# Patient Record
Sex: MALE | Race: WHITE | NOT HISPANIC OR LATINO | Employment: OTHER | URBAN - METROPOLITAN AREA
[De-identification: names, ages, dates, MRNs, and addresses within clinical notes are randomized per-mention and may not be internally consistent; named-entity substitution may affect disease eponyms.]

---

## 2017-05-04 ENCOUNTER — PATIENT MESSAGE (OUTPATIENT)
Dept: DERMATOLOGY | Facility: CLINIC | Age: 72
End: 2017-05-04

## 2017-05-04 ENCOUNTER — PATIENT MESSAGE (OUTPATIENT)
Dept: CARDIOLOGY | Facility: CLINIC | Age: 72
End: 2017-05-04

## 2017-05-04 DIAGNOSIS — Z12.5 SCREENING FOR PROSTATE CANCER: Primary | ICD-10-CM

## 2017-06-21 ENCOUNTER — LAB VISIT (OUTPATIENT)
Dept: LAB | Facility: HOSPITAL | Age: 72
End: 2017-06-21
Attending: INTERNAL MEDICINE
Payer: MEDICARE

## 2017-06-21 DIAGNOSIS — Z12.5 SCREENING FOR PROSTATE CANCER: ICD-10-CM

## 2017-06-21 LAB — COMPLEXED PSA SERPL-MCNC: 1.9 NG/ML

## 2017-06-21 PROCEDURE — 36415 COLL VENOUS BLD VENIPUNCTURE: CPT

## 2017-06-21 PROCEDURE — 84270 ASSAY OF SEX HORMONE GLOBUL: CPT

## 2017-06-21 PROCEDURE — 84153 ASSAY OF PSA TOTAL: CPT

## 2017-06-24 LAB
ALBUMIN SERPL-MCNC: 4 G/DL (ref 3.6–5.1)
SHBG SERPL-SCNC: 22 NMOL/L (ref 22–77)
TESTOST FREE SERPL-MCNC: 65.7 PG/ML (ref 6–73)
TESTOST SERPL-MCNC: 356 NG/DL (ref 250–1100)
TESTOSTERONE.FREE+WB SERPL-MCNC: 120.8 NG/DL (ref 15–150)

## 2017-06-27 ENCOUNTER — OFFICE VISIT (OUTPATIENT)
Dept: CARDIOLOGY | Facility: CLINIC | Age: 72
End: 2017-06-27
Payer: MEDICARE

## 2017-06-27 ENCOUNTER — LAB VISIT (OUTPATIENT)
Dept: LAB | Facility: HOSPITAL | Age: 72
End: 2017-06-27
Payer: MEDICARE

## 2017-06-27 VITALS
DIASTOLIC BLOOD PRESSURE: 77 MMHG | HEIGHT: 73 IN | HEART RATE: 69 BPM | WEIGHT: 257.25 LBS | OXYGEN SATURATION: 96 % | BODY MASS INDEX: 34.09 KG/M2 | SYSTOLIC BLOOD PRESSURE: 151 MMHG

## 2017-06-27 DIAGNOSIS — E78.5 DYSLIPIDEMIA: ICD-10-CM

## 2017-06-27 DIAGNOSIS — R53.83 FATIGUE, UNSPECIFIED TYPE: ICD-10-CM

## 2017-06-27 DIAGNOSIS — R06.2 WHEEZING: Primary | ICD-10-CM

## 2017-06-27 DIAGNOSIS — I25.10 CORONARY ARTERY DISEASE INVOLVING NATIVE CORONARY ARTERY WITHOUT ANGINA PECTORIS, UNSPECIFIED WHETHER NATIVE OR TRANSPLANTED HEART: ICD-10-CM

## 2017-06-27 DIAGNOSIS — Z87.891 HISTORY OF CIGARETTE SMOKING: ICD-10-CM

## 2017-06-27 DIAGNOSIS — I10 ESSENTIAL HYPERTENSION: ICD-10-CM

## 2017-06-27 LAB
ALBUMIN SERPL BCP-MCNC: 3.6 G/DL
ALP SERPL-CCNC: 36 U/L
ALT SERPL W/O P-5'-P-CCNC: 30 U/L
ANION GAP SERPL CALC-SCNC: 8 MMOL/L
AST SERPL-CCNC: 26 U/L
BASOPHILS # BLD AUTO: 0.03 K/UL
BASOPHILS NFR BLD: 0.4 %
BILIRUB SERPL-MCNC: 0.4 MG/DL
BUN SERPL-MCNC: 33 MG/DL
CALCIUM SERPL-MCNC: 10 MG/DL
CHLORIDE SERPL-SCNC: 107 MMOL/L
CHOLEST/HDLC SERPL: 3.5 {RATIO}
CO2 SERPL-SCNC: 22 MMOL/L
CREAT SERPL-MCNC: 1.1 MG/DL
DIFFERENTIAL METHOD: NORMAL
EOSINOPHIL # BLD AUTO: 0.2 K/UL
EOSINOPHIL NFR BLD: 2.8 %
ERYTHROCYTE [DISTWIDTH] IN BLOOD BY AUTOMATED COUNT: 14.2 %
EST. GFR  (AFRICAN AMERICAN): >60 ML/MIN/1.73 M^2
EST. GFR  (NON AFRICAN AMERICAN): >60 ML/MIN/1.73 M^2
GLUCOSE SERPL-MCNC: 105 MG/DL
HCT VFR BLD AUTO: 43.1 %
HDL/CHOLESTEROL RATIO: 28.3 %
HDLC SERPL-MCNC: 184 MG/DL
HDLC SERPL-MCNC: 52 MG/DL
HGB BLD-MCNC: 14.5 G/DL
LDLC SERPL CALC-MCNC: 118.6 MG/DL
LYMPHOCYTES # BLD AUTO: 2 K/UL
LYMPHOCYTES NFR BLD: 26.6 %
MCH RBC QN AUTO: 31 PG
MCHC RBC AUTO-ENTMCNC: 33.6 %
MCV RBC AUTO: 92 FL
MONOCYTES # BLD AUTO: 0.8 K/UL
MONOCYTES NFR BLD: 11.3 %
NEUTROPHILS # BLD AUTO: 4.4 K/UL
NEUTROPHILS NFR BLD: 58.6 %
NONHDLC SERPL-MCNC: 132 MG/DL
PLATELET # BLD AUTO: 182 K/UL
PMV BLD AUTO: 10.8 FL
POTASSIUM SERPL-SCNC: 5 MMOL/L
PROT SERPL-MCNC: 7.5 G/DL
RBC # BLD AUTO: 4.68 M/UL
SODIUM SERPL-SCNC: 137 MMOL/L
TRIGL SERPL-MCNC: 67 MG/DL
TSH SERPL DL<=0.005 MIU/L-ACNC: 1.25 UIU/ML
WBC # BLD AUTO: 7.45 K/UL

## 2017-06-27 PROCEDURE — 85025 COMPLETE CBC W/AUTO DIFF WBC: CPT

## 2017-06-27 PROCEDURE — 99499 UNLISTED E&M SERVICE: CPT | Mod: S$GLB,,, | Performed by: INTERNAL MEDICINE

## 2017-06-27 PROCEDURE — 80053 COMPREHEN METABOLIC PANEL: CPT

## 2017-06-27 PROCEDURE — 1125F AMNT PAIN NOTED PAIN PRSNT: CPT | Mod: S$GLB,,, | Performed by: INTERNAL MEDICINE

## 2017-06-27 PROCEDURE — 84443 ASSAY THYROID STIM HORMONE: CPT

## 2017-06-27 PROCEDURE — 1159F MED LIST DOCD IN RCRD: CPT | Mod: S$GLB,,, | Performed by: INTERNAL MEDICINE

## 2017-06-27 PROCEDURE — 99999 PR PBB SHADOW E&M-EST. PATIENT-LVL III: CPT | Mod: PBBFAC,,, | Performed by: INTERNAL MEDICINE

## 2017-06-27 PROCEDURE — 99215 OFFICE O/P EST HI 40 MIN: CPT | Mod: S$GLB,,, | Performed by: INTERNAL MEDICINE

## 2017-06-27 PROCEDURE — 36415 COLL VENOUS BLD VENIPUNCTURE: CPT

## 2017-06-27 PROCEDURE — 80061 LIPID PANEL: CPT

## 2017-06-27 RX ORDER — LISINOPRIL 10 MG/1
20 TABLET ORAL DAILY
Qty: 90 TABLET | Refills: 0 | Status: SHIPPED | OUTPATIENT
Start: 2017-06-27 | End: 2017-07-24 | Stop reason: DRUGHIGH

## 2017-06-27 NOTE — PROGRESS NOTES
Subjective:    Patient ID:  Jose Fonseca Jr. is a 71 y.o. male who presents for follow-up of Coronary Artery Disease      HPI  Hx of  CAD s/p CABG x3 (1996 - LIMA-OM; LANCE-LAD; SVG-PDA), normal stress echo 6/26/15 . Last angiogram in Oct 2013 significant for patent pLAD stent and LIMA-OM and VG-PDA grafts and known LANCE-LAD occlusion dating back to at least 2004. He has had no symptoms of chest pain or shortness of breath. He reports significant stress in his life. Reports wheezing, generalized fatigue. Has not been exercising as much recently as he has in the past but has lost significant amount of weight due to lifestyle modification. Reports nocturnal leg cramping that improves with hydration and electrolyte replacement.      Review of Systems   Constitution: Positive for malaise/fatigue and weight loss. Negative for weight gain.   HENT: Negative for headaches.    Eyes: Negative for blurred vision, double vision, vision loss in left eye, vision loss in right eye and visual disturbance.   Cardiovascular: Negative for claudication, dyspnea on exertion, irregular heartbeat, leg swelling, near-syncope, orthopnea and palpitations.   Respiratory: Negative for shortness of breath and snoring.    Hematologic/Lymphatic: Does not bruise/bleed easily.   Skin: Negative for rash.   Musculoskeletal: Negative for myalgias.   Gastrointestinal: Negative for bloating, abdominal pain, constipation, diarrhea, hematemesis, hematochezia, melena, nausea and vomiting.   Neurological: Negative for excessive daytime sleepiness, dizziness, light-headedness, loss of balance, numbness and vertigo.   Psychiatric/Behavioral: Negative for altered mental status.        Objective:    Physical Exam   Constitutional: He is oriented to person, place, and time. Vital signs are normal. He appears well-developed and well-nourished.   HENT:   Head: Normocephalic.   Eyes: Conjunctivae and EOM are normal. Pupils are equal, round, and reactive to  light.   Neck: Normal range of motion. Neck supple. No hepatojugular reflux and no JVD present. Carotid bruit is not present. No tracheal deviation present. No thyromegaly present.   Cardiovascular: Normal rate, regular rhythm, normal heart sounds, intact distal pulses and normal pulses.    Pulses:       Carotid pulses are 2+ on the right side, and 2+ on the left side.       Radial pulses are 2+ on the right side, and 2+ on the left side.        Femoral pulses are 2+ on the right side, and 2+ on the left side.       Popliteal pulses are 2+ on the right side, and 2+ on the left side.        Dorsalis pedis pulses are 2+ on the right side, and 2+ on the left side.        Posterior tibial pulses are 2+ on the right side, and 2+ on the left side.   Pulmonary/Chest: Effort normal and breath sounds normal. No respiratory distress.   Abdominal: Soft. Bowel sounds are normal. There is no hepatosplenomegaly.   Musculoskeletal: Normal range of motion.   Neurological: He is alert and oriented to person, place, and time. He has normal strength and normal reflexes.   Skin: Skin is warm and dry.   Psychiatric: He has a normal mood and affect.   Nursing note and vitals reviewed.    Vitals:    06/27/17 0945   BP: (!) 151/77   Pulse: 69         Assessment:       1. Coronary artery disease involving native coronary artery without angina pectoris, unspecified whether native or transplanted heart    2. Essential hypertension    3. Dyslipidemia    4. Fatigue, unspecified type    5. Wheezing    6. History of cigarette smoking         Plan:     1. Coronary artery disease involving native coronary artery without angina pectoris, unspecified whether native or transplanted heart   - Continue ASA/ statin   2. Essential hypertension   - Increase lisinopril to 20 mg daily, D/C beta blocker due to fatigue / wheezing   3. Dyslipidemia   - COntinue lipitor   4. Fatigue, unspecified type   - Check BMP, CBC, TSH, stop beta blocker and monitor  response   5. Wheezing   - hx of smoking, check for COPD c PFTs, D/C beta blocker   6. History of cigarette smoking      RTC in 1 year or PRN    Patient seen and discussed with Dr. Fred Tran MD, MPH  PGY-IV  Cardiovascular Disease Fellow      I have personally taken the history and examined this patient. I have discussed and agree with the resident's findings and plan as documented in the resident's note.  Ken Ibarra

## 2017-06-28 ENCOUNTER — TELEPHONE (OUTPATIENT)
Dept: OTOLARYNGOLOGY | Facility: CLINIC | Age: 72
End: 2017-06-28

## 2017-06-28 ENCOUNTER — PATIENT MESSAGE (OUTPATIENT)
Dept: CARDIOLOGY | Facility: CLINIC | Age: 72
End: 2017-06-28

## 2017-06-28 DIAGNOSIS — I25.10 CORONARY ARTERY DISEASE, ANGINA PRESENCE UNSPECIFIED, UNSPECIFIED VESSEL OR LESION TYPE, UNSPECIFIED WHETHER NATIVE OR TRANSPLANTED HEART: Primary | ICD-10-CM

## 2017-06-28 DIAGNOSIS — E78.5 DYSLIPIDEMIA: ICD-10-CM

## 2017-06-28 NOTE — TELEPHONE ENCOUNTER
Voicemail left informing pt he  Was scheduled with wrong provider regarding a hearing test. Pt rescheduled with the first available audiogram and with NP to go over results. Pt asked to return call to clinic if there is a conflict with new date and time.

## 2017-06-29 RX ORDER — ASPIRIN 81 MG/1
81 TABLET ORAL DAILY
Qty: 30 TABLET | Refills: 11 | Status: SHIPPED | OUTPATIENT
Start: 2017-06-29

## 2017-06-29 RX ORDER — ATORVASTATIN CALCIUM 40 MG/1
40 TABLET, FILM COATED ORAL DAILY
Qty: 30 TABLET | Refills: 11 | Status: SHIPPED | OUTPATIENT
Start: 2017-06-29 | End: 2019-05-23 | Stop reason: SDUPTHER

## 2017-07-15 ENCOUNTER — PATIENT MESSAGE (OUTPATIENT)
Dept: CARDIOLOGY | Facility: CLINIC | Age: 72
End: 2017-07-15

## 2017-07-24 RX ORDER — LISINOPRIL 20 MG/1
40 TABLET ORAL DAILY
COMMUNITY
End: 2018-02-20 | Stop reason: ALTCHOICE

## 2017-07-25 ENCOUNTER — TELEPHONE (OUTPATIENT)
Dept: INTERNAL MEDICINE | Facility: CLINIC | Age: 72
End: 2017-07-25

## 2017-07-25 ENCOUNTER — OFFICE VISIT (OUTPATIENT)
Dept: INTERNAL MEDICINE | Facility: CLINIC | Age: 72
End: 2017-07-25
Payer: MEDICARE

## 2017-07-25 DIAGNOSIS — R53.83 FATIGUE, UNSPECIFIED TYPE: ICD-10-CM

## 2017-07-25 DIAGNOSIS — Z00.00 ROUTINE GENERAL MEDICAL EXAMINATION AT A HEALTH CARE FACILITY: Primary | ICD-10-CM

## 2017-07-25 DIAGNOSIS — E55.9 VITAMIN D DEFICIENCY: Primary | ICD-10-CM

## 2017-07-25 DIAGNOSIS — R79.9 ABNORMAL FINDING OF BLOOD CHEMISTRY: ICD-10-CM

## 2017-07-25 DIAGNOSIS — I25.10 CORONARY ARTERY DISEASE INVOLVING NATIVE CORONARY ARTERY WITHOUT ANGINA PECTORIS, UNSPECIFIED WHETHER NATIVE OR TRANSPLANTED HEART: ICD-10-CM

## 2017-07-25 DIAGNOSIS — E55.9 VITAMIN D DEFICIENCY: ICD-10-CM

## 2017-07-25 PROCEDURE — 99214 OFFICE O/P EST MOD 30 MIN: CPT | Mod: S$GLB,,, | Performed by: INTERNAL MEDICINE

## 2017-07-25 PROCEDURE — 1159F MED LIST DOCD IN RCRD: CPT | Mod: S$GLB,,, | Performed by: INTERNAL MEDICINE

## 2017-07-25 PROCEDURE — 99499 UNLISTED E&M SERVICE: CPT | Mod: S$GLB,,, | Performed by: INTERNAL MEDICINE

## 2017-07-25 PROCEDURE — 99999 PR PBB SHADOW E&M-EST. PATIENT-LVL III: CPT | Mod: PBBFAC,,, | Performed by: INTERNAL MEDICINE

## 2017-07-25 PROCEDURE — 1126F AMNT PAIN NOTED NONE PRSNT: CPT | Mod: S$GLB,,, | Performed by: INTERNAL MEDICINE

## 2017-07-26 ENCOUNTER — PATIENT MESSAGE (OUTPATIENT)
Dept: INTERNAL MEDICINE | Facility: CLINIC | Age: 72
End: 2017-07-26

## 2017-07-27 VITALS
HEART RATE: 80 BPM | WEIGHT: 258.19 LBS | SYSTOLIC BLOOD PRESSURE: 128 MMHG | TEMPERATURE: 98 F | BODY MASS INDEX: 34.06 KG/M2 | DIASTOLIC BLOOD PRESSURE: 64 MMHG

## 2017-07-27 RX ORDER — ASPIRIN 325 MG
50000 TABLET, DELAYED RELEASE (ENTERIC COATED) ORAL
Qty: 8 CAPSULE | Refills: 0 | Status: SHIPPED | OUTPATIENT
Start: 2017-07-27 | End: 2018-03-18 | Stop reason: SDUPTHER

## 2017-07-27 NOTE — PROGRESS NOTES
"Subjective:       Patient ID: Jose Fonseca Jr. is a 71 y.o. male.    Chief Complaint: Annual Exam    HPIVery pleasant gentleman from [place] here for his annual exam. Overall doing well, but he reports feeling fatigued over the last few weeks. He was recently seen by Dr Ibarra in Cardiology and his beta blocker was stopped to see if it made a difference. He still reports feeling "lethargic" and I obtained additional blood work to look into his complaints. I discussed the results of the initial studies that were unremarkable, but the added blood work showed some vitamin D deficiency. I will start him on replacement weekly and see how he does. I also discussed other possible causes of his fatigue including sleep apnea. He is not aware of whether he snores or not, but I suspect he might have some degree of LM and need to r/o. This issue was discussed in the distant past, but he did not tolerate the sleep study and did not complete it. I will schedule it at his convenience and treat accordingly. Meanwhile, I emphasized the importance of proper nutrition, exercise, weight management vis a vis future health issues.  Review of Systems   All other systems reviewed and are negative.      Objective:      Physical Exam   Constitutional: He is oriented to person, place, and time. He appears well-developed and well-nourished. No distress.   HENT:   Head: Normocephalic and atraumatic.   Right Ear: External ear normal.   Left Ear: External ear normal.   Nose: Nose normal.   Mouth/Throat: Oropharynx is clear and moist. No oropharyngeal exudate.   Eyes: Conjunctivae and EOM are normal. Pupils are equal, round, and reactive to light. Right eye exhibits no discharge. Left eye exhibits no discharge. No scleral icterus.   Cataract OS.   Neck: Normal range of motion. Neck supple. No JVD present. No thyromegaly present.   Cardiovascular: Normal rate, regular rhythm, normal heart sounds and intact distal pulses.    No murmur " heard.  Pulmonary/Chest: Effort normal and breath sounds normal. No respiratory distress. He has no wheezes. He exhibits no tenderness.   Abdominal: Soft. Bowel sounds are normal. He exhibits no distension and no mass. There is no tenderness.   Musculoskeletal: Normal range of motion. He exhibits no edema or tenderness.   Lymphadenopathy:     He has no cervical adenopathy.   Neurological: He is alert and oriented to person, place, and time. No cranial nerve deficit. Coordination normal.   Skin: Skin is warm and dry. No rash noted. He is not diaphoretic. No erythema.   Psychiatric: He has a normal mood and affect. His behavior is normal. Judgment and thought content normal.   Nursing note and vitals reviewed.      Assessment:       1. Fatigue, unspecified type    2. Vitamin D deficiency    3. Coronary artery disease involving native coronary artery without angina pectoris, unspecified whether native or transplanted heart    4. Routine general medical examination at a health care facility     5.    R/O LM  Plan:    1. Start vitamin D3 50 K units weekly x 8.         2. Obtain sleep study.         3. Continue with current medications.         4. RTC after above.

## 2017-10-02 ENCOUNTER — HOSPITAL ENCOUNTER (EMERGENCY)
Facility: OTHER | Age: 72
Discharge: HOME | End: 2017-10-02
Attending: EMERGENCY MEDICINE
Payer: MEDICARE

## 2017-10-02 VITALS
HEART RATE: 94 BPM | WEIGHT: 244 LBS | OXYGEN SATURATION: 95 % | BODY MASS INDEX: 32.34 KG/M2 | SYSTOLIC BLOOD PRESSURE: 178 MMHG | HEIGHT: 73 IN | DIASTOLIC BLOOD PRESSURE: 75 MMHG | RESPIRATION RATE: 24 BRPM | TEMPERATURE: 100 F

## 2017-10-02 DIAGNOSIS — M10.9 ACUTE GOUT OF LEFT FOOT, UNSPECIFIED CAUSE: Primary | ICD-10-CM

## 2017-10-02 DIAGNOSIS — R60.9 EDEMA: ICD-10-CM

## 2017-10-02 LAB — URATE SERPL-MCNC: 10.7 MG/DL

## 2017-10-02 PROCEDURE — 84550 ASSAY OF BLOOD/URIC ACID: CPT

## 2017-10-02 PROCEDURE — 99284 EMERGENCY DEPT VISIT MOD MDM: CPT | Mod: 25

## 2017-10-02 PROCEDURE — 96372 THER/PROPH/DIAG INJ SC/IM: CPT

## 2017-10-02 PROCEDURE — 63600175 PHARM REV CODE 636 W HCPCS: Performed by: EMERGENCY MEDICINE

## 2017-10-02 RX ORDER — OXYCODONE AND ACETAMINOPHEN 5; 325 MG/1; MG/1
1 TABLET ORAL EVERY 6 HOURS PRN
Qty: 12 TABLET | Refills: 0 | Status: SHIPPED | OUTPATIENT
Start: 2017-10-02 | End: 2018-06-27

## 2017-10-02 RX ORDER — DEXAMETHASONE SODIUM PHOSPHATE 4 MG/ML
8 INJECTION, SOLUTION INTRA-ARTICULAR; INTRALESIONAL; INTRAMUSCULAR; INTRAVENOUS; SOFT TISSUE
Status: COMPLETED | OUTPATIENT
Start: 2017-10-02 | End: 2017-10-02

## 2017-10-02 RX ORDER — KETOROLAC TROMETHAMINE 30 MG/ML
30 INJECTION, SOLUTION INTRAMUSCULAR; INTRAVENOUS
Status: COMPLETED | OUTPATIENT
Start: 2017-10-02 | End: 2017-10-02

## 2017-10-02 RX ORDER — IBUPROFEN 800 MG/1
800 TABLET ORAL EVERY 6 HOURS PRN
Qty: 12 TABLET | Refills: 0 | Status: ON HOLD | OUTPATIENT
Start: 2017-10-02 | End: 2019-04-22 | Stop reason: HOSPADM

## 2017-10-02 RX ADMIN — KETOROLAC TROMETHAMINE 30 MG: 30 INJECTION, SOLUTION INTRAMUSCULAR at 01:10

## 2017-10-02 RX ADMIN — DEXAMETHASONE SODIUM PHOSPHATE 8 MG: 4 INJECTION, SOLUTION INTRAMUSCULAR; INTRAVENOUS at 01:10

## 2017-10-02 NOTE — ED TRIAGE NOTES
Pt states began having swelling to left foot Friday morning.  Pt states the pain got so bad that he couldn't walk.

## 2017-10-02 NOTE — ED NOTES
Patient Identifiers for Jose Fonseca  checked and correct  LOC: The patient is awake, alert and aware of environment with an appropriate affect, the patient is oriented x 3 and speaking appropriate.  APPEARANCE: Patient resting comfortably and in no acute distress. The patient is clean and well groomed. The patient's clothing is properly fastened.  SKIN: The skin is warm and dry. The patient has normal skin turgor and moist mucus membranes. No rashes or lesions upon observation. Skin Intact , no breakdown noted.  Musculoskeletal :  Normal range of motion noted. Moves all extremities well, swelling noted to left foot  RESPIRATORY: Airway is open and patent, respirations are spontaneous, patient has a normal effort and rate. Breath sounds are clear & equal, bilaterally.  PULSES: 2+ radial & pedal pulses, symmetrical in all extremities.  Will continue to monitor

## 2017-10-02 NOTE — ED PROVIDER NOTES
"Encounter Date: 10/2/2017    SCRIBE #1 NOTE: I, Kasandrache Burton, am scribing for, and in the presence of, Dr. Boothe.       History     Chief Complaint   Patient presents with    Foot Swelling     C/o swelling/pain to left foot x 4 days. Denies any injury. Reports onset of pain when standing from seated position 3 days ago. Denies hx of DVT, gout. Strong pedal pulses noted. Area non-discolored.     Time seen by provider: 12:56 PM    This is a 71 y.o. male who presents with complaint of left foot pain and swelling that has persisted for the past 4 days. The patient reports that his pain began after "sitting incorrectly" at the [facility].  Although his pain is constant, he states that his discomfort fluctuates in severity.  He denies any recent trauma, including falls or heavy lifting.  He mentions no fever, chills, open wounds, color changes, weakness, numbness, or tingling.  The patient states that his discomfort is significantly exacerbated with weight bearing.  Although he attempted to alleviate his discomfort with ice and elevation, he has found no relief.  He denies attempting to alleviate his discomfort with OTC medication because he does not "believe in pain medication." He reports no other identifying, alleviating, or exacerbating factors.  The patient claims that he has had no prior episodes of this symptomology.  He reports history of HTN, HLD, MI, and CAD.  He reports surgical history of CABG.  He denies any recent medication changes.        The history is provided by the patient.     Review of patient's allergies indicates:  No Known Allergies  Past Medical History:   Diagnosis Date    Coronary artery disease     S/P 3 vessel CABG; stents    Glaucoma     HTN (hypertension) 4/19/2013    Hyperlipidemia     Myocardial infarction     Squamous cell carcinoma      Past Surgical History:   Procedure Laterality Date    ADENOIDECTOMY      CATARACT EXTRACTION      patient not sure which eye    CORONARY " ANGIOPLASTY      CORONARY ARTERY BYPASS GRAFT      ROTATOR CUFF REPAIR      SHOULDER SURGERY      TONSILLECTOMY Left 2015     Family History   Problem Relation Age of Onset    Heart disease Father     Hypertension Father     Heart failure Father     Heart attack Father     Diabetes Maternal Grandmother     Diabetes Maternal Grandfather      Social History   Substance Use Topics    Smoking status: Former Smoker     Quit date: 4/24/1991    Smokeless tobacco: Former User    Alcohol use 1.2 oz/week     2 Glasses of wine per week      Comment: Socially     Review of Systems   Constitutional: Negative for chills and fever.   HENT: Negative for congestion and sore throat.    Eyes: Negative for photophobia and redness.   Respiratory: Negative for cough and shortness of breath.    Cardiovascular: Negative for chest pain.   Gastrointestinal: Negative for abdominal pain, nausea and vomiting.   Genitourinary: Negative for dysuria.   Musculoskeletal: Negative for back pain.        Positive for left foot pain and swelling.   Skin: Negative for color change, rash and wound.   Neurological: Negative for weakness, light-headedness, numbness and headaches.        Negative for tingling.    Psychiatric/Behavioral: Negative for confusion.       Physical Exam     Initial Vitals [10/02/17 1234]   BP Pulse Resp Temp SpO2   (!) 178/75 94 (!) 24 100 °F (37.8 °C) 95 %      MAP       109.33         Physical Exam    Nursing note and vitals reviewed.  Constitutional: He appears well-developed and well-nourished. He is not diaphoretic. No distress.   HENT:   Head: Normocephalic and atraumatic.   Mouth/Throat: Oropharynx is clear and moist.   Oropharynx is clear and intact.  Moist mucous membranes.    Eyes: Conjunctivae and EOM are normal. Pupils are equal, round, and reactive to light.   Conjunctiva are pink, clear, and intact.    Neck: Normal range of motion. Neck supple.   Cardiovascular: Normal rate, regular rhythm, S1 normal, S2  normal and normal heart sounds. Exam reveals no gallop and no friction rub.    No murmur heard.  Pulses:       Dorsalis pedis pulses are 2+ on the left side.        Posterior tibial pulses are 2+ on the left side.   Pulmonary/Chest: Breath sounds normal. No respiratory distress. He has no wheezes. He has no rhonchi. He has no rales.   Clear to ascultation bilaterally.   Abdominal: Soft. Bowel sounds are normal. There is no tenderness. There is no rebound and no guarding.   No audible bruits.   Musculoskeletal: Normal range of motion. He exhibits no edema or tenderness.   1+ edema to the left foot. No lower extremity edema. No palpable cords.   Lymphadenopathy:     He has no cervical adenopathy.   Neurological: He is alert and oriented to person, place, and time.   Strength 5/5 to bilateral lower extremities.   Skin: Skin is warm and dry. Capillary refill takes less than 2 seconds. No rash noted. No pallor.   No clinical evidence of infection, cellulitis, or abscess formation. No clinical evidence of septic joint. Capillary refill <2 seconds. Warm and dry. No skin tenting, rashes, or lesions.     Psychiatric: He has a normal mood and affect. His behavior is normal. Judgment and thought content normal.         ED Course   Procedures  Labs Reviewed   URIC ACID - Abnormal; Notable for the following:        Result Value    Uric Acid 10.7 (*)     All other components within normal limits      Imaging Results          X-Ray Foot Complete Left (Final result)  Result time 10/02/17 14:06:32    Final result by Kira Pascual MD (10/02/17 14:06:32)                 Impression:        No acute displaced fracture or dislocation identified.      Electronically signed by: KIRA PASCUAL MD, MD  Date:     10/02/17  Time:    14:06              Narrative:    COMPARISON: None    FINDINGS: 3 views left foot.        Overall alignment is within normal limits.   No acute displaced fracture, dislocation, or destructive osseous process  identified.  Mild degenerative change at the midfoot and first MTP joint.  Small plantar calcaneal spur. Scattered advanced atherosclerotic vascular calcifications noted. A few small linear metallic densities suggesting surgical clips project adjacent to the anterior and medial aspect of the distal tibia. No subcutaneous emphysema.                                   X-Rays:   Independently Interpreted Readings:   Other Readings:  X-Ray Foot Complete Left: No acute fracture or dislocation.    Medical Decision Making:   History:   Old Medical Records: I decided to obtain old medical records.  Independently Interpreted Test(s):   I have ordered and independently interpreted X-rays - see prior notes.  Clinical Tests:   Lab Tests: Ordered and Reviewed  Radiological Study: Ordered and Reviewed            Scribe Attestation:   Scribe #1: I performed the above scribed service and the documentation accurately describes the services I performed. I attest to the accuracy of the note.    Attending Attestation:           Physician Attestation for Scribe:  Physician Attestation Statement for Scribe #1: I, Dr. Boothe, reviewed documentation, as scribed by Kasandra Burton in my presence, and it is both accurate and complete.         Attending ED Notes:   Emergent evaluation a 71-year-old male with nontraumatic right foot pain.  Patient is afebrile, nontoxic-appearing with stable vital signs except for elevation in blood pressure.  Patient is neurovascularly intact without focal neurologic deficits.  No clinical evidence of infection, cellulitis or abscess formation.  No septic joint.  Uric acid level is 10.7.  No acute findings on x-ray.  The patient is extensive the counseled on his diagnosis and treatment including all diagnostic, laboratory and physical exam findings.  The patient is discharged in good condition and directed to follow-up with his PCP in the next 24-48 hours.          ED Course      Clinical Impression:     1.  Acute gout of left foot, unspecified cause    2. Edema                                 Oh Walker MD  10/02/17 0032

## 2018-01-11 ENCOUNTER — PATIENT MESSAGE (OUTPATIENT)
Dept: ORTHOPEDICS | Facility: CLINIC | Age: 73
End: 2018-01-11

## 2018-02-14 ENCOUNTER — OFFICE VISIT (OUTPATIENT)
Dept: URGENT CARE | Facility: CLINIC | Age: 73
End: 2018-02-14
Payer: MEDICARE

## 2018-02-14 VITALS
TEMPERATURE: 100 F | HEART RATE: 83 BPM | SYSTOLIC BLOOD PRESSURE: 142 MMHG | DIASTOLIC BLOOD PRESSURE: 77 MMHG | RESPIRATION RATE: 19 BRPM | HEIGHT: 72 IN | OXYGEN SATURATION: 96 % | BODY MASS INDEX: 33.18 KG/M2 | WEIGHT: 245 LBS

## 2018-02-14 DIAGNOSIS — J18.9 PNEUMONIA DUE TO INFECTIOUS ORGANISM, UNSPECIFIED LATERALITY, UNSPECIFIED PART OF LUNG: ICD-10-CM

## 2018-02-14 DIAGNOSIS — J45.909 REACTIVE AIRWAY DISEASE WITHOUT COMPLICATION, UNSPECIFIED ASTHMA SEVERITY, UNSPECIFIED WHETHER PERSISTENT: ICD-10-CM

## 2018-02-14 DIAGNOSIS — R05.9 COUGH: Primary | ICD-10-CM

## 2018-02-14 LAB
CTP QC/QA: YES
FLUAV AG NPH QL: NEGATIVE
FLUBV AG NPH QL: NEGATIVE

## 2018-02-14 PROCEDURE — 87804 INFLUENZA ASSAY W/OPTIC: CPT | Mod: QW,S$GLB,, | Performed by: FAMILY MEDICINE

## 2018-02-14 PROCEDURE — 3008F BODY MASS INDEX DOCD: CPT | Mod: S$GLB,,, | Performed by: FAMILY MEDICINE

## 2018-02-14 PROCEDURE — 99499 UNLISTED E&M SERVICE: CPT | Mod: S$GLB,,, | Performed by: FAMILY MEDICINE

## 2018-02-14 PROCEDURE — 96372 THER/PROPH/DIAG INJ SC/IM: CPT | Mod: 59,S$GLB,, | Performed by: FAMILY MEDICINE

## 2018-02-14 PROCEDURE — 1159F MED LIST DOCD IN RCRD: CPT | Mod: S$GLB,,, | Performed by: FAMILY MEDICINE

## 2018-02-14 PROCEDURE — 94640 AIRWAY INHALATION TREATMENT: CPT | Mod: S$GLB,,, | Performed by: FAMILY MEDICINE

## 2018-02-14 PROCEDURE — 99215 OFFICE O/P EST HI 40 MIN: CPT | Mod: 25,S$GLB,, | Performed by: FAMILY MEDICINE

## 2018-02-14 RX ORDER — ALBUTEROL SULFATE 0.83 MG/ML
2.5 SOLUTION RESPIRATORY (INHALATION) EVERY 4 HOURS PRN
Qty: 1 BOX | Refills: 0 | Status: SHIPPED | OUTPATIENT
Start: 2018-02-14 | End: 2018-03-20

## 2018-02-14 RX ORDER — AZITHROMYCIN 250 MG/1
TABLET, FILM COATED ORAL
Qty: 6 TABLET | Refills: 0 | Status: SHIPPED | OUTPATIENT
Start: 2018-02-14 | End: 2018-02-20 | Stop reason: ALTCHOICE

## 2018-02-14 RX ORDER — ALBUTEROL SULFATE 0.83 MG/ML
2.5 SOLUTION RESPIRATORY (INHALATION)
Status: COMPLETED | OUTPATIENT
Start: 2018-02-14 | End: 2018-02-14

## 2018-02-14 RX ORDER — PREDNISONE 20 MG/1
40 TABLET ORAL DAILY
Qty: 10 TABLET | Refills: 0 | Status: SHIPPED | OUTPATIENT
Start: 2018-02-14 | End: 2018-02-19

## 2018-02-14 RX ORDER — CEFTRIAXONE 1 G/1
1 INJECTION, POWDER, FOR SOLUTION INTRAMUSCULAR; INTRAVENOUS
Status: COMPLETED | OUTPATIENT
Start: 2018-02-14 | End: 2018-02-14

## 2018-02-14 RX ORDER — BETAMETHASONE SODIUM PHOSPHATE AND BETAMETHASONE ACETATE 3; 3 MG/ML; MG/ML
9 INJECTION, SUSPENSION INTRA-ARTICULAR; INTRALESIONAL; INTRAMUSCULAR; SOFT TISSUE ONCE
Status: COMPLETED | OUTPATIENT
Start: 2018-02-14 | End: 2018-02-14

## 2018-02-14 RX ADMIN — BETAMETHASONE SODIUM PHOSPHATE AND BETAMETHASONE ACETATE 9 MG: 3; 3 INJECTION, SUSPENSION INTRA-ARTICULAR; INTRALESIONAL; INTRAMUSCULAR; SOFT TISSUE at 03:02

## 2018-02-14 RX ADMIN — CEFTRIAXONE 1 G: 1 INJECTION, POWDER, FOR SOLUTION INTRAMUSCULAR; INTRAVENOUS at 03:02

## 2018-02-14 RX ADMIN — ALBUTEROL SULFATE 2.5 MG: 0.83 SOLUTION RESPIRATORY (INHALATION) at 02:02

## 2018-02-14 NOTE — PATIENT INSTRUCTIONS
Pneumonia (Adult)  Pneumonia is an infection deep within the lungs. It is in the small air sacs (alveoli). Pneumonia may be caused by a virus or bacteria. Pneumonia caused by bacteria is usually treated with an antibiotic. Severe cases may need to be treated in the hospital. Milder cases can be treated at home. Symptoms usually start to get better during the first 2 days of treatment.    Home care  Follow these guidelines when caring for yourself at home:  · Rest at home for the first 2 to 3 days, or until you feel stronger. Dont let yourself get overly tired when you go back to your activities.  · Stay away from cigarette smoke - yours or other peoples.  · You may use acetaminophen or ibuprofen to control fever or pain, unless another medicine was prescribed. If you have chronic liver or kidney disease, talk with your healthcare provider before using these medicines. Also talk with your provider if youve had a stomach ulcer or gastrointestinal bleeding. Dont give aspirin to anyone younger than 18 years of age who is ill with a fever. It may cause severe liver damage.  · Your appetite may be poor, so a light diet is fine.  · Drink 6 to 8 glasses of fluids every day to make sure you are getting enough fluids. Beverages can include water, sport drinks, sodas without caffeine, juices, tea, or soup. Fluids will help loosen secretions in the lung. This will make it easier for you to cough up the phlegm (sputum). If you also have heart or kidney disease, check with your healthcare provider before you drink extra fluids.  · Take antibiotic medicine prescribed until it is all gone, even if you are feeling better after a few days.  Follow-up care  Follow up with your healthcare provider in the next 2 to 3 days, or as advised. This is to be sure the medicine is helping you get better.  If you are 65 or older, you should get a pneumococcal vaccine and a yearly flu (influenza) shot. You should also get these vaccines if  you have chronic lung disease like asthma, emphysema, or COPD. Recently, a second type of pneumonia vaccine has become available for everyone over 65 years old. This is in addition to the previous vaccine. Ask your provider about this.  When to seek medical advice  Call your healthcare provider right away if any of these occur:  · You dont get better within the first 48 hours of treatment  · Shortness of breath gets worse  · Rapid breathing (more than 25 breaths per minute)  · Coughing up blood  · Chest pain gets worse with breathing  · Fever of 100.4°F (38°C) or higher that doesnt get better with fever medicine  · Weakness, dizziness, or fainting that gets worse  · Thirst or dry mouth that gets worse  · Sinus pain, headache, or a stiff neck  · Chest pain not caused by coughing  Date Last Reviewed: 1/1/2017  © 8175-1043 [employer]. [address] [place] [address]. All rights reserved. This information is not intended as a substitute for professional medical care. Always follow your healthcare professional's instructions.        Bronchospasm (Adult)    Bronchospasm occurs when the airways (bronchial tubes) go into spasm and contract. This makes it hard to breathe and causes wheezing (a high-pitched whistling sound). Bronchospasm can also cause frequent coughing without wheezing.  Bronchospasm is due to irritation, inflammation, or allergic reaction of the airways. People with asthma get bronchospasm. However, not everyone with bronchospasm has asthma.  Being exposed to harmful fumes, a recent case of bronchitis, exercise, or a flare-up of chronic obstructive pulmonary disease (COPD) may cause the airways to spasm. An episode of bronchospasm may last 7 to 14 days. Medicine may be prescribed to relax the airways and prevent wheezing. Antibiotics will be prescribed only if your healthcare provider thinks there is a bacterial infection. Antibiotics do not help a viral infection.  Home  care  · Drink lots of water or other fluids (at least 10 glasses a day) during an attack. This will loosen lung secretions and make it easier to breathe. If you have heart or kidney disease, check with your doctor before you drink extra fluids.  · Take prescribed medicine exactly at the times advised. If you take an inhaled medicine to help with breathing, do not use it more than once every 4 hours, unless told to do so. If prescribed an antibiotic or prednisone, take all of the medicine, even if you are feeling better after a few days.  · Do not smoke. Also avoid being exposed to secondhand smoke.  · If you were given an inhaler, use it exactly as directed. If you need to use it more often than prescribed, your condition may be getting worse. Contact your healthcare provider.  Follow-up care  Follow up with your healthcare provider, or as advised.  Note: If you are age 65 or older, have a chronic lung disease or condition that affects your immune system, or you smoke, we recommend getting pneumococcal vaccinations, as well as an influenza vaccination (flu shot) every autumn. Ask your healthcare provider about this.  When to seek medical advice  Call your healthcare provider right away if any of these occur:  · You need to use your inhalers more often than usual.  · You develop a fever of 100.4°F (38°C) or higher.  · You are coughing up lots of dark-colored sputum (mucus).  · You do not start to improve within 24 hours.  Call 911, or get immediate medical care  Contact emergency services if any of these occur:  · Coughing up bloody sputum (mucus)  · Chest pain with each breath  · Increased wheezing or shortness of breath  Date Last Reviewed: 9/13/2015 © 2000-2017 [employer]. [address] [place] [address]. All rights reserved. This information is not intended as a substitute for professional medical care. Always follow your healthcare professional's instructions.      APPT WITH YOUR PCP  NEXT WEEK      Make sure that you follow up with your primary care doctor in the next 2-5 days if needed .  Return to the Urgent Care if signs or symptoms change and certainly if you have worsening symptoms go to the nearest emergency department for further evaluation.

## 2018-02-14 NOTE — PROGRESS NOTES
Subjective:       Patient ID: Jose Fonseca Jr. is a 72 y.o. male.    Vitals:  height is 6' (1.829 m) and weight is 111.1 kg (245 lb). His oral temperature is 100.1 °F (37.8 °C). His blood pressure is 142/77 (abnormal) and his pulse is 83. His respiration is 19 and oxygen saturation is 96%.     Chief Complaint: Cough    Patient with cough x 4 weeks. Patient stating he had a cough with fever, chills, and body aches. Patient also with wheezing. All of these symptoms began in January 2018. Patient has been using an Ventolin inhaler and mucinex and antibiotic as prescribed by his daughter. Patient also just finished a 7 day course of antibiotics that was prescribed to him by N.P. Patient stating his cough has been fairly consistent since January, although worse past 48 hours. Patient stating that fever and headache returned yesterday. Patient with fatigue      Cough   This is a chronic problem. The current episode started more than 1 month ago. The problem has been unchanged. Associated symptoms include shortness of breath and wheezing. Pertinent negatives include no chest pain, chills, ear pain, eye redness, fever, headaches, myalgias or sore throat.     Review of Systems   Constitution: Positive for malaise/fatigue. Negative for chills and fever.   HENT: Positive for congestion. Negative for ear pain, hoarse voice and sore throat.    Eyes: Negative for discharge and redness.   Cardiovascular: Negative for chest pain, dyspnea on exertion and leg swelling.   Respiratory: Positive for cough, shortness of breath and wheezing. Negative for sputum production.    Musculoskeletal: Negative for myalgias.   Gastrointestinal: Negative for abdominal pain and nausea.   Neurological: Negative for headaches.       Results for orders placed or performed in visit on 02/14/18   POCT Influenza A/B   Result Value Ref Range    Rapid Influenza A Ag Negative Negative    Rapid Influenza B Ag Negative Negative      Acceptable Yes      CXR: Findings suggesting mild pulmonary edema/CHF pattern without focal consolidation. Interstitial pneumonia not excluded. Followup as warranted.  Right costophrenic angle minimal blunting suggesting pleural thickening/scarring versus trace pleural effusion.    Objective:      Physical Exam   Constitutional: He is oriented to person, place, and time. He appears well-developed and well-nourished. He is cooperative.  Non-toxic appearance. He does not appear ill. No distress.   HENT:   Head: Normocephalic and atraumatic.   Right Ear: Hearing, tympanic membrane, external ear and ear canal normal.   Left Ear: Hearing, tympanic membrane, external ear and ear canal normal.   Nose: Nose normal. No mucosal edema, rhinorrhea or nasal deformity. No epistaxis. Right sinus exhibits no maxillary sinus tenderness and no frontal sinus tenderness. Left sinus exhibits no maxillary sinus tenderness and no frontal sinus tenderness.   Mouth/Throat: Uvula is midline, oropharynx is clear and moist and mucous membranes are normal. No trismus in the jaw. Normal dentition. No uvula swelling. No oropharyngeal exudate or posterior oropharyngeal erythema.   Eyes: Conjunctivae and lids are normal. No scleral icterus.   Sclera clear bilat   Neck: Trachea normal, full passive range of motion without pain and phonation normal. Neck supple.   Cardiovascular: Normal rate, regular rhythm, normal heart sounds, intact distal pulses and normal pulses.    Pulmonary/Chest: Effort normal. No respiratory distress. He has wheezes. He has no rales.   No distress.  Expiratory wheezing noted.  This improved after alb neb.  No rales.  Extrem with no edema.   Abdominal: Soft. Normal appearance and bowel sounds are normal. He exhibits no distension. There is no tenderness.   Musculoskeletal: Normal range of motion. He exhibits no edema or deformity.   Neurological: He is alert and oriented to person, place, and time. He exhibits normal muscle  tone. Coordination normal.   Skin: Skin is warm, dry and intact. He is not diaphoretic. No pallor.   Psychiatric: He has a normal mood and affect. His speech is normal and behavior is normal. Judgment and thought content normal. Cognition and memory are normal.   Nursing note and vitals reviewed.      Assessment:       1. Cough    2. Pneumonia due to infectious organism, unspecified laterality, unspecified part of lung    3. Reactive airway disease without complication, unspecified asthma severity, unspecified whether persistent        Plan:         Cough  -     POCT Influenza A/B  -     albuterol nebulizer solution 2.5 mg; Take 3 mLs (2.5 mg total) by nebulization one time.  -     X-Ray Chest PA And Lateral    Pneumonia due to infectious organism, unspecified laterality, unspecified part of lung  -     betamethasone acetate-betamethasone sodium phosphate injection 9 mg; Inject 1.5 mLs (9 mg total) into the muscle once.  -     cefTRIAXone injection 1 g; Inject 1 g into the muscle one time.  -     azithromycin (Z-KEELEY) 250 MG tablet; Take 2 tablets by mouth on day 1; Take 1 tablet by mouth on days 2-5  Dispense: 6 tablet; Refill: 0    Reactive airway disease without complication, unspecified asthma severity, unspecified whether persistent  -     predniSONE (DELTASONE) 20 MG tablet; Take 2 tablets (40 mg total) by mouth once daily.  Dispense: 10 tablet; Refill: 0  -     albuterol (PROVENTIL) 2.5 mg /3 mL (0.083 %) nebulizer solution; Take 3 mLs (2.5 mg total) by nebulization every 4 (four) hours as needed for Wheezing. Rescue  Dispense: 1 Box; Refill: 0    APPT WITH YOUR PCP NEXT WEEK      Make sure that you follow up with your primary care doctor in the next 2-5 days if needed .  Return to the Urgent Care if signs or symptoms change and certainly if you have worsening symptoms go to the nearest emergency department for further evaluation.

## 2018-02-15 ENCOUNTER — PATIENT MESSAGE (OUTPATIENT)
Dept: CARDIOLOGY | Facility: CLINIC | Age: 73
End: 2018-02-15

## 2018-02-18 ENCOUNTER — PATIENT MESSAGE (OUTPATIENT)
Dept: INTERNAL MEDICINE | Facility: CLINIC | Age: 73
End: 2018-02-18

## 2018-02-19 ENCOUNTER — TELEPHONE (OUTPATIENT)
Dept: INFECTIOUS DISEASES | Facility: CLINIC | Age: 73
End: 2018-02-19

## 2018-02-19 DIAGNOSIS — J40 BRONCHITIS: Primary | ICD-10-CM

## 2018-02-20 ENCOUNTER — HOSPITAL ENCOUNTER (OUTPATIENT)
Dept: PULMONOLOGY | Facility: CLINIC | Age: 73
Discharge: HOME | End: 2018-02-20
Payer: MEDICARE

## 2018-02-20 ENCOUNTER — OFFICE VISIT (OUTPATIENT)
Dept: PULMONOLOGY | Facility: CLINIC | Age: 73
End: 2018-02-20
Payer: MEDICARE

## 2018-02-20 ENCOUNTER — PATIENT MESSAGE (OUTPATIENT)
Dept: INTERNAL MEDICINE | Facility: CLINIC | Age: 73
End: 2018-02-20

## 2018-02-20 VITALS
SYSTOLIC BLOOD PRESSURE: 138 MMHG | OXYGEN SATURATION: 95 % | HEART RATE: 82 BPM | WEIGHT: 257 LBS | DIASTOLIC BLOOD PRESSURE: 78 MMHG | BODY MASS INDEX: 35.98 KG/M2 | HEIGHT: 71 IN

## 2018-02-20 DIAGNOSIS — R06.2 WHEEZING: ICD-10-CM

## 2018-02-20 DIAGNOSIS — R06.89 DYSPNEA AND RESPIRATORY ABNORMALITY: Primary | ICD-10-CM

## 2018-02-20 DIAGNOSIS — J40 BRONCHITIS: ICD-10-CM

## 2018-02-20 DIAGNOSIS — R06.09 DOE (DYSPNEA ON EXERTION): Primary | ICD-10-CM

## 2018-02-20 DIAGNOSIS — I50.22 CHRONIC SYSTOLIC CONGESTIVE HEART FAILURE: ICD-10-CM

## 2018-02-20 DIAGNOSIS — R06.00 DYSPNEA AND RESPIRATORY ABNORMALITY: Primary | ICD-10-CM

## 2018-02-20 LAB
PRE FEV1 FVC: 70
PRE FEV1: 1.97
PRE FVC: 2.82
PREDICTED FEV1 FVC: 78
PREDICTED FEV1: 3.39
PREDICTED FVC: 4.28

## 2018-02-20 PROCEDURE — 99204 OFFICE O/P NEW MOD 45 MIN: CPT | Mod: S$GLB,,, | Performed by: INTERNAL MEDICINE

## 2018-02-20 PROCEDURE — 3008F BODY MASS INDEX DOCD: CPT | Mod: S$GLB,,, | Performed by: INTERNAL MEDICINE

## 2018-02-20 PROCEDURE — 99999 PR PBB SHADOW E&M-EST. PATIENT-LVL III: CPT | Mod: PBBFAC,,, | Performed by: INTERNAL MEDICINE

## 2018-02-20 PROCEDURE — 1126F AMNT PAIN NOTED NONE PRSNT: CPT | Mod: S$GLB,,, | Performed by: INTERNAL MEDICINE

## 2018-02-20 PROCEDURE — 94729 DIFFUSING CAPACITY: CPT | Mod: S$GLB,,, | Performed by: INTERNAL MEDICINE

## 2018-02-20 PROCEDURE — 99499 UNLISTED E&M SERVICE: CPT | Mod: S$GLB,,, | Performed by: INTERNAL MEDICINE

## 2018-02-20 PROCEDURE — 1159F MED LIST DOCD IN RCRD: CPT | Mod: S$GLB,,, | Performed by: INTERNAL MEDICINE

## 2018-02-20 PROCEDURE — 94010 BREATHING CAPACITY TEST: CPT | Mod: S$GLB,,, | Performed by: INTERNAL MEDICINE

## 2018-02-20 RX ORDER — ALBUTEROL SULFATE 90 UG/1
2 AEROSOL, METERED RESPIRATORY (INHALATION) 3 TIMES DAILY PRN
COMMUNITY
Start: 2018-02-17 | End: 2018-03-20

## 2018-02-20 RX ORDER — FUROSEMIDE 40 MG/1
40 TABLET ORAL DAILY
Qty: 30 TABLET | Refills: 11 | Status: SHIPPED | OUTPATIENT
Start: 2018-02-20 | End: 2019-02-05 | Stop reason: SDUPTHER

## 2018-02-20 RX ORDER — ALLOPURINOL 300 MG/1
1 TABLET ORAL NIGHTLY
COMMUNITY
Start: 2018-01-20 | End: 2019-08-19 | Stop reason: SDUPTHER

## 2018-02-20 RX ORDER — LISINOPRIL 40 MG/1
1 TABLET ORAL DAILY
COMMUNITY
Start: 2018-02-12 | End: 2018-08-28 | Stop reason: SDUPTHER

## 2018-02-20 NOTE — LETTER
February 21, 2018      Vasyl Morris MD  [address] [facility] [address]           [facility] Pulmonary [facility]  [address]  [facility] [address]  Phone: [phone]          Patient: Jose Fonseca Jr.   MR Number: [id number]   YOB: 1945   Date of Visit: 2/20/2018       Dear Dr. Vasyl Morris:    Thank you for referring Jose Fonseca to me for evaluation. Attached you will find relevant portions of my assessment and plan of care.    If you have questions, please do not hesitate to call me. I look forward to following Jose Fonseca along with you.    Sincerely,    Darell Olivia MD    Enclosure  CC:  No Recipients    If you would like to receive this communication electronically, please contact [email or web address] or [phone] to request more information on [employer] access.    For providers and/or their staff who would like to refer a patient to [facility], please contact us through our one-stop-shop provider referral line, [facility], at [phone].    If you feel you have received this communication in error or would no longer like to receive these types of communications, please e-mail [email or web address]

## 2018-02-20 NOTE — PROGRESS NOTES
Subjective:       Patient ID: Jose Fonseca Jr. is a 72 y.o. male.    Chief Complaint: Cough and Shortness of Breath    HPI  73 yo male with hx of S/P CABG and later two stents comes for assessment of exertional dyspnea and awareness of wheezing after having a URI 10 days ago. Estefani Salazar Friday night but stayed in after that and ultimately went to an urgent care for cough and congestion. He had a chest x-ray. ?of slight increased interstitial markings and mild cardiomegaly, S/P sternotomy. No effusion. PFT's today show mild restriction with a decreased DLCO.  Review of Systems   Constitutional: Negative.    HENT: Negative.    Eyes: Negative.    Respiratory: Positive for cough and dyspnea on extertion.    Cardiovascular: Negative.         S/P CABG and later two stents   Genitourinary: Negative.    Musculoskeletal: Negative.         Rotator cuff surgery   Skin: Negative.    Gastrointestinal: Negative.    Neurological: Negative.    Psychiatric/Behavioral: Negative.        Objective:      Physical Exam   Constitutional: He is oriented to person, place, and time. He appears well-developed and well-nourished.   Obese  BMI 34   HENT:   Head: Normocephalic and atraumatic.   Right Ear: External ear normal.   Left Ear: External ear normal.   Eyes: Conjunctivae and EOM are normal. Pupils are equal, round, and reactive to light.   Neck: Normal range of motion. Neck supple.   Cardiovascular: Normal rate, regular rhythm and normal heart sounds.    2/6 apical systolic murmur  Radiating into the axilla.    No prior physician's exam reports a heart murmur.  This is  Either new  Or they can't  Hear.   Pulmonary/Chest: Effort normal and breath sounds normal.   PFT's mild restriction. No wheezes    Sa02: 97/pulse 77 and after walking the length of the milian and back 92% and 103 pulse.   Abdominal: Soft. Bowel sounds are normal.   Musculoskeletal: Normal range of motion. He exhibits no edema.   Neurological: He is alert and  oriented to person, place, and time. He has normal reflexes.   Skin: Skin is warm and dry.   Psychiatric: He has a normal mood and affect. His behavior is normal. Judgment and thought content normal.         Assessment:       No diagnosis found.    Outpatient Encounter Prescriptions as of 2018   Medication Sig Dispense Refill    albuterol (PROVENTIL) 2.5 mg /3 mL (0.083 %) nebulizer solution Take 3 mLs (2.5 mg total) by nebulization every 4 (four) hours as needed for Wheezing. Rescue 1 Box 0    allopurinol (ZYLOPRIM) 300 MG tablet Take 1 tablet by mouth once daily.      aspirin (ECOTRIN) 81 MG EC tablet Take 1 tablet (81 mg total) by mouth once daily. 30 tablet 11    atorvastatin (LIPITOR) 40 MG tablet Take 1 tablet (40 mg total) by mouth once daily. 30 tablet 11    cholecalciferol, vitamin D3, 50,000 unit capsule Take 1 capsule (50,000 Units total) by mouth every 7 days. 8 capsule 0    etodolac (LODINE) 400 MG tablet 400 mg 2 (two) times daily.       ibuprofen (ADVIL,MOTRIN) 800 MG tablet Take 1 tablet (800 mg total) by mouth every 6 (six) hours as needed for Pain. 12 tablet 0    lisinopril (PRINIVIL,ZESTRIL) 40 MG tablet Take 1 tablet by mouth once daily.      oxycodone-acetaminophen (PERCOCET) 5-325 mg per tablet Take 1 tablet by mouth every 6 (six) hours as needed for Pain. 12 tablet 0    [] predniSONE (DELTASONE) 20 MG tablet Take 2 tablets (40 mg total) by mouth once daily. 10 tablet 0    VENTOLIN HFA 90 mcg/actuation inhaler 2 puffs 3 (three) times daily as needed.      VIAGRA 100 mg tablet TK 1 T PO QD PRF ERECTILE DYSFUNCTION  11    [DISCONTINUED] azithromycin (Z-KEELEY) 250 MG tablet Take 2 tablets by mouth on day 1; Take 1 tablet by mouth on days 2-5 6 tablet 0    [DISCONTINUED] lisinopril (PRINIVIL,ZESTRIL) 20 MG tablet Take 40 mg by mouth once daily.        No facility-administered encounter medications on file as of 2018.      No orders of the defined types were placed in  this encounter.    Plan:         BNP: 367 , New apical murmur compatible with mitral valve regurgitation  Will  Give 40 mg of Lasix qod and put in touch with Dr. Ibarra; [facility]

## 2018-02-21 ENCOUNTER — PATIENT MESSAGE (OUTPATIENT)
Dept: PULMONOLOGY | Facility: CLINIC | Age: 73
End: 2018-02-21

## 2018-02-23 ENCOUNTER — PATIENT MESSAGE (OUTPATIENT)
Dept: CARDIOLOGY | Facility: CLINIC | Age: 73
End: 2018-02-23

## 2018-02-23 DIAGNOSIS — I50.9 CONGESTIVE HEART FAILURE, UNSPECIFIED CONGESTIVE HEART FAILURE CHRONICITY, UNSPECIFIED CONGESTIVE HEART FAILURE TYPE: Primary | ICD-10-CM

## 2018-02-27 NOTE — PROGRESS NOTES
Patient, Jose Fonseca Jr. (MRN #[id number]), presented with a recorded BMI of 35.84 kg/m^2 and a documented comorbidity(s):  - Atrial Fibrillation  to which the severe obesity is a contributing factor. This is consistent with the definition of severe obesity (BMI 35.0-35.9) with comorbidity (ICD-10 E66.01, Z68.35). The patient's severe obesity was monitored, evaluated, addressed and/or treated. This addendum to the medical record is made on 02/27/2018.

## 2018-03-07 ENCOUNTER — HOSPITAL ENCOUNTER (OUTPATIENT)
Dept: CARDIOLOGY | Facility: CLINIC | Age: 73
Discharge: HOME | End: 2018-03-07
Attending: INTERNAL MEDICINE
Payer: MEDICARE

## 2018-03-07 DIAGNOSIS — I50.9 CONGESTIVE HEART FAILURE, UNSPECIFIED CONGESTIVE HEART FAILURE CHRONICITY, UNSPECIFIED CONGESTIVE HEART FAILURE TYPE: ICD-10-CM

## 2018-03-07 LAB
AORTIC VALVE REGURGITATION: NORMAL
ESTIMATED PA SYSTOLIC PRESSURE: 29.21
MITRAL VALVE MOBILITY: NORMAL
MITRAL VALVE REGURGITATION: NORMAL
RETIRED EF AND QEF - SEE NOTES: 55 (ref 55–65)
TRICUSPID VALVE REGURGITATION: NORMAL

## 2018-03-07 PROCEDURE — 93306 TTE W/DOPPLER COMPLETE: CPT | Mod: S$GLB,,, | Performed by: INTERNAL MEDICINE

## 2018-03-09 ENCOUNTER — TELEPHONE (OUTPATIENT)
Dept: CARDIOLOGY | Facility: CLINIC | Age: 73
End: 2018-03-09

## 2018-03-09 DIAGNOSIS — I50.9 CONGESTIVE HEART FAILURE, UNSPECIFIED CONGESTIVE HEART FAILURE CHRONICITY, UNSPECIFIED CONGESTIVE HEART FAILURE TYPE: ICD-10-CM

## 2018-03-09 DIAGNOSIS — I25.10 CORONARY ARTERY DISEASE, ANGINA PRESENCE UNSPECIFIED, UNSPECIFIED VESSEL OR LESION TYPE, UNSPECIFIED WHETHER NATIVE OR TRANSPLANTED HEART: Primary | ICD-10-CM

## 2018-03-12 ENCOUNTER — DOCUMENTATION ONLY (OUTPATIENT)
Dept: CARDIOLOGY | Facility: CLINIC | Age: 73
End: 2018-03-12

## 2018-03-12 NOTE — PROGRESS NOTES
Cardiac PET stress test cancelled due to claustrophobia. Pt willing to attempt again with anti-anxiety meds. Will notify Dr. العلي's office. Pt instructed to contact physician.

## 2018-03-13 ENCOUNTER — PATIENT MESSAGE (OUTPATIENT)
Dept: CARDIOLOGY | Facility: CLINIC | Age: 73
End: 2018-03-13

## 2018-03-15 DIAGNOSIS — I25.10 CORONARY ARTERY DISEASE, ANGINA PRESENCE UNSPECIFIED, UNSPECIFIED VESSEL OR LESION TYPE, UNSPECIFIED WHETHER NATIVE OR TRANSPLANTED HEART: Primary | ICD-10-CM

## 2018-03-18 DIAGNOSIS — E55.9 VITAMIN D DEFICIENCY: ICD-10-CM

## 2018-03-19 RX ORDER — CHOLECALCIFEROL (VITAMIN D3) 1250 MCG
CAPSULE ORAL
Qty: 8 CAPSULE | Refills: 0 | Status: SHIPPED | OUTPATIENT
Start: 2018-03-19 | End: 2019-01-30

## 2018-03-20 ENCOUNTER — DOCUMENTATION ONLY (OUTPATIENT)
Dept: CARDIOLOGY | Facility: CLINIC | Age: 73
End: 2018-03-20

## 2018-03-20 ENCOUNTER — HOSPITAL ENCOUNTER (OUTPATIENT)
Dept: CARDIOLOGY | Facility: CLINIC | Age: 73
Discharge: HOME | End: 2018-03-20
Attending: INTERNAL MEDICINE
Payer: MEDICARE

## 2018-03-20 DIAGNOSIS — I25.10 CORONARY ARTERY DISEASE, ANGINA PRESENCE UNSPECIFIED, UNSPECIFIED VESSEL OR LESION TYPE, UNSPECIFIED WHETHER NATIVE OR TRANSPLANTED HEART: ICD-10-CM

## 2018-03-20 LAB
DIASTOLIC DYSFUNCTION: NO
RETIRED EF AND QEF - SEE NOTES: 50 (ref 55–65)

## 2018-03-20 PROCEDURE — 93321 DOPPLER ECHO F-UP/LMTD STD: CPT | Mod: S$GLB,,, | Performed by: INTERNAL MEDICINE

## 2018-03-20 PROCEDURE — 96372 THER/PROPH/DIAG INJ SC/IM: CPT | Mod: 59,S$GLB,, | Performed by: INTERNAL MEDICINE

## 2018-03-20 PROCEDURE — 93351 STRESS TTE COMPLETE: CPT | Mod: S$GLB,,, | Performed by: INTERNAL MEDICINE

## 2018-03-20 NOTE — PROGRESS NOTES
Patient verified by 2 identifiers and allergies reviewed.  22g IV placed to Rt FA.  DSE explained to patient, consent obtained & testing completed.  Pt tolerated testing well. IV removed post testing.  Post study discharge instructions reviewed with patient, patient verbalized understanding.  Patient discharged to home in stable condition.

## 2018-03-22 ENCOUNTER — PATIENT MESSAGE (OUTPATIENT)
Dept: CARDIOLOGY | Facility: CLINIC | Age: 73
End: 2018-03-22

## 2018-03-27 ENCOUNTER — PATIENT MESSAGE (OUTPATIENT)
Dept: CARDIOLOGY | Facility: CLINIC | Age: 73
End: 2018-03-27

## 2018-05-10 ENCOUNTER — PATIENT MESSAGE (OUTPATIENT)
Dept: PULMONOLOGY | Facility: CLINIC | Age: 73
End: 2018-05-10

## 2018-06-13 ENCOUNTER — PES CALL (OUTPATIENT)
Dept: ADMINISTRATIVE | Facility: CLINIC | Age: 73
End: 2018-06-13

## 2018-06-24 ENCOUNTER — PATIENT MESSAGE (OUTPATIENT)
Dept: CARDIOLOGY | Facility: CLINIC | Age: 73
End: 2018-06-24

## 2018-06-25 ENCOUNTER — PATIENT MESSAGE (OUTPATIENT)
Dept: INTERNAL MEDICINE | Facility: CLINIC | Age: 73
End: 2018-06-25

## 2018-06-25 ENCOUNTER — TELEPHONE (OUTPATIENT)
Dept: UROLOGY | Facility: CLINIC | Age: 73
End: 2018-06-25

## 2018-06-25 NOTE — TELEPHONE ENCOUNTER
Hi, At the anus there is a hard,swollen lump. My self diagnosis via [employer]  is a hemroid  or something else. So I thought it was a good idea to see a physician. I am 72 and it has been some time since I had  colonoscopy. Does this help?    ----- Message -----   From: Nurse Silvia SHAFFER   Sent: 6/25/2018  1:45 PM CDT   To: Jose Fonseca Jr.   Subject: RE: Appointment Request   Can you tell me more about the issue you are having? Dr. Jose does not see rectal swelling-that would be seen in our  colon and rectal department . He does see enlarged prostate and urinary issues.       ----- Message -----      From: Jose Fonseca Jr.      Sent: 6/24/2018  1:22 PM CDT        To: Patient Appointment Schedule Request Mailing List   Subject: Appointment Request      Appointment Request From: Jose Fonseca Jr.      With Provider: John Jose MD [[facility] - Urology 4th Floor]      Would Accept With:Only the person I've selected      Preferred Date Range: From 6/24/2018 To 7/13/2018      Preferred Times: Any      Reason for visit: Request an Appt      Comments:

## 2018-06-27 ENCOUNTER — OFFICE VISIT (OUTPATIENT)
Dept: SURGERY | Facility: CLINIC | Age: 73
End: 2018-06-27
Payer: MEDICARE

## 2018-06-27 VITALS
BODY MASS INDEX: 35.49 KG/M2 | HEART RATE: 85 BPM | HEIGHT: 72 IN | DIASTOLIC BLOOD PRESSURE: 65 MMHG | SYSTOLIC BLOOD PRESSURE: 161 MMHG | WEIGHT: 262 LBS

## 2018-06-27 DIAGNOSIS — K64.5 THROMBOSED EXTERNAL HEMORRHOID: ICD-10-CM

## 2018-06-27 DIAGNOSIS — R15.0 INCOMPLETE DEFECATION: ICD-10-CM

## 2018-06-27 DIAGNOSIS — R15.1 FECAL SMEARING: ICD-10-CM

## 2018-06-27 PROCEDURE — 99203 OFFICE O/P NEW LOW 30 MIN: CPT | Mod: 25,S$GLB,, | Performed by: COLON & RECTAL SURGERY

## 2018-06-27 PROCEDURE — 3077F SYST BP >= 140 MM HG: CPT | Mod: CPTII,S$GLB,, | Performed by: COLON & RECTAL SURGERY

## 2018-06-27 PROCEDURE — 3078F DIAST BP <80 MM HG: CPT | Mod: CPTII,S$GLB,, | Performed by: COLON & RECTAL SURGERY

## 2018-06-27 PROCEDURE — 46600 DIAGNOSTIC ANOSCOPY SPX: CPT | Mod: S$GLB,,, | Performed by: COLON & RECTAL SURGERY

## 2018-06-27 PROCEDURE — 99999 PR PBB SHADOW E&M-EST. PATIENT-LVL III: CPT | Mod: PBBFAC,,, | Performed by: COLON & RECTAL SURGERY

## 2018-06-27 NOTE — PROGRESS NOTES
CRS Office Visit History and Physical    Referring Md:   Aaareferral Self  No address on file    SUBJECTIVE:     Chief Complaint:  Hemorrhoids    History of Present Illness:  Patient is a 72 y.o. male presents with hemorrhoids  for 3-4 weeks duration.  No pain.  Reports protrusion or prolapse with bowel movements.  Bowel movements occur daily.  They are soft and leave a lot of residue behind.  He has had problems with spotting of stool on the toilet seat.  He has to wipe multiple times..  He denies abdominal pain, denies nausea or vomiting      Last Colonoscopy: 2014  Impression:        - One 5 mm polyp in the rectum. Resected and                                retrieved.                          - Two 3 mm polyps in the transverse colon. Resected                                and retrieved.                           -  One 3 mm polyp at the hepatic flexure. Resected                              and retrieved.                           - Diverticulosis in the sigmoid colon.                             - The examination was otherwise normal.      Past Medical History:   Diagnosis Date    Coronary artery disease     S/P 3 vessel CABG; stents    Glaucoma     HTN (hypertension) 4/19/2013    Hyperlipidemia     Myocardial infarction     Squamous cell carcinoma        Past Surgical History:   Procedure Laterality Date    ADENOIDECTOMY      CATARACT EXTRACTION      patient not sure which eye    CORONARY ANGIOPLASTY      CORONARY ARTERY BYPASS GRAFT      ROTATOR CUFF REPAIR      SHOULDER SURGERY      TONSILLECTOMY Left 2015       Review of patient's allergies indicates:  No Known Allergies    Current Outpatient Prescriptions on File Prior to Visit   Medication Sig Dispense Refill    allopurinol (ZYLOPRIM) 300 MG tablet Take 1 tablet by mouth once daily.      aspirin (ECOTRIN) 81 MG EC tablet Take 1 tablet (81 mg total) by mouth once daily. 30 tablet 11    atorvastatin (LIPITOR) 40 MG tablet Take 1 tablet (40  mg total) by mouth once daily. 30 tablet 11    DECARA 50,000 unit capsule TAKE 1 CAPSULE BY MOUTH EVERY 7 DAYS 8 capsule 0    etodolac (LODINE) 400 MG tablet 400 mg 2 (two) times daily.       furosemide (LASIX) 40 MG tablet Take 1 tablet (40 mg total) by mouth once daily. 30 tablet 11    ibuprofen (ADVIL,MOTRIN) 800 MG tablet Take 1 tablet (800 mg total) by mouth every 6 (six) hours as needed for Pain. 12 tablet 0    lisinopril (PRINIVIL,ZESTRIL) 40 MG tablet Take 1 tablet by mouth once daily.      oxycodone-acetaminophen (PERCOCET) 5-325 mg per tablet Take 1 tablet by mouth every 6 (six) hours as needed for Pain. 12 tablet 0    VIAGRA 100 mg tablet TK 1 T PO QD PRF ERECTILE DYSFUNCTION  11     No current facility-administered medications on file prior to visit.        Family History   Problem Relation Age of Onset    Heart disease Father     Hypertension Father     Heart failure Father     Heart attack Father     Diabetes Maternal Grandmother     Diabetes Maternal Grandfather        Social History   Substance Use Topics    Smoking status: Former Smoker     Packs/day: 1.00     Years: 31.00     Quit date: 4/24/1991    Smokeless tobacco: Never Used    Alcohol use 1.2 oz/week     2 Glasses of wine per week      Comment: Socially        Review of Systems:  ROS:  GENERAL: No fever, chills, fatigability or weight loss.  Integument:  No rashes, redness, icterus  CHEST: Denies OCONNELL, cyanosis, wheezing, cough and sputum production.  CARDIOVASCULAR: Denies chest pain, PND, orthopnea or reduced exercise tolerance.  GI:  Denies abd pain, dysphagia, nausea, vomiting, no hematemesis, no rectal pain  : Denies burning on urination, no hematuria, no bacteriuria  MSK:  No deformities, swelling, joint pain swelling  Neurologic:  No HAs, seizures, weakness, paresthesias, gait problems      OBJECTIVE:     Vital Signs (Most Recent)  BP (!) 161/65 (BP Location: Right arm, Patient Position: Sitting, BP Method: Large  (Automatic))   Pulse 85   Ht 6' (1.829 m)   Wt 118.8 kg (262 lb)   BMI 35.53 kg/m²     Physical Exam:  General: White male in no distress   Skin/ Sclera anicteric  HEENT: anicteric, normocephalic, extraocular movements intact   Neck trachea midline,   Chest symmetric, nl excursions, no retractions  Respiratory: respirations are even and unlabored     Anorectal:   Inspection       Small thrombosed hemorrhoid, right posterior position.  Nontender.  No fissures  Faint amount of fecal smearing 1 anus  OMEGA:  Normal tone, no masses, excellent squeeze, normal relaxation with Valsalva  Extremities: Warm dry and intact.  NO CCE  Neuro: alert and oriented x 4.  Moves all extremities.         ASSESSMENT/PLAN:   Asymptomatic thrombosed external hemorrhoid  Fecal smearing secondary to soft stools, in effective evacuations      Recommend  Anoscopy  High fiber diet - 25 grams per day.  Emphasis on whole grain breads such as double fiber wheat bread and high fiber cereals and bars.    Drink 8 glasses of water per day 64 - 96 oz per day  Fiber supplements such as KONSYL, METAMUCIL can be taken 1-2 x per day  Regular exercise - 30 min brisk walking 5 days per week  Office visit 6 weeks    Anoscopy Procedure Note:   Verbal consent obtained    Indications:  Hemorrhoids    Post procedure diagnosis:  Same    Procedure: anoscopy    Surgeon QASIM    Assistant: Rester    Specimen none    Findings:  Small thrombosed external hemorrhoid, mild internal hemorrhoids.  Fecal smearing    Right posterior hemorrhoid grade grade 1  Right anterior hemorrhoid grade grade 1  Left lateral hemorrhoid grade grade 2    Pt tolerated procedure well.      No complications.

## 2018-08-28 RX ORDER — LISINOPRIL 40 MG/1
TABLET ORAL
Qty: 30 TABLET | Refills: 0 | Status: SHIPPED | OUTPATIENT
Start: 2018-08-28 | End: 2018-11-30 | Stop reason: SDUPTHER

## 2018-11-24 ENCOUNTER — PATIENT MESSAGE (OUTPATIENT)
Dept: UROLOGY | Facility: CLINIC | Age: 73
End: 2018-11-24

## 2018-11-30 RX ORDER — LISINOPRIL 40 MG/1
TABLET ORAL
Qty: 30 TABLET | Refills: 0 | Status: SHIPPED | OUTPATIENT
Start: 2018-11-30 | End: 2018-12-30 | Stop reason: SDUPTHER

## 2018-12-07 ENCOUNTER — OFFICE VISIT (OUTPATIENT)
Dept: SPORTS MEDICINE | Facility: CLINIC | Age: 73
End: 2018-12-07
Payer: MEDICARE

## 2018-12-07 ENCOUNTER — HOSPITAL ENCOUNTER (OUTPATIENT)
Dept: RADIOLOGY | Facility: HOSPITAL | Age: 73
Discharge: HOME | End: 2018-12-07
Attending: PHYSICIAN ASSISTANT
Payer: MEDICARE

## 2018-12-07 VITALS
DIASTOLIC BLOOD PRESSURE: 71 MMHG | HEART RATE: 89 BPM | WEIGHT: 262 LBS | BODY MASS INDEX: 35.49 KG/M2 | HEIGHT: 72 IN | SYSTOLIC BLOOD PRESSURE: 140 MMHG

## 2018-12-07 DIAGNOSIS — M25.562 LEFT KNEE PAIN, UNSPECIFIED CHRONICITY: ICD-10-CM

## 2018-12-07 DIAGNOSIS — M25.462 EFFUSION OF LEFT KNEE: ICD-10-CM

## 2018-12-07 DIAGNOSIS — M22.42 PATELLA, CHONDROMALACIA, LEFT: ICD-10-CM

## 2018-12-07 DIAGNOSIS — M25.562 ACUTE PAIN OF LEFT KNEE: Primary | ICD-10-CM

## 2018-12-07 DIAGNOSIS — M17.12 OSTEOARTHRITIS OF LEFT KNEE, UNSPECIFIED OSTEOARTHRITIS TYPE: ICD-10-CM

## 2018-12-07 PROCEDURE — 3078F DIAST BP <80 MM HG: CPT | Mod: CPTII,HCNC,S$GLB, | Performed by: PHYSICIAN ASSISTANT

## 2018-12-07 PROCEDURE — 73564 X-RAY EXAM KNEE 4 OR MORE: CPT | Mod: 26,50,HCNC, | Performed by: RADIOLOGY

## 2018-12-07 PROCEDURE — 3077F SYST BP >= 140 MM HG: CPT | Mod: CPTII,HCNC,S$GLB, | Performed by: PHYSICIAN ASSISTANT

## 2018-12-07 PROCEDURE — 1101F PT FALLS ASSESS-DOCD LE1/YR: CPT | Mod: CPTII,HCNC,S$GLB, | Performed by: PHYSICIAN ASSISTANT

## 2018-12-07 PROCEDURE — 99999 PR PBB SHADOW E&M-EST. PATIENT-LVL IV: CPT | Mod: PBBFAC,HCNC,, | Performed by: PHYSICIAN ASSISTANT

## 2018-12-07 PROCEDURE — 20610 DRAIN/INJ JOINT/BURSA W/O US: CPT | Mod: HCNC,LT,S$GLB, | Performed by: PHYSICIAN ASSISTANT

## 2018-12-07 PROCEDURE — 99214 OFFICE O/P EST MOD 30 MIN: CPT | Mod: 25,HCNC,S$GLB, | Performed by: PHYSICIAN ASSISTANT

## 2018-12-07 PROCEDURE — 73564 X-RAY EXAM KNEE 4 OR MORE: CPT | Mod: TC,50,FY,HCNC,PO

## 2018-12-07 RX ORDER — TRIAMCINOLONE ACETONIDE 40 MG/ML
80 INJECTION, SUSPENSION INTRA-ARTICULAR; INTRAMUSCULAR
Status: COMPLETED | OUTPATIENT
Start: 2018-12-07 | End: 2018-12-07

## 2018-12-07 RX ORDER — LIDOCAINE HYDROCHLORIDE 10 MG/ML
2 INJECTION INFILTRATION; PERINEURAL
Status: COMPLETED | OUTPATIENT
Start: 2018-12-07 | End: 2018-12-07

## 2018-12-07 RX ORDER — BUPIVACAINE HYDROCHLORIDE 5 MG/ML
2 INJECTION, SOLUTION PERINEURAL
Status: COMPLETED | OUTPATIENT
Start: 2018-12-07 | End: 2018-12-07

## 2018-12-07 RX ADMIN — BUPIVACAINE HYDROCHLORIDE 10 MG: 5 INJECTION, SOLUTION PERINEURAL at 12:12

## 2018-12-07 RX ADMIN — TRIAMCINOLONE ACETONIDE 80 MG: 40 INJECTION, SUSPENSION INTRA-ARTICULAR; INTRAMUSCULAR at 12:12

## 2018-12-07 RX ADMIN — LIDOCAINE HYDROCHLORIDE 2 ML: 10 INJECTION INFILTRATION; PERINEURAL at 12:12

## 2018-12-10 NOTE — PROGRESS NOTES
CC: left knee pain    73 y.o. Male  who reports anterior knee pain refractory to conservative mgmt. He reports that his knee pain began 4-6 months ago but significantly worsened over the last week. He describes pain as aching. He has been climbing a lot of stairs which he thinks brought on his pain.     He reports that the pain is worse with steps.  It also bothers him at night.    Is affecting ADLs.     No mechanical symptoms, no instability    Review of Systems   Constitution: Negative. Negative for chills, fever and night sweats.   HENT: Negative for congestion and headaches.    Eyes: Negative for blurred vision, left vision loss and right vision loss.   Cardiovascular: Negative for chest pain and syncope.   Respiratory: Negative for cough and shortness of breath.    Endocrine: Negative for polydipsia, polyphagia and polyuria.   Hematologic/Lymphatic: Negative for bleeding problem. Does not bruise/bleed easily.   Skin: Negative for dry skin, itching and rash.   Musculoskeletal: Negative for falls and muscle weakness.   Gastrointestinal: Negative for abdominal pain and bowel incontinence.   Genitourinary: Negative for bladder incontinence and nocturia.   Neurological: Negative for disturbances in coordination, loss of balance and seizures.   Psychiatric/Behavioral: Negative for depression. The patient does not have insomnia.    Allergic/Immunologic: Negative for hives and persistent infections.   All other systems negative      PAST MEDICAL HISTORY:   Past Medical History:   Diagnosis Date    Coronary artery disease     S/P 3 vessel CABG; stents    Glaucoma     HTN (hypertension) 4/19/2013    Hyperlipidemia     Myocardial infarction     Squamous cell carcinoma      PAST SURGICAL HISTORY:   Past Surgical History:   Procedure Laterality Date    ADENOIDECTOMY      ARTHROSCOPY-SHOULDER EXCISION DISTAL CLAVICLE Left 3/18/2015    [facility]    ARTHROSCOPY-SHOULDER WITH  SUBACROMIAL DECOMPRESSION Left 3/18/2015    [facility]    CATARACT EXTRACTION      patient not sure which eye    CATHETERIZATION, HEART, LEFT Left 10/1/2013    [facility]    COLONOSCOPY N/A 2014    [facility] ENDO (4TH FLR)    CORONARY ANGIOPLASTY      CORONARY ARTERY BYPASS GRAFT      REPAIR-ROTATOR CUFF Left 3/18/2015    [facility]    REPAIR-TENDON-BICEP Left 3/18/2015    [facility]    ROTATOR CUFF REPAIR      SHOULDER SURGERY      TONSILLECTOMY Left 2015     FAMILY HISTORY:   Family History   Problem Relation Age of Onset    Heart disease Father     Hypertension Father     Heart failure Father     Heart attack Father     Diabetes Maternal Grandmother     Diabetes Maternal Grandfather      SOCIAL HISTORY:   Social History     Socioeconomic History    Marital status:      Spouse name: Not on file    Number of children: Not on file    Years of education: Not on file    Highest education level: Not on file   Social Needs    Financial resource strain: Not on file    Food insecurity - worry: Not on file    Food insecurity - inability: Not on file    Transportation needs - medical: Not on file    Transportation needs - non-medical: Not on file   Occupational History    Not on file   Tobacco Use    Smoking status: Former Smoker     Packs/day: 1.00     Years: 31.00     Pack years: 31.00     Last attempt to quit: 1991     Years since quittin.6    Smokeless tobacco: Never Used   Substance and Sexual Activity    Alcohol use: Yes     Alcohol/week: 1.2 oz     Types: 2 Glasses of wine per week     Comment: Socially    Drug use: No    Sexual activity: Not on file   Other Topics Concern    Not on file   Social History Narrative    Not on file       MEDICATIONS:   Current Outpatient Medications:      allopurinol (ZYLOPRIM) 300 MG tablet, Take 1 tablet by mouth once daily., Disp: , Rfl:     aspirin (ECOTRIN) 81 MG EC tablet, Take 1 tablet (81 mg total) by mouth once daily., Disp: 30 tablet, Rfl: 11    atorvastatin (LIPITOR) 40 MG tablet, Take 1 tablet (40 mg total) by mouth once daily., Disp: 30 tablet, Rfl: 11    DECARA 50,000 unit capsule, TAKE 1 CAPSULE BY MOUTH EVERY 7 DAYS, Disp: 8 capsule, Rfl: 0    etodolac (LODINE) 400 MG tablet, 400 mg 2 (two) times daily. , Disp: , Rfl:     furosemide (LASIX) 40 MG tablet, Take 1 tablet (40 mg total) by mouth once daily., Disp: 30 tablet, Rfl: 11    ibuprofen (ADVIL,MOTRIN) 800 MG tablet, Take 1 tablet (800 mg total) by mouth every 6 (six) hours as needed for Pain., Disp: 12 tablet, Rfl: 0    lisinopril (PRINIVIL,ZESTRIL) 40 MG tablet, TAKE 1 TABLET BY MOUTH EVERY DAY, Disp: 30 tablet, Rfl: 0    VIAGRA 100 mg tablet, TK 1 T PO QD PRF ERECTILE DYSFUNCTION, Disp: , Rfl: 11  ALLERGIES: Review of patient's allergies indicates:  No Known Allergies    VITAL SIGNS: BP (!) 140/71   Pulse 89   Ht 6' (1.829 m)   Wt 118.8 kg (262 lb)   BMI 35.53 kg/m²      PHYSICAL EXAMINATION    General:  The patient is alert and oriented x 3.  Mood is pleasant.  Observation of ears, eyes and nose reveal no gross abnormalities.  HEENT: NCAT, sclera nonicteric  Lungs: Respirations are equal and unlabored.    left  KNEE EXAMINATION     OBSERVATION / INSPECTION   Gait:   Nonantalgic   Alignment:  Neutral   Scars:   None   Muscle atrophy: Mild  Effusion:  moderate  Warmth:  None   Discoloration:   none     TENDERNESS / CREPITUS (T / C):          T / C      T / C   Patella   - / -   Lateral joint line   - / -      Peripatellar medial  +  Medial joint line    - / -   Peripatellar lateral +  Medial plica   - / -   Patellar tendon -   Popliteal fossa  - / -   Quad tendon   +   Gastrocnemius   -   Prepatellar Bursa - / -   Quadricep   -   Tibial tubercle  -  Thigh/hamstring  -   Pes  anserine/HS -  Fibula    -   ITB   - / -  Tibia     -   Tib/fib joint  - / -  LCL    -     MFC   - / -   MCL: Proximal  -    LFC   - / -    Distal   -          ROM: (* = pain)  PASSIVE   ACTIVE    Left :   0/ 0 / 130   0 / 0 / 130     Right :    5 / 0 / 145   5 / 0 / 145    Patellofemoral examination:  See above noted areas of tenderness.   Patella position    Subluxation / dislocation: Centered           Sup. / Inf;   Normal   Crepitus (PF):    Absent   Patellar Mobility:       Medial-lateral:   Normal    Superior-inferior:  Normal    Inferior tilt   Normal    Patellar tendon:  Normal   Lateral tilt:    Normal   J-sign:     None   Patellofemoral grind:   +       MENISCAL SIGNS:     Pain on terminal extension:  -  Pain on terminal flexion:  +  Jemals maneuver:  -  Squat     NT    LIGAMENT EXAMINATION:  ACL / Lachman:  normal (-1 to 2mm)    PCL-Post.  drawer: normal 0 to 2mm  MCL- Valgus:  normal 0 to 2mm  LCL- Varus:  normal 0 to 2mm  Pivot shift: normal (Equal)   Dial Test: difference c/w other side   At 30° flexion: normal (< 5°)    At 90° flexion: normal (< 5°)   Reverse Pivot Shift:   normal (Equal)     STRENGTH: (* = with pain) PAINFUL SIDE   Quadricep   5/5   Hamstrin/5    EXTREMITY NEURO-VASCULAR EXAMINATION:   Sensation:  Grossly intact to light touch all dermatomal regions.   Motor Function:  Fully intact motor function at hip, knee, foot and ankle    DTRs;  quadriceps and  achilles 2+.  No clonus and downgoing Babinski.    Vascular status:  DP and PT pulses 2+, brisk capillary refill, symmetric.     Other Findings:  + step down bilat  + bridge test    + Pain with pushing patella down into trochlea groove     Xrays:  Xrays of the bilateral knees with flexion were ordered and reviewed by me today. No fracture, subluxation. Mild to moderate DJD noted with medial compartment joint space narrowing bilaterally and mild patella spurring noted. Joint effusion noted on left.      ASSESSMENT:    1.  left Knee pain  2. Left knee osteoarthritis   3. Left knee effusion  4. Patella chondromalacia  Bilateral hip abd/core weakness    PLAN:    1. PT for left knee pain due to anterior compartment inflammation, osteoarthritis, and quad tendinitis. Tight IT band on left. Pt for stretching and strengthening of the hip, core, abd. Give HEP.  2. Ice compresses prn pain    3.  PROCEDURE NOTE:   left KNEE ASPIRATION and INJECTION  After consent was obtained, time out was performed, including verification of patient ID, procedure, site and side, availability of information and equipment, review of safety issues, and agreement with consent, the procedure site was marked and the patient was prepped aseptically.    Using a sterile technique the left knee was prepped and the superior lateral knee joint was entered with a 18 gauge needle and 36 ml's of serous colored fluid was withdrawn.   The procedure was well tolerated.       A diagnostic and therapeutic injection of 2cc 40 mg kenalog and 1% lidocaine/0.5% sensorcaine was given under sterile technique using the same 18 gauge needle into the superior lateral knee site with patient in supine position.     The patient had no adverse reactions to the medication. Pain decreased. The patient was instructed to apply ice to the joint for 20 minutes and avoid strenuous activities for 24-36 hours following the injection. Patient was warned of possible blood sugar and/or blood pressure changes during that time. Following that time, patient can resume regular activities.  Watch for fever, or increased swelling or persistent pain in knee. Call or return to clinic prn if such symptoms occur or the knee fails to improve as anticipated.    4. Knee compression sleeve given today.     5. RTC in 2-3 weeks for recheck     All questions were answered, pt will contact us for questions or concerns in the interim.

## 2018-12-19 ENCOUNTER — OFFICE VISIT (OUTPATIENT)
Dept: INTERNAL MEDICINE | Facility: CLINIC | Age: 73
End: 2018-12-19
Payer: MEDICARE

## 2018-12-19 VITALS
SYSTOLIC BLOOD PRESSURE: 136 MMHG | DIASTOLIC BLOOD PRESSURE: 70 MMHG | HEART RATE: 72 BPM | BODY MASS INDEX: 34 KG/M2 | WEIGHT: 250.69 LBS

## 2018-12-19 DIAGNOSIS — Z00.00 ROUTINE GENERAL MEDICAL EXAMINATION AT A HEALTH CARE FACILITY: Primary | ICD-10-CM

## 2018-12-19 DIAGNOSIS — E55.9 VITAMIN D DEFICIENCY: ICD-10-CM

## 2018-12-19 DIAGNOSIS — I25.10 CORONARY ARTERY DISEASE, ANGINA PRESENCE UNSPECIFIED, UNSPECIFIED VESSEL OR LESION TYPE, UNSPECIFIED WHETHER NATIVE OR TRANSPLANTED HEART: ICD-10-CM

## 2018-12-19 DIAGNOSIS — Z12.5 PROSTATE CANCER SCREENING: ICD-10-CM

## 2018-12-19 DIAGNOSIS — I10 HYPERTENSION, UNSPECIFIED TYPE: ICD-10-CM

## 2018-12-19 DIAGNOSIS — R53.83 FATIGUE, UNSPECIFIED TYPE: ICD-10-CM

## 2018-12-19 PROCEDURE — 99999 PR PBB SHADOW E&M-EST. PATIENT-LVL III: CPT | Mod: PBBFAC,HCNC,, | Performed by: INTERNAL MEDICINE

## 2018-12-19 PROCEDURE — 3078F DIAST BP <80 MM HG: CPT | Mod: CPTII,HCNC,S$GLB, | Performed by: INTERNAL MEDICINE

## 2018-12-19 PROCEDURE — 3075F SYST BP GE 130 - 139MM HG: CPT | Mod: CPTII,HCNC,S$GLB, | Performed by: INTERNAL MEDICINE

## 2018-12-19 PROCEDURE — 99214 OFFICE O/P EST MOD 30 MIN: CPT | Mod: HCNC,S$GLB,, | Performed by: INTERNAL MEDICINE

## 2018-12-19 PROCEDURE — 1101F PT FALLS ASSESS-DOCD LE1/YR: CPT | Mod: CPTII,HCNC,S$GLB, | Performed by: INTERNAL MEDICINE

## 2018-12-19 NOTE — PROGRESS NOTES
Subjective:       Patient ID: Jose Fonseca Jr. is a 73 y.o. male.    Chief Complaint: Annual Exam    HPIPlIneasant gentleman from [place] here for his annual exam. Overall doing well, but he reports feeling fatigued, tired, somnolent over the last year. We discussed the possibility of LM as he snores and has occassional bouts of apnea. He has been scheduled for 3 sleep studies, but has been unable to complete them due to the surroundings of [facility] and his inability to relax at that time. I discussed the possibility of a home study and will make arrangements for him to be seen in Sleep Clinic for a formal consult and proceed from there. He also has hx of CAD and was recently seen by Dr Ibarra in Cardiology. Studies were obtained that were fairly normal and was encouraged to continue with his current meds. He also has noted decreased body hair and fatigue, lack of energy and will obtain blood work including testosterone levels for evaluation.  Review of Systems   All other systems reviewed and are negative.      Objective:      Physical Exam   Constitutional: He is oriented to person, place, and time. He appears well-developed and well-nourished. No distress.   HENT:   Head: Normocephalic and atraumatic.   Right Ear: External ear normal.   Left Ear: External ear normal.   Mouth/Throat: Oropharynx is clear and moist. No oropharyngeal exudate.   Eyes: Conjunctivae and EOM are normal. Pupils are equal, round, and reactive to light. Right eye exhibits no discharge. Left eye exhibits no discharge. No scleral icterus.   Neck: Normal range of motion. Neck supple. No JVD present. No thyromegaly present.   Cardiovascular: Normal rate, regular rhythm, normal heart sounds and intact distal pulses.   No murmur heard.  Pulmonary/Chest: Effort normal and breath sounds normal. No respiratory distress. He has no wheezes. He exhibits no tenderness.   Abdominal: Soft. Bowel sounds are normal. He exhibits no  distension and no mass. There is no tenderness.   Musculoskeletal: Normal range of motion. He exhibits no edema or tenderness.   Lymphadenopathy:     He has no cervical adenopathy.   Neurological: He is alert and oriented to person, place, and time. No cranial nerve deficit. Coordination normal.   Skin: Skin is warm and dry. No rash noted. He is not diaphoretic. No erythema.   Psychiatric: He has a normal mood and affect. His behavior is normal. Judgment and thought content normal.   Nursing note and vitals reviewed.      Assessment:       1. Routine general medical examination at a health care facility    2. Fatigue, unspecified type    3. Coronary artery disease, angina presence unspecified, unspecified vessel or lesion type, unspecified whether native or transplanted heart    4. Hypertension, unspecified type    5. Prostate cancer screening    6. Vitamin D deficiency        Plan:    1. Obtain blood work.         2. Consult Sleep Clinic.         3. Continue with current medications.         4. RTC for review.

## 2019-01-01 RX ORDER — LISINOPRIL 40 MG/1
TABLET ORAL
Qty: 30 TABLET | Refills: 0 | Status: SHIPPED | OUTPATIENT
Start: 2019-01-01 | End: 2019-02-01 | Stop reason: SDUPTHER

## 2019-01-04 ENCOUNTER — OFFICE VISIT (OUTPATIENT)
Dept: UROLOGY | Facility: CLINIC | Age: 74
End: 2019-01-04
Payer: MEDICARE

## 2019-01-04 ENCOUNTER — PATIENT MESSAGE (OUTPATIENT)
Dept: INTERNAL MEDICINE | Facility: CLINIC | Age: 74
End: 2019-01-04

## 2019-01-04 VITALS
HEART RATE: 72 BPM | BODY MASS INDEX: 33.23 KG/M2 | WEIGHT: 245.38 LBS | SYSTOLIC BLOOD PRESSURE: 133 MMHG | DIASTOLIC BLOOD PRESSURE: 81 MMHG | HEIGHT: 72 IN

## 2019-01-04 DIAGNOSIS — N13.8 BPH WITH URINARY OBSTRUCTION: ICD-10-CM

## 2019-01-04 DIAGNOSIS — R39.198 URINE STREAM SPRAYING: ICD-10-CM

## 2019-01-04 DIAGNOSIS — N40.1 BPH WITH URINARY OBSTRUCTION: ICD-10-CM

## 2019-01-04 DIAGNOSIS — R39.15 URGENCY OF URINATION: Primary | ICD-10-CM

## 2019-01-04 PROCEDURE — 99214 OFFICE O/P EST MOD 30 MIN: CPT | Mod: HCNC,S$GLB,, | Performed by: UROLOGY

## 2019-01-04 PROCEDURE — 3079F DIAST BP 80-89 MM HG: CPT | Mod: CPTII,HCNC,S$GLB, | Performed by: UROLOGY

## 2019-01-04 PROCEDURE — 1101F PT FALLS ASSESS-DOCD LE1/YR: CPT | Mod: CPTII,HCNC,S$GLB, | Performed by: UROLOGY

## 2019-01-04 PROCEDURE — 99214 PR OFFICE/OUTPT VISIT, EST, LEVL IV, 30-39 MIN: ICD-10-PCS | Mod: HCNC,S$GLB,, | Performed by: UROLOGY

## 2019-01-04 PROCEDURE — 3079F PR MOST RECENT DIASTOLIC BLOOD PRESSURE 80-89 MM HG: ICD-10-PCS | Mod: CPTII,HCNC,S$GLB, | Performed by: UROLOGY

## 2019-01-04 PROCEDURE — 99999 PR PBB SHADOW E&M-EST. PATIENT-LVL III: ICD-10-PCS | Mod: PBBFAC,HCNC,, | Performed by: UROLOGY

## 2019-01-04 PROCEDURE — 1101F PR PT FALLS ASSESS DOC 0-1 FALLS W/OUT INJ PAST YR: ICD-10-PCS | Mod: CPTII,HCNC,S$GLB, | Performed by: UROLOGY

## 2019-01-04 PROCEDURE — 99999 PR PBB SHADOW E&M-EST. PATIENT-LVL III: CPT | Mod: PBBFAC,HCNC,, | Performed by: UROLOGY

## 2019-01-04 PROCEDURE — 3075F PR MOST RECENT SYSTOLIC BLOOD PRESS GE 130-139MM HG: ICD-10-PCS | Mod: CPTII,HCNC,S$GLB, | Performed by: UROLOGY

## 2019-01-04 PROCEDURE — 3075F SYST BP GE 130 - 139MM HG: CPT | Mod: CPTII,HCNC,S$GLB, | Performed by: UROLOGY

## 2019-01-04 RX ORDER — TAMSULOSIN HYDROCHLORIDE 0.4 MG/1
0.4 CAPSULE ORAL NIGHTLY
Qty: 30 CAPSULE | Refills: 11 | Status: SHIPPED | OUTPATIENT
Start: 2019-01-04 | End: 2020-01-02

## 2019-01-04 NOTE — PROGRESS NOTES
CC: spraying of urine    Jsoe Fonseca Jr. is a 73 y.o. man who is here for the evaluation of weak urine flow, spraying of urine, incomplete bladder emptying.    last seen by me on 1/2016.  At that time, I told him that if he has a progressive urinary symptoms such as weak urine flow or spraying of urine, he should come back and see me.  He has experienced these symptoms and thus he decided to follow up with me.  Denies flank pain, dysuira, hematuria.      He is an  and is a single.  S/p CABG and cardiac stent x2.    He is also interested in prostate check and colonoscopy.  Tried viagra in the past and it worked marginally well.  Tried Stendra but did not work as well as expected.  Experienced more side effects of HA and nasal congestion.  Denies any chest pain nor taking nitroglycerine products.     Past Medical History:   Diagnosis Date    Coronary artery disease     S/P 3 vessel CABG; stents    Glaucoma     HTN (hypertension) 4/19/2013    Hyperlipidemia     Myocardial infarction     Squamous cell carcinoma      Past Surgical History:   Procedure Laterality Date    ADENOIDECTOMY      ARTHROSCOPY-SHOULDER EXCISION DISTAL CLAVICLE Left 3/18/2015    [facility]    ARTHROSCOPY-SHOULDER WITH SUBACROMIAL DECOMPRESSION Left 3/18/2015    [facility]    CATARACT EXTRACTION      patient not sure which eye    CATHETERIZATION, HEART, LEFT Left 10/1/2013    [facility] CATH LAB    COLONOSCOPY N/A 11/20/2014    [facility] ENDO (4TH FLR)    CORONARY ANGIOPLASTY      CORONARY ARTERY BYPASS GRAFT      REPAIR-ROTATOR CUFF Left 3/18/2015    [facility]    REPAIR-TENDON-BICEP Left 3/18/2015    [facility] OR    ROTATOR CUFF REPAIR      SHOULDER SURGERY      TONSILLECTOMY Left 2015     Social History      Tobacco Use    Smoking status: Former Smoker     Packs/day: 1.00     Years: 31.00     Pack years: 31.00     Last attempt to quit: 1991     Years since quittin.7    Smokeless tobacco: Never Used   Substance Use Topics    Alcohol use: Yes     Alcohol/week: 1.2 oz     Types: 2 Glasses of wine per week     Comment: Socially    Drug use: No     Family History   Problem Relation Age of Onset    Heart disease Father     Hypertension Father     Heart failure Father     Heart attack Father     Diabetes Maternal Grandmother     Diabetes Maternal Grandfather      Allergy:  Review of patient's allergies indicates:  No Known Allergies  Outpatient Encounter Medications as of 2019   Medication Sig Dispense Refill    allopurinol (ZYLOPRIM) 300 MG tablet Take 1 tablet by mouth once daily.      aspirin (ECOTRIN) 81 MG EC tablet Take 1 tablet (81 mg total) by mouth once daily. 30 tablet 11    atorvastatin (LIPITOR) 40 MG tablet Take 1 tablet (40 mg total) by mouth once daily. 30 tablet 11    DECARA 50,000 unit capsule TAKE 1 CAPSULE BY MOUTH EVERY 7 DAYS 8 capsule 0    etodolac (LODINE) 400 MG tablet 400 mg 2 (two) times daily.       furosemide (LASIX) 40 MG tablet Take 1 tablet (40 mg total) by mouth once daily. 30 tablet 11    ibuprofen (ADVIL,MOTRIN) 800 MG tablet Take 1 tablet (800 mg total) by mouth every 6 (six) hours as needed for Pain. 12 tablet 0    lisinopril (PRINIVIL,ZESTRIL) 40 MG tablet TAKE 1 TABLET BY MOUTH EVERY DAY 30 tablet 0    VIAGRA 100 mg tablet TK 1 T PO QD PRF ERECTILE DYSFUNCTION  11    tamsulosin (FLOMAX) 0.4 mg Cap Take 1 capsule (0.4 mg total) by mouth every evening. 30 capsule 11     No facility-administered encounter medications on file as of 2019.      Review of Systems   ROS  Physical Exam     Vitals:    19 0905   BP: 133/81   Pulse: 72     Physical Exam   Constitutional: He is oriented to person, place, and time. He appears well-developed and  well-nourished. No distress.   HENT:   Head: Normocephalic and atraumatic.   Right Ear: External ear normal.   Left Ear: External ear normal.   Nose: Nose normal.   Mouth/Throat: Oropharynx is clear and moist.   Eyes: Conjunctivae are normal. Pupils are equal, round, and reactive to light.   Neck: Normal range of motion. Neck supple. No JVD present. No tracheal deviation present. No thyromegaly present.   Cardiovascular: Normal rate, regular rhythm, normal heart sounds and intact distal pulses.  Exam reveals no gallop and no friction rub.    No murmur heard.  Pulmonary/Chest: Effort normal and breath sounds normal. No respiratory distress. He has no wheezes. He exhibits no tenderness.   Abdominal: Soft. Bowel sounds are normal. He exhibits no distension and no mass. There is no tenderness. There is no rebound and no guarding.   Genitourinary: Rectum normal and penis normal. No penile tenderness.   Genitourinary Comments: Prostate 30 grams with negative nodule or negative tenderness     Musculoskeletal: Normal range of motion. He exhibits no edema, tenderness or deformity.   Lymphadenopathy:     He has no cervical adenopathy.   Neurological: He is alert and oriented to person, place, and time.   Skin: Skin is warm and dry. He is not diaphoretic.     Psychiatric: He has a normal mood and affect. His behavior is normal. Thought content normal.     Genitalia:  Scrotum: no rash or lesion  Normal symmetric epididymis without masses  Normal vas palpated  Normal size, symmetric testicles with no masses   Normal urethral meatus with no discharge  Normal circumcised penis with no lesion   Rectal:  Normal perineum and anus upon inspection.  Normal tone, no masses or tenderness;     LABS:  Lab Results   Component Value Date    PSA 1.5 12/19/2018    PSA 1.9 06/21/2017    PSA 1.7 12/21/2015    PSA 1.0 05/13/2014    PSA 1.57 04/18/2013    PSADIAG 1.7 12/21/2015    PSADIAG 2.0 11/03/2015    PSATOTAL 1.0 09/27/2004    PSAFREE 0.31  09/27/2004    PSAFREEPCT 31.00 09/27/2004     Results for orders placed or performed in visit on 12/21/15   Prostate Specific Antigen, Diagnostic   Result Value Ref Range    PSA DIAGNOSTIC 1.7 0.00 - 4.00 ng/mL   Results for orders placed or performed in visit on 11/03/15   Prostate Specific Antigen, Diagnostic   Result Value Ref Range    PSA DIAGNOSTIC 2.0 0.00 - 4.00 ng/mL     Lab Results   Component Value Date    CREATININE 1.1 12/19/2018    CREATININE 1.1 06/27/2017    CREATININE 1.2 12/21/2015     Results for orders placed or performed in visit on 12/19/18   Testosterone   Result Value Ref Range    Testosterone, Total 376 304 - 1227 ng/dL   Results for orders placed or performed in visit on 12/21/15   Testosterone   Result Value Ref Range    Testosterone, Total 414 195.0 - 1138.0 ng/dL     No results found for: LABURIN  UA clear    Assessment and Plan:  Jose was seen today for spraying with urinaion.    Diagnoses and all orders for this visit:    Urgency of urination    Urine stream spraying  -     tamsulosin (FLOMAX) 0.4 mg Cap; Take 1 capsule (0.4 mg total) by mouth every evening.    BPH with urinary obstruction  -     tamsulosin (FLOMAX) 0.4 mg Cap; Take 1 capsule (0.4 mg total) by mouth every evening.    nature of BPH with obstruction discussed in depth.  discussed extensively regarding options of treatments including medications ( alpha blockers, 5 alpha reductase inhibitors, and daily cialis), minimally invasive therapy (Rezum Therapy), and surgery (laser vs. Traditional TURP).  discussed benefits and risks of these treatments.    Will start him on flomax first.  All questions answered.  He watched Rezum Therapy video today.    Follow-up:  Follow-up in about 3 months (around 4/4/2019).

## 2019-01-09 ENCOUNTER — TELEPHONE (OUTPATIENT)
Dept: INTERNAL MEDICINE | Facility: CLINIC | Age: 74
End: 2019-01-09

## 2019-01-09 NOTE — TELEPHONE ENCOUNTER
----- Message from Ken Ibarra MD sent at 1/8/2019  4:27 PM CST -----  You obviously are calling the wrong number. My nurse is Marisol and her phone lines are always answered. Her # is [phone]. I saw one of Vasyl's patients today and the appt was made by someone in your office several days ago.   I'll be happy to see  any time you like, I know him well.   Allan      ----- Message -----  From: Giulia Thrasher  Sent: 1/8/2019   9:06 AM  To: Vasyl Morris MD, Ken Ibarra MD    Good Morning Dr Ibarra:  This is Giulia BERNABE. From Dr Morris's office, I just want to let you know that the pt Jose Fonseca with Mr No. [id number] have anormal BNP and Dr Morris wants the pt to go to you, but is been impossible to communicate with your office, I don't know if the pt get in contact with you...Pt address and telephone no. Are correct on Epic..Thanks and have a good day.      Giulia Thrasher  Patient Coordinador  [phone]

## 2019-01-30 ENCOUNTER — OFFICE VISIT (OUTPATIENT)
Dept: SLEEP MEDICINE | Facility: CLINIC | Age: 74
End: 2019-01-30
Payer: MEDICARE

## 2019-01-30 VITALS
HEIGHT: 72 IN | WEIGHT: 251.13 LBS | DIASTOLIC BLOOD PRESSURE: 78 MMHG | HEART RATE: 74 BPM | SYSTOLIC BLOOD PRESSURE: 122 MMHG | BODY MASS INDEX: 34.01 KG/M2

## 2019-01-30 DIAGNOSIS — G47.33 OBSTRUCTIVE SLEEP APNEA: Primary | ICD-10-CM

## 2019-01-30 DIAGNOSIS — E66.9 OBESITY (BMI 30-39.9): Chronic | ICD-10-CM

## 2019-01-30 DIAGNOSIS — I10 ESSENTIAL HYPERTENSION: ICD-10-CM

## 2019-01-30 DIAGNOSIS — Z95.1 S/P CABG (CORONARY ARTERY BYPASS GRAFT): Chronic | ICD-10-CM

## 2019-01-30 PROCEDURE — 3078F DIAST BP <80 MM HG: CPT | Mod: HCNC,CPTII,S$GLB, | Performed by: NURSE PRACTITIONER

## 2019-01-30 PROCEDURE — 3074F SYST BP LT 130 MM HG: CPT | Mod: HCNC,CPTII,S$GLB, | Performed by: NURSE PRACTITIONER

## 2019-01-30 PROCEDURE — 99201 PR OFFICE/OUTPT VISIT,NEW,LEVL I: ICD-10-PCS | Mod: HCNC,S$GLB,, | Performed by: NURSE PRACTITIONER

## 2019-01-30 PROCEDURE — 1101F PT FALLS ASSESS-DOCD LE1/YR: CPT | Mod: HCNC,CPTII,S$GLB, | Performed by: NURSE PRACTITIONER

## 2019-01-30 PROCEDURE — 99201 PR OFFICE/OUTPT VISIT,NEW,LEVL I: CPT | Mod: HCNC,S$GLB,, | Performed by: NURSE PRACTITIONER

## 2019-01-30 PROCEDURE — 99999 PR PBB SHADOW E&M-EST. PATIENT-LVL III: ICD-10-PCS | Mod: PBBFAC,HCNC,, | Performed by: NURSE PRACTITIONER

## 2019-01-30 PROCEDURE — 1101F PR PT FALLS ASSESS DOC 0-1 FALLS W/OUT INJ PAST YR: ICD-10-PCS | Mod: HCNC,CPTII,S$GLB, | Performed by: NURSE PRACTITIONER

## 2019-01-30 PROCEDURE — 3074F PR MOST RECENT SYSTOLIC BLOOD PRESSURE < 130 MM HG: ICD-10-PCS | Mod: HCNC,CPTII,S$GLB, | Performed by: NURSE PRACTITIONER

## 2019-01-30 PROCEDURE — 3078F PR MOST RECENT DIASTOLIC BLOOD PRESSURE < 80 MM HG: ICD-10-PCS | Mod: HCNC,CPTII,S$GLB, | Performed by: NURSE PRACTITIONER

## 2019-01-30 PROCEDURE — 99999 PR PBB SHADOW E&M-EST. PATIENT-LVL III: CPT | Mod: PBBFAC,HCNC,, | Performed by: NURSE PRACTITIONER

## 2019-01-30 NOTE — PROGRESS NOTES
"This 73 y.o. male patient presents for the mgt of obstructive sleep apnea.. Last seen by MD 2013, this is my initial visit with him    Since seen he never had requal study. Gained wgt since his study 2003. No record of study available. Having more pulmonary issues/respiratory past year. Will wake up short of breath, air gasping. GF reports + snoring. SIDE sleeper. +disrupted sleep. May check emails if up at 3a. +lethargic. Wears mouth guard already for grinding.Busy .       HISTORY  2013  History of LM testing pre-vane - reports "difficulty completing the test".  Tried CPAP during testing only, but difficulty sleeping.    Never set up on CPAP. He concerned about untreated LM and how it affects his cardiovascular health. He would be interested in pursuing CPAP again  Since 2003 he reports cessation of snoring, but weight gain of 40 + lbs    ESS = 8    BASELINE PSG 6/24/03: +LM, RDI 14.9, low sat 89%; wt 235 lbs    EPWORTH SLEEPINESS SCALE 1/30/2019   Sitting and reading 2   Watching TV 0   Sitting, inactive in a public place (e.g. a theatre or a meeting) 0   As a passenger in a car for an hour without a break 0   Lying down to rest in the afternoon when circumstances permit 3   Sitting and talking to someone 0   Sitting quietly after a lunch without alcohol 0   In a car, while stopped for a few minutes in traffic 0   Total score 5     Sleep Clinic New Patient 1/30/2019   What time do you go to bed on a week day? (Give a range) 10:30 pm to 12:30 am   What time do you go to bed on a day off? (Give a range) 10:30 pm to 12:30 am   How long does it take you to fall asleep? (Give a range) Time varys depending on whether or not he has been working late on computer. Sometimes falls asleep quickly other times much longer.   On average, how many times per night do you wake up? 1   How long does it take you to fall back into sleep? (Give a range) Pretty quickly   What time do you wake up to start your day on a " week day? (Give a range) 7:30 am   What time do you wake up to start your day on a day off? (Give a range) 7:30 am   What time do you get out of bed? (Give a range) Gets up, gets coffee, and goes back to bed to watch tv.   On average, how many hours do you sleep? 8 to 9   On average, how many naps do you take per day? 0-1   Rate your sleep quality from 0 to 5 (0-poor, 5-great). 4   Have you experienced:  Weight loss?   How much weight have you lost or gained (in lbs.) in the last year? 10   On average, how many times per night do you go to the bathroom?  1   Have you ever had a sleep study/CPAP machine/surgery for sleep apnea? Yes   Have you ever had a CPAP machine for sleep apnea? No   Have you ever had surgery for sleep apnea? No     REVIEW OF SYSTEMS:  Sleep related symptoms as per [facility]   Sleep Clinic ROS  1/30/2019   Difficulty breathing through the nose?  Sometimes   Sore throat or dry mouth in the morning? Yes   Irregular or very fast heart beat?  No   Shortness of breath?  Yes   Acid reflux? Sometimes   Body aches and pains?  Yes   Morning headaches? No   Dizziness? No   Mood changes?  Sometimes   Do you exercise?  No   Do you feel like moving your legs a lot?  No    Otherwise, a balance of systems reviewed is negative.    PHYSICAL EXAM:  /78   Pulse 74   Ht 6' (1.829 m)   Wt 113.9 kg (251 lb 1.7 oz)   BMI 34.06 kg/m²   GENERAL: Well groomed; Normally developed; Obese    ASSESSMENT:    1. LM, mild. No record PSG available. Symptoms ongoing  He has medical co-morbidities of hypertension (optimal today), CAD (MI/CABG), and obesity       2. Deviated nasal septum - may limit PAP adherence.    PLAN:    Defers in lab. OBTAIN home sleep study, myochnser results. Plan PAP setup if study + with close monitoring.    Education: During our discussion today, we talked about the etiology of obstructive sleep apnea as well as the potential ramifications of untreated sleep apnea, which could include daytime  sleepiness, hypertension, heart disease and/or stroke.  We discussed potential treatment options, which could include weight loss, body positioning, use of an oral appliance, continuous positive airway pressure (CPAP),[facility], or  referral for surgical consideration.

## 2019-02-01 ENCOUNTER — TELEPHONE (OUTPATIENT)
Dept: SLEEP MEDICINE | Facility: OTHER | Age: 74
End: 2019-02-01

## 2019-02-01 RX ORDER — LISINOPRIL 40 MG/1
TABLET ORAL
Qty: 30 TABLET | Refills: 0 | Status: SHIPPED | OUTPATIENT
Start: 2019-02-01 | End: 2019-03-07 | Stop reason: SDUPTHER

## 2019-02-05 ENCOUNTER — OFFICE VISIT (OUTPATIENT)
Dept: CARDIOLOGY | Facility: CLINIC | Age: 74
End: 2019-02-05
Payer: MEDICARE

## 2019-02-05 ENCOUNTER — PATIENT MESSAGE (OUTPATIENT)
Dept: UROLOGY | Facility: CLINIC | Age: 74
End: 2019-02-05

## 2019-02-05 VITALS
BODY MASS INDEX: 34.31 KG/M2 | OXYGEN SATURATION: 94 % | HEIGHT: 72 IN | DIASTOLIC BLOOD PRESSURE: 66 MMHG | HEART RATE: 74 BPM | WEIGHT: 253.31 LBS | SYSTOLIC BLOOD PRESSURE: 142 MMHG

## 2019-02-05 DIAGNOSIS — I50.22 CHRONIC SYSTOLIC CONGESTIVE HEART FAILURE: ICD-10-CM

## 2019-02-05 DIAGNOSIS — I50.30 (HFPEF) HEART FAILURE WITH PRESERVED EJECTION FRACTION: Primary | ICD-10-CM

## 2019-02-05 PROCEDURE — 3077F SYST BP >= 140 MM HG: CPT | Mod: HCNC,CPTII,S$GLB, | Performed by: INTERNAL MEDICINE

## 2019-02-05 PROCEDURE — 3077F PR MOST RECENT SYSTOLIC BLOOD PRESSURE >= 140 MM HG: ICD-10-PCS | Mod: HCNC,CPTII,S$GLB, | Performed by: INTERNAL MEDICINE

## 2019-02-05 PROCEDURE — 99215 OFFICE O/P EST HI 40 MIN: CPT | Mod: HCNC,S$GLB,, | Performed by: INTERNAL MEDICINE

## 2019-02-05 PROCEDURE — 99215 PR OFFICE/OUTPT VISIT, EST, LEVL V, 40-54 MIN: ICD-10-PCS | Mod: HCNC,S$GLB,, | Performed by: INTERNAL MEDICINE

## 2019-02-05 PROCEDURE — 99999 PR PBB SHADOW E&M-EST. PATIENT-LVL IV: CPT | Mod: PBBFAC,HCNC,, | Performed by: INTERNAL MEDICINE

## 2019-02-05 PROCEDURE — 3078F PR MOST RECENT DIASTOLIC BLOOD PRESSURE < 80 MM HG: ICD-10-PCS | Mod: HCNC,CPTII,S$GLB, | Performed by: INTERNAL MEDICINE

## 2019-02-05 PROCEDURE — 3078F DIAST BP <80 MM HG: CPT | Mod: HCNC,CPTII,S$GLB, | Performed by: INTERNAL MEDICINE

## 2019-02-05 PROCEDURE — 1101F PR PT FALLS ASSESS DOC 0-1 FALLS W/OUT INJ PAST YR: ICD-10-PCS | Mod: HCNC,CPTII,S$GLB, | Performed by: INTERNAL MEDICINE

## 2019-02-05 PROCEDURE — 1101F PT FALLS ASSESS-DOCD LE1/YR: CPT | Mod: HCNC,CPTII,S$GLB, | Performed by: INTERNAL MEDICINE

## 2019-02-05 PROCEDURE — 99999 PR PBB SHADOW E&M-EST. PATIENT-LVL IV: ICD-10-PCS | Mod: PBBFAC,HCNC,, | Performed by: INTERNAL MEDICINE

## 2019-02-05 RX ORDER — FUROSEMIDE 40 MG/1
40 TABLET ORAL 2 TIMES DAILY
Qty: 60 TABLET | Refills: 11 | Status: ON HOLD | OUTPATIENT
Start: 2019-02-05 | End: 2019-04-22 | Stop reason: HOSPADM

## 2019-02-05 RX ORDER — AMOXICILLIN 500 MG/1
TABLET, FILM COATED ORAL
Refills: 0 | COMMUNITY
Start: 2019-01-24 | End: 2019-02-14

## 2019-02-05 NOTE — PROGRESS NOTES
Subjective:    Patient ID:  Jose Fonseca Jr. is a 73 y.o. male who presents for follow-up of and evaluation of fatigue, SOB and elevated BNP.    HPI  73 year old male here for evaluation of elevated BNP. Patient has PMH significant for CAD s/p CABG x3 (1996 - LIMA-OM; LANCE-LAD; SVG-PDA), normal stress echo 6/26/15 . Last angiogram in Oct 2013 significant for patent pLAD stent and LIMA-OM and VG-PDA grafts and known LANCE-LAD occlusion .    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 When he skip doses he felt worsening SOB. Over the past 2 months, his SOB has progressed and BNP was checked and was elevated at 447, his weight has been ranging between 110-112, he gained 5 Lbs over the past 2 months. Patient was able to walk from the parking lot, however he seems SOB while talking in the clinic.   He denied Orthopnea, denied LE edema, denied PND. Denied any chest pain.    He eats outside most of the time, he eats crackers and canned food at home. He drinks 1-2 glasses of wine daily and doesn't smoke. He has sleep apnea that us untreated because of is waiting for the CPAP approval.     TTE 3/2018     1 - Low normal to mildly depressed left ventricular systolic function (EF 50-55%).     2 - Normal right ventricular systolic function .     3 - Normal left ventricular diastolic function.     4 - Mild left atrial enlargement.     Inferoseptal and inferior wall myocardial scar.    Review of Systems   Constitution: Positive for fever and weight gain. Negative for decreased appetite.   HENT: Negative for congestion, sore throat, stridor and tinnitus.    Cardiovascular: Positive for dyspnea on exertion. Negative for chest pain, claudication, irregular heartbeat, orthopnea, palpitations, paroxysmal nocturnal dyspnea and syncope.   Respiratory: Positive for shortness of  breath and snoring. Negative for cough and wheezing.    Endocrine: Negative.    Skin: Negative.    Gastrointestinal: Negative for bloating, melena, nausea and vomiting.   Neurological: Negative.         Objective:    Physical Exam   Constitutional: He is oriented to person, place, and time. He appears well-developed and well-nourished. No distress.   HENT:   Head: Normocephalic and atraumatic.   Eyes: Pupils are equal, round, and reactive to light.   Neck: Normal range of motion. Neck supple. JVD present.   Hepatojugular reflux   Cardiovascular: Normal rate, regular rhythm and intact distal pulses.   Murmur heard.  2/6 BECKY on the right parasternum   Pulmonary/Chest: Effort normal and breath sounds normal. He has no wheezes. He has no rales. He exhibits no tenderness.   Abdominal: Bowel sounds are normal. He exhibits no distension. There is no tenderness. There is no rebound.   Musculoskeletal: Normal range of motion. He exhibits no edema.   Neurological: He is alert and oriented to person, place, and time.   Skin: Skin is warm. He is not diaphoretic.         Vitals:    02/05/19 1329   BP: (!) 142/66   Pulse:        Assessment:       1. (HFpEF) heart failure with preserved ejection fraction    2. Chronic systolic congestive heart failure         Plan:   --Acute on chronic systolic heart failure. Preserved EF on previous echo.  - Etiology is high salt intake.    Recommendations  -Low salt intake, patient educated about canned foods and eating outside.   -Daily weight every morning and document readings.   -Increase Furosemide to 40 mgs BID for 5 days then drop to 40 mgs daily.  -Stress the importance of treating LM and ensuring using the CPAP.  -Check TTE   -Check CXR  -Check BMP in 1 week   Will schedule patient in the HTS clinic.     Chris Dykes MD  Cardiology Fellow      I have personally taken the history and examined this patient. I have discussed and agree with the resident's findings and plan as documented  in the resident's note.  Referred with for evaluation of elevated BNP.   Ken Ibarra

## 2019-02-06 ENCOUNTER — TELEPHONE (OUTPATIENT)
Dept: TRANSPLANT | Facility: CLINIC | Age: 74
End: 2019-02-06

## 2019-02-06 ENCOUNTER — HOSPITAL ENCOUNTER (OUTPATIENT)
Dept: RADIOLOGY | Facility: HOSPITAL | Age: 74
Discharge: HOME | End: 2019-02-06
Attending: HOSPITALIST
Payer: MEDICARE

## 2019-02-06 ENCOUNTER — HOSPITAL ENCOUNTER (OUTPATIENT)
Dept: CARDIOLOGY | Facility: CLINIC | Age: 74
Discharge: HOME | End: 2019-02-06
Attending: HOSPITALIST
Payer: MEDICARE

## 2019-02-06 VITALS
DIASTOLIC BLOOD PRESSURE: 66 MMHG | HEIGHT: 72 IN | WEIGHT: 253 LBS | HEART RATE: 84 BPM | SYSTOLIC BLOOD PRESSURE: 142 MMHG | BODY MASS INDEX: 34.27 KG/M2

## 2019-02-06 DIAGNOSIS — N52.9 ED (ERECTILE DYSFUNCTION) OF ORGANIC ORIGIN: Primary | ICD-10-CM

## 2019-02-06 DIAGNOSIS — I50.22 CHRONIC SYSTOLIC CONGESTIVE HEART FAILURE: ICD-10-CM

## 2019-02-06 DIAGNOSIS — I50.22 CHRONIC SYSTOLIC CONGESTIVE HEART FAILURE: Primary | ICD-10-CM

## 2019-02-06 LAB
ASCENDING AORTA: 3.38 CM
AV INDEX (PROSTH): 0.75
AV MEAN GRADIENT: 3.31 MMHG
AV PEAK GRADIENT: 6.66 MMHG
AV VALVE AREA: 2.96 CM2
AV VELOCITY RATIO: 0.7
BSA FOR ECHO PROCEDURE: 2.41 M2
CV ECHO LV RWT: 0.34 CM
DOP CALC AO PEAK VEL: 1.29 M/S
DOP CALC AO VTI: 22.8 CM
DOP CALC LVOT AREA: 3.94 CM2
DOP CALC LVOT DIAMETER: 2.24 CM
DOP CALC LVOT PEAK VEL: 0.91 M/S
DOP CALC LVOT STROKE VOLUME: 67.59 CM3
DOP CALCLVOT PEAK VEL VTI: 17.16 CM
E WAVE DECELERATION TIME: 178.73 MSEC
E/A RATIO: 1.79
E/E' RATIO: 9.63
ECHO LV POSTERIOR WALL: 1.11 CM (ref 0.6–1.1)
FRACTIONAL SHORTENING: 18 % (ref 28–44)
INTERVENTRICULAR SEPTUM: 0.99 CM (ref 0.6–1.1)
IVRT: 0.07 MSEC
LA MAJOR: 6.51 CM
LA MINOR: 6.44 CM
LA WIDTH: 5.32 CM
LEFT ATRIUM SIZE: 5.59 CM
LEFT ATRIUM VOLUME INDEX: 69.5 ML/M2
LEFT ATRIUM VOLUME: 163.67 CM3
LEFT INTERNAL DIMENSION IN SYSTOLE: 5.32 CM (ref 2.1–4)
LEFT VENTRICLE DIASTOLIC VOLUME INDEX: 90.7 ML/M2
LEFT VENTRICLE DIASTOLIC VOLUME: 213.52 ML
LEFT VENTRICLE MASS INDEX: 127 G/M2
LEFT VENTRICLE SYSTOLIC VOLUME INDEX: 58 ML/M2
LEFT VENTRICLE SYSTOLIC VOLUME: 136.44 ML
LEFT VENTRICULAR INTERNAL DIMENSION IN DIASTOLE: 6.47 CM (ref 3.5–6)
LEFT VENTRICULAR MASS: 298.94 G
LV LATERAL E/E' RATIO: 7.7
LV SEPTAL E/E' RATIO: 12.83
MV PEAK A VEL: 0.43 M/S
MV PEAK E VEL: 0.77 M/S
PISA TR MAX VEL: 3.81 M/S
PULM VEIN S/D RATIO: 0.27
PV PEAK D VEL: 1.07 M/S
PV PEAK S VEL: 0.29 M/S
PV PEAK VELOCITY: 1.02 CM/S
RA MAJOR: 6.43 CM
RA PRESSURE: 3 MMHG
RA WIDTH: 4.86 CM
RETIRED EF AND QEF - SEE NOTES: 48 %
RIGHT VENTRICULAR END-DIASTOLIC DIMENSION: 5.5 CM
RV TISSUE DOPPLER FREE WALL SYSTOLIC VELOCITY 1 (APICAL 4 CHAMBER VIEW): 7.76 M/S
SINUS: 2.96 CM
STJ: 2.79 CM
TDI LATERAL: 0.1
TDI SEPTAL: 0.06
TDI: 0.08
TR MAX PG: 58.06 MMHG
TRICUSPID ANNULAR PLANE SYSTOLIC EXCURSION: 1.43 CM
TV REST PULMONARY ARTERY PRESSURE: 61 MMHG

## 2019-02-06 PROCEDURE — 71046 X-RAY EXAM CHEST 2 VIEWS: CPT | Mod: 26,HCNC,, | Performed by: RADIOLOGY

## 2019-02-06 PROCEDURE — 93306 TRANSTHORACIC ECHO (TTE) COMPLETE (CUPID ONLY): ICD-10-PCS | Mod: HCNC,S$GLB,, | Performed by: INTERNAL MEDICINE

## 2019-02-06 PROCEDURE — 93306 TTE W/DOPPLER COMPLETE: CPT | Mod: HCNC,S$GLB,, | Performed by: INTERNAL MEDICINE

## 2019-02-06 PROCEDURE — 71046 XR CHEST PA AND LATERAL: ICD-10-PCS | Mod: 26,HCNC,, | Performed by: RADIOLOGY

## 2019-02-06 PROCEDURE — 96374 THER/PROPH/DIAG INJ IV PUSH: CPT | Mod: 59,HCNC,S$GLB, | Performed by: INTERNAL MEDICINE

## 2019-02-06 PROCEDURE — 71046 X-RAY EXAM CHEST 2 VIEWS: CPT | Mod: TC,HCNC,FY

## 2019-02-06 PROCEDURE — 96374 TRANSTHORACIC ECHO (TTE) COMPLETE (CUPID ONLY): ICD-10-PCS | Mod: 59,HCNC,S$GLB, | Performed by: INTERNAL MEDICINE

## 2019-02-06 RX ORDER — SILDENAFIL 100 MG/1
100 TABLET, FILM COATED ORAL DAILY PRN
Qty: 6 TABLET | Refills: 12 | Status: ON HOLD | OUTPATIENT
Start: 2019-02-06 | End: 2023-01-01 | Stop reason: HOSPADM

## 2019-02-06 NOTE — PROGRESS NOTES
ED (erectile dysfunction) of organic origin  -     sildenafil (VIAGRA) 100 MG tablet; Take 1 tablet (100 mg total) by mouth daily as needed for Erectile Dysfunction.  Dispense: 6 tablet; Refill: 12

## 2019-02-06 NOTE — NURSING
Patient identified by 2 identifiers. Denies previous reactions to blood transfusions, allergies reviewed & procedure explained.  22 g IV placed to Lt FA, flushed w/ 10cc NS pre & post contrast administration.  3cc Optison administered, echo images obtained.  Pt tolerated procedure well.  IV D/C'ed, preasure dsg applied.  Pt D/C'ed to home.

## 2019-02-07 DIAGNOSIS — I25.10 CORONARY ARTERY DISEASE, ANGINA PRESENCE UNSPECIFIED, UNSPECIFIED VESSEL OR LESION TYPE, UNSPECIFIED WHETHER NATIVE OR TRANSPLANTED HEART: Primary | ICD-10-CM

## 2019-02-10 ENCOUNTER — PATIENT MESSAGE (OUTPATIENT)
Dept: TRANSPLANT | Facility: CLINIC | Age: 74
End: 2019-02-10

## 2019-02-10 ENCOUNTER — PATIENT MESSAGE (OUTPATIENT)
Dept: CARDIOLOGY | Facility: CLINIC | Age: 74
End: 2019-02-10

## 2019-02-11 ENCOUNTER — TELEPHONE (OUTPATIENT)
Dept: SLEEP MEDICINE | Facility: OTHER | Age: 74
End: 2019-02-11

## 2019-02-11 ENCOUNTER — LAB VISIT (OUTPATIENT)
Dept: LAB | Facility: OTHER | Age: 74
End: 2019-02-11
Payer: MEDICARE

## 2019-02-11 DIAGNOSIS — I50.22 CHRONIC SYSTOLIC CONGESTIVE HEART FAILURE: ICD-10-CM

## 2019-02-13 ENCOUNTER — HOSPITAL ENCOUNTER (OUTPATIENT)
Dept: CARDIOLOGY | Facility: CLINIC | Age: 74
Discharge: HOME | End: 2019-02-13
Attending: INTERNAL MEDICINE
Payer: MEDICARE

## 2019-02-13 VITALS
WEIGHT: 236.5 LBS | HEART RATE: 73 BPM | BODY MASS INDEX: 32.03 KG/M2 | DIASTOLIC BLOOD PRESSURE: 58 MMHG | HEIGHT: 72 IN | RESPIRATION RATE: 18 BRPM | SYSTOLIC BLOOD PRESSURE: 122 MMHG

## 2019-02-13 DIAGNOSIS — I25.10 CORONARY ARTERY DISEASE, ANGINA PRESENCE UNSPECIFIED, UNSPECIFIED VESSEL OR LESION TYPE, UNSPECIFIED WHETHER NATIVE OR TRANSPLANTED HEART: ICD-10-CM

## 2019-02-13 LAB
ASCENDING AORTA: 3.28 CM
BSA FOR ECHO PROCEDURE: 2.33 M2
CV ECHO LV RWT: 0.3 CM
CV STRESS BASE HR: 77 BPM
DIASTOLIC BLOOD PRESSURE: 70 MMHG
DOP CALC LVOT AREA: 3.3 CM2
DOP CALC LVOT DIAMETER: 2.05 CM
DOP CALC LVOT PEAK VEL: 0.79 M/S
DOP CALC LVOT STROKE VOLUME: 41.07 CM3
DOP CALCLVOT PEAK VEL VTI: 12.45 CM
E WAVE DECELERATION TIME: 237.63 MSEC
E/A RATIO: 1.71
ECHO LV POSTERIOR WALL: 0.93 CM (ref 0.6–1.1)
FRACTIONAL SHORTENING: 17 % (ref 28–44)
INTERVENTRICULAR SEPTUM: 0.86 CM (ref 0.6–1.1)
IVRT: 0.09 MSEC
LA MAJOR: 5.01 CM
LA MINOR: 5.35 CM
LA WIDTH: 2.87 CM
LEFT ATRIUM SIZE: 4.98 CM
LEFT ATRIUM VOLUME INDEX: 27.5 ML/M2
LEFT ATRIUM VOLUME: 62.86 CM3
LEFT INTERNAL DIMENSION IN SYSTOLE: 5.08 CM (ref 2.1–4)
LEFT VENTRICLE DIASTOLIC VOLUME INDEX: 82.34 ML/M2
LEFT VENTRICLE DIASTOLIC VOLUME: 188.38 ML
LEFT VENTRICLE MASS INDEX: 96.9 G/M2
LEFT VENTRICLE SYSTOLIC VOLUME INDEX: 53.7 ML/M2
LEFT VENTRICLE SYSTOLIC VOLUME: 122.8 ML
LEFT VENTRICULAR INTERNAL DIMENSION IN DIASTOLE: 6.12 CM (ref 3.5–6)
LEFT VENTRICULAR MASS: 221.66 G
MV PEAK A VEL: 0.51 M/S
MV PEAK E VEL: 0.87 M/S
OHS CV CPX 1 MINUTE RECOVERY HEART RATE: 141 BPM
OHS CV CPX 85 PERCENT MAX PREDICTED HEART RATE MALE: 125
OHS CV CPX MAX PREDICTED HEART RATE: 147
OHS CV CPX PATIENT IS FEMALE: 0
OHS CV CPX PATIENT IS MALE: 1
OHS CV CPX PEAK DIASTOLIC BLOOD PRESSURE: 43 MMHG
OHS CV CPX PEAK HEAR RATE: 141 BPM
OHS CV CPX PEAK RATE PRESSURE PRODUCT: ABNORMAL
OHS CV CPX PEAK SYSTOLIC BLOOD PRESSURE: 147 MMHG
OHS CV CPX PERCENT MAX PREDICTED HEART RATE ACHIEVED: 96
OHS CV CPX PERCENT TARGET HEART RATE ACHIEVED: 113.71
OHS CV CPX RATE PRESSURE PRODUCT PRESENTING: ABNORMAL
OHS CV CPX TARGET HEART RATE: 124
PULM VEIN S/D RATIO: 0.75
PV PEAK D VEL: 0.69 M/S
PV PEAK S VEL: 0.52 M/S
RA MAJOR: 4.48 CM
RA WIDTH: 3.11 CM
RIGHT VENTRICULAR END-DIASTOLIC DIMENSION: 4.31 CM
SYSTOLIC BLOOD PRESSURE: 142 MMHG
TRICUSPID ANNULAR PLANE SYSTOLIC EXCURSION: 1.67 CM

## 2019-02-13 PROCEDURE — 93351 ECHOCARDIOGRAM STRESS TEST (CUPID ONLY): ICD-10-PCS | Mod: HCNC,S$GLB,, | Performed by: INTERNAL MEDICINE

## 2019-02-13 PROCEDURE — 93351 STRESS TTE COMPLETE: CPT | Mod: HCNC,S$GLB,, | Performed by: INTERNAL MEDICINE

## 2019-02-13 RX ORDER — DOBUTAMINE HYDROCHLORIDE 100 MG/100ML
30 INJECTION INTRAVENOUS
Status: COMPLETED | OUTPATIENT
Start: 2019-02-13 | End: 2019-02-13

## 2019-02-13 RX ORDER — ATROPINE SULFATE 0.1 MG/ML
0.25 INJECTION INTRAVENOUS
Status: COMPLETED | OUTPATIENT
Start: 2019-02-13 | End: 2019-02-13

## 2019-02-13 RX ADMIN — ATROPINE SULFATE 0.25 MG: 0.1 INJECTION INTRAVENOUS at 09:02

## 2019-02-13 RX ADMIN — DOBUTAMINE HYDROCHLORIDE 30 MCG/KG/MIN: 100 INJECTION INTRAVENOUS at 09:02

## 2019-02-13 NOTE — NURSING NOTE
Patient verified by 2 identifiers and allergies reviewed.  22g IV placed to Rt FA.  DSE explained to patient, consent obtained & testing completed.  Pt's HR sensitive to Atropine, would use less than 0.25mg dose in the future.  Pt C/O 7/10 non-radiating CP associated w/ SOB during peak testing.  Symptoms resolved during the recovery period.   IV removed post testing.  Post study discharge instructions reviewed with patient, patient verbalized understanding.  Patient discharged to home in stable condition.

## 2019-02-14 ENCOUNTER — TELEPHONE (OUTPATIENT)
Dept: CARDIOLOGY | Facility: CLINIC | Age: 74
End: 2019-02-14

## 2019-02-14 ENCOUNTER — INITIAL CONSULT (OUTPATIENT)
Dept: TRANSPLANT | Facility: CLINIC | Age: 74
End: 2019-02-14
Payer: MEDICARE

## 2019-02-14 VITALS
HEART RATE: 85 BPM | WEIGHT: 244.06 LBS | DIASTOLIC BLOOD PRESSURE: 63 MMHG | SYSTOLIC BLOOD PRESSURE: 110 MMHG | HEIGHT: 72 IN | BODY MASS INDEX: 33.06 KG/M2

## 2019-02-14 DIAGNOSIS — I34.0 MODERATE MITRAL REGURGITATION BY PRIOR ECHOCARDIOGRAM: ICD-10-CM

## 2019-02-14 DIAGNOSIS — Z95.1 S/P CABG (CORONARY ARTERY BYPASS GRAFT): Primary | Chronic | ICD-10-CM

## 2019-02-14 DIAGNOSIS — I51.89 MILD LEFT VENTRICULAR SYSTOLIC DYSFUNCTION: ICD-10-CM

## 2019-02-14 DIAGNOSIS — I25.10 CORONARY ARTERY DISEASE INVOLVING NATIVE CORONARY ARTERY WITHOUT ANGINA PECTORIS, UNSPECIFIED WHETHER NATIVE OR TRANSPLANTED HEART: ICD-10-CM

## 2019-02-14 PROCEDURE — 3074F PR MOST RECENT SYSTOLIC BLOOD PRESSURE < 130 MM HG: ICD-10-PCS | Mod: HCNC,CPTII,S$GLB, | Performed by: INTERNAL MEDICINE

## 2019-02-14 PROCEDURE — 99205 OFFICE O/P NEW HI 60 MIN: CPT | Mod: HCNC,S$GLB,, | Performed by: INTERNAL MEDICINE

## 2019-02-14 PROCEDURE — 99999 PR PBB SHADOW E&M-EST. PATIENT-LVL III: ICD-10-PCS | Mod: PBBFAC,HCNC,, | Performed by: INTERNAL MEDICINE

## 2019-02-14 PROCEDURE — 99205 PR OFFICE/OUTPT VISIT, NEW, LEVL V, 60-74 MIN: ICD-10-PCS | Mod: HCNC,S$GLB,, | Performed by: INTERNAL MEDICINE

## 2019-02-14 PROCEDURE — 3078F PR MOST RECENT DIASTOLIC BLOOD PRESSURE < 80 MM HG: ICD-10-PCS | Mod: HCNC,CPTII,S$GLB, | Performed by: INTERNAL MEDICINE

## 2019-02-14 PROCEDURE — 3074F SYST BP LT 130 MM HG: CPT | Mod: HCNC,CPTII,S$GLB, | Performed by: INTERNAL MEDICINE

## 2019-02-14 PROCEDURE — 1101F PR PT FALLS ASSESS DOC 0-1 FALLS W/OUT INJ PAST YR: ICD-10-PCS | Mod: HCNC,CPTII,S$GLB, | Performed by: INTERNAL MEDICINE

## 2019-02-14 PROCEDURE — 1101F PT FALLS ASSESS-DOCD LE1/YR: CPT | Mod: HCNC,CPTII,S$GLB, | Performed by: INTERNAL MEDICINE

## 2019-02-14 PROCEDURE — 99999 PR PBB SHADOW E&M-EST. PATIENT-LVL III: CPT | Mod: PBBFAC,HCNC,, | Performed by: INTERNAL MEDICINE

## 2019-02-14 PROCEDURE — 3078F DIAST BP <80 MM HG: CPT | Mod: HCNC,CPTII,S$GLB, | Performed by: INTERNAL MEDICINE

## 2019-02-14 NOTE — PROGRESS NOTES
Subjective:    Patient ID:  Jose Fonseca Jr. is a 73 y.o. male who presents for evaluation of Congestive Heart Failure and Mitral Regurgitation      HPI  CAD s/p 1996 PCI of LAD in 2013 CABG 2/3 patent graft in 2013 with a history of OCONNELL / weight gain since OCt 2018 while on trip aboard. He notes that BID lasix makes him urinate multiple times a day so he occasionally skips doses When he was seen in interventional clinic in early Feb 2019, he was encouraged to increase his lasix to 40 BID til he saw me.  Since interventional clinic visit, his clinic weight has decreased 9 lbs (home weight six) with normalization of his BNP.       Today reports slight OCONNELL which he can not quantify  No other CHF symptoms (see ROS) or exertional chest pain   He has not been exercising.  Has a sleep study planned for March 2019     Review of Systems   Constitution: Negative for decreased appetite, weight gain and weight loss.   Cardiovascular: Positive for dyspnea on exertion. Negative for chest pain, leg swelling, near-syncope, orthopnea and palpitations.   Respiratory: Negative for cough and shortness of breath.    Musculoskeletal: Negative for myalgias.   Gastrointestinal: Negative for jaundice.        Objective:    Physical Exam   Constitutional: He is oriented to person, place, and time. He appears well-developed and well-nourished. He is active. He is not intubated.   /63 (BP Location: Right arm, Patient Position: Sitting, BP Method: Large (Automatic))   Pulse 85   Ht 6' (1.829 m)   Wt 110.7 kg (244 lb 0.8 oz)   BMI 33.10 kg/m²      HENT:   Head: Normocephalic and atraumatic. Hair is normal.   Right Ear: External ear normal.   Left Ear: External ear normal.   Nose: Nose normal. No nasal deformity. No epistaxis.  No foreign bodies.   Mouth/Throat: Mucous membranes are normal. Mucous membranes are not cyanotic. No oropharyngeal exudate.   Eyes: Conjunctivae and EOM are normal. Pupils are equal, round, and reactive to  light.   Neck: Neck supple. No hepatojugular reflux and no JVD present.   Cardiovascular: Normal rate, regular rhythm and normal pulses. Exam reveals no gallop.   Murmur heard.  High-pitched blowing early systolic murmur is present with a grade of 2/6 at the apex. Consistent with mild to moderate MR  Pulmonary/Chest: Effort normal and breath sounds normal. No apnea and no tachypnea. He is not intubated. No respiratory distress. He exhibits no tenderness.   Abdominal: Soft. Normal appearance and bowel sounds are normal. There is no tenderness. No hernia.   Musculoskeletal: Normal range of motion.   Neurological: He is alert and oriented to person, place, and time. He displays no seizure activity.   Skin: Skin is warm, dry and intact. No rash noted. No pallor.   Psychiatric: He has a normal mood and affect. His speech is normal and behavior is normal. Thought content normal. Cognition and memory are normal.         Assessment:       1. S/P CABG (coronary artery bypass graft)    2. Coronary artery disease involving native coronary artery without angina pectoris, unspecified whether native or transplanted heart    3. Moderate ischemic mitral regurgitation by prior echocardiogram    4. Mild left ventricular systolic dysfunction (LVEF 45% 2019)          Plan:       Problem List Items Addressed This Visit     S/P CABG (coronary artery bypass graft) - Primary (Chronic)    Relevant Orders    Basic metabolic panel    Comprehensive metabolic panel    Brain natriuretic peptide    CAD (coronary artery disease)    Overview      Last angiogram in Oct 2013 significant for patent pLAD stent and LIMA-OM and VG-PDA grafts and known LANCE-LAD occlusion            Moderate ischemic mitral regurgitation by prior echocardiogram    Current Assessment & Plan     2019 stress echo shows - Basal inferoseptum and basal inferior are akinetic at rest that is unchanged with stress (old MI)   · Would like to review angio with Dr Ibarra Wonder if we  need PET to see if LAD is more ischemic versus the inferior wall has become infarcted to explain ischemic MR          Mild left ventricular systolic dysfunction (LVEF 45% 2019)     Overview     Due to CAD (infarct or ischemia) versus mitral regurg or both         Current Assessment & Plan     Will change lasix 40 mg daily with second dose for weight gain > 2 lbs in 24 hr or 5 lbs in a week   Will check labs / nurse review in two weeks to see how his doing   Maybe worth getting MRI to look at volumes if we have an option for mitral valve intervention (percutanous or surgical repair?)            Follow-up in about 5 weeks (around 3/22/2019).         Requested patient to weigh themselves daily, and to notify us if their weight increases by more than 2 lbs in 1 day or 5 lbs in 1 week.

## 2019-02-14 NOTE — Clinical Note
Any options for his ischemic mitral valve (carillon versus surgical repair?) What do u think about getting a PET ?

## 2019-02-14 NOTE — PATIENT INSTRUCTIONS
Take 40 mg of lasix daily  TAke second dose of 40 mg of lasix if weight increases by more than 2 lbs in 1 day or 5 lbs in 1 week.

## 2019-02-16 PROBLEM — I51.89 MILD LEFT VENTRICULAR SYSTOLIC DYSFUNCTION: Status: ACTIVE | Noted: 2019-02-16

## 2019-02-16 PROBLEM — I34.0 MODERATE MITRAL REGURGITATION BY PRIOR ECHOCARDIOGRAM: Status: ACTIVE | Noted: 2019-02-16

## 2019-02-16 NOTE — ASSESSMENT & PLAN NOTE
Will change lasix 40 mg daily with second dose for weight gain > 2 lbs in 24 hr or 5 lbs in a week   Will check labs / nurse review in two weeks to see how his doing   Maybe worth getting MRI to look at volumes if we have an option for mitral valve intervention (percutanous or surgical repair?)

## 2019-02-16 NOTE — ASSESSMENT & PLAN NOTE
2019 stress echo shows - Basal inferoseptum and basal inferior are akinetic at rest that is unchanged with stress (old MI)   · Would like to review angio with Dr Ibarra Wonder if we need PET to see if LAD is more ischemic versus the inferior wall has become infarcted to explain ischemic MR

## 2019-02-20 DIAGNOSIS — I50.22 CHRONIC SYSTOLIC CONGESTIVE HEART FAILURE: ICD-10-CM

## 2019-02-20 DIAGNOSIS — I34.0 MITRAL VALVE INSUFFICIENCY, UNSPECIFIED ETIOLOGY: Primary | ICD-10-CM

## 2019-02-20 RX ORDER — FUROSEMIDE 40 MG/1
TABLET ORAL
Qty: 30 TABLET | Refills: 0 | Status: ON HOLD | OUTPATIENT
Start: 2019-02-20 | End: 2019-04-22 | Stop reason: HOSPADM

## 2019-02-20 RX ORDER — FUROSEMIDE 40 MG/1
TABLET ORAL
Qty: 30 TABLET | Refills: 0 | Status: SHIPPED | OUTPATIENT
Start: 2019-02-20 | End: 2019-11-03 | Stop reason: SDUPTHER

## 2019-02-28 ENCOUNTER — TELEPHONE (OUTPATIENT)
Dept: TRANSPLANT | Facility: CLINIC | Age: 74
End: 2019-02-28

## 2019-02-28 ENCOUNTER — LAB VISIT (OUTPATIENT)
Dept: LAB | Facility: OTHER | Age: 74
End: 2019-02-28
Attending: INTERNAL MEDICINE
Payer: MEDICARE

## 2019-02-28 DIAGNOSIS — Z95.1 S/P CABG (CORONARY ARTERY BYPASS GRAFT): Chronic | ICD-10-CM

## 2019-02-28 LAB
ANION GAP SERPL CALC-SCNC: 13 MMOL/L
BUN SERPL-MCNC: 45 MG/DL
CALCIUM SERPL-MCNC: 10.1 MG/DL
CHLORIDE SERPL-SCNC: 104 MMOL/L
CO2 SERPL-SCNC: 21 MMOL/L
CREAT SERPL-MCNC: 1.3 MG/DL
EST. GFR  (AFRICAN AMERICAN): >60 ML/MIN/1.73 M^2
EST. GFR  (NON AFRICAN AMERICAN): 54 ML/MIN/1.73 M^2
GLUCOSE SERPL-MCNC: 123 MG/DL
POTASSIUM SERPL-SCNC: 4.5 MMOL/L
SODIUM SERPL-SCNC: 138 MMOL/L

## 2019-02-28 PROCEDURE — 80048 BASIC METABOLIC PNL TOTAL CA: CPT | Mod: HCNC

## 2019-02-28 PROCEDURE — 36415 COLL VENOUS BLD VENIPUNCTURE: CPT | Mod: HCNC

## 2019-03-07 RX ORDER — LISINOPRIL 40 MG/1
TABLET ORAL
Qty: 30 TABLET | Refills: 0 | Status: ON HOLD | OUTPATIENT
Start: 2019-03-07 | End: 2019-04-22 | Stop reason: SDUPTHER

## 2019-03-15 ENCOUNTER — TELEPHONE (OUTPATIENT)
Dept: SLEEP MEDICINE | Facility: OTHER | Age: 74
End: 2019-03-15

## 2019-03-18 ENCOUNTER — HOSPITAL ENCOUNTER (OUTPATIENT)
Dept: SLEEP MEDICINE | Facility: OTHER | Age: 74
Discharge: HOME | End: 2019-03-18
Attending: NURSE PRACTITIONER
Payer: MEDICARE

## 2019-03-18 DIAGNOSIS — G47.33 OBSTRUCTIVE SLEEP APNEA: ICD-10-CM

## 2019-03-18 PROCEDURE — 95800 SLP STDY UNATTENDED: CPT | Mod: HCNC

## 2019-03-18 PROCEDURE — 95806 SLEEP STUDY UNATT&RESP EFFT: CPT | Mod: 26,HCNC,, | Performed by: PSYCHIATRY & NEUROLOGY

## 2019-03-18 PROCEDURE — 95806 PR SLEEP STUDY, UNATTENDED, SIMUL RECORD HR/O2 SAT/RESP FLOW/RESP EFFT: ICD-10-PCS | Mod: 26,HCNC,, | Performed by: PSYCHIATRY & NEUROLOGY

## 2019-03-26 ENCOUNTER — OFFICE VISIT (OUTPATIENT)
Dept: CARDIOLOGY | Facility: CLINIC | Age: 74
End: 2019-03-26
Payer: MEDICARE

## 2019-03-26 VITALS
DIASTOLIC BLOOD PRESSURE: 58 MMHG | WEIGHT: 248.69 LBS | BODY MASS INDEX: 32.96 KG/M2 | SYSTOLIC BLOOD PRESSURE: 122 MMHG | HEART RATE: 68 BPM | OXYGEN SATURATION: 94 % | HEIGHT: 73 IN

## 2019-03-26 DIAGNOSIS — I25.10 CORONARY ARTERY DISEASE INVOLVING NATIVE CORONARY ARTERY WITHOUT ANGINA PECTORIS, UNSPECIFIED WHETHER NATIVE OR TRANSPLANTED HEART: ICD-10-CM

## 2019-03-26 DIAGNOSIS — I34.0 MODERATE MITRAL REGURGITATION BY PRIOR ECHOCARDIOGRAM: ICD-10-CM

## 2019-03-26 DIAGNOSIS — I51.89 MILD LEFT VENTRICULAR SYSTOLIC DYSFUNCTION: ICD-10-CM

## 2019-03-26 DIAGNOSIS — Z95.1 S/P CABG (CORONARY ARTERY BYPASS GRAFT): Chronic | ICD-10-CM

## 2019-03-26 DIAGNOSIS — I20.89 STABLE ANGINA: Primary | ICD-10-CM

## 2019-03-26 PROCEDURE — 99999 PR PBB SHADOW E&M-EST. PATIENT-LVL III: CPT | Mod: PBBFAC,HCNC,, | Performed by: INTERNAL MEDICINE

## 2019-03-26 PROCEDURE — 3074F SYST BP LT 130 MM HG: CPT | Mod: HCNC,CPTII,S$GLB, | Performed by: INTERNAL MEDICINE

## 2019-03-26 PROCEDURE — 3078F DIAST BP <80 MM HG: CPT | Mod: HCNC,CPTII,S$GLB, | Performed by: INTERNAL MEDICINE

## 2019-03-26 PROCEDURE — 3078F PR MOST RECENT DIASTOLIC BLOOD PRESSURE < 80 MM HG: ICD-10-PCS | Mod: HCNC,CPTII,S$GLB, | Performed by: INTERNAL MEDICINE

## 2019-03-26 PROCEDURE — 99999 PR PBB SHADOW E&M-EST. PATIENT-LVL III: ICD-10-PCS | Mod: PBBFAC,HCNC,, | Performed by: INTERNAL MEDICINE

## 2019-03-26 PROCEDURE — 99215 PR OFFICE/OUTPT VISIT, EST, LEVL V, 40-54 MIN: ICD-10-PCS | Mod: HCNC,S$GLB,, | Performed by: INTERNAL MEDICINE

## 2019-03-26 PROCEDURE — 1101F PR PT FALLS ASSESS DOC 0-1 FALLS W/OUT INJ PAST YR: ICD-10-PCS | Mod: HCNC,CPTII,S$GLB, | Performed by: INTERNAL MEDICINE

## 2019-03-26 PROCEDURE — 99215 OFFICE O/P EST HI 40 MIN: CPT | Mod: HCNC,S$GLB,, | Performed by: INTERNAL MEDICINE

## 2019-03-26 PROCEDURE — 99499 UNLISTED E&M SERVICE: CPT | Mod: S$GLB,,, | Performed by: INTERNAL MEDICINE

## 2019-03-26 PROCEDURE — 3074F PR MOST RECENT SYSTOLIC BLOOD PRESSURE < 130 MM HG: ICD-10-PCS | Mod: HCNC,CPTII,S$GLB, | Performed by: INTERNAL MEDICINE

## 2019-03-26 PROCEDURE — 99499 RISK ADDL DX/OHS AUDIT: ICD-10-PCS | Mod: S$GLB,,, | Performed by: INTERNAL MEDICINE

## 2019-03-26 PROCEDURE — 1101F PT FALLS ASSESS-DOCD LE1/YR: CPT | Mod: HCNC,CPTII,S$GLB, | Performed by: INTERNAL MEDICINE

## 2019-03-26 RX ORDER — SODIUM CHLORIDE 9 MG/ML
3 INJECTION, SOLUTION INTRAVENOUS CONTINUOUS
Status: CANCELLED | OUTPATIENT
Start: 2019-03-26 | End: 2019-03-26

## 2019-03-26 RX ORDER — DIPHENHYDRAMINE HCL 25 MG
50 CAPSULE ORAL EVERY 6 HOURS
Status: CANCELLED | OUTPATIENT
Start: 2019-03-26 | End: 2019-03-26

## 2019-03-26 NOTE — PROGRESS NOTES
Interventional Cardiology Clinic Note  Reason for Visit: angina     HPI:   74 y/o M here for follow up of dyspnea on exertion and angina. Since 2/2018 had dyspnea and orthopnea. His orthopnea has improved with diuresis. [place] airport [place] distances he experiences dyspnea on exertion and chest tightness that is relieved with rest. Stress echo negative in 2/2019. Compared with 2018 his EF has decreased to 45% along with moderate MR.     CAD s/p 1996 PCI of LAD in 2013. LIMA-OM; LANCE-LAD (occluded); SVG-PDA.        ROS:    Review of Systems   All other systems reviewed and are negative.    PMH:     Past Medical History:   Diagnosis Date    Coronary artery disease     S/P 3 vessel CABG; stents    Glaucoma     HTN (hypertension) 4/19/2013    Hyperlipidemia     Myocardial infarction     Obstructive sleep apnea     Squamous cell carcinoma      Past Surgical History:   Procedure Laterality Date    ADENOIDECTOMY      ARTHROSCOPY-SHOULDER EXCISION DISTAL CLAVICLE Left 3/18/2015    [facility]    ARTHROSCOPY-SHOULDER WITH SUBACROMIAL DECOMPRESSION Left 3/18/2015    [facility]    CATARACT EXTRACTION      patient not sure which eye    CATHETERIZATION, HEART, LEFT Left 10/1/2013    [facility] CATH LAB    COLONOSCOPY N/A 11/20/2014    Performed by Ken Gay [facility] at [facility] ENDO (4TH FLR)    CORONARY ANGIOPLASTY      CORONARY ARTERY BYPASS GRAFT      REPAIR-ROTATOR CUFF Left 3/18/2015    Performed [facility]    REPAIR-TENDON-BICEP Left 3/18/2015    [facility]    ROTATOR CUFF REPAIR      SHOULDER SURGERY      TONSILLECTOMY Left 2015     Allergies:   Review of patient's allergies indicates:  No Known Allergies  Medications:     Current Outpatient Medications on File Prior to Visit   Medication Sig Dispense Refill     allopurinol (ZYLOPRIM) 300 MG tablet Take 1 tablet by mouth once daily.      aspirin (ECOTRIN) 81 MG EC tablet Take 1 tablet (81 mg total) by mouth once daily. 30 tablet 11    atorvastatin (LIPITOR) 40 MG tablet Take 1 tablet (40 mg total) by mouth once daily. 30 tablet 11    etodolac (LODINE) 400 MG tablet 400 mg as needed.       furosemide (LASIX) 40 MG tablet Take 1 tablet (40 mg total) by mouth 2 (two) times daily. (Patient taking differently: Take 40 mg by mouth once daily. ) 60 tablet 11    furosemide (LASIX) 40 MG tablet TAKE 1 TABLET(40 MG) BY MOUTH EVERY DAY 30 tablet 0    furosemide (LASIX) 40 MG tablet TAKE 1 TABLET(40 MG) BY MOUTH EVERY DAY 30 tablet 0    ibuprofen (ADVIL,MOTRIN) 800 MG tablet Take 1 tablet (800 mg total) by mouth every 6 (six) hours as needed for Pain. 12 tablet 0    lisinopril (PRINIVIL,ZESTRIL) 40 MG tablet TAKE 1 TABLET BY MOUTH EVERY DAY 30 tablet 0    sildenafil (VIAGRA) 100 MG tablet Take 1 tablet (100 mg total) by mouth daily as needed for Erectile Dysfunction. 6 tablet 12    tamsulosin (FLOMAX) 0.4 mg Cap Take 1 capsule (0.4 mg total) by mouth every evening. 30 capsule 11     No current facility-administered medications on file prior to visit.      Social History:     Social History     Tobacco Use    Smoking status: Former Smoker     Packs/day: 1.00     Years: 31.00     Pack years: 31.00     Last attempt to quit: 1991     Years since quittin.9    Smokeless tobacco: Never Used   Substance Use Topics    Alcohol use: Yes     Alcohol/week: 1.2 oz     Types: 2 Glasses of wine per week     Comment: Socially     Family History:     Family History   Problem Relation Age of Onset    Heart disease Father     Hypertension Father     Heart failure Father     Heart attack Father     Diabetes Maternal Grandmother     Diabetes Maternal Grandfather      Physical Exam:   BP (!) 122/58 (BP Location: Right arm, Patient Position: Sitting, BP Method: Large  "(Automatic))   Pulse 68   Ht 6' 1" (1.854 m)   Wt 112.8 kg (248 lb 10.9 oz)   SpO2 (!) 94%   BMI 32.81 kg/m²      Physical Exam   Constitutional: He is oriented to person, place, and time. He appears well-developed and well-nourished.   HENT:   Head: Normocephalic and atraumatic.   Eyes: No scleral icterus.   Neck: No JVD present.   Cardiovascular: Normal rate and regular rhythm.   Murmur heard.  Pulses:       Radial pulses are 2+ on the right side, and 2+ on the left side.        Femoral pulses are 2+ on the right side.  Allens test normal   Systolic murmur at left sternal border    Pulmonary/Chest: Breath sounds normal. No respiratory distress. He has no wheezes. He has no rales.   Musculoskeletal: He exhibits no edema.   Neurological: He is alert and oriented to person, place, and time.   Skin: Skin is warm.   Psychiatric: He has a normal mood and affect.       Labs:     Lab Results   Component Value Date     02/28/2019    K 4.5 02/28/2019     02/28/2019    CO2 21 (L) 02/28/2019    BUN 45 (H) 02/28/2019    CREATININE 1.3 02/28/2019    ANIONGAP 13 02/28/2019     Lab Results   Component Value Date    HGBA1C 5.6 07/25/2017     Lab Results   Component Value Date    BNP 54 02/11/2019     (H) 12/19/2018     (H) 02/20/2018    Lab Results   Component Value Date    WBC 9.30 12/19/2018    HGB 14.1 12/19/2018    HCT 42.6 12/19/2018     12/19/2018    GRAN 6.6 12/19/2018    GRAN 70.7 12/19/2018     Lab Results   Component Value Date    CHOL 158 12/19/2018    HDL 54 12/19/2018    LDLCALC 90.4 12/19/2018    TRIG 68 12/19/2018          Imaging:     Assessment:      1. Stable angina    2. S/P CABG (coronary artery bypass graft)    3. Coronary artery disease involving native coronary artery without angina pectoris, unspecified whether native or transplanted heart    4. Moderate ischemic mitral regurgitation by prior echocardiogram    5. Mild left ventricular systolic dysfunction (LVEF 45% " 2019)         72 y/o M with previous CABG, known CAD, here with exertional dyspnea and chest tightness. He has stable angina and given his age and presence of bypass grafts along with a new reduced EF would recommend repeat angiogram.    Will need to be loaded with Plavix 600mg on the day of procedure    Plan:   Diagnoses and all orders for this visit:    Stable angina  1. - Cardiac catheterization with probable PCI.   2. Antiplatelets: ASA ( will load with plavix 600mg on the day of procedure)  3. Access: RCFA   4. Catheters: Rafal   5. The risks, benefits, and alternatives of coronary vascular angiography and possible intervention were discussed with the patient. All questions were answered and informed consent was obtained. I had a detailed discussion with the patient regarding risk of stroke, MI, bleeding access site complications including limb loss, allergy, kidney failure including dialysis and death.  6. The patient understands the risks and benefits and wishes to go ahead with the procedure.  7. All patient's questions were answered    Charles Zarate DO  Cardiology Fellow       Moderate ischemic mitral regurgitation by prior echocardiogram  Ischemia evaluation     Mild left ventricular systolic dysfunction (LVEF 45% 2019)   -Will pursue ischemic eval with angiogram   Repeat TTE           Signed:  Charles Zarate DO  Cardiology Fellow      I have personally taken the history and examined this patient. I have discussed and agree with the resident's findings and plan as documented in the resident's note.  Repeat ECHO. Would recommend repeat LHC regardless of results due to persistent smx now that patient is diuresed and euvolumenic.  Ken Ibarra

## 2019-03-26 NOTE — H&P (VIEW-ONLY)
Interventional Cardiology Clinic Note  Reason for Visit: angina     HPI:   72 y/o M here for follow up of dyspnea on exertion and angina. Since 2/2018 had dyspnea and orthopnea. His orthopnea has improved with diuresis. [place] he experiences dyspnea on exertion and chest tightness that is relieved with rest. Stress echo negative in 2/2019. Compared with 2018 his EF has decreased to 45% along with moderate MR.     CAD s/p 1996 PCI of LAD in 2013. LIMA-OM; LANCE-LAD (occluded); SVG-PDA.        ROS:    Review of Systems   All other systems reviewed and are negative.    PMH:     Past Medical History:   Diagnosis Date    Coronary artery disease     S/P 3 vessel CABG; stents    Glaucoma     HTN (hypertension) 4/19/2013    Hyperlipidemia     Myocardial infarction     Obstructive sleep apnea     Squamous cell carcinoma      Past Surgical History:   Procedure Laterality Date    ADENOIDECTOMY      ARTHROSCOPY-SHOULDER EXCISION DISTAL CLAVICLE Left 3/18/2015    [facility] at [facility] OR    ARTHROSCOPY-SHOULDER WITH SUBACROMIAL DECOMPRESSION Left 3/18/2015    [facility] at [facility] OR    CATARACT EXTRACTION      patient not sure which eye    CATHETERIZATION, HEART, LEFT Left 10/1/2013    Performed by Ken Ibarra [facility] at [facility] CATH LAB    COLONOSCOPY N/A 11/20/2014    Performed by Ken Gay MD at [facility] ENDO (4TH FLR)    CORONARY ANGIOPLASTY      CORONARY ARTERY BYPASS GRAFT      REPAIR-ROTATOR CUFF Left 3/18/2015    [facility] by [facility] at [facility] OR    REPAIR-TENDON-BICEP Left 3/18/2015    [facility] at [facility] OR    ROTATOR CUFF REPAIR      SHOULDER SURGERY      TONSILLECTOMY Left 2015     Allergies:   Review of patient's allergies indicates:  No Known Allergies  Medications:     Current Outpatient Medications on File Prior to Visit   Medication Sig Dispense Refill     allopurinol (ZYLOPRIM) 300 MG tablet Take 1 tablet by mouth once daily.      aspirin (ECOTRIN) 81 MG EC tablet Take 1 tablet (81 mg total) by mouth once daily. 30 tablet 11    atorvastatin (LIPITOR) 40 MG tablet Take 1 tablet (40 mg total) by mouth once daily. 30 tablet 11    etodolac (LODINE) 400 MG tablet 400 mg as needed.       furosemide (LASIX) 40 MG tablet Take 1 tablet (40 mg total) by mouth 2 (two) times daily. (Patient taking differently: Take 40 mg by mouth once daily. ) 60 tablet 11    furosemide (LASIX) 40 MG tablet TAKE 1 TABLET(40 MG) BY MOUTH EVERY DAY 30 tablet 0    furosemide (LASIX) 40 MG tablet TAKE 1 TABLET(40 MG) BY MOUTH EVERY DAY 30 tablet 0    ibuprofen (ADVIL,MOTRIN) 800 MG tablet Take 1 tablet (800 mg total) by mouth every 6 (six) hours as needed for Pain. 12 tablet 0    lisinopril (PRINIVIL,ZESTRIL) 40 MG tablet TAKE 1 TABLET BY MOUTH EVERY DAY 30 tablet 0    sildenafil (VIAGRA) 100 MG tablet Take 1 tablet (100 mg total) by mouth daily as needed for Erectile Dysfunction. 6 tablet 12    tamsulosin (FLOMAX) 0.4 mg Cap Take 1 capsule (0.4 mg total) by mouth every evening. 30 capsule 11     No current facility-administered medications on file prior to visit.      Social History:     Social History     Tobacco Use    Smoking status: Former Smoker     Packs/day: 1.00     Years: 31.00     Pack years: 31.00     Last attempt to quit: 1991     Years since quittin.9    Smokeless tobacco: Never Used   Substance Use Topics    Alcohol use: Yes     Alcohol/week: 1.2 oz     Types: 2 Glasses of wine per week     Comment: Socially     Family History:     Family History   Problem Relation Age of Onset    Heart disease Father     Hypertension Father     Heart failure Father     Heart attack Father     Diabetes Maternal Grandmother     Diabetes Maternal Grandfather      Physical Exam:   BP (!) 122/58 (BP Location: Right arm, Patient Position: Sitting, BP Method: Large  "(Automatic))   Pulse 68   Ht 6' 1" (1.854 m)   Wt 112.8 kg (248 lb 10.9 oz)   SpO2 (!) 94%   BMI 32.81 kg/m²      Physical Exam   Constitutional: He is oriented to person, place, and time. He appears well-developed and well-nourished.   HENT:   Head: Normocephalic and atraumatic.   Eyes: No scleral icterus.   Neck: No JVD present.   Cardiovascular: Normal rate and regular rhythm.   Murmur heard.  Pulses:       Radial pulses are 2+ on the right side, and 2+ on the left side.        Femoral pulses are 2+ on the right side.  Allens test normal   Systolic murmur at left sternal border    Pulmonary/Chest: Breath sounds normal. No respiratory distress. He has no wheezes. He has no rales.   Musculoskeletal: He exhibits no edema.   Neurological: He is alert and oriented to person, place, and time.   Skin: Skin is warm.   Psychiatric: He has a normal mood and affect.       Labs:     Lab Results   Component Value Date     02/28/2019    K 4.5 02/28/2019     02/28/2019    CO2 21 (L) 02/28/2019    BUN 45 (H) 02/28/2019    CREATININE 1.3 02/28/2019    ANIONGAP 13 02/28/2019     Lab Results   Component Value Date    HGBA1C 5.6 07/25/2017     Lab Results   Component Value Date    BNP 54 02/11/2019     (H) 12/19/2018     (H) 02/20/2018    Lab Results   Component Value Date    WBC 9.30 12/19/2018    HGB 14.1 12/19/2018    HCT 42.6 12/19/2018     12/19/2018    GRAN 6.6 12/19/2018    GRAN 70.7 12/19/2018     Lab Results   Component Value Date    CHOL 158 12/19/2018    HDL 54 12/19/2018    LDLCALC 90.4 12/19/2018    TRIG 68 12/19/2018          Imaging:     Assessment:      1. Stable angina    2. S/P CABG (coronary artery bypass graft)    3. Coronary artery disease involving native coronary artery without angina pectoris, unspecified whether native or transplanted heart    4. Moderate ischemic mitral regurgitation by prior echocardiogram    5. Mild left ventricular systolic dysfunction (LVEF 45% " 2019)         72 y/o M with previous CABG, known CAD, here with exertional dyspnea and chest tightness. He has stable angina and given his age and presence of bypass grafts along with a new reduced EF would recommend repeat angiogram.    Will need to be loaded with Plavix 600mg on the day of procedure    Plan:   Diagnoses and all orders for this visit:    Stable angina  1. - Cardiac catheterization with probable PCI.   2. Antiplatelets: ASA ( will load with plavix 600mg on the day of procedure)  3. Access: RCFA   4. Catheters: Rafal   5. The risks, benefits, and alternatives of coronary vascular angiography and possible intervention were discussed with the patient. All questions were answered and informed consent was obtained. I had a detailed discussion with the patient regarding risk of stroke, MI, bleeding access site complications including limb loss, allergy, kidney failure including dialysis and death.  6. The patient understands the risks and benefits and wishes to go ahead with the procedure.  7. All patient's questions were answered    Charles Zarate DO  Cardiology Fellow       Moderate ischemic mitral regurgitation by prior echocardiogram  Ischemia evaluation     Mild left ventricular systolic dysfunction (LVEF 45% 2019)   -Will pursue ischemic eval with angiogram   Repeat TTE           Signed:  Charles Zarate DO  Cardiology Fellow      I have personally taken the history and examined this patient. I have discussed and agree with the resident's findings and plan as documented in the resident's note.  Repeat ECHO. Would recommend repeat LHC regardless of results due to persistent smx now that patient is diuresed and euvolumenic.  Ken Ibarra

## 2019-03-28 ENCOUNTER — DOCUMENTATION ONLY (OUTPATIENT)
Dept: CARDIOLOGY | Facility: CLINIC | Age: 74
End: 2019-03-28

## 2019-03-28 NOTE — PROGRESS NOTES
OUTPATIENT CATHETERIZATION INSTRUCTIONS    You have been scheduled for a procedure in the catheterization lab on Wednesday May 22, 2019.     Please report to the Cardiology Waiting Area on the Third floor of the hospital and check in at 7 AM.   You will then be taken to the SSCU (Short Stay Cardiac Unit) and prepared for your procedure. Please be aware that this is not the time of your procedure but the time you are to arrive. The procedures are scheduled on an hourly basis; however, emergency cases take precedence over all other cases.       You may not have anything to eat or drink after midnight the night before your test. You may take your regular morning medications with water. If there are any medications that you should not take you will be instructed to hold them that morning. If you are diabetic and on Metformin (Glucophage) do not take it the day before, the day of, and for 2 days after your procedure.      The procedure will take 1-2 hours to perform. After the procedure, you will return to SSCU on the third floor of the hospital. You will need to lie still (or keep your arm still) for the next 4 to 6 hours to minimize bleeding from the puncture site. Your family may remain in the room with you during this time.       You may be able to be discharged home that same afternoon if there is someone to drive you home and there were no complications. If you have one of the balloon, stent, or device procedures you may spend the night in the hospital. Your doctor will determine, based on your progress, the date and time of your discharge. The results of your procedure will be discussed with you before you are discharged. Any further testing or procedures will be scheduled for you either before you leave or you will be called with these appointments.       If you should have any questions, concerns, or need to change the date of your procedure, please call [facility] @ [phone]    Special  Instructions:    None        THE ABOVE INSTRUCTIONS WERE GIVEN TO THE PATIENT VERBALLY AND THEY VERBALIZED UNDERSTANDING.  THEY DO NOT REQUIRE ANY SPECIAL NEEDS AND DO NOT HAVE ANY LEARNING BARRIERS.          Directions for Reporting to Cardiology Waiting Area in the Hospital  If you park in the Parking Garage:  Take elevators to the1st floor of the parking garage.  Continue past the gift shop, coffee shop, and piano.  Take a right and go to the gold elevators. (Elevator B)  Take the elevator to the 3rd floor.  Follow the arrow on the sign on the wall that says Cath Lab Registration/EP Lab Registration.  Follow the long hallway all the way around until you come to a big open area.  This is the registration area.  Check in at Reception Desk.    OR    If family is dropping you off:  Have them drop you off at the front of the Hospital under the green overhang.  Enter through the doors and take a right.  Take the E elevators to the 3rd floor Cardiology Waiting Area.  Check in at the Reception Desk in the waiting room.

## 2019-03-29 ENCOUNTER — HOSPITAL ENCOUNTER (OUTPATIENT)
Dept: CARDIOLOGY | Facility: CLINIC | Age: 74
Discharge: HOME | End: 2019-03-29
Attending: INTERNAL MEDICINE
Payer: MEDICARE

## 2019-03-29 VITALS
HEIGHT: 73 IN | BODY MASS INDEX: 33 KG/M2 | HEART RATE: 65 BPM | SYSTOLIC BLOOD PRESSURE: 122 MMHG | DIASTOLIC BLOOD PRESSURE: 58 MMHG | WEIGHT: 249 LBS

## 2019-03-29 DIAGNOSIS — I34.0 MITRAL VALVE INSUFFICIENCY, UNSPECIFIED ETIOLOGY: ICD-10-CM

## 2019-03-29 LAB
ASCENDING AORTA: 3.44 CM
AV INDEX (PROSTH): 0.86
AV MEAN GRADIENT: 2.61 MMHG
AV PEAK GRADIENT: 5.11 MMHG
AV VALVE AREA: 3.81 CM2
AV VELOCITY RATIO: 0.91
BSA FOR ECHO PROCEDURE: 2.41 M2
CV ECHO LV RWT: 0.36 CM
DOP CALC AO PEAK VEL: 1.13 M/S
DOP CALC AO VTI: 21.35 CM
DOP CALC LVOT AREA: 4.41 CM2
DOP CALC LVOT DIAMETER: 2.37 CM
DOP CALC LVOT PEAK VEL: 1.03 M/S
DOP CALC LVOT STROKE VOLUME: 81.31 CM3
DOP CALCLVOT PEAK VEL VTI: 18.44 CM
E WAVE DECELERATION TIME: 117.27 MSEC
E/A RATIO: 1.38
ECHO LV POSTERIOR WALL: 1.15 CM (ref 0.6–1.1)
FRACTIONAL SHORTENING: 21 % (ref 28–44)
INTERVENTRICULAR SEPTUM: 1.26 CM (ref 0.6–1.1)
LA MAJOR: 6.87 CM
LA MINOR: 6.35 CM
LA WIDTH: 4.5 CM
LEFT ATRIUM SIZE: 5.39 CM
LEFT ATRIUM VOLUME INDEX: 57.6 ML/M2
LEFT ATRIUM VOLUME: 136.07 CM3
LEFT INTERNAL DIMENSION IN SYSTOLE: 5.02 CM (ref 2.1–4)
LEFT VENTRICLE DIASTOLIC VOLUME INDEX: 87.97 ML/M2
LEFT VENTRICLE DIASTOLIC VOLUME: 207.76 ML
LEFT VENTRICLE MASS INDEX: 148.4 G/M2
LEFT VENTRICLE SYSTOLIC VOLUME INDEX: 50.5 ML/M2
LEFT VENTRICLE SYSTOLIC VOLUME: 119.22 ML
LEFT VENTRICULAR INTERNAL DIMENSION IN DIASTOLE: 6.39 CM (ref 3.5–6)
LEFT VENTRICULAR MASS: 350.5 G
LV LATERAL E/E' RATIO: 7
MV PEAK A VEL: 0.56 M/S
MV PEAK E VEL: 0.77 M/S
PISA TR MAX VEL: 2.26 M/S
PULM VEIN S/D RATIO: 0.48
PV PEAK D VEL: 0.81 M/S
PV PEAK S VEL: 0.39 M/S
RA MAJOR: 5.77 CM
RA PRESSURE: 3 MMHG
RA WIDTH: 5.16 CM
RIGHT VENTRICULAR END-DIASTOLIC DIMENSION: 4.74 CM
RV TISSUE DOPPLER FREE WALL SYSTOLIC VELOCITY 1 (APICAL 4 CHAMBER VIEW): 8.95 M/S
SINUS: 3.42 CM
STJ: 2.62 CM
TDI LATERAL: 0.11
TR MAX PG: 20.43 MMHG
TRICUSPID ANNULAR PLANE SYSTOLIC EXCURSION: 1.7 CM
TV REST PULMONARY ARTERY PRESSURE: 23 MMHG

## 2019-03-29 PROCEDURE — 93306 TRANSTHORACIC ECHO (TTE) COMPLETE (CUPID ONLY): ICD-10-PCS | Mod: HCNC,S$GLB,, | Performed by: INTERNAL MEDICINE

## 2019-03-29 PROCEDURE — 93306 TTE W/DOPPLER COMPLETE: CPT | Mod: HCNC,S$GLB,, | Performed by: INTERNAL MEDICINE

## 2019-04-03 ENCOUNTER — PATIENT MESSAGE (OUTPATIENT)
Dept: SLEEP MEDICINE | Facility: CLINIC | Age: 74
End: 2019-04-03

## 2019-04-03 DIAGNOSIS — G47.33 OBSTRUCTIVE SLEEP APNEA: Primary | ICD-10-CM

## 2019-04-09 ENCOUNTER — TELEPHONE (OUTPATIENT)
Dept: DERMATOLOGY | Facility: CLINIC | Age: 74
End: 2019-04-09

## 2019-04-09 ENCOUNTER — OFFICE VISIT (OUTPATIENT)
Dept: DERMATOLOGY | Facility: CLINIC | Age: 74
End: 2019-04-09
Payer: MEDICARE

## 2019-04-09 DIAGNOSIS — L02.91 ABSCESS: Primary | ICD-10-CM

## 2019-04-09 PROCEDURE — 99999 PR PBB SHADOW E&M-EST. PATIENT-LVL II: CPT | Mod: PBBFAC,HCNC,, | Performed by: PATHOLOGY

## 2019-04-09 PROCEDURE — 1101F PT FALLS ASSESS-DOCD LE1/YR: CPT | Mod: HCNC,CPTII,S$GLB, | Performed by: PATHOLOGY

## 2019-04-09 PROCEDURE — 87186 SC STD MICRODIL/AGAR DIL: CPT | Mod: HCNC

## 2019-04-09 PROCEDURE — 99999 PR PBB SHADOW E&M-EST. PATIENT-LVL II: ICD-10-PCS | Mod: PBBFAC,HCNC,, | Performed by: PATHOLOGY

## 2019-04-09 PROCEDURE — 1101F PR PT FALLS ASSESS DOC 0-1 FALLS W/OUT INJ PAST YR: ICD-10-PCS | Mod: HCNC,CPTII,S$GLB, | Performed by: PATHOLOGY

## 2019-04-09 PROCEDURE — 87070 CULTURE OTHR SPECIMN AEROBIC: CPT | Mod: HCNC

## 2019-04-09 PROCEDURE — 99212 PR OFFICE/OUTPT VISIT, EST, LEVL II, 10-19 MIN: ICD-10-PCS | Mod: HCNC,S$GLB,, | Performed by: PATHOLOGY

## 2019-04-09 PROCEDURE — 99212 OFFICE O/P EST SF 10 MIN: CPT | Mod: HCNC,S$GLB,, | Performed by: PATHOLOGY

## 2019-04-09 PROCEDURE — 87077 CULTURE AEROBIC IDENTIFY: CPT | Mod: HCNC

## 2019-04-09 NOTE — PROGRESS NOTES
Subjective:       Patient ID:  Jose Fonseca Jr. is a 73 y.o. male who presents for   Chief Complaint   Patient presents with    Cyst     Pt here today for abscess on left calf, 4+ days, fluid filled,redness, pain, Tx. Sulfa pill     Cyst  - Initial  Affected locations: left lower leg  Duration: 4 days  Signs / symptoms: redness, tender, oozing and draining  Severity: mild  Timing: constant  Aggravated by: nothing  Relieving factors/Treatments tried: nothing  Improvement on treatment: no relief    Has had bouts of recurrent staph furunculosis over the years.  Noticed increasing redness and tenderness to right calf 4 days ago.  Saw Dr. Campbell yesterday - she obtained a culture and started him on doxycycline (has had 3 doses).  She apparently wanted to start him on Bactrim, but there was some contraindication in combination with one or more of his other medications.  Pain improved but surrounding erythema and induration worse today.  +weeping.  Subjective fever/chills yesterday but not today.      Review of Systems   Constitutional: Positive for chills. Negative for fever, weight loss, weight gain, fatigue, night sweats and malaise.   Skin: Negative for daily sunscreen use and activity-related sunscreen use.        Objective:    Physical Exam   Constitutional: He appears well-developed and well-nourished.   Neurological: He is alert and oriented to person, place, and time.   Psychiatric: He has a normal mood and affect.   Skin:   Areas Examined (abnormalities noted in diagram):   LLE Inspection Performed                      Diagram Legend     Erythematous scaling macule/papule c/w actinic keratosis       Vascular papule c/w angioma      Pigmented verrucoid papule/plaque c/w seborrheic keratosis      Yellow umbilicated papule c/w sebaceous hyperplasia      Irregularly shaped tan macule c/w lentigo     1-2 mm smooth white papules consistent with Milia      Movable subcutaneous cyst with punctum c/w epidermal  inclusion cyst      Subcutaneous movable cyst c/w pilar cyst      Firm pink to brown papule c/w dermatofibroma      Pedunculated fleshy papule(s) c/w skin tag(s)      Evenly pigmented macule c/w junctional nevus     Mildly variegated pigmented, slightly irregular-bordered macule c/w mildly atypical nevus      Flesh colored to evenly pigmented papule c/w intradermal nevus       Pink pearly papule/plaque c/w basal cell carcinoma      Erythematous hyperkeratotic cursted plaque c/w SCC      Surgical scar with no sign of skin cancer recurrence      Open and closed comedones      Inflammatory papules and pustules      Verrucoid papule consistent consistent with wart     Erythematous eczematous patches and plaques     Dystrophic onycholytic nail with subungual debris c/w onychomycosis     Umbilicated papule    Erythematous-base heme-crusted tan verrucoid plaque consistent with inflamed seborrheic keratosis     Erythematous Silvery Scaling Plaque c/w Psoriasis     See annotation      Assessment / Plan:        Abscess  -     Aerobic culture  - continue doxycycline as prescribed  - await culture and sensitivities  - notify us if area of redness or induration expands, or if systemic symptoms develop           Follow up if symptoms worsen or fail to improve.

## 2019-04-09 NOTE — TELEPHONE ENCOUNTER
----- Message from Sophie Adamson MA sent at 4/9/2019  7:27 AM CDT -----  Contact: pt   Why you sent this to me?   ----- Message -----  From: Marisol Zheng MA  Sent: 4/8/2019   5:04 PM  To: Sophie Adamson MA        ----- Message -----  From: Garima Negro  Sent: 4/8/2019   3:43 PM  To: Jayla Scherer [facility] Clinical Staff    Same Day Appointment Request    Was an appointment with another provider offered? An appt was offered on April 10 pt needs to be seen tomorrow   Reason for FST appt.: pt has a boil on his left calf   Communication Preference: can you please call pt at [phone]  Additional Information: none    MARY

## 2019-04-09 NOTE — TELEPHONE ENCOUNTER
Scheduled patient appointment per message received in the Dermatology department. Patient acknowledges appointment made and confirms.

## 2019-04-09 NOTE — TELEPHONE ENCOUNTER
Called patient to schedule an appointment with Dermatology, per message. No answer. Message was left asking patient to call the office back for an appointment.

## 2019-04-11 ENCOUNTER — PATIENT MESSAGE (OUTPATIENT)
Dept: MEDSURG UNIT | Facility: HOSPITAL | Age: 74
End: 2019-04-11

## 2019-04-11 ENCOUNTER — PATIENT MESSAGE (OUTPATIENT)
Dept: DERMATOLOGY | Facility: CLINIC | Age: 74
End: 2019-04-11

## 2019-04-11 DIAGNOSIS — L02.91 ABSCESS: Primary | ICD-10-CM

## 2019-04-11 LAB — BACTERIA SPEC AEROBE CULT: NORMAL

## 2019-04-12 ENCOUNTER — TELEPHONE (OUTPATIENT)
Dept: DERMATOLOGY | Facility: CLINIC | Age: 74
End: 2019-04-12

## 2019-04-12 DIAGNOSIS — L02.91 ABSCESS: Primary | ICD-10-CM

## 2019-04-12 RX ORDER — SULFAMETHOXAZOLE AND TRIMETHOPRIM 800; 160 MG/1; MG/1
2 TABLET ORAL 2 TIMES DAILY
Qty: 40 TABLET | Refills: 0 | Status: ON HOLD | OUTPATIENT
Start: 2019-04-12 | End: 2019-04-22 | Stop reason: HOSPADM

## 2019-04-12 NOTE — TELEPHONE ENCOUNTER
----- Message from Liv Wright MD sent at 4/12/2019 10:10 AM CDT -----  Please let patient know that I heard back from Dr. Morris.  He does not feel that there is any contraindication to switching his antibiotic from doxycycline to Bactrim, even in combination with his lasix and other medications.  Since the abscess is not improving very quickly on the doxycycline, I am switching him to Bactrim DS - 2 tablets by mouth twice daily for 10 days.  I sent the prescription to his [facility] on file.  If you cannot reach him by phone, you can message him through [facility].  He should stop the doxycycline when he starts the Bactrim.  Thanks,  bv

## 2019-04-12 NOTE — TELEPHONE ENCOUNTER
Spoke with pt and let him know that Dr. Wright heard back from Dr. Morris and Dr. Morris  does not feel that there is any contraindication to switching his antibiotic from doxycycline to Bactrim, even in combination with his lasix and other medications. I let the pt know that since the abscess is not improving very quickly on the doxycycline, Dr. Wright is switching him to Bactrim DS - 2 tablets by mouth twice daily for 10 days. I let the pt know that the medication was sent to his [facility] that is on file and that he should stop the doxycycline when he starts the Bactrim. Pt verbalized understanding.

## 2019-04-22 ENCOUNTER — HOSPITAL ENCOUNTER (OUTPATIENT)
Facility: HOSPITAL | Age: 74
Discharge: HOME | End: 2019-04-22
Attending: INTERNAL MEDICINE | Admitting: INTERNAL MEDICINE
Payer: MEDICARE

## 2019-04-22 VITALS
RESPIRATION RATE: 16 BRPM | TEMPERATURE: 97 F | BODY MASS INDEX: 32.25 KG/M2 | HEIGHT: 72 IN | OXYGEN SATURATION: 97 % | WEIGHT: 238.13 LBS | HEART RATE: 74 BPM | SYSTOLIC BLOOD PRESSURE: 126 MMHG | DIASTOLIC BLOOD PRESSURE: 64 MMHG

## 2019-04-22 DIAGNOSIS — I20.89 STABLE ANGINA: ICD-10-CM

## 2019-04-22 LAB
ABO + RH BLD: NORMAL
ANION GAP SERPL CALC-SCNC: 11 MMOL/L (ref 8–16)
BASOPHILS # BLD AUTO: 0.05 K/UL (ref 0–0.2)
BASOPHILS NFR BLD: 0.6 % (ref 0–1.9)
BLD GP AB SCN CELLS X3 SERPL QL: NORMAL
BUN SERPL-MCNC: 51 MG/DL (ref 8–23)
CALCIUM SERPL-MCNC: 9.9 MG/DL (ref 8.7–10.5)
CHLORIDE SERPL-SCNC: 104 MMOL/L (ref 95–110)
CO2 SERPL-SCNC: 18 MMOL/L (ref 23–29)
CREAT SERPL-MCNC: 2.8 MG/DL (ref 0.5–1.4)
DIFFERENTIAL METHOD: ABNORMAL
EOSINOPHIL # BLD AUTO: 0.2 K/UL (ref 0–0.5)
EOSINOPHIL NFR BLD: 2 % (ref 0–8)
ERYTHROCYTE [DISTWIDTH] IN BLOOD BY AUTOMATED COUNT: 14.9 % (ref 11.5–14.5)
EST. GFR  (AFRICAN AMERICAN): 24.7 ML/MIN/1.73 M^2
EST. GFR  (NON AFRICAN AMERICAN): 21.4 ML/MIN/1.73 M^2
GLUCOSE SERPL-MCNC: 111 MG/DL (ref 70–110)
HCT VFR BLD AUTO: 42.1 % (ref 40–54)
HGB BLD-MCNC: 13.6 G/DL (ref 14–18)
IMM GRANULOCYTES # BLD AUTO: 0.05 K/UL (ref 0–0.04)
IMM GRANULOCYTES NFR BLD AUTO: 0.6 % (ref 0–0.5)
INR PPP: 1 (ref 0.8–1.2)
LYMPHOCYTES # BLD AUTO: 1.9 K/UL (ref 1–4.8)
LYMPHOCYTES NFR BLD: 21.7 % (ref 18–48)
MCH RBC QN AUTO: 31.2 PG (ref 27–31)
MCHC RBC AUTO-ENTMCNC: 32.3 G/DL (ref 32–36)
MCV RBC AUTO: 97 FL (ref 82–98)
MONOCYTES # BLD AUTO: 0.7 K/UL (ref 0.3–1)
MONOCYTES NFR BLD: 7.4 % (ref 4–15)
NEUTROPHILS # BLD AUTO: 6 K/UL (ref 1.8–7.7)
NEUTROPHILS NFR BLD: 67.7 % (ref 38–73)
NRBC BLD-RTO: 0 /100 WBC
PLATELET # BLD AUTO: 254 K/UL (ref 150–350)
PMV BLD AUTO: 10.9 FL (ref 9.2–12.9)
POTASSIUM SERPL-SCNC: 5 MMOL/L (ref 3.5–5.1)
PROTHROMBIN TIME: 10.6 SEC (ref 9–12.5)
RBC # BLD AUTO: 4.36 M/UL (ref 4.6–6.2)
SODIUM SERPL-SCNC: 133 MMOL/L (ref 136–145)
WBC # BLD AUTO: 8.83 K/UL (ref 3.9–12.7)

## 2019-04-22 PROCEDURE — 86901 BLOOD TYPING SEROLOGIC RH(D): CPT | Mod: HCNC

## 2019-04-22 PROCEDURE — 25000003 PHARM REV CODE 250: Mod: HCNC | Performed by: STUDENT IN AN ORGANIZED HEALTH CARE EDUCATION/TRAINING PROGRAM

## 2019-04-22 PROCEDURE — 99499 UNLISTED E&M SERVICE: CPT | Mod: HCNC,,, | Performed by: INTERNAL MEDICINE

## 2019-04-22 PROCEDURE — 85025 COMPLETE CBC W/AUTO DIFF WBC: CPT | Mod: HCNC

## 2019-04-22 PROCEDURE — 80048 BASIC METABOLIC PNL TOTAL CA: CPT | Mod: HCNC

## 2019-04-22 PROCEDURE — 99499 NO LOS: ICD-10-PCS | Mod: HCNC,,, | Performed by: INTERNAL MEDICINE

## 2019-04-22 PROCEDURE — 85610 PROTHROMBIN TIME: CPT | Mod: HCNC

## 2019-04-22 RX ORDER — LISINOPRIL 20 MG/1
20 TABLET ORAL DAILY
Qty: 90 TABLET | Refills: 3 | Status: SHIPPED | OUTPATIENT
Start: 2019-04-22 | End: 2020-02-19 | Stop reason: SDUPTHER

## 2019-04-22 RX ORDER — SODIUM CHLORIDE 9 MG/ML
3 INJECTION, SOLUTION INTRAVENOUS CONTINUOUS
Status: ACTIVE | OUTPATIENT
Start: 2019-04-22 | End: 2019-04-22

## 2019-04-22 RX ORDER — CLOPIDOGREL 300 MG/1
300 TABLET, FILM COATED ORAL ONCE
Status: COMPLETED | OUTPATIENT
Start: 2019-04-22 | End: 2019-04-22

## 2019-04-22 RX ORDER — DIPHENHYDRAMINE HCL 25 MG
50 CAPSULE ORAL EVERY 6 HOURS
Status: COMPLETED | OUTPATIENT
Start: 2019-04-22 | End: 2019-04-22

## 2019-04-22 RX ORDER — ASPIRIN 325 MG
325 TABLET ORAL DAILY
Status: DISCONTINUED | OUTPATIENT
Start: 2019-04-22 | End: 2019-04-22 | Stop reason: HOSPADM

## 2019-04-22 RX ADMIN — CLOPIDOGREL BISULFATE 300 MG: 300 TABLET, FILM COATED ORAL at 10:04

## 2019-04-22 RX ADMIN — SODIUM CHLORIDE 3 ML/KG/HR: 0.9 INJECTION, SOLUTION INTRAVENOUS at 10:04

## 2019-04-22 RX ADMIN — DIPHENHYDRAMINE HYDROCHLORIDE 50 MG: 25 CAPSULE ORAL at 10:04

## 2019-04-22 RX ADMIN — ASPIRIN 325 MG ORAL TABLET 325 MG: 325 PILL ORAL at 10:04

## 2019-04-22 NOTE — PLAN OF CARE
Problem: Adult Inpatient Plan of Care  Goal: Plan of Care Review  Outcome: Ongoing (interventions implemented as appropriate)  Patient arrived to room. PIV placed, labs sent. Admit assessment completed. Plan of care discussed with patient. Will monitor. Report called to Jinny

## 2019-04-22 NOTE — PROGRESS NOTES
Cr elevated. Procedure cancelled. Discharge instructions given to and reviewed with patient and daughter. PIVs dc'd, catheter tips intact. Patient refused transport and ambulated off unit with daughter.

## 2019-04-23 ENCOUNTER — PATIENT MESSAGE (OUTPATIENT)
Dept: CARDIOLOGY | Facility: CLINIC | Age: 74
End: 2019-04-23

## 2019-04-23 NOTE — HPI
72 y/o M here for follow up of dyspnea on exertion and angina. Since 2/2018 had dyspnea and orthopnea. His orthopnea has improved with diuresis. [place] he experiences dyspnea on exertion and chest tightness that is relieved with rest. Stress echo negative in 2/2019. Compared with 2018 his EF has decreased to 45% along with moderate MR.      CAD s/p 1996 PCI of LAD in 2013. LIMA-OM; LANCE-LAD (occluded); SVG-PDA.

## 2019-04-23 NOTE — HOSPITAL COURSE
Procedure aborted due to elevated Cr. Will decrease lisinopril and will have follow up with nephrology prior to angiogram.

## 2019-04-23 NOTE — DISCHARGE SUMMARY
[facility]  Interventional Cardiology  Discharge Summary      Patient Name: Jose Fonseca Jr.  MRN: [id number]  Admission Date: 4/22/2019  Hospital Length of Stay: 0 days  Discharge Date and Time:  04/23/2019 10:56 AM  Attending Physician: No att. providers found  Discharging Provider: Nydia Pérez MD  Primary Care Physician: Vasyl Morris MD    HPI:  74 y/o M here for follow up of dyspnea on exertion and angina. Since 2/2018 had dyspnea and orthopnea. His orthopnea has improved with diuresis. [place] the airport [place] distances he experiences dyspnea on exertion and chest tightness that is relieved with rest. Stress echo negative in 2/2019. Compared with 2018 his EF has decreased to 45% along with moderate MR.      CAD s/p 1996 PCI of LAD in 2013. LIMA-OM; LANCE-LAD (occluded); SVG-PDA.         Procedure(s) (LRB):  ANGIOGRAM, CORONARY, INCLUDING BYPASS GRAFT, WITH LEFT HEART CATHETERIZATION (Right)     Indwelling Lines/Drains at time of discharge:  Lines/Drains/Airways          None          Hospital Course:  Procedure aborted due to elevated Cr. Will decrease lisinopril and will have follow up with nephrology prior to angiogram.         Pending Diagnostic Studies:     None            Discharged Condition: good    Follow Up:    Patient Instructions:   No discharge procedures on file.  Medications:  Reconciled Home Medications:      Medication List      CHANGE how you take these medications    atorvastatin 40 MG tablet  Commonly known as:  LIPITOR  Take 1 tablet (40 mg total) by mouth once daily.  What changed:  when to take this     furosemide 40 MG tablet  Commonly known as:  LASIX  TAKE 1 TABLET(40 MG) BY MOUTH EVERY DAY  What changed:  Another medication with the same name was removed. Continue taking this medication, and follow the directions you see here.     lisinopril 20 MG tablet  Commonly known as:  PRINIVIL,ZESTRIL  Take 1 tablet (20 mg total) by mouth  once daily.  What changed:    · medication strength  · how much to take        CONTINUE taking these medications    allopurinol 300 MG tablet  Commonly known as:  ZYLOPRIM  Take 1 tablet by mouth every evening.     aspirin 81 MG EC tablet  Commonly known as:  ECOTRIN  Take 1 tablet (81 mg total) by mouth once daily.     sildenafil 100 MG tablet  Commonly known as:  VIAGRA  Take 1 tablet (100 mg total) by mouth daily as needed for Erectile Dysfunction.     tamsulosin 0.4 mg Cap  Commonly known as:  FLOMAX  Take 1 capsule (0.4 mg total) by mouth every evening.        STOP taking these medications    etodolac 400 MG tablet  Commonly known as:  LODINE     ibuprofen 800 MG tablet  Commonly known as:  ADVIL,MOTRIN     sulfamethoxazole-trimethoprim 800-160mg 800-160 mg Tab  Commonly known as:  BACTRIM DS            Time spent on the discharge of patient: 30 minutes    Nydia Pérez MD  Interventional Cardiology  [facility]

## 2019-04-25 ENCOUNTER — OFFICE VISIT (OUTPATIENT)
Dept: NEPHROLOGY | Facility: CLINIC | Age: 74
End: 2019-04-25
Payer: MEDICARE

## 2019-04-25 VITALS
OXYGEN SATURATION: 96 % | SYSTOLIC BLOOD PRESSURE: 120 MMHG | DIASTOLIC BLOOD PRESSURE: 60 MMHG | HEART RATE: 85 BPM | BODY MASS INDEX: 33.45 KG/M2 | WEIGHT: 246.94 LBS | HEIGHT: 72 IN

## 2019-04-25 DIAGNOSIS — I10 ESSENTIAL HYPERTENSION: ICD-10-CM

## 2019-04-25 DIAGNOSIS — N17.9 ACUTE KIDNEY INJURY: Primary | ICD-10-CM

## 2019-04-25 DIAGNOSIS — I25.10 CORONARY ARTERY DISEASE INVOLVING NATIVE CORONARY ARTERY WITHOUT ANGINA PECTORIS, UNSPECIFIED WHETHER NATIVE OR TRANSPLANTED HEART: ICD-10-CM

## 2019-04-25 PROCEDURE — 99999 PR PBB SHADOW E&M-EST. PATIENT-LVL IV: CPT | Mod: PBBFAC,HCNC,GC, | Performed by: GENERAL PRACTICE

## 2019-04-25 PROCEDURE — 99999 PR PBB SHADOW E&M-EST. PATIENT-LVL IV: ICD-10-PCS | Mod: PBBFAC,HCNC,GC, | Performed by: GENERAL PRACTICE

## 2019-04-25 NOTE — PROGRESS NOTES
Nephrology Clinic Progress Note    Subjective:       Patient ID: Jose Fonseca Jr. is a 73 y.o. White male who presents for new evaluation of Initial Visit and Chronic Kidney Disease    72 y/o White man with CKD 2 (unknown proteinuria), CAD s/p CABG x 3 1996, arterial hypertension, dyslipidemia, LM, glaucoma, comes for initial evaluation at [facility] after most recent labs 4/22/2019 showed increased sCr to 2.8 from baseline sCr ~1.1-1.3.  Patient recently started (4/12/2019) on Bactrim 800/160 mg 2 tabs BiD x 10 days for a thigh abscess which grew MRSA, completed treatment 4/23/2019.  Patient noticed that urine volume seemed less.     Patient has had chest pain and SOB, for which patient had originally been seen by his cardiology, was to get a heart cath 4/22/2019 but cancelled due to sudden rise in sCr to 2.8.    The patient denies taking NSAIDs or new antibiotics, recreational drugs, recent episode of dehydration, diarrhea, nausea or vomiting, acute illness, hospitalization or exposure to IV radiocontrast.    Past Medical History:  No date: Coronary artery disease      Comment:  S/P 3 vessel CABG; stents  No date: Glaucoma  4/19/2013: HTN (hypertension)  No date: Hyperlipidemia  No date: Myocardial infarction  No date: Obstructive sleep apnea  No date: Squamous cell carcinoma    Review of patient's family history indicates:  Problem: Heart disease      Relation: Father          Age of Onset: (Not Specified)  Problem: Hypertension      Relation: Father          Age of Onset: (Not Specified)  Problem: Heart failure      Relation: Father          Age of Onset: (Not Specified)  Problem: Heart attack      Relation: Father          Age of Onset: (Not Specified)  Problem: Diabetes      Relation: Maternal Grandmother          Age of Onset: (Not Specified)  Problem: Diabetes      Relation: Maternal Grandfather          Age of Onset: (Not Specified)  Problem: Melanoma      Relation: Neg Hx          Age of  Onset: (Not Specified)      Past Surgical History:  No date: ADENOIDECTOMY  4/22/2019: ANGIOGRAM, CORONARY, INCLUDING BYPASS GRAFT, WITH LEFT   HEART CATHETERIZATION; Right      Comment:  Performed by Ken Ibarra MD at [facility] CATH LAB  3/18/2015: ARTHROSCOPY-SHOULDER EXCISION DISTAL CLAVICLE; Left      Comment:  Performed by John Lindsey MD at [facility] OR  3/18/2015: ARTHROSCOPY-SHOULDER WITH SUBACROMIAL DECOMPRESSION; Left      Comment:  Performed by John Lindsey MD at [facility] OR  No date: CATARACT EXTRACTION      Comment:  patient not sure which eye  10/1/2013: CATHETERIZATION, HEART, LEFT; Left      Comment:  Performed by Ken Ibarra MD at [facility] CATH LAB  11/20/2014: COLONOSCOPY; N/A      Comment:  Performed by Ken Gay MD at [facility] ENDO (4TH FLR)  No date: CORONARY ANGIOPLASTY  No date: CORONARY ARTERY BYPASS GRAFT  3/18/2015: REPAIR-ROTATOR CUFF; Left      Comment:  Performed by John Lindsey MD at [facility] OR  3/18/2015: REPAIR-TENDON-BICEP; Left      Comment:  Performed by John Lindsey MD at [facility] OR  No date: ROTATOR CUFF REPAIR  No date: SHOULDER SURGERY  2015: TONSILLECTOMY; Left    Current Outpatient Medications:   allopurinol (ZYLOPRIM) 300 MG tablet, Take 1 tablet by mouth every evening. , Disp: , Rfl:   aspirin (ECOTRIN) 81 MG EC tablet, Take 1 tablet (81 mg total) by mouth once daily., Disp: 30 tablet, Rfl: 11  atorvastatin (LIPITOR) 40 MG tablet, Take 1 tablet (40 mg total) by mouth once daily. (Patient taking differently: Take 40 mg by mouth every evening. ), Disp: 30 tablet, Rfl: 11  furosemide (LASIX) 40 MG tablet, TAKE 1 TABLET(40 MG) BY MOUTH EVERY DAY, Disp: 30 tablet, Rfl: 0  lisinopril (PRINIVIL,ZESTRIL) 20 MG tablet, Take 1 tablet (20 mg total) by mouth once daily., Disp: 90 tablet, Rfl: 3  sildenafil (VIAGRA) 100 MG tablet, Take 1 tablet (100 mg total) by mouth daily as needed for Erectile Dysfunction., Disp: 6 tablet, Rfl: 12  tamsulosin (FLOMAX) 0.4  mg Cap, Take 1 capsule (0.4 mg total) by mouth every evening., Disp: 30 capsule, Rfl: 11    Patient's medical, family, surgical, and medication hx reviewed.          Review of Systems   Constitutional: Negative for chills and fever.   HENT: Negative for facial swelling.    Eyes: Negative for visual disturbance.   Respiratory: Negative for cough, shortness of breath and wheezing.    Cardiovascular: Negative for chest pain and palpitations.   Gastrointestinal: Negative for abdominal distention, abdominal pain, diarrhea, nausea and vomiting.   Genitourinary: Negative for difficulty urinating, dysuria, flank pain and scrotal swelling.   Musculoskeletal: Negative for arthralgias, back pain, gait problem and joint swelling.   Skin: Negative for pallor.   Neurological: Negative for dizziness, syncope and light-headedness.   Psychiatric/Behavioral: Negative for behavioral problems and confusion.       Objective:       Wt Readings from Last 3 Encounters:   04/25/19 112 kg (246 lb 14.6 oz)   04/22/19 108 kg (238 lb 1.6 oz)   03/29/19 112.9 kg (249 lb)     Temp Readings from Last 3 Encounters:   04/22/19 97.3 °F (36.3 °C) (Oral)   02/14/18 100.1 °F (37.8 °C) (Oral)   10/02/17 100 °F (37.8 °C) (Oral)     BP Readings from Last 3 Encounters:   04/25/19 120/60   04/22/19 126/64   03/29/19 (!) 122/58     Pulse Readings from Last 3 Encounters:   04/25/19 85   04/22/19 74   03/29/19 65       Physical Exam   Constitutional: He is oriented to person, place, and time. He appears well-developed and well-nourished.   HENT:   Head: Normocephalic and atraumatic.   Eyes: Pupils are equal, round, and reactive to light.   Neck: No JVD present.   Cardiovascular: Normal rate and regular rhythm.   No murmur heard.  Pulmonary/Chest: Breath sounds normal. No respiratory distress. He has no wheezes. He has no rales.   Abdominal: Soft. Bowel sounds are normal. He exhibits no distension. There is no guarding. No hernia.   Musculoskeletal: He exhibits  no edema.   Neurological: He is alert and oriented to person, place, and time.   Skin: No erythema.       Assessment:       1. Essential hypertension    2. Acute kidney injury on CKD 2    3. Coronary artery disease involving native coronary artery without angina pectoris, unspecified whether native or transplanted heart        Plan:    *    1. Acute Kidney Injury on CKD 2 (baseline sCr 1.1-1.3) :   Patient with KASSIE likely secondary to prerenal factors of diuresing for heart failure and bactrim administration recently on (finished 4/23/2019).    Lab Results   Component Value Date    CREATININE 2.8 (H) 04/22/2019     Urine Protein:   No results found for: UTPCR    Acid-Base:   Lab Results   Component Value Date     (L) 04/22/2019    K 5.0 04/22/2019    CO2 18 (L) 04/22/2019     Will repeat labs and urine samples.    2. HTN: Blood pressures   Stable blood pressures.      Follow up in with labs and urine studies.    Faheem Jiang MD  Nephrology  [facility]

## 2019-04-29 ENCOUNTER — HOSPITAL ENCOUNTER (OUTPATIENT)
Dept: RADIOLOGY | Facility: OTHER | Age: 74
Discharge: HOME | End: 2019-04-29
Attending: GENERAL PRACTICE
Payer: MEDICARE

## 2019-04-29 DIAGNOSIS — N17.9 ACUTE KIDNEY INJURY: ICD-10-CM

## 2019-04-29 PROCEDURE — 76770 US RETROPERITONEAL COMPLETE: ICD-10-PCS | Mod: 26,HCNC,, | Performed by: RADIOLOGY

## 2019-04-29 PROCEDURE — 76770 US EXAM ABDO BACK WALL COMP: CPT | Mod: TC,HCNC

## 2019-04-29 PROCEDURE — 76770 US EXAM ABDO BACK WALL COMP: CPT | Mod: 26,HCNC,, | Performed by: RADIOLOGY

## 2019-04-30 ENCOUNTER — PATIENT MESSAGE (OUTPATIENT)
Dept: TRANSPLANT | Facility: CLINIC | Age: 74
End: 2019-04-30

## 2019-05-16 ENCOUNTER — LAB VISIT (OUTPATIENT)
Dept: LAB | Facility: OTHER | Age: 74
End: 2019-05-16
Attending: INTERNAL MEDICINE
Payer: MEDICARE

## 2019-05-16 DIAGNOSIS — Z95.1 S/P CABG (CORONARY ARTERY BYPASS GRAFT): Chronic | ICD-10-CM

## 2019-05-16 LAB
ALBUMIN SERPL BCP-MCNC: 3.8 G/DL (ref 3.5–5.2)
ALP SERPL-CCNC: 41 U/L (ref 55–135)
ALT SERPL W/O P-5'-P-CCNC: 29 U/L (ref 10–44)
ANION GAP SERPL CALC-SCNC: 7 MMOL/L (ref 8–16)
AST SERPL-CCNC: 21 U/L (ref 10–40)
BILIRUB SERPL-MCNC: 0.4 MG/DL (ref 0.1–1)
BNP SERPL-MCNC: 240 PG/ML (ref 0–99)
BUN SERPL-MCNC: 27 MG/DL (ref 8–23)
CALCIUM SERPL-MCNC: 9.7 MG/DL (ref 8.7–10.5)
CHLORIDE SERPL-SCNC: 107 MMOL/L (ref 95–110)
CO2 SERPL-SCNC: 24 MMOL/L (ref 23–29)
CREAT SERPL-MCNC: 1.1 MG/DL (ref 0.5–1.4)
EST. GFR  (AFRICAN AMERICAN): >60 ML/MIN/1.73 M^2
EST. GFR  (NON AFRICAN AMERICAN): >60 ML/MIN/1.73 M^2
GLUCOSE SERPL-MCNC: 106 MG/DL (ref 70–110)
POTASSIUM SERPL-SCNC: 4.8 MMOL/L (ref 3.5–5.1)
PROT SERPL-MCNC: 7.1 G/DL (ref 6–8.4)
SODIUM SERPL-SCNC: 138 MMOL/L (ref 136–145)

## 2019-05-16 PROCEDURE — 36415 COLL VENOUS BLD VENIPUNCTURE: CPT | Mod: HCNC

## 2019-05-16 PROCEDURE — 83880 ASSAY OF NATRIURETIC PEPTIDE: CPT | Mod: HCNC

## 2019-05-16 PROCEDURE — 80053 COMPREHEN METABOLIC PANEL: CPT | Mod: HCNC

## 2019-05-17 ENCOUNTER — OFFICE VISIT (OUTPATIENT)
Dept: TRANSPLANT | Facility: CLINIC | Age: 74
End: 2019-05-17
Payer: MEDICARE

## 2019-05-17 VITALS
DIASTOLIC BLOOD PRESSURE: 76 MMHG | HEART RATE: 79 BPM | BODY MASS INDEX: 33.58 KG/M2 | WEIGHT: 247.94 LBS | SYSTOLIC BLOOD PRESSURE: 136 MMHG | HEIGHT: 72 IN

## 2019-05-17 DIAGNOSIS — I51.89 MILD LEFT VENTRICULAR SYSTOLIC DYSFUNCTION: Primary | ICD-10-CM

## 2019-05-17 DIAGNOSIS — I34.0 MODERATE MITRAL REGURGITATION BY PRIOR ECHOCARDIOGRAM: ICD-10-CM

## 2019-05-17 PROCEDURE — 3078F DIAST BP <80 MM HG: CPT | Mod: HCNC,CPTII,S$GLB, | Performed by: INTERNAL MEDICINE

## 2019-05-17 PROCEDURE — 99999 PR PBB SHADOW E&M-EST. PATIENT-LVL III: CPT | Mod: PBBFAC,HCNC,, | Performed by: INTERNAL MEDICINE

## 2019-05-17 PROCEDURE — 99214 OFFICE O/P EST MOD 30 MIN: CPT | Mod: HCNC,S$GLB,, | Performed by: INTERNAL MEDICINE

## 2019-05-17 PROCEDURE — 99214 PR OFFICE/OUTPT VISIT, EST, LEVL IV, 30-39 MIN: ICD-10-PCS | Mod: HCNC,S$GLB,, | Performed by: INTERNAL MEDICINE

## 2019-05-17 PROCEDURE — 99999 PR PBB SHADOW E&M-EST. PATIENT-LVL III: ICD-10-PCS | Mod: PBBFAC,HCNC,, | Performed by: INTERNAL MEDICINE

## 2019-05-17 PROCEDURE — 3078F PR MOST RECENT DIASTOLIC BLOOD PRESSURE < 80 MM HG: ICD-10-PCS | Mod: HCNC,CPTII,S$GLB, | Performed by: INTERNAL MEDICINE

## 2019-05-17 PROCEDURE — 3075F PR MOST RECENT SYSTOLIC BLOOD PRESS GE 130-139MM HG: ICD-10-PCS | Mod: HCNC,CPTII,S$GLB, | Performed by: INTERNAL MEDICINE

## 2019-05-17 PROCEDURE — 1101F PR PT FALLS ASSESS DOC 0-1 FALLS W/OUT INJ PAST YR: ICD-10-PCS | Mod: HCNC,CPTII,S$GLB, | Performed by: INTERNAL MEDICINE

## 2019-05-17 PROCEDURE — 99499 RISK ADDL DX/OHS AUDIT: ICD-10-PCS | Mod: HCNC,S$GLB,, | Performed by: INTERNAL MEDICINE

## 2019-05-17 PROCEDURE — 99499 UNLISTED E&M SERVICE: CPT | Mod: HCNC,S$GLB,, | Performed by: INTERNAL MEDICINE

## 2019-05-17 PROCEDURE — 3075F SYST BP GE 130 - 139MM HG: CPT | Mod: HCNC,CPTII,S$GLB, | Performed by: INTERNAL MEDICINE

## 2019-05-17 PROCEDURE — 1101F PT FALLS ASSESS-DOCD LE1/YR: CPT | Mod: HCNC,CPTII,S$GLB, | Performed by: INTERNAL MEDICINE

## 2019-05-17 NOTE — ASSESSMENT & PLAN NOTE
lasix 40 mg daily with second dose for weight gain > 2 lbs in 24 hr or 5 lbs in a week  Maybe worth getting MRI to look at volumes if we have an option for mitral valve intervention (percutanous or surgical repair?)

## 2019-05-17 NOTE — PROGRESS NOTES
Subjective: class 2     Patient ID:  Jose Fonseca Jr. is a 73 y.o. male who presents for follow-up of Congestive Heart Failure      HPI  s/p 1996 PCI of LAD in 2013 CABG 2/3 patent graft in 2013 with a history of OCONNELL / weight gain since OCt 2018 who comes for medical optimization.  He is currently on lasix 40 daily which maintains his weight at 108 to 110 kg No CHF symptoms. Sleeps on two pillows No edema   Fatigued late in the day CPAP equipment has not been working     Review of Systems   Constitution: Negative for decreased appetite, weight gain and weight loss.   Cardiovascular: Negative for chest pain, dyspnea on exertion, leg swelling, near-syncope, orthopnea and palpitations.   Respiratory: Negative for cough and shortness of breath.    Musculoskeletal: Negative for myalgias.   Gastrointestinal: Negative for jaundice.        Objective:    Physical Exam   Constitutional: He is oriented to person, place, and time. He appears well-developed and well-nourished. He is active. He is not intubated.   /76 (BP Location: Left arm, Patient Position: Sitting, BP Method: Large (Automatic))   Pulse 79   Ht 6' (1.829 m)   Wt 112.5 kg (247 lb 14.5 oz)   BMI 33.62 kg/m²      HENT:   Head: Normocephalic and atraumatic. Hair is normal.   Right Ear: External ear normal.   Left Ear: External ear normal.   Nose: Nose normal. No nasal deformity. No epistaxis.  No foreign bodies.   Mouth/Throat: Mucous membranes are normal. Mucous membranes are not cyanotic. No oropharyngeal exudate.   Eyes: Pupils are equal, round, and reactive to light. Conjunctivae and EOM are normal.   Neck: Neck supple. No hepatojugular reflux and no JVD present.   Cardiovascular: Normal rate, regular rhythm and normal pulses. Exam reveals no gallop.   Murmur heard.   Medium-pitched blowing decrescendo early systolic murmur is present with a grade of 2/6 at the apex.  Pulmonary/Chest: Effort normal and breath sounds normal. No apnea and no  tachypnea. He is not intubated. No respiratory distress. He exhibits no tenderness.   Abdominal: Soft. Normal appearance and bowel sounds are normal. There is no tenderness. No hernia.   Musculoskeletal: Normal range of motion.   Neurological: He is alert and oriented to person, place, and time. He displays no seizure activity.   Skin: Skin is warm, dry and intact. No rash noted. No pallor.   Psychiatric: He has a normal mood and affect. His speech is normal and behavior is normal. Thought content normal. Cognition and memory are normal.     Lab Results   Component Value Date     (H) 05/16/2019     05/16/2019    K 4.8 05/16/2019     05/16/2019    CO2 24 05/16/2019    BUN 27 (H) 05/16/2019    CREATININE 1.1 05/16/2019     05/16/2019    HGBA1C 5.6 07/25/2017    AST 21 05/16/2019    ALT 29 05/16/2019    ALBUMIN 3.8 05/16/2019    ALBUMIN 4.0 06/21/2017    PROT 7.1 05/16/2019    BILITOT 0.4 05/16/2019    CHOL 158 12/19/2018    HDL 54 12/19/2018    LDLCALC 90.4 12/19/2018    TRIG 68 12/19/2018     BNP   Date Value Ref Range Status   05/16/2019 240 (H) 0 - 99 pg/mL Final     Comment:     Values of less than 100 pg/ml are consistent with non-CHF populations.   02/11/2019 54 0 - 99 pg/mL Final     Comment:     Values of less than 100 pg/ml are consistent with non-CHF populations.   12/19/2018 447 (H) 0 - 99 pg/mL Final     Comment:     Values of less than 100 pg/ml are consistent with non-CHF populations.               Assessment:       1. Mild left ventricular systolic dysfunction (LVEF 45% 2019)     2. Moderate ischemic mitral regurgitation by prior echocardiogram         Plan:           Problem List Items Addressed This Visit     Moderate ischemic mitral regurgitation by prior echocardiogram    Current Assessment & Plan     May review angio with Dr Ibarra later this month   Will repeat echo in t hree month          Mild left ventricular systolic dysfunction (LVEF 45% 2019)  - Primary    Overview      Due to CAD (infarct or ischemia) versus mitral regurg or both         Current Assessment & Plan      lasix 40 mg daily with second dose for weight gain > 2 lbs in 24 hr or 5 lbs in a week  Maybe worth getting MRI to look at volumes if we have an option for mitral valve intervention (percutanous or surgical repair?)          Relevant Orders    Transthoracic echo (TTE) 2D with Color Flow    Brain natriuretic peptide    Comprehensive metabolic panel        Follow up in about 3 months (around 8/17/2019).

## 2019-05-22 ENCOUNTER — HOSPITAL ENCOUNTER (OUTPATIENT)
Facility: HOSPITAL | Age: 74
Discharge: HOME | End: 2019-05-22
Attending: INTERNAL MEDICINE | Admitting: INTERNAL MEDICINE
Payer: MEDICARE

## 2019-05-22 VITALS
DIASTOLIC BLOOD PRESSURE: 65 MMHG | HEART RATE: 82 BPM | WEIGHT: 240.31 LBS | BODY MASS INDEX: 32.55 KG/M2 | RESPIRATION RATE: 20 BRPM | OXYGEN SATURATION: 98 % | TEMPERATURE: 98 F | HEIGHT: 72 IN | SYSTOLIC BLOOD PRESSURE: 141 MMHG

## 2019-05-22 DIAGNOSIS — I25.10 CORONARY ARTERY DISEASE, ANGINA PRESENCE UNSPECIFIED, UNSPECIFIED VESSEL OR LESION TYPE, UNSPECIFIED WHETHER NATIVE OR TRANSPLANTED HEART: ICD-10-CM

## 2019-05-22 LAB
ABO + RH BLD: NORMAL
ANION GAP SERPL CALC-SCNC: 6 MMOL/L (ref 8–16)
BASOPHILS # BLD AUTO: 0.04 K/UL (ref 0–0.2)
BASOPHILS NFR BLD: 0.4 % (ref 0–1.9)
BLD GP AB SCN CELLS X3 SERPL QL: NORMAL
BUN SERPL-MCNC: 28 MG/DL (ref 8–23)
CALCIUM SERPL-MCNC: 9.8 MG/DL (ref 8.7–10.5)
CHLORIDE SERPL-SCNC: 112 MMOL/L (ref 95–110)
CO2 SERPL-SCNC: 24 MMOL/L (ref 23–29)
CREAT SERPL-MCNC: 1.1 MG/DL (ref 0.5–1.4)
DIFFERENTIAL METHOD: ABNORMAL
EOSINOPHIL # BLD AUTO: 0.4 K/UL (ref 0–0.5)
EOSINOPHIL NFR BLD: 3.8 % (ref 0–8)
ERYTHROCYTE [DISTWIDTH] IN BLOOD BY AUTOMATED COUNT: 15 % (ref 11.5–14.5)
EST. GFR  (AFRICAN AMERICAN): >60 ML/MIN/1.73 M^2
EST. GFR  (NON AFRICAN AMERICAN): >60 ML/MIN/1.73 M^2
GLUCOSE SERPL-MCNC: 119 MG/DL (ref 70–110)
HCT VFR BLD AUTO: 35.9 % (ref 40–54)
HGB BLD-MCNC: 11.8 G/DL (ref 14–18)
IMM GRANULOCYTES # BLD AUTO: 0.05 K/UL (ref 0–0.04)
IMM GRANULOCYTES NFR BLD AUTO: 0.5 % (ref 0–0.5)
LYMPHOCYTES # BLD AUTO: 1.3 K/UL (ref 1–4.8)
LYMPHOCYTES NFR BLD: 13.8 % (ref 18–48)
MCH RBC QN AUTO: 32.3 PG (ref 27–31)
MCHC RBC AUTO-ENTMCNC: 32.9 G/DL (ref 32–36)
MCV RBC AUTO: 98 FL (ref 82–98)
MONOCYTES # BLD AUTO: 0.9 K/UL (ref 0.3–1)
MONOCYTES NFR BLD: 10 % (ref 4–15)
NEUTROPHILS # BLD AUTO: 6.6 K/UL (ref 1.8–7.7)
NEUTROPHILS NFR BLD: 71.5 % (ref 38–73)
NRBC BLD-RTO: 0 /100 WBC
PLATELET # BLD AUTO: 194 K/UL (ref 150–350)
PMV BLD AUTO: 10.9 FL (ref 9.2–12.9)
POTASSIUM SERPL-SCNC: 4.4 MMOL/L (ref 3.5–5.1)
RBC # BLD AUTO: 3.65 M/UL (ref 4.6–6.2)
SODIUM SERPL-SCNC: 142 MMOL/L (ref 136–145)
WBC # BLD AUTO: 9.18 K/UL (ref 3.9–12.7)

## 2019-05-22 PROCEDURE — C1894 INTRO/SHEATH, NON-LASER: HCPCS | Mod: HCNC | Performed by: INTERNAL MEDICINE

## 2019-05-22 PROCEDURE — 99152 MOD SED SAME PHYS/QHP 5/>YRS: CPT | Mod: HCNC | Performed by: INTERNAL MEDICINE

## 2019-05-22 PROCEDURE — 93459 L HRT ART/GRFT ANGIO: CPT | Mod: HCNC | Performed by: INTERNAL MEDICINE

## 2019-05-22 PROCEDURE — 25000003 PHARM REV CODE 250: Mod: HCNC | Performed by: INTERNAL MEDICINE

## 2019-05-22 PROCEDURE — 25000003 PHARM REV CODE 250: Mod: HCNC | Performed by: STUDENT IN AN ORGANIZED HEALTH CARE EDUCATION/TRAINING PROGRAM

## 2019-05-22 PROCEDURE — 99152 PR MOD CONSCIOUS SEDATION, SAME PHYS, 5+ YRS, FIRST 15 MIN: ICD-10-PCS | Mod: HCNC,,, | Performed by: INTERNAL MEDICINE

## 2019-05-22 PROCEDURE — 93459: ICD-10-PCS | Mod: 26,HCNC,, | Performed by: INTERNAL MEDICINE

## 2019-05-22 PROCEDURE — 63600175 PHARM REV CODE 636 W HCPCS: Mod: HCNC | Performed by: INTERNAL MEDICINE

## 2019-05-22 PROCEDURE — 99152 MOD SED SAME PHYS/QHP 5/>YRS: CPT | Mod: HCNC,,, | Performed by: INTERNAL MEDICINE

## 2019-05-22 PROCEDURE — 93459 L HRT ART/GRFT ANGIO: CPT | Mod: 26,HCNC,, | Performed by: INTERNAL MEDICINE

## 2019-05-22 PROCEDURE — 85025 COMPLETE CBC W/AUTO DIFF WBC: CPT | Mod: HCNC

## 2019-05-22 PROCEDURE — C1769 GUIDE WIRE: HCPCS | Mod: HCNC | Performed by: INTERNAL MEDICINE

## 2019-05-22 PROCEDURE — 25500020 PHARM REV CODE 255: Mod: HCNC | Performed by: INTERNAL MEDICINE

## 2019-05-22 PROCEDURE — 86850 RBC ANTIBODY SCREEN: CPT | Mod: HCNC

## 2019-05-22 PROCEDURE — 80048 BASIC METABOLIC PNL TOTAL CA: CPT | Mod: HCNC

## 2019-05-22 RX ORDER — CLOPIDOGREL 300 MG/1
600 TABLET, FILM COATED ORAL ONCE
Status: COMPLETED | OUTPATIENT
Start: 2019-05-22 | End: 2019-05-22

## 2019-05-22 RX ORDER — DIPHENHYDRAMINE HCL 25 MG
50 CAPSULE ORAL ONCE
Status: COMPLETED | OUTPATIENT
Start: 2019-05-22 | End: 2019-05-22

## 2019-05-22 RX ORDER — MIDAZOLAM HYDROCHLORIDE 1 MG/ML
INJECTION, SOLUTION INTRAMUSCULAR; INTRAVENOUS
Status: DISCONTINUED | OUTPATIENT
Start: 2019-05-22 | End: 2019-05-22 | Stop reason: HOSPADM

## 2019-05-22 RX ORDER — ASPIRIN 81 MG/1
81 TABLET ORAL DAILY
Status: DISCONTINUED | OUTPATIENT
Start: 2019-05-22 | End: 2019-05-22 | Stop reason: HOSPADM

## 2019-05-22 RX ORDER — SODIUM CHLORIDE 9 MG/ML
INJECTION, SOLUTION INTRAVENOUS CONTINUOUS
Status: DISCONTINUED | OUTPATIENT
Start: 2019-05-22 | End: 2019-05-22 | Stop reason: HOSPADM

## 2019-05-22 RX ORDER — NITROGLYCERIN 5 MG/ML
INJECTION, SOLUTION INTRAVENOUS
Status: DISCONTINUED | OUTPATIENT
Start: 2019-05-22 | End: 2019-05-22 | Stop reason: HOSPADM

## 2019-05-22 RX ORDER — ACETAMINOPHEN 325 MG/1
650 TABLET ORAL EVERY 4 HOURS PRN
Status: DISCONTINUED | OUTPATIENT
Start: 2019-05-22 | End: 2019-05-22 | Stop reason: HOSPADM

## 2019-05-22 RX ORDER — HEPARIN SODIUM 200 [USP'U]/100ML
INJECTION, SOLUTION INTRAVENOUS
Status: DISCONTINUED | OUTPATIENT
Start: 2019-05-22 | End: 2019-05-22 | Stop reason: HOSPADM

## 2019-05-22 RX ORDER — SODIUM CHLORIDE 9 MG/ML
INJECTION, SOLUTION INTRAVENOUS CONTINUOUS
Status: ACTIVE | OUTPATIENT
Start: 2019-05-22 | End: 2019-05-22

## 2019-05-22 RX ORDER — LIDOCAINE HYDROCHLORIDE 20 MG/ML
INJECTION, SOLUTION INFILTRATION; PERINEURAL
Status: DISCONTINUED | OUTPATIENT
Start: 2019-05-22 | End: 2019-05-22 | Stop reason: HOSPADM

## 2019-05-22 RX ORDER — FENTANYL CITRATE 50 UG/ML
INJECTION, SOLUTION INTRAMUSCULAR; INTRAVENOUS
Status: DISCONTINUED | OUTPATIENT
Start: 2019-05-22 | End: 2019-05-22 | Stop reason: HOSPADM

## 2019-05-22 RX ADMIN — SODIUM CHLORIDE: 0.9 INJECTION, SOLUTION INTRAVENOUS at 08:05

## 2019-05-22 RX ADMIN — ASPIRIN 81 MG: 81 TABLET, COATED ORAL at 08:05

## 2019-05-22 RX ADMIN — CLOPIDOGREL BISULFATE 600 MG: 300 TABLET, FILM COATED ORAL at 08:05

## 2019-05-22 RX ADMIN — DIPHENHYDRAMINE HYDROCHLORIDE 50 MG: 25 CAPSULE ORAL at 08:05

## 2019-05-22 NOTE — Clinical Note
Catheter is inserted into the LIMA graft . Angiography performed of the graft. Angiography performed via hand injection with .

## 2019-05-22 NOTE — Clinical Note
100 ml injected throughout the case. 100 mL total wasted during the case. 200 mL total used in the case.

## 2019-05-22 NOTE — ASSESSMENT & PLAN NOTE
- Here for C +/- PCI, patient is a MIGUEL candidate  - Anti-platelet Therapy: ASA 81 mg Daily and Clopidogrel 600 mg load x 1  - Access: R CFA  - Creatinine/CrCl: 1.1  - Allergies: No shellfish/Iodine allergy  - Pre-Hydration: 3 cc/kg/hr IV, continuous, for 1 hour, Pre-Procedure  - Pre-Op Med: Diphenhydramine (Benadryl) 50 mg, Oral, Once, Pre-Procedure   - Consents signed in clinic, scanned in media  - All patient's questions were answered  - The risks, benefits & alternatives of the procedure were explained to the patient  - The risks of coronary angiography include but are not limited to: Bleeding, infection, heart rhythm abnormalities, allergic reactions, kidney injury requiring dilaysis, limb loss, stroke and death   - Should stenting be indicated, the patient has agreed to dual anti-platelet therapy for 1-consecutive year with a drug-eluting stent and a minimum of 1-month with the use of a bare metal stent  - Additionally, pt is aware that non-compliance is likely to result in stent clotting with heart attack, heart failure, and/or death  - The risks of moderate sedation include hypotension, respiratory depression, arrhythmias, bronchospasm, & death  - Informed consent was obtained & the patient is agreeable to proceed with the procedure

## 2019-05-22 NOTE — PROGRESS NOTES
Patient ambulated around unit with standby assist. R groin remained CDI, soft, non tender. Will monitor.

## 2019-05-22 NOTE — HPI
Mr. Fonseca is a 72 y/o gentleman with a PMHx CAD s/p CABG 1996 (LIMA-OM, LANCE-LAD, SVG-PDA) s/p LAD PCI in 2013. His LANCE is known to be occluded. He has stable angina with OCONNELL since 2/2018. His HF has been managed and orthopnea has improved with diuresis. However he still has angina. TTE with EF deecreased to 45% along with moderate MR.    Procedure was cancelled in the past due to KASSIE, now resolved with Cr 1.1 this AM.    Now here for LHC +/- PCI, patient is a MIGUEL candidate.  NPO since yesterday.  Has not been on any DAPT, loaded with ASA and Plavix this AM.

## 2019-05-22 NOTE — BRIEF OP NOTE
Post Cath Note  Referring Physician: Ken Ibarra MD  Procedure: CATHETERIZATION, HEART, LEFT (N/A), ANGIOGRAM, CORONARY ARTERY (N/A)       Access: Right CFA    Patent LIMA-OM graft  Patent SVG-RCA graft  Patent LAD stent with distal 95% stenosis  Distal % stenosis    LVEDP 24 mmHg    See full report for further details    Intervention:     Closure device: Manual pressure    Post Cath Exam:   BP (!) 116/55   Pulse 71   Temp 98.5 °F (36.9 °C) (Oral)   Resp (!) 42   Ht 6' (1.829 m)   Wt 109 kg (240 lb 4.8 oz)   SpO2 98%   BMI 32.59 kg/m²   No unusual pain, hematoma, thrill or bruit at vascular access site.  Distal pulse present without signs of ischemia.    Recommendations:   - Routine post-cath care  - IVF at 100 cc/hr for 4 hrs  - Continue medical management  - Risk factor reduction  - Follow-up with outpatient cardiologist    Signed:  Ramsey Salomon MD  Cardiology Fellow, PGY-6  5/22/2019 2:15 PM

## 2019-05-22 NOTE — Clinical Note
Catheter is inserted into the SVG . Angiography performed of the graft. Angiography performed via hand injection with .

## 2019-05-22 NOTE — PLAN OF CARE
Problem: Adult Inpatient Plan of Care  Goal: Plan of Care Review  Outcome: Ongoing (interventions implemented as appropriate)  Received report from [facility]. Patient s/p [facility], AAOx3. VSS, no c/o pain or discomfort at this time, resp even and unlabored. Gauze/tegaderm dressing to R groin is CDI. No active bleeding. No hematoma noted. Post procedure protocol reviewed with patient. Understanding verbalized. Family members at bedside. Nurse call bell within reach. Will continue to monitor per post procedure protocol.

## 2019-05-22 NOTE — SUBJECTIVE & OBJECTIVE
Past Medical History:   Diagnosis Date    Coronary artery disease     S/P 3 vessel CABG; stents    Glaucoma     HTN (hypertension) 4/19/2013    Hyperlipidemia     Myocardial infarction     Obstructive sleep apnea     Squamous cell carcinoma      Past Surgical History:   Procedure Laterality Date    ADENOIDECTOMY      ANGIOGRAM, CORONARY, INCLUDING BYPASS GRAFT, WITH LEFT HEART CATHETERIZATION Right 4/22/2019    Performed by [facility] MD at [facility] CATH LAB    ARTHROSCOPY-SHOULDER EXCISION DISTAL CLAVICLE Left 3/18/2015    [facility] by [facility] OR    ARTHROSCOPY-SHOULDER WITH SUBACROMIAL DECOMPRESSION Left 3/18/2015    Performed by John [facility] at [facility] OR    CATARACT EXTRACTION      patient not sure which eye    CATHETERIZATION, HEART, LEFT Left 10/1/2013    Performed by Ken [facility]ra MD at [facility] CATH LAB    COLONOSCOPY N/A 11/20/2014    Performed by Ken Gay MD at [facility] ENDO (4TH FLR)    CORONARY ANGIOPLASTY      CORONARY ARTERY BYPASS GRAFT      REPAIR-ROTATOR CUFF Left 3/18/2015    Performed by [facility] at [facility] OR    REPAIR-TENDON-BICEP Left 3/18/2015    Performed by John [facility] at [facility] OR    ROTATOR CUFF REPAIR      SHOULDER SURGERY      TONSILLECTOMY Left 2015     Review of patient's allergies indicates:  No Known Allergies    PTA Medications   Medication Sig    allopurinol (ZYLOPRIM) 300 MG tablet Take 1 tablet by mouth every evening.     atorvastatin (LIPITOR) 40 MG tablet Take 1 tablet (40 mg total) by mouth once daily. (Patient taking differently: Take 40 mg by mouth every evening. )    furosemide (LASIX) 40 MG tablet TAKE 1 TABLET(40 MG) BY MOUTH EVERY DAY    lisinopril (PRINIVIL,ZESTRIL) 20 MG tablet Take 1 tablet (20 mg total) by mouth once daily.    tamsulosin (FLOMAX) 0.4 mg Cap Take 1 capsule (0.4 mg total) by mouth every evening.    aspirin (ECOTRIN) 81 MG EC tablet Take 1 tablet (81 mg total) by mouth  once daily.    sildenafil (VIAGRA) 100 MG tablet Take 1 tablet (100 mg total) by mouth daily as needed for Erectile Dysfunction.     Family History     Problem Relation (Age of Onset)    Diabetes Maternal Grandmother, Maternal Grandfather    Heart attack Father    Heart disease Father    Heart failure Father    Hypertension Father        Tobacco Use    Smoking status: Former Smoker     Packs/day: 1.00     Years: 31.00     Pack years: 31.00     Last attempt to quit: 1991     Years since quittin.0    Smokeless tobacco: Never Used   Substance and Sexual Activity    Alcohol use: Yes     Alcohol/week: 1.2 oz     Types: 2 Glasses of wine per week     Comment: Socially    Drug use: No    Sexual activity: Not on file     Review of Systems   Constitution: Negative for chills, fever and weight loss.   HENT: Negative for sore throat and stridor.    Eyes: Negative for blurred vision, double vision and pain.   Cardiovascular: Positive for chest pain and dyspnea on exertion. Negative for claudication, near-syncope, orthopnea, palpitations, paroxysmal nocturnal dyspnea and syncope.   Respiratory: Positive for shortness of breath. Negative for cough, sputum production and wheezing.    Endocrine: Negative for polydipsia, polyphagia and polyuria.   Skin: Negative for rash.   Musculoskeletal: Negative for joint pain, joint swelling and myalgias.   Gastrointestinal: Negative for abdominal pain, constipation, diarrhea, heartburn, melena, nausea and vomiting.   Genitourinary: Negative for dysuria and hematuria.   Neurological: Negative for focal weakness and loss of balance.   Psychiatric/Behavioral: Negative for depression and memory loss.     Objective:     Vital Signs (Most Recent):  Temp: 98.5 °F (36.9 °C) (19 0800)  Pulse: 80 (19 0800)  Resp: 20 (19 08)  BP: (!) 121/56 (19 0801)  SpO2: (!) 94 % (19 08) Vital Signs (24h Range):  Temp:  [98.5 °F (36.9 °C)] 98.5 °F (36.9 °C)  Pulse:   [80] 80  Resp:  [20] 20  SpO2:  [94 %] 94 %  BP: (121-131)/(56-60) 121/56     Weight: 109 kg (240 lb 4.8 oz)  Body mass index is 32.59 kg/m².  SpO2: (!) 94 %  O2 Device (Oxygen Therapy): room air  No intake or output data in the 24 hours ending 05/22/19 1013    Lines/Drains/Airways          None        Physical Exam   Constitutional: He is oriented to person, place, and time. He appears well-developed and well-nourished.   HENT:   Head: Normocephalic.   Eyes: Pupils are equal, round, and reactive to light.   Neck: Normal range of motion. No JVD present.   Cardiovascular: Normal rate and regular rhythm.   Murmur heard.   Blowing holosystolic murmur is present with a grade of 2/6 at the apex.  Pulses:       Radial pulses are 2+ on the right side, and 2+ on the left side.        Femoral pulses are 2+ on the right side, and 2+ on the left side.       Dorsalis pedis pulses are 2+ on the right side, and 2+ on the left side.        Posterior tibial pulses are 2+ on the right side, and 2+ on the left side.   Pulmonary/Chest: Effort normal and breath sounds normal. No respiratory distress. He has no wheezes. He has no rales.   Abdominal: Soft. Bowel sounds are normal. He exhibits no distension. There is no tenderness.   Musculoskeletal: Normal range of motion. He exhibits no edema.   Neurological: He is alert and oriented to person, place, and time.   Skin: Skin is warm and dry.     Significant Labs:   CMP   Recent Labs   Lab 05/22/19  0739      K 4.4   *   CO2 24   *   BUN 28*   CREATININE 1.1   CALCIUM 9.8   ANIONGAP 6*   ESTGFRAFRICA >60.0   EGFRNONAA >60.0   , CBC   Recent Labs   Lab 05/22/19  0739   WBC 9.18   HGB 11.8*   HCT 35.9*

## 2019-05-22 NOTE — Clinical Note
Catheter is inserted into the ostium   right coronary artery. Angiography performed of the right coronary arteries in multiple views. Angiography performed via hand injection with .

## 2019-05-23 DIAGNOSIS — E78.5 DYSLIPIDEMIA: ICD-10-CM

## 2019-05-23 DIAGNOSIS — I25.10 CORONARY ARTERY DISEASE, ANGINA PRESENCE UNSPECIFIED, UNSPECIFIED VESSEL OR LESION TYPE, UNSPECIFIED WHETHER NATIVE OR TRANSPLANTED HEART: ICD-10-CM

## 2019-05-23 RX ORDER — ATORVASTATIN CALCIUM 40 MG/1
TABLET, FILM COATED ORAL
Qty: 30 TABLET | Refills: 0 | Status: SHIPPED | OUTPATIENT
Start: 2019-05-23 | End: 2019-06-21 | Stop reason: SDUPTHER

## 2019-05-23 NOTE — DISCHARGE SUMMARY
Discharge Summary  Interventional Cardiology      Admit Date: 5/22/2019    Discharge Date:  5/22/2019    Attending Physician: Ken Ibarra MD    Discharge Physician: Ramsey Salomon MD    Principal Diagnoses: CAD (coronary artery disease)  Indication for Admission: CATHETERIZATION, HEART, LEFT (N/A), ANGIOGRAM, CORONARY ARTERY (N/A)    Discharged Condition: Good    Hospital Course:   Patient presented for outpatient LHC which went without complication. LHC revealed patent LIMA-OM graft, patent SVG-RCA graft, patent LAD stent with distal LAD 95% stenosis, and distal % stenosis. This was essentially unchanged from previous angiogram in 2013. See full cath report in Epic for details. Hemostasis of patient's R CFA access site was achieved with manual pressure. Patient was monitored per post-cath protocol, and his groin access site was c/d/i with no evidence of hematoma this afternoon. He was able to ambulate without difficulty. Patient was feeling well and anticipating discharge home today.    Outpatient Plan:  - Continue medical management  - Risk factor reduction  - Follow-up with outpatient cardiologist (Dr. Radha Olivas)    Diet: Cardiac diet    Activity: Ad jackeline, wound care instructions provided    Disposition: Home or Self Care    Discharge Medications:      Medication List      CHANGE how you take these medications    atorvastatin 40 MG tablet  Commonly known as:  LIPITOR  Take 1 tablet (40 mg total) by mouth once daily.  What changed:  when to take this        CONTINUE taking these medications    allopurinol 300 MG tablet  Commonly known as:  ZYLOPRIM     aspirin 81 MG EC tablet  Commonly known as:  ECOTRIN  Take 1 tablet (81 mg total) by mouth once daily.     furosemide 40 MG tablet  Commonly known as:  LASIX  TAKE 1 TABLET(40 MG) BY MOUTH EVERY DAY     lisinopril 20 MG tablet  Commonly known as:  PRINIVIL,ZESTRIL  Take 1 tablet (20 mg total) by mouth once daily.     sildenafil 100 MG  tablet  Commonly known as:  VIAGRA  Take 1 tablet (100 mg total) by mouth daily as needed for Erectile Dysfunction.     tamsulosin 0.4 mg Cap  Commonly known as:  FLOMAX  Take 1 capsule (0.4 mg total) by mouth every evening.          Follow Up:  Follow-up Information     Radha Olivas MD.    Specialties:  Transplant, Cardiology  Contact information:  [address]  [facility] [id number] [phone]             Vasyl Morris MD.    Specialty:  Internal Medicine  Contact information:  [address]  [facility] [id number] [phone]

## 2019-05-27 ENCOUNTER — PATIENT MESSAGE (OUTPATIENT)
Dept: CARDIOLOGY | Facility: CLINIC | Age: 74
End: 2019-05-27

## 2019-06-21 DIAGNOSIS — E78.5 DYSLIPIDEMIA: ICD-10-CM

## 2019-06-21 DIAGNOSIS — I25.10 CORONARY ARTERY DISEASE, ANGINA PRESENCE UNSPECIFIED, UNSPECIFIED VESSEL OR LESION TYPE, UNSPECIFIED WHETHER NATIVE OR TRANSPLANTED HEART: ICD-10-CM

## 2019-06-24 RX ORDER — ATORVASTATIN CALCIUM 40 MG/1
TABLET, FILM COATED ORAL
Qty: 30 TABLET | Refills: 0 | Status: SHIPPED | OUTPATIENT
Start: 2019-06-24 | End: 2019-08-05 | Stop reason: SDUPTHER

## 2019-08-05 DIAGNOSIS — E78.5 DYSLIPIDEMIA: ICD-10-CM

## 2019-08-05 DIAGNOSIS — I25.10 CORONARY ARTERY DISEASE, ANGINA PRESENCE UNSPECIFIED, UNSPECIFIED VESSEL OR LESION TYPE, UNSPECIFIED WHETHER NATIVE OR TRANSPLANTED HEART: ICD-10-CM

## 2019-08-05 RX ORDER — ATORVASTATIN CALCIUM 40 MG/1
TABLET, FILM COATED ORAL
Qty: 30 TABLET | Refills: 0 | Status: SHIPPED | OUTPATIENT
Start: 2019-08-05 | End: 2019-08-19 | Stop reason: SDUPTHER

## 2019-08-14 ENCOUNTER — PES CALL (OUTPATIENT)
Dept: ADMINISTRATIVE | Facility: CLINIC | Age: 74
End: 2019-08-14

## 2019-08-19 ENCOUNTER — OFFICE VISIT (OUTPATIENT)
Dept: TRANSPLANT | Facility: CLINIC | Age: 74
End: 2019-08-19
Payer: MEDICARE

## 2019-08-19 ENCOUNTER — HOSPITAL ENCOUNTER (OUTPATIENT)
Dept: CARDIOLOGY | Facility: CLINIC | Age: 74
Discharge: HOME | End: 2019-08-19
Attending: INTERNAL MEDICINE
Payer: MEDICARE

## 2019-08-19 VITALS
WEIGHT: 244.25 LBS | HEIGHT: 73 IN | BODY MASS INDEX: 32.37 KG/M2 | DIASTOLIC BLOOD PRESSURE: 66 MMHG | SYSTOLIC BLOOD PRESSURE: 151 MMHG | HEART RATE: 70 BPM

## 2019-08-19 VITALS
WEIGHT: 240 LBS | DIASTOLIC BLOOD PRESSURE: 70 MMHG | HEIGHT: 72 IN | HEART RATE: 70 BPM | BODY MASS INDEX: 32.51 KG/M2 | SYSTOLIC BLOOD PRESSURE: 140 MMHG

## 2019-08-19 DIAGNOSIS — I34.0 MODERATE MITRAL REGURGITATION BY PRIOR ECHOCARDIOGRAM: ICD-10-CM

## 2019-08-19 DIAGNOSIS — I51.89 MILD LEFT VENTRICULAR SYSTOLIC DYSFUNCTION: ICD-10-CM

## 2019-08-19 DIAGNOSIS — E78.5 DYSLIPIDEMIA: ICD-10-CM

## 2019-08-19 DIAGNOSIS — I51.89 MILD LEFT VENTRICULAR SYSTOLIC DYSFUNCTION: Primary | ICD-10-CM

## 2019-08-19 DIAGNOSIS — I25.10 CORONARY ARTERY DISEASE, ANGINA PRESENCE UNSPECIFIED, UNSPECIFIED VESSEL OR LESION TYPE, UNSPECIFIED WHETHER NATIVE OR TRANSPLANTED HEART: ICD-10-CM

## 2019-08-19 LAB
ASCENDING AORTA: 3.47 CM
AV INDEX (PROSTH): 0.65
AV MEAN GRADIENT: 4 MMHG
AV PEAK GRADIENT: 8 MMHG
AV VALVE AREA: 2.68 CM2
AV VELOCITY RATIO: 0.64
BSA FOR ECHO PROCEDURE: 2.35 M2
CV ECHO LV RWT: 0.35 CM
DOP CALC AO PEAK VEL: 1.44 M/S
DOP CALC AO VTI: 28.58 CM
DOP CALC LVOT AREA: 4.1 CM2
DOP CALC LVOT DIAMETER: 2.29 CM
DOP CALC LVOT PEAK VEL: 0.92 M/S
DOP CALC LVOT STROKE VOLUME: 76.73 CM3
DOP CALCLVOT PEAK VEL VTI: 18.64 CM
E WAVE DECELERATION TIME: 433.32 MSEC
E/A RATIO: 2.3
E/E' RATIO: 8.44 M/S
ECHO LV POSTERIOR WALL: 1.1 CM (ref 0.6–1.1)
FRACTIONAL SHORTENING: 19 % (ref 28–44)
INTERVENTRICULAR SEPTUM: 0.9 CM (ref 0.6–1.1)
LA MAJOR: 7.02 CM
LA MINOR: 7.02 CM
LA WIDTH: 5.87 CM
LEFT ATRIUM SIZE: 5.64 CM
LEFT ATRIUM VOLUME INDEX: 85.8 ML/M2
LEFT ATRIUM VOLUME: 197.55 CM3
LEFT INTERNAL DIMENSION IN SYSTOLE: 5.13 CM (ref 2.1–4)
LEFT VENTRICLE DIASTOLIC VOLUME INDEX: 89.31 ML/M2
LEFT VENTRICLE DIASTOLIC VOLUME: 205.59 ML
LEFT VENTRICLE MASS INDEX: 118 G/M2
LEFT VENTRICLE SYSTOLIC VOLUME INDEX: 54.6 ML/M2
LEFT VENTRICLE SYSTOLIC VOLUME: 125.75 ML
LEFT VENTRICULAR INTERNAL DIMENSION IN DIASTOLE: 6.36 CM (ref 3.5–6)
LEFT VENTRICULAR MASS: 272.68 G
LV LATERAL E/E' RATIO: 6.91 M/S
LV SEPTAL E/E' RATIO: 10.86 M/S
MV PEAK A VEL: 0.33 M/S
MV PEAK E VEL: 0.76 M/S
PISA TR MAX VEL: 3.68 M/S
PULM VEIN S/D RATIO: 0.42
PV PEAK D VEL: 0.92 M/S
PV PEAK S VEL: 0.39 M/S
RA MAJOR: 5.79 CM
RA PRESSURE: 8 MMHG
RA WIDTH: 5.24 CM
RIGHT VENTRICULAR END-DIASTOLIC DIMENSION: 4.42 CM
RV TISSUE DOPPLER FREE WALL SYSTOLIC VELOCITY 1 (APICAL 4 CHAMBER VIEW): 9.21 CM/S
SINUS: 3.98 CM
STJ: 3.54 CM
TDI LATERAL: 0.11 M/S
TDI SEPTAL: 0.07 M/S
TDI: 0.09 M/S
TR MAX PG: 54 MMHG
TRICUSPID ANNULAR PLANE SYSTOLIC EXCURSION: 1.73 CM
TV REST PULMONARY ARTERY PRESSURE: 62 MMHG

## 2019-08-19 PROCEDURE — 99499 UNLISTED E&M SERVICE: CPT | Mod: S$GLB,,, | Performed by: INTERNAL MEDICINE

## 2019-08-19 PROCEDURE — 3077F PR MOST RECENT SYSTOLIC BLOOD PRESSURE >= 140 MM HG: ICD-10-PCS | Mod: HCNC,CPTII,S$GLB, | Performed by: INTERNAL MEDICINE

## 2019-08-19 PROCEDURE — 99999 PR PBB SHADOW E&M-EST. PATIENT-LVL III: ICD-10-PCS | Mod: PBBFAC,HCNC,, | Performed by: INTERNAL MEDICINE

## 2019-08-19 PROCEDURE — 99215 PR OFFICE/OUTPT VISIT, EST, LEVL V, 40-54 MIN: ICD-10-PCS | Mod: HCNC,S$GLB,, | Performed by: INTERNAL MEDICINE

## 2019-08-19 PROCEDURE — 1101F PT FALLS ASSESS-DOCD LE1/YR: CPT | Mod: HCNC,CPTII,S$GLB, | Performed by: INTERNAL MEDICINE

## 2019-08-19 PROCEDURE — 99999 PR PBB SHADOW E&M-EST. PATIENT-LVL III: CPT | Mod: PBBFAC,HCNC,, | Performed by: INTERNAL MEDICINE

## 2019-08-19 PROCEDURE — 99499 RISK ADDL DX/OHS AUDIT: ICD-10-PCS | Mod: S$GLB,,, | Performed by: INTERNAL MEDICINE

## 2019-08-19 PROCEDURE — 93306 TRANSTHORACIC ECHO (TTE) COMPLETE (CUPID ONLY): ICD-10-PCS | Mod: HCNC,S$GLB,, | Performed by: INTERNAL MEDICINE

## 2019-08-19 PROCEDURE — 99215 OFFICE O/P EST HI 40 MIN: CPT | Mod: HCNC,S$GLB,, | Performed by: INTERNAL MEDICINE

## 2019-08-19 PROCEDURE — 1101F PR PT FALLS ASSESS DOC 0-1 FALLS W/OUT INJ PAST YR: ICD-10-PCS | Mod: HCNC,CPTII,S$GLB, | Performed by: INTERNAL MEDICINE

## 2019-08-19 PROCEDURE — 3078F PR MOST RECENT DIASTOLIC BLOOD PRESSURE < 80 MM HG: ICD-10-PCS | Mod: HCNC,CPTII,S$GLB, | Performed by: INTERNAL MEDICINE

## 2019-08-19 PROCEDURE — 93306 TTE W/DOPPLER COMPLETE: CPT | Mod: HCNC,S$GLB,, | Performed by: INTERNAL MEDICINE

## 2019-08-19 PROCEDURE — 3077F SYST BP >= 140 MM HG: CPT | Mod: HCNC,CPTII,S$GLB, | Performed by: INTERNAL MEDICINE

## 2019-08-19 PROCEDURE — 3078F DIAST BP <80 MM HG: CPT | Mod: HCNC,CPTII,S$GLB, | Performed by: INTERNAL MEDICINE

## 2019-08-19 RX ORDER — ATORVASTATIN CALCIUM 40 MG/1
TABLET, FILM COATED ORAL
Qty: 30 TABLET | Refills: 6 | Status: SHIPPED | OUTPATIENT
Start: 2019-08-19 | End: 2020-02-19 | Stop reason: SDUPTHER

## 2019-08-19 RX ORDER — ALLOPURINOL 300 MG/1
300 TABLET ORAL NIGHTLY
Qty: 30 TABLET | Refills: 6 | Status: SHIPPED | OUTPATIENT
Start: 2019-08-19

## 2019-08-19 NOTE — ASSESSMENT & PLAN NOTE
· Continue lipitor 40 and ASA 81  · Consider b-blocker ?  Erectile dysfunction not an issue for pt

## 2019-08-19 NOTE — PATIENT INSTRUCTIONS
GOAL weight at home of 230 lbs  Metoprolol extended-release tablets  What is this medicine?  METOPROLOL (me TOE proe lole) is a beta-blocker. Beta-blockers reduce the workload on the heart and help it to beat more regularly. This medicine is used to treat high blood pressure and to prevent chest pain. It is also used to after a heart attack and to prevent an additional heart attack from occurring.  How should I use this medicine?  Take this medicine by mouth with a glass of water. Follow the directions on the prescription label. Do not crush or chew. Take this medicine with or immediately after meals. Take your doses at regular intervals. Do not take more medicine than directed. Do not stop taking this medicine suddenly. This could lead to serious heart-related effects.  Talk to your pediatrician regarding the use of this medicine in children. While this drug may be prescribed for children as young as 6 years for selected conditions, precautions do apply.  What side effects may I notice from receiving this medicine?  Side effects that you should report to your doctor or health care professional as soon as possible:  · allergic reactions like skin rash, itching or hives  · cold or numb hands or feet  · depression  · difficulty breathing  · faint  · fever with sore throat  · irregular heartbeat, chest pain  · rapid weight gain  · swollen legs or ankles  Side effects that usually do not require medical attention (report to your doctor or health care professional if they continue or are bothersome):  · anxiety or nervousness  · change in sex drive or performance  · dry skin  · headache  · nightmares or trouble sleeping  · short term memory loss  · stomach upset or diarrhea  · unusually tired  What may interact with this medicine?  This medicine may interact with the following medications:  · certain medicines for blood pressure, heart disease, irregular heart beat  · certain medicines for depression, like monoamine  oxidase (MAO) inhibitors, fluoxetine, or paroxetine  · clonidine  · dobutamine  · epinephrine  · isoproterenol  · reserpine  What if I miss a dose?  If you miss a dose, take it as soon as you can. If it is almost time for your next dose, take only that dose. Do not take double or extra doses.  Where should I keep my medicine?  Keep out of the reach of children.  Store at room temperature between 15 and 30 degrees C (59 and 86 degrees F). Throw away any unused medicine after the expiration date.  What should I tell my health care provider before I take this medicine?  They need to know if you have any of these conditions:  · diabetes  · heart or vessel disease like slow heart rate, worsening heart failure, heart block, sick sinus syndrome or Raynaud's disease  · kidney disease  · liver disease  · lung or breathing disease, like asthma or emphysema  · pheochromocytoma  · thyroid disease  · an unusual or allergic reaction to metoprolol, other beta-blockers, medicines, foods, dyes, or preservatives  · pregnant or trying to get pregnant  · breast-feeding  What should I watch for while using this medicine?  Visit your doctor or health care professional for regular check ups. Contact your doctor right away if your symptoms worsen. Check your blood pressure and pulse rate regularly. Ask your health care professional what your blood pressure and pulse rate should be, and when you should contact them.  You may get drowsy or dizzy. Do not drive, use machinery, or do anything that needs mental alertness until you know how this medicine affects you. Do not sit or stand up quickly, especially if you are an older patient. This reduces the risk of dizzy or fainting spells. Contact your doctor if these symptoms continue. Alcohol may interfere with the effect of this medicine. Avoid alcoholic drinks.  NOTE:This sheet is a summary. It may not cover all possible information. If you have questions about this medicine, talk to your  doctor, pharmacist, or health care provider. Copyright© 2017 [employer]

## 2019-08-19 NOTE — PROGRESS NOTES
Subjective: class 2     Patient ID:  Jose Fonseca Jr. is a 73 y.o. male who presents for follow-up of Mitral Regurgitation and Congestive Heart Failure      HPI   s/p 1996 PCI of LAD in 2013 CABG 2/3 patent graft in 2013 with a history of OCONNELL / weight gain since OCt 2018 who comes for medical optimization.  He is currently on lasix 40 daily which maintains his home weight at 235 lbs (clinic weight is actually two kg lower today)   . Sleeps on two pillows  Able to walk more than two blocks [place] [facility] [place].  No edema  Admits that it is challenging to maintain low salt diet as he frequently eats out as he is a single man who travels often.   Did not take lisinopril this am with may explain high BP today (also has some barbeque yesterday)    Echo 8/19/19   · Mild left ventricular enlargement.  · Mildly decreased left ventricular systolic function. The estimated ejection fraction is 45%  · Mild right ventricular enlargement.  · Low normal right ventricular systolic function.  · Grade III (severe) left ventricular diastolic dysfunction consistent with restrictive physiology.  · Severe biatrial enlargement.  · Moderate mitral regurgitation.  · The estimated PA systolic pressure is 62 mm Hg  · Intermediate central venous pressure (8 mm Hg).    Review of Systems   Constitution: Negative for decreased appetite, weight gain and weight loss.   Cardiovascular: Negative for chest pain, dyspnea on exertion, leg swelling, near-syncope, orthopnea and palpitations.   Respiratory: Negative for cough and shortness of breath.    Musculoskeletal: Negative for myalgias.   Gastrointestinal: Negative for jaundice.        Objective:    Physical Exam   Constitutional: He is oriented to person, place, and time. He appears well-developed and well-nourished. He is active. He is not intubated.   BP (!) 151/66 (BP Location: Right arm, Patient  "Position: Sitting, BP Method: Large (Automatic))   Pulse 70   Ht 6' 1" (1.854 m)   Wt 110.8 kg (244 lb 4.3 oz)   BMI 32.23 kg/m²      HENT:   Head: Normocephalic and atraumatic. Hair is normal.   Right Ear: External ear normal.   Left Ear: External ear normal.   Nose: Nose normal. No nasal deformity. No epistaxis.  No foreign bodies.   Mouth/Throat: Mucous membranes are normal. Mucous membranes are not cyanotic. No oropharyngeal exudate.   Eyes: Pupils are equal, round, and reactive to light. Conjunctivae and EOM are normal.   Neck: Neck supple. No hepatojugular reflux and no JVD (JVP 6 ) present.   Cardiovascular: Normal rate, regular rhythm and normal pulses. Exam reveals no gallop.   Murmur heard.  High-pitched blowing decrescendo early systolic murmur is present with a grade of 3/6 at the apex radiating to the axilla. Consistent with MR   Pulmonary/Chest: Effort normal and breath sounds normal. No apnea and no tachypnea. He is not intubated. No respiratory distress. He exhibits no tenderness.   Abdominal: Soft. Normal appearance and bowel sounds are normal. There is no tenderness. No hernia.   Musculoskeletal: Normal range of motion.   Neurological: He is alert and oriented to person, place, and time. He displays no seizure activity.   Skin: Skin is warm, dry and intact. No rash noted. No pallor.   Psychiatric: He has a normal mood and affect. His speech is normal and behavior is normal. Thought content normal. Cognition and memory are normal.     BMP  Lab Results   Component Value Date     08/19/2019    K 4.6 08/19/2019     (H) 08/19/2019    CO2 22 (L) 08/19/2019    BUN 34 (H) 08/19/2019    CREATININE 1.5 (H) 08/19/2019    CALCIUM 9.5 08/19/2019    ANIONGAP 9 08/19/2019    ESTGFRAFRICA 52.6 (A) 08/19/2019    EGFRNONAA 45.5 (A) 08/19/2019     Lab Results   Component Value Date    CHOL 158 12/19/2018    CHOL 184 06/27/2017    CHOL 191 12/21/2015     Lab Results   Component Value Date    HDL 54 " 12/19/2018    HDL 52 06/27/2017    HDL 56 12/21/2015     Lab Results   Component Value Date    LDLCALC 90.4 12/19/2018    LDLCALC 118.6 06/27/2017    LDLCALC 106.2 12/21/2015     Lab Results   Component Value Date    TRIG 68 12/19/2018    TRIG 67 06/27/2017    TRIG 144 12/21/2015     Lab Results   Component Value Date    CHOLHDL 34.2 12/19/2018    CHOLHDL 28.3 06/27/2017    CHOLHDL 29.3 12/21/2015         BNP   Date Value Ref Range Status   08/19/2019 486 (H) 0 - 99 pg/mL Final     Comment:     Values of less than 100 pg/ml are consistent with non-CHF populations.   05/16/2019 240 (H) 0 - 99 pg/mL Final     Comment:     Values of less than 100 pg/ml are consistent with non-CHF populations.   02/11/2019 54 0 - 99 pg/mL Final     Comment:     Values of less than 100 pg/ml are consistent with non-CHF populations.           Assessment:       1. Mild left ventricular systolic dysfunction (LVEF 45% 2019)     2. Dyslipidemia    3. Coronary artery disease, angina presence unspecified, unspecified vessel or lesion type, unspecified whether native or transplanted heart    4. Moderate ischemic mitral regurgitation by prior echocardiogram         Plan:       Problem List Items Addressed This Visit     CAD (coronary artery disease)    Overview      Last angiogram in Oct 2013 significant for patent pLAD stent and LIMA-OM and VG-PDA grafts and known LANCE-LAD occlusion  2019 angiogram    · Dist LAD lesion , 95% stenosed.  · Dist RCA lesion , 100% stenosed.  · Estimated blood loss: none  · Patent LIMA to to OMB and patent SVG to RCA.   2019 significant change in coronary anatomy compared to angiogram performed October 2013           Current Assessment & Plan       · Continue lipitor 40 and ASA 81  · Consider b-blocker ?  Erectile dysfunction not an issue for pt            Relevant Medications    atorvastatin (LIPITOR) 40 MG tablet    Other Relevant Orders    CPX Study    Basic metabolic panel    Brain natriuretic peptide     Dyslipidemia    Relevant Medications    atorvastatin (LIPITOR) 40 MG tablet    Other Relevant Orders    CPX Study    Moderate ischemic mitral regurgitation by prior echocardiogram    Current Assessment & Plan     Unchanged on today's echo         Mild left ventricular systolic dysfunction (LVEF 45% 2019)  - Primary    Overview     Due to CAD (infarct or ischemia) versus mitral regurg or both         Current Assessment & Plan     lasix 40 mg daily with second dose for weight gain > 2 lbs in 24 hr or 5 lbs in a week  Maybe worth getting MRI to look at volumes if we have an option for mitral valve intervention (percutanous or surgical repair?)   Would like to keep on lisinopril 20 daily for now (see above for explanation of elevated BP today)   Will get CPX for functional assessment

## 2019-08-19 NOTE — ASSESSMENT & PLAN NOTE
lasix 40 mg daily with second dose for weight gain > 2 lbs in 24 hr or 5 lbs in a week  Maybe worth getting MRI to look at volumes if we have an option for mitral valve intervention (percutanous or surgical repair?)   Would like to keep on lisinopril 20 daily for now (see above for explanation of elevated BP today)   Will get CPX for functional assessment

## 2019-08-21 DIAGNOSIS — I25.10 CORONARY ARTERY DISEASE INVOLVING NATIVE CORONARY ARTERY WITHOUT ANGINA PECTORIS, UNSPECIFIED WHETHER NATIVE OR TRANSPLANTED HEART: Primary | ICD-10-CM

## 2019-08-28 ENCOUNTER — PATIENT MESSAGE (OUTPATIENT)
Dept: TRANSPLANT | Facility: CLINIC | Age: 74
End: 2019-08-28

## 2019-09-06 DIAGNOSIS — I25.10 CORONARY ARTERY DISEASE, ANGINA PRESENCE UNSPECIFIED, UNSPECIFIED VESSEL OR LESION TYPE, UNSPECIFIED WHETHER NATIVE OR TRANSPLANTED HEART: ICD-10-CM

## 2019-09-06 DIAGNOSIS — E78.5 DYSLIPIDEMIA: ICD-10-CM

## 2019-09-06 RX ORDER — ATORVASTATIN CALCIUM 40 MG/1
TABLET, FILM COATED ORAL
Qty: 30 TABLET | Refills: 0 | Status: SHIPPED | OUTPATIENT
Start: 2019-09-06 | End: 2020-03-30 | Stop reason: SDUPTHER

## 2019-09-19 ENCOUNTER — RESEARCH ENCOUNTER (OUTPATIENT)
Dept: RESEARCH | Facility: HOSPITAL | Age: 74
End: 2019-09-19

## 2019-09-19 DIAGNOSIS — I50.9 HEART FAILURE, UNSPECIFIED HF CHRONICITY, UNSPECIFIED HEART FAILURE TYPE: Primary | ICD-10-CM

## 2019-09-19 NOTE — PROGRESS NOTES
Met with patient to discuss the Guide- HF trial. Dr. Boothe met with patient and discussed the trial at length. Answered any questions that the patient had regarding the trial. Patient would like to wait to sign consent and meet with Dr. Ibarra and Dr. Olivas before making any decisions. Thanked patient for meeting to discuss the trial and provided contact information if he has any further questions.

## 2019-09-30 ENCOUNTER — HOSPITAL ENCOUNTER (OUTPATIENT)
Dept: CARDIOLOGY | Facility: CLINIC | Age: 74
Discharge: HOME | End: 2019-09-30
Attending: INTERNAL MEDICINE
Payer: MEDICARE

## 2019-09-30 VITALS — WEIGHT: 243 LBS | HEIGHT: 73 IN | BODY MASS INDEX: 32.2 KG/M2

## 2019-09-30 DIAGNOSIS — E78.5 DYSLIPIDEMIA: ICD-10-CM

## 2019-09-30 DIAGNOSIS — I25.10 CORONARY ARTERY DISEASE, ANGINA PRESENCE UNSPECIFIED, UNSPECIFIED VESSEL OR LESION TYPE, UNSPECIFIED WHETHER NATIVE OR TRANSPLANTED HEART: ICD-10-CM

## 2019-09-30 LAB
CV STRESS BASE HR: 80 BPM
DIASTOLIC BLOOD PRESSURE: 70 MMHG
OHS CV CPX 1 MINUTE RECOVERY HEART RATE: 129 BPM
OHS CV CPX 85 PERCENT MAX PREDICTED HEART RATE MALE: 125
OHS CV CPX ANAEROBIC THRESHOLD DIASTOLIC BLOOD PRESSURE: 89 MMHG
OHS CV CPX ANAEROBIC THRESHOLD HEART RATE: 115
OHS CV CPX ANAEROBIC THRESHOLD RATE PRESSURE PRODUCT: NORMAL
OHS CV CPX ANAEROBIC THRESHOLD SYSTOLIC BLOOD PRESSURE: 137
OHS CV CPX DATA GRADE - AT: 4
OHS CV CPX DATA GRADE - PEAK: 4.9
OHS CV CPX DATA O2 SAT - PEAK: 97
OHS CV CPX DATA O2 SAT - REST: 97
OHS CV CPX DATA SPEED - AT: 2.7
OHS CV CPX DATA SPEED - PEAK: 3
OHS CV CPX DATA TIME - AT: 6.25
OHS CV CPX DATA TIME - PEAK: 7.42
OHS CV CPX DATA VE/VCO2 - AT: 45
OHS CV CPX DATA VE/VCO2 - PEAK: 47
OHS CV CPX DATA VE/VO2 - AT: 43
OHS CV CPX DATA VE/VO2 - PEAK: 47
OHS CV CPX DATA VO2 - AT: 11.7
OHS CV CPX DATA VO2 - PEAK: 13.6
OHS CV CPX DATA VO2 - REST: 4.1
OHS CV CPX FEV1/FVC: 0.7
OHS CV CPX FORCED EXPIRATORY VOLUME: 1.97
OHS CV CPX FORCED VITAL CAPACITY (FVC): 2.83
OHS CV CPX HIGHEST VO: 21.6
OHS CV CPX MAX PREDICTED HEART RATE: 147
OHS CV CPX MAXIMAL VOLUNTARY VENTILATION (MVV) PREDICTED: 78.8
OHS CV CPX MAXIMAL VOLUNTARY VENTILATION (MVV): 79
OHS CV CPX MAXIUMUM EXERCISE VENTILATION (VE MAX): 70
OHS CV CPX PATIENT AGE: 73
OHS CV CPX PATIENT HEIGHT IN: 73
OHS CV CPX PATIENT IS FEMALE AGE 11-19: 0
OHS CV CPX PATIENT IS FEMALE AGE GREATER THAN 19: 0
OHS CV CPX PATIENT IS FEMALE AGE LESS THAN 11: 0
OHS CV CPX PATIENT IS FEMALE: 0
OHS CV CPX PATIENT IS MALE AGE 11-25: 0
OHS CV CPX PATIENT IS MALE AGE GREATER THAN 25: 1
OHS CV CPX PATIENT IS MALE AGE LESS THAN 11: 0
OHS CV CPX PATIENT IS MALE GREATER THAN 18: 1
OHS CV CPX PATIENT IS MALE LESS THAN OR EQUAL TO 18: 0
OHS CV CPX PATIENT IS MALE: 1
OHS CV CPX PATIENT WEIGHT RETURNED IN OZ: 3888
OHS CV CPX PEAK DIASTOLIC BLOOD PRESSURE: 80 MMHG
OHS CV CPX PEAK HEAR RATE: 131 BPM
OHS CV CPX PEAK RATE PRESSURE PRODUCT: NORMAL
OHS CV CPX PEAK SYSTOLIC BLOOD PRESSURE: 140 MMHG
OHS CV CPX PERCENT BODY FAT: 21
OHS CV CPX PERCENT MAX PREDICTED HEART RATE ACHIEVED: 89
OHS CV CPX PREDICTED VO2: 21.6 ML/KG/MIN
OHS CV CPX RATE PRESSURE PRODUCT PRESENTING: NORMAL
OHS CV CPX REST PET CO2: 25
OHS CV CPX VE/VCO2 SLOPE: 39
STRESS ECHO POST EXERCISE DUR MIN: 7 MINUTES
STRESS ECHO POST EXERCISE DUR SEC: 25 SECONDS
SYSTOLIC BLOOD PRESSURE: 134 MMHG

## 2019-09-30 PROCEDURE — 94621 CARDIOPULM EXERCISE TESTING: CPT | Mod: HCNC

## 2019-09-30 PROCEDURE — 94621 CARDIOPULM EXERCISE TESTING: CPT | Mod: 26,HCNC,, | Performed by: INTERNAL MEDICINE

## 2019-09-30 PROCEDURE — 94621 CARDIOPULMONARY EXERCISE TESTING (CUPID ONLY): ICD-10-PCS | Mod: 26,HCNC,, | Performed by: INTERNAL MEDICINE

## 2019-10-07 ENCOUNTER — LAB VISIT (OUTPATIENT)
Dept: LAB | Facility: HOSPITAL | Age: 74
End: 2019-10-07
Attending: INTERNAL MEDICINE
Payer: MEDICARE

## 2019-10-07 ENCOUNTER — OFFICE VISIT (OUTPATIENT)
Dept: TRANSPLANT | Facility: CLINIC | Age: 74
End: 2019-10-07
Payer: MEDICARE

## 2019-10-07 ENCOUNTER — PATIENT MESSAGE (OUTPATIENT)
Dept: ADMINISTRATIVE | Facility: OTHER | Age: 74
End: 2019-10-07

## 2019-10-07 VITALS
BODY MASS INDEX: 33.13 KG/M2 | WEIGHT: 250 LBS | DIASTOLIC BLOOD PRESSURE: 66 MMHG | OXYGEN SATURATION: 93 % | SYSTOLIC BLOOD PRESSURE: 140 MMHG | HEART RATE: 83 BPM | HEIGHT: 73 IN

## 2019-10-07 DIAGNOSIS — I10 ESSENTIAL HYPERTENSION: ICD-10-CM

## 2019-10-07 DIAGNOSIS — I25.10 CORONARY ARTERY DISEASE, ANGINA PRESENCE UNSPECIFIED, UNSPECIFIED VESSEL OR LESION TYPE, UNSPECIFIED WHETHER NATIVE OR TRANSPLANTED HEART: ICD-10-CM

## 2019-10-07 DIAGNOSIS — I25.10 CORONARY ARTERY DISEASE INVOLVING NATIVE CORONARY ARTERY WITHOUT ANGINA PECTORIS, UNSPECIFIED WHETHER NATIVE OR TRANSPLANTED HEART: Primary | ICD-10-CM

## 2019-10-07 DIAGNOSIS — I34.0 MODERATE MITRAL REGURGITATION BY PRIOR ECHOCARDIOGRAM: ICD-10-CM

## 2019-10-07 DIAGNOSIS — I51.89 MILD LEFT VENTRICULAR SYSTOLIC DYSFUNCTION: ICD-10-CM

## 2019-10-07 LAB
ANION GAP SERPL CALC-SCNC: 8 MMOL/L (ref 8–16)
BNP SERPL-MCNC: 342 PG/ML (ref 0–99)
BUN SERPL-MCNC: 24 MG/DL (ref 8–23)
CALCIUM SERPL-MCNC: 9.3 MG/DL (ref 8.7–10.5)
CHLORIDE SERPL-SCNC: 111 MMOL/L (ref 95–110)
CO2 SERPL-SCNC: 22 MMOL/L (ref 23–29)
CREAT SERPL-MCNC: 1.1 MG/DL (ref 0.5–1.4)
EST. GFR  (AFRICAN AMERICAN): >60 ML/MIN/1.73 M^2
EST. GFR  (NON AFRICAN AMERICAN): >60 ML/MIN/1.73 M^2
GLUCOSE SERPL-MCNC: 122 MG/DL (ref 70–110)
POTASSIUM SERPL-SCNC: 4.5 MMOL/L (ref 3.5–5.1)
SODIUM SERPL-SCNC: 141 MMOL/L (ref 136–145)

## 2019-10-07 PROCEDURE — 1101F PT FALLS ASSESS-DOCD LE1/YR: CPT | Mod: HCNC,CPTII,S$GLB, | Performed by: INTERNAL MEDICINE

## 2019-10-07 PROCEDURE — 99215 PR OFFICE/OUTPT VISIT, EST, LEVL V, 40-54 MIN: ICD-10-PCS | Mod: HCNC,S$GLB,, | Performed by: INTERNAL MEDICINE

## 2019-10-07 PROCEDURE — 3078F DIAST BP <80 MM HG: CPT | Mod: HCNC,CPTII,S$GLB, | Performed by: INTERNAL MEDICINE

## 2019-10-07 PROCEDURE — 80048 BASIC METABOLIC PNL TOTAL CA: CPT | Mod: HCNC

## 2019-10-07 PROCEDURE — 83880 ASSAY OF NATRIURETIC PEPTIDE: CPT | Mod: HCNC

## 2019-10-07 PROCEDURE — 1101F PR PT FALLS ASSESS DOC 0-1 FALLS W/OUT INJ PAST YR: ICD-10-PCS | Mod: HCNC,CPTII,S$GLB, | Performed by: INTERNAL MEDICINE

## 2019-10-07 PROCEDURE — 3077F PR MOST RECENT SYSTOLIC BLOOD PRESSURE >= 140 MM HG: ICD-10-PCS | Mod: HCNC,CPTII,S$GLB, | Performed by: INTERNAL MEDICINE

## 2019-10-07 PROCEDURE — 3077F SYST BP >= 140 MM HG: CPT | Mod: HCNC,CPTII,S$GLB, | Performed by: INTERNAL MEDICINE

## 2019-10-07 PROCEDURE — 99999 PR PBB SHADOW E&M-EST. PATIENT-LVL III: ICD-10-PCS | Mod: PBBFAC,HCNC,, | Performed by: INTERNAL MEDICINE

## 2019-10-07 PROCEDURE — 99999 PR PBB SHADOW E&M-EST. PATIENT-LVL III: CPT | Mod: PBBFAC,HCNC,, | Performed by: INTERNAL MEDICINE

## 2019-10-07 PROCEDURE — 36415 COLL VENOUS BLD VENIPUNCTURE: CPT | Mod: HCNC

## 2019-10-07 PROCEDURE — 3078F PR MOST RECENT DIASTOLIC BLOOD PRESSURE < 80 MM HG: ICD-10-PCS | Mod: HCNC,CPTII,S$GLB, | Performed by: INTERNAL MEDICINE

## 2019-10-07 PROCEDURE — 99499 UNLISTED E&M SERVICE: CPT | Mod: HCNC,S$GLB,, | Performed by: INTERNAL MEDICINE

## 2019-10-07 PROCEDURE — 99499 RISK ADDL DX/OHS AUDIT: ICD-10-PCS | Mod: HCNC,S$GLB,, | Performed by: INTERNAL MEDICINE

## 2019-10-07 PROCEDURE — 99215 OFFICE O/P EST HI 40 MIN: CPT | Mod: HCNC,S$GLB,, | Performed by: INTERNAL MEDICINE

## 2019-10-07 RX ORDER — METOPROLOL SUCCINATE 25 MG/1
25 TABLET, EXTENDED RELEASE ORAL NIGHTLY
Qty: 30 TABLET | Refills: 11 | Status: SHIPPED | OUTPATIENT
Start: 2019-10-07 | End: 2020-02-19

## 2019-10-07 NOTE — PATIENT INSTRUCTIONS
Take lasix 40 mg twice a day on Friday and Monday ONCE a day lasix all other days  Consider cardiomems if above does not work  START toprol 25 at night with increase to 50 in a month if labs ok

## 2019-10-07 NOTE — PROGRESS NOTES
Subjective: class 2     Patient ID:  Jose Fonseca Jr. is a 73 y.o. male who presents for follow-up of No chief complaint on file.      HPI   s/p 1996 PCI of LAD in 2013 CABG 2/3 patent graft in 2013 with a history of OCONNELL / weight gain since OCt 2018 who comes for medical optimization.  He is currently on lasix 40 daily which maintains his home weight at  107 kg (clinic weight is actually two kg higher today).  Reports that he home weight fluctuates three to five lbs per weight.  Report one week of weight gain related to trip to [place].  What more SOB in [place] compared to [place] earlier this Summer.    Sleeps on two pillows  No edema  Admits that it is challenging to maintain low salt diet as he frequently eats out as he is a single man who travels often.     Echo 8/19/19   · Mild left ventricular enlargement.  · Mildly decreased left ventricular systolic function. The estimated ejection fraction is 45%  · Mild right ventricular enlargement.  · Low normal right ventricular systolic function.  · Grade III (severe) left ventricular diastolic dysfunction consistent with restrictive physiology.  · Severe biatrial enlargement.  · Moderate mitral regurgitation.  · The estimated PA systolic pressure is 62 mm Hg  Intermediate central venous pressure (8 mm Hg).    Sept 2019 CPX  peak VO2 was 13.6 ml/kg/min which is 62.96% of predicted equating to a functional capacity of 3.89 METS indicating moderate functional impairment.         Review of Systems   Constitution: Negative for decreased appetite, weight gain and weight loss.   Cardiovascular: Negative for chest pain, dyspnea on exertion, leg swelling, near-syncope, orthopnea and palpitations.   Respiratory: Negative for cough and shortness of breath.    Musculoskeletal: Negative for myalgias.   Gastrointestinal: Negative for jaundice.        Objective:    Physical Exam   Constitutional: He is oriented to person, place, and time. He appears well-developed and  "well-nourished. He is active. He is not intubated.   BP (!) 140/66 (BP Location: Right arm, Patient Position: Sitting, BP Method: Large (Automatic))   Pulse 83   Ht 6' 1" (1.854 m)   Wt 113.4 kg (250 lb)   SpO2 (!) 93%   BMI 32.98 kg/m²        HENT:   Head: Normocephalic and atraumatic. Hair is normal.   Right Ear: External ear normal.   Left Ear: External ear normal.   Nose: Nose normal. No nasal deformity. No epistaxis.  No foreign bodies.   Mouth/Throat: Mucous membranes are normal. Mucous membranes are not cyanotic. No oropharyngeal exudate.   Eyes: Pupils are equal, round, and reactive to light. Conjunctivae and EOM are normal.   Neck: Neck supple. No hepatojugular reflux and no JVD (JVP 6 ) present.   Cardiovascular: Normal rate, regular rhythm and normal pulses. Exam reveals no gallop.   Murmur heard.  High-pitched blowing decrescendo early systolic murmur is present with a grade of 3/6 at the apex radiating to the axilla. Consistent with MR   Pulmonary/Chest: Effort normal and breath sounds normal. No apnea and no tachypnea. He is not intubated. No respiratory distress. He exhibits no tenderness.   Abdominal: Soft. Normal appearance and bowel sounds are normal. There is no tenderness. No hernia.   Musculoskeletal: Normal range of motion.   Neurological: He is alert and oriented to person, place, and time. He displays no seizure activity.   Skin: Skin is warm, dry and intact. No rash noted. No pallor.   Psychiatric: He has a normal mood and affect. His speech is normal and behavior is normal. Thought content normal. Cognition and memory are normal.     BMP  Lab Results   Component Value Date     10/07/2019    K 4.5 10/07/2019     (H) 10/07/2019    CO2 22 (L) 10/07/2019    BUN 24 (H) 10/07/2019    CREATININE 1.1 10/07/2019    CALCIUM 9.3 10/07/2019    ANIONGAP 8 10/07/2019    ESTGFRAFRICA >60.0 10/07/2019    EGFRNONAA >60.0 10/07/2019     Lab Results   Component Value Date    CHOL 158 " 12/19/2018    CHOL 184 06/27/2017    CHOL 191 12/21/2015     Lab Results   Component Value Date    HDL 54 12/19/2018    HDL 52 06/27/2017    HDL 56 12/21/2015     Lab Results   Component Value Date    LDLCALC 90.4 12/19/2018    LDLCALC 118.6 06/27/2017    LDLCALC 106.2 12/21/2015     Lab Results   Component Value Date    TRIG 68 12/19/2018    TRIG 67 06/27/2017    TRIG 144 12/21/2015     Lab Results   Component Value Date    CHOLHDL 34.2 12/19/2018    CHOLHDL 28.3 06/27/2017    CHOLHDL 29.3 12/21/2015         BNP   Date Value Ref Range Status   10/07/2019 342 (H) 0 - 99 pg/mL Final     Comment:     Values of less than 100 pg/ml are consistent with non-CHF populations.   08/19/2019 486 (H) 0 - 99 pg/mL Final     Comment:     Values of less than 100 pg/ml are consistent with non-CHF populations.   05/16/2019 240 (H) 0 - 99 pg/mL Final     Comment:     Values of less than 100 pg/ml are consistent with non-CHF populations.           Assessment:       1. Coronary artery disease involving native coronary artery without angina pectoris, unspecified whether native or transplanted heart    2. Mild left ventricular systolic dysfunction (LVEF 45% 2019)     3. Moderate ischemic mitral regurgitation by prior echocardiogram    4. Essential hypertension         Plan:       Problem List Items Addressed This Visit     CAD (coronary artery disease) - Primary    Overview      Last angiogram in Oct 2013 significant for patent pLAD stent and LIMA-OM and VG-PDA grafts and known LANCE-LAD occlusion  2019 angiogram    · Dist LAD lesion , 95% stenosed.  · Dist RCA lesion , 100% stenosed.  · Estimated blood loss: none  · Patent LIMA to to OMB and patent SVG to RCA.   2019 significant change in coronary anatomy compared to angiogram performed October 2013           Current Assessment & Plan     · Continue lipitor 40 and ASA 81  Take lasix 40 mg twice a day on Friday and Monday   Consider cardiomems if above does not work  START toprol 25 at  night with increase to 50 in a month if labs ok          Relevant Orders    Cardiac Rehab Phase II    Basic metabolic panel    Brain natriuretic peptide    Basic metabolic panel    Brain natriuretic peptide    HTN (hypertension)    Current Assessment & Plan     Consider digital hypertension   Start toprol as noted above          Moderate ischemic mitral regurgitation by prior echocardiogram    Current Assessment & Plan     Will start on b-blocker see above followed by MRI (want to get MRI if cost / claustrophobia not prohibitive to look at RV / LV volumes)  Could be a candidate for percutaneous intervention for mitral regurg if significant MR persists          Relevant Orders    Cardiac Rehab Phase II    Mild left ventricular systolic dysfunction (LVEF 45% 2019)     Overview     Due to CAD (infarct or ischemia) versus mitral regurg or both         Current Assessment & Plan     Recommendation by me based on CPX Sept 2019 -  Can repeat as this could  improve with exercise and weight lose          Relevant Orders    Cardiac Rehab Phase II

## 2019-10-07 NOTE — ASSESSMENT & PLAN NOTE
Recommendation by me based on CPX Sept 2019 -  Can repeat as this could  improve with exercise and weight lose

## 2019-10-07 NOTE — ASSESSMENT & PLAN NOTE
Will start on b-blocker see above followed by MRI (want to get MRI if cost / claustrophobia not prohibitive to look at RV / LV volumes)  Could be a candidate for percutaneous intervention for mitral regurg if significant MR persists

## 2019-10-07 NOTE — ASSESSMENT & PLAN NOTE
· Continue lipitor 40 and ASA 81  Take lasix 40 mg twice a day on Friday and Monday   Consider cardiomems if above does not work  START toprol 25 at night with increase to 50 in a month if labs ok

## 2019-10-21 ENCOUNTER — LAB VISIT (OUTPATIENT)
Dept: LAB | Facility: HOSPITAL | Age: 74
End: 2019-10-21
Attending: INTERNAL MEDICINE
Payer: MEDICARE

## 2019-10-21 DIAGNOSIS — I25.10 CORONARY ARTERY DISEASE INVOLVING NATIVE CORONARY ARTERY WITHOUT ANGINA PECTORIS, UNSPECIFIED WHETHER NATIVE OR TRANSPLANTED HEART: ICD-10-CM

## 2019-10-21 LAB
ANION GAP SERPL CALC-SCNC: 10 MMOL/L (ref 8–16)
BNP SERPL-MCNC: 250 PG/ML (ref 0–99)
BUN SERPL-MCNC: 31 MG/DL (ref 8–23)
CALCIUM SERPL-MCNC: 9.6 MG/DL (ref 8.7–10.5)
CHLORIDE SERPL-SCNC: 107 MMOL/L (ref 95–110)
CO2 SERPL-SCNC: 23 MMOL/L (ref 23–29)
CREAT SERPL-MCNC: 1.4 MG/DL (ref 0.5–1.4)
EST. GFR  (AFRICAN AMERICAN): 57.2 ML/MIN/1.73 M^2
EST. GFR  (NON AFRICAN AMERICAN): 49.5 ML/MIN/1.73 M^2
GLUCOSE SERPL-MCNC: 141 MG/DL (ref 70–110)
POTASSIUM SERPL-SCNC: 4.6 MMOL/L (ref 3.5–5.1)
SODIUM SERPL-SCNC: 140 MMOL/L (ref 136–145)

## 2019-10-21 PROCEDURE — 36415 COLL VENOUS BLD VENIPUNCTURE: CPT | Mod: HCNC,PN

## 2019-10-21 PROCEDURE — 83880 ASSAY OF NATRIURETIC PEPTIDE: CPT | Mod: HCNC

## 2019-10-21 PROCEDURE — 80048 BASIC METABOLIC PNL TOTAL CA: CPT | Mod: HCNC

## 2019-10-30 RX ORDER — LISINOPRIL 40 MG/1
40 TABLET ORAL NIGHTLY
Qty: 90 TABLET | Refills: 3 | Status: SHIPPED | OUTPATIENT
Start: 2019-10-30 | End: 2020-02-19 | Stop reason: SDUPTHER

## 2019-11-03 DIAGNOSIS — I50.22 CHRONIC SYSTOLIC CONGESTIVE HEART FAILURE: ICD-10-CM

## 2019-11-03 RX ORDER — FUROSEMIDE 40 MG/1
TABLET ORAL
Qty: 30 TABLET | Refills: 0 | Status: SHIPPED | OUTPATIENT
Start: 2019-11-03 | End: 2019-12-27

## 2019-11-07 ENCOUNTER — LAB VISIT (OUTPATIENT)
Dept: LAB | Facility: HOSPITAL | Age: 74
End: 2019-11-07
Attending: INTERNAL MEDICINE
Payer: MEDICARE

## 2019-11-07 DIAGNOSIS — I25.10 CORONARY ARTERY DISEASE INVOLVING NATIVE CORONARY ARTERY WITHOUT ANGINA PECTORIS, UNSPECIFIED WHETHER NATIVE OR TRANSPLANTED HEART: ICD-10-CM

## 2019-11-07 LAB
ANION GAP SERPL CALC-SCNC: 12 MMOL/L (ref 8–16)
BNP SERPL-MCNC: 102 PG/ML (ref 0–99)
BUN SERPL-MCNC: 52 MG/DL (ref 8–23)
CALCIUM SERPL-MCNC: 9.5 MG/DL (ref 8.7–10.5)
CHLORIDE SERPL-SCNC: 107 MMOL/L (ref 95–110)
CO2 SERPL-SCNC: 20 MMOL/L (ref 23–29)
CREAT SERPL-MCNC: 1.5 MG/DL (ref 0.5–1.4)
EST. GFR  (AFRICAN AMERICAN): 52.3 ML/MIN/1.73 M^2
EST. GFR  (NON AFRICAN AMERICAN): 45.2 ML/MIN/1.73 M^2
GLUCOSE SERPL-MCNC: 125 MG/DL (ref 70–110)
POTASSIUM SERPL-SCNC: 4.8 MMOL/L (ref 3.5–5.1)
SODIUM SERPL-SCNC: 139 MMOL/L (ref 136–145)

## 2019-11-07 PROCEDURE — 83880 ASSAY OF NATRIURETIC PEPTIDE: CPT | Mod: HCNC

## 2019-11-07 PROCEDURE — 80048 BASIC METABOLIC PNL TOTAL CA: CPT | Mod: HCNC

## 2019-11-07 PROCEDURE — 36415 COLL VENOUS BLD VENIPUNCTURE: CPT | Mod: HCNC,PN

## 2019-11-08 ENCOUNTER — TELEPHONE (OUTPATIENT)
Dept: TRANSPLANT | Facility: CLINIC | Age: 74
End: 2019-11-08

## 2019-11-08 NOTE — TELEPHONE ENCOUNTER
11/8/19 - Labs received and reviewed by ANT Olivas M.D.  Written orders received for patient to increase Toprol to 50 mg at bedtime if tolerating.  Called and spoke with patient who stated he has been feeling fine.  He reports no issues with the Toprol.  Instructed patient of above orders.  Patient verbalized understanding and all instructions given.

## 2019-11-22 ENCOUNTER — TELEPHONE (OUTPATIENT)
Dept: TRANSPLANT | Facility: CLINIC | Age: 74
End: 2019-11-22

## 2019-12-27 DIAGNOSIS — I50.22 CHRONIC SYSTOLIC CONGESTIVE HEART FAILURE: ICD-10-CM

## 2019-12-27 RX ORDER — FUROSEMIDE 40 MG/1
TABLET ORAL
Qty: 30 TABLET | Refills: 0 | Status: SHIPPED | OUTPATIENT
Start: 2019-12-27 | End: 2019-12-27

## 2019-12-27 RX ORDER — FUROSEMIDE 40 MG/1
TABLET ORAL
Qty: 90 TABLET | Refills: 3 | Status: SHIPPED | OUTPATIENT
Start: 2019-12-27 | End: 2020-02-19

## 2020-01-01 DIAGNOSIS — N13.8 BPH WITH URINARY OBSTRUCTION: ICD-10-CM

## 2020-01-01 DIAGNOSIS — R39.198 URINE STREAM SPRAYING: ICD-10-CM

## 2020-01-01 DIAGNOSIS — N40.1 BPH WITH URINARY OBSTRUCTION: ICD-10-CM

## 2020-01-02 RX ORDER — TAMSULOSIN HYDROCHLORIDE 0.4 MG/1
CAPSULE ORAL
Qty: 30 CAPSULE | Refills: 11 | Status: SHIPPED | OUTPATIENT
Start: 2020-01-02 | End: 2021-03-26

## 2020-02-09 ENCOUNTER — PATIENT MESSAGE (OUTPATIENT)
Dept: SPORTS MEDICINE | Facility: CLINIC | Age: 75
End: 2020-02-09

## 2020-02-19 ENCOUNTER — OFFICE VISIT (OUTPATIENT)
Dept: TRANSPLANT | Facility: CLINIC | Age: 75
End: 2020-02-19
Payer: MEDICARE

## 2020-02-19 ENCOUNTER — LAB VISIT (OUTPATIENT)
Dept: LAB | Facility: HOSPITAL | Age: 75
End: 2020-02-19
Attending: INTERNAL MEDICINE
Payer: MEDICARE

## 2020-02-19 VITALS
BODY MASS INDEX: 32.67 KG/M2 | DIASTOLIC BLOOD PRESSURE: 52 MMHG | HEART RATE: 62 BPM | WEIGHT: 246.5 LBS | SYSTOLIC BLOOD PRESSURE: 102 MMHG | HEIGHT: 73 IN

## 2020-02-19 DIAGNOSIS — I50.22 CHRONIC SYSTOLIC CONGESTIVE HEART FAILURE: ICD-10-CM

## 2020-02-19 DIAGNOSIS — I25.10 CORONARY ARTERY DISEASE INVOLVING NATIVE CORONARY ARTERY WITHOUT ANGINA PECTORIS, UNSPECIFIED WHETHER NATIVE OR TRANSPLANTED HEART: ICD-10-CM

## 2020-02-19 DIAGNOSIS — I51.89 MILD LEFT VENTRICULAR SYSTOLIC DYSFUNCTION: ICD-10-CM

## 2020-02-19 LAB
ANION GAP SERPL CALC-SCNC: 9 MMOL/L (ref 8–16)
BNP SERPL-MCNC: 220 PG/ML (ref 0–99)
BUN SERPL-MCNC: 62 MG/DL (ref 8–23)
CALCIUM SERPL-MCNC: 10.1 MG/DL (ref 8.7–10.5)
CHLORIDE SERPL-SCNC: 105 MMOL/L (ref 95–110)
CO2 SERPL-SCNC: 26 MMOL/L (ref 23–29)
CREAT SERPL-MCNC: 1.4 MG/DL (ref 0.5–1.4)
EST. GFR  (AFRICAN AMERICAN): 56.8 ML/MIN/1.73 M^2
EST. GFR  (NON AFRICAN AMERICAN): 49.1 ML/MIN/1.73 M^2
GLUCOSE SERPL-MCNC: 136 MG/DL (ref 70–110)
POTASSIUM SERPL-SCNC: 4.3 MMOL/L (ref 3.5–5.1)
SODIUM SERPL-SCNC: 140 MMOL/L (ref 136–145)

## 2020-02-19 PROCEDURE — 3074F SYST BP LT 130 MM HG: CPT | Mod: HCNC,CPTII,S$GLB, | Performed by: INTERNAL MEDICINE

## 2020-02-19 PROCEDURE — 80048 BASIC METABOLIC PNL TOTAL CA: CPT | Mod: HCNC

## 2020-02-19 PROCEDURE — 3288F PR FALLS RISK ASSESSMENT DOCUMENTED: ICD-10-PCS | Mod: HCNC,CPTII,S$GLB, | Performed by: INTERNAL MEDICINE

## 2020-02-19 PROCEDURE — 3288F FALL RISK ASSESSMENT DOCD: CPT | Mod: HCNC,CPTII,S$GLB, | Performed by: INTERNAL MEDICINE

## 2020-02-19 PROCEDURE — 83880 ASSAY OF NATRIURETIC PEPTIDE: CPT | Mod: HCNC

## 2020-02-19 PROCEDURE — 36415 COLL VENOUS BLD VENIPUNCTURE: CPT | Mod: HCNC

## 2020-02-19 PROCEDURE — 99214 OFFICE O/P EST MOD 30 MIN: CPT | Mod: HCNC,S$GLB,, | Performed by: INTERNAL MEDICINE

## 2020-02-19 PROCEDURE — 1159F PR MEDICATION LIST DOCUMENTED IN MEDICAL RECORD: ICD-10-PCS | Mod: HCNC,S$GLB,, | Performed by: INTERNAL MEDICINE

## 2020-02-19 PROCEDURE — 99999 PR PBB SHADOW E&M-EST. PATIENT-LVL III: CPT | Mod: PBBFAC,HCNC,, | Performed by: INTERNAL MEDICINE

## 2020-02-19 PROCEDURE — 99999 PR PBB SHADOW E&M-EST. PATIENT-LVL III: ICD-10-PCS | Mod: PBBFAC,HCNC,, | Performed by: INTERNAL MEDICINE

## 2020-02-19 PROCEDURE — 3078F PR MOST RECENT DIASTOLIC BLOOD PRESSURE < 80 MM HG: ICD-10-PCS | Mod: HCNC,CPTII,S$GLB, | Performed by: INTERNAL MEDICINE

## 2020-02-19 PROCEDURE — 1100F PR PT FALLS ASSESS DOC 2+ FALLS/FALL W/INJURY/YR: ICD-10-PCS | Mod: HCNC,CPTII,S$GLB, | Performed by: INTERNAL MEDICINE

## 2020-02-19 PROCEDURE — 99214 PR OFFICE/OUTPT VISIT, EST, LEVL IV, 30-39 MIN: ICD-10-PCS | Mod: HCNC,S$GLB,, | Performed by: INTERNAL MEDICINE

## 2020-02-19 PROCEDURE — 1125F AMNT PAIN NOTED PAIN PRSNT: CPT | Mod: HCNC,S$GLB,, | Performed by: INTERNAL MEDICINE

## 2020-02-19 PROCEDURE — 3074F PR MOST RECENT SYSTOLIC BLOOD PRESSURE < 130 MM HG: ICD-10-PCS | Mod: HCNC,CPTII,S$GLB, | Performed by: INTERNAL MEDICINE

## 2020-02-19 PROCEDURE — 1100F PTFALLS ASSESS-DOCD GE2>/YR: CPT | Mod: HCNC,CPTII,S$GLB, | Performed by: INTERNAL MEDICINE

## 2020-02-19 PROCEDURE — 1125F PR PAIN SEVERITY QUANTIFIED, PAIN PRESENT: ICD-10-PCS | Mod: HCNC,S$GLB,, | Performed by: INTERNAL MEDICINE

## 2020-02-19 PROCEDURE — 1159F MED LIST DOCD IN RCRD: CPT | Mod: HCNC,S$GLB,, | Performed by: INTERNAL MEDICINE

## 2020-02-19 PROCEDURE — 3078F DIAST BP <80 MM HG: CPT | Mod: HCNC,CPTII,S$GLB, | Performed by: INTERNAL MEDICINE

## 2020-02-19 RX ORDER — LISINOPRIL 40 MG/1
40 TABLET ORAL NIGHTLY
Qty: 90 TABLET | Refills: 3 | Status: SHIPPED | OUTPATIENT
Start: 2020-02-19 | End: 2021-03-05

## 2020-02-19 RX ORDER — FUROSEMIDE 40 MG/1
40 TABLET ORAL 2 TIMES DAILY
Qty: 90 TABLET | Refills: 3 | Status: SHIPPED | OUTPATIENT
Start: 2020-02-19 | End: 2020-02-26

## 2020-02-19 RX ORDER — METOPROLOL SUCCINATE 50 MG/1
50 TABLET, EXTENDED RELEASE ORAL NIGHTLY
Qty: 30 TABLET | Refills: 11 | Status: SHIPPED | OUTPATIENT
Start: 2020-02-19 | End: 2020-06-23 | Stop reason: SDUPTHER

## 2020-02-19 NOTE — ASSESSMENT & PLAN NOTE
keep toprol 50 at night as I am not convinced that this is the predominant cause of fatigue  Keep on lisinopril 40 mg qd  With issues of fatigue / low BP  to not believe he would benefit from a drug like entresto as it is BID

## 2020-02-19 NOTE — PROGRESS NOTES
Subjective: class 2     Patient ID:  Jose Fonseca Jr. is a 74 y.o. male who presents for follow-up of Congestive Heart Failure      Congestive Heart Failure   Pertinent negatives include no chest pain, coughing or myalgias.      s/p 1996 PCI of LAD in 2013 CABG 2/3 patent graft in 2013 with a history of OCONNELL / weight gain since OCt 2018 who comes for medical optimization.    Today, he is currently on lasix 40 BID which maintains his home weight at  108 kg (clinic weight is actually two kg low today).  Reports that he home weight fluctuates three to five lbs per day.     Sleeps on two pillows without PND.  No edema  Admits that it is challenging to maintain low salt diet as he frequently eats out as he is a single man who travels often.     Echo 8/19/19   · Mild left ventricular enlargement.  · Mildly decreased left ventricular systolic function. The estimated ejection fraction is 45%  · Mild right ventricular enlargement.  · Low normal right ventricular systolic function.  · Grade III (severe) left ventricular diastolic dysfunction consistent with restrictive physiology.  · Severe biatrial enlargement.  · Moderate mitral regurgitation.  · The estimated PA systolic pressure is 62 mm Hg  Intermediate central venous pressure (8 mm Hg).    Sept 2019 CPX  peak VO2 was 13.6 ml/kg/min which is 62.96% of predicted equating to a functional capacity of 3.89 METS indicating moderate functional impairment.         Review of Systems   Constitution: Negative for decreased appetite, weight gain and weight loss.   Cardiovascular: Negative for chest pain, dyspnea on exertion, leg swelling, near-syncope, orthopnea and palpitations.   Respiratory: Negative for cough and shortness of breath.    Musculoskeletal: Negative for myalgias.   Gastrointestinal: Negative for jaundice.        Objective:    Physical Exam   Constitutional: He is oriented to person, place, and time. He appears well-developed and well-nourished. He is active.  "He is not intubated.   BP (!) 102/52 (BP Location: Left arm, Patient Position: Sitting, BP Method: Medium (Automatic))   Pulse 62   Ht 6' 1" (1.854 m)   Wt 111.8 kg (246 lb 7.6 oz)   BMI 32.52 kg/m²          HENT:   Head: Normocephalic and atraumatic. Hair is normal.   Right Ear: External ear normal.   Left Ear: External ear normal.   Nose: Nose normal. No nasal deformity. No epistaxis.  No foreign bodies.   Mouth/Throat: Mucous membranes are normal. Mucous membranes are not cyanotic. No oropharyngeal exudate.   Eyes: Pupils are equal, round, and reactive to light. Conjunctivae and EOM are normal.   Neck: Neck supple. No hepatojugular reflux and no JVD (JVP 6 ) present.   Cardiovascular: Normal rate, regular rhythm and normal pulses. Exam reveals no gallop.   Murmur heard.  High-pitched blowing decrescendo early systolic murmur is present with a grade of 3/6 at the apex radiating to the axilla. Consistent with MR   Pulmonary/Chest: Effort normal and breath sounds normal. No apnea and no tachypnea. He is not intubated. No respiratory distress. He exhibits no tenderness.   Abdominal: Soft. Normal appearance and bowel sounds are normal. There is no tenderness. No hernia.   Musculoskeletal: Normal range of motion.   Neurological: He is alert and oriented to person, place, and time. He displays no seizure activity.   Skin: Skin is warm, dry and intact. No rash noted. No pallor.   Psychiatric: He has a normal mood and affect. His speech is normal and behavior is normal. Thought content normal. Cognition and memory are normal.     BMP  Lab Results   Component Value Date     02/19/2020    K 4.3 02/19/2020     02/19/2020    CO2 26 02/19/2020    BUN 62 (H) 02/19/2020    CREATININE 1.4 02/19/2020    CALCIUM 10.1 02/19/2020    ANIONGAP 9 02/19/2020    ESTGFRAFRICA 56.8 (A) 02/19/2020    EGFRNONAA 49.1 (A) 02/19/2020     Lab Results   Component Value Date    CHOL 158 12/19/2018    CHOL 184 06/27/2017    CHOL 191 " 12/21/2015     Lab Results   Component Value Date    HDL 54 12/19/2018    HDL 52 06/27/2017    HDL 56 12/21/2015     Lab Results   Component Value Date    LDLCALC 90.4 12/19/2018    LDLCALC 118.6 06/27/2017    LDLCALC 106.2 12/21/2015     Lab Results   Component Value Date    TRIG 68 12/19/2018    TRIG 67 06/27/2017    TRIG 144 12/21/2015     Lab Results   Component Value Date    CHOLHDL 34.2 12/19/2018    CHOLHDL 28.3 06/27/2017    CHOLHDL 29.3 12/21/2015         BNP   Date Value Ref Range Status   02/19/2020 220 (H) 0 - 99 pg/mL Final     Comment:     Values of less than 100 pg/ml are consistent with non-CHF populations.   11/07/2019 102 (H) 0 - 99 pg/mL Final     Comment:     Values of less than 100 pg/ml are consistent with non-CHF populations.   10/21/2019 250 (H) 0 - 99 pg/mL Final     Comment:     Values of less than 100 pg/ml are consistent with non-CHF populations.           Assessment:       1. Chronic systolic congestive heart failure    2. Coronary artery disease involving native coronary artery without angina pectoris, unspecified whether native or transplanted heart    3. Mild left ventricular systolic dysfunction (LVEF 45% 2019)          Plan:       Problem List Items Addressed This Visit     CAD (coronary artery disease)    Overview      Last angiogram in Oct 2013 significant for patent pLAD stent and LIMA-OM and VG-PDA grafts and known LANCE-LAD occlusion  2019 angiogram    · Dist LAD lesion , 95% stenosed.  · Dist RCA lesion , 100% stenosed.  · Estimated blood loss: none  · Patent LIMA to to OMB and patent SVG to RCA.   2019 significant change in coronary anatomy compared to angiogram performed October 2013           Current Assessment & Plan     Take lasix 40 mg twice a day          Mild left ventricular systolic dysfunction (LVEF 45% 2019)     Overview     Due to CAD (infarct or ischemia) versus mitral regurg or both         Current Assessment & Plan     keep toprol 50 at night as I am not  convinced that this is the predominant cause of fatigue  Keep on lisinopril 40 mg qd  With issues of fatigue / low BP  to not believe he would benefit from a drug like entresto as it is BID              Other Visit Diagnoses     Chronic systolic congestive heart failure        Relevant Medications    furosemide (LASIX) 40 MG tablet    Other Relevant Orders    Echo Color Flow Doppler? Yes    Basic metabolic panel    Brain natriuretic peptide

## 2020-02-26 DIAGNOSIS — I50.22 CHRONIC SYSTOLIC CONGESTIVE HEART FAILURE: ICD-10-CM

## 2020-02-26 RX ORDER — FUROSEMIDE 40 MG/1
TABLET ORAL
Qty: 30 TABLET | Refills: 12 | Status: SHIPPED | OUTPATIENT
Start: 2020-02-26 | End: 2020-10-13 | Stop reason: SDUPTHER

## 2020-03-27 DIAGNOSIS — I25.10 CORONARY ARTERY DISEASE, ANGINA PRESENCE UNSPECIFIED, UNSPECIFIED VESSEL OR LESION TYPE, UNSPECIFIED WHETHER NATIVE OR TRANSPLANTED HEART: ICD-10-CM

## 2020-03-27 DIAGNOSIS — E78.5 DYSLIPIDEMIA: ICD-10-CM

## 2020-03-30 DIAGNOSIS — E78.5 DYSLIPIDEMIA: ICD-10-CM

## 2020-03-30 DIAGNOSIS — I25.10 CORONARY ARTERY DISEASE, ANGINA PRESENCE UNSPECIFIED, UNSPECIFIED VESSEL OR LESION TYPE, UNSPECIFIED WHETHER NATIVE OR TRANSPLANTED HEART: ICD-10-CM

## 2020-03-30 RX ORDER — ATORVASTATIN CALCIUM 40 MG/1
TABLET, FILM COATED ORAL
Qty: 90 TABLET | Refills: 3 | Status: ON HOLD | OUTPATIENT
Start: 2020-03-30 | End: 2023-01-01 | Stop reason: HOSPADM

## 2020-03-30 RX ORDER — ATORVASTATIN CALCIUM 40 MG/1
TABLET, FILM COATED ORAL
Qty: 30 TABLET | Refills: 0 | Status: SHIPPED | OUTPATIENT
Start: 2020-03-30 | End: 2020-03-30

## 2020-05-15 ENCOUNTER — HOSPITAL ENCOUNTER (OUTPATIENT)
Dept: CARDIOLOGY | Facility: CLINIC | Age: 75
Discharge: HOME | End: 2020-05-15
Attending: INTERNAL MEDICINE
Payer: MEDICARE

## 2020-05-15 VITALS
HEIGHT: 73 IN | SYSTOLIC BLOOD PRESSURE: 140 MMHG | DIASTOLIC BLOOD PRESSURE: 60 MMHG | WEIGHT: 246 LBS | HEART RATE: 70 BPM | BODY MASS INDEX: 32.6 KG/M2

## 2020-05-15 DIAGNOSIS — I50.22 CHRONIC SYSTOLIC CONGESTIVE HEART FAILURE: ICD-10-CM

## 2020-05-15 LAB
ASCENDING AORTA: 3.68 CM
AV INDEX (PROSTH): 0.76
AV MEAN GRADIENT: 3 MMHG
AV PEAK GRADIENT: 7 MMHG
AV VALVE AREA: 3.13 CM2
AV VELOCITY RATIO: 0.73
BSA FOR ECHO PROCEDURE: 2.4 M2
CV ECHO LV RWT: 0.29 CM
DOP CALC AO PEAK VEL: 1.36 M/S
DOP CALC AO VTI: 24.79 CM
DOP CALC LVOT AREA: 4.1 CM2
DOP CALC LVOT DIAMETER: 2.29 CM
DOP CALC LVOT PEAK VEL: 0.99 M/S
DOP CALC LVOT STROKE VOLUME: 77.56 CM3
DOP CALCLVOT PEAK VEL VTI: 18.84 CM
E WAVE DECELERATION TIME: 145.41 MSEC
E/A RATIO: 2.1
E/E' RATIO: 10.5 M/S
ECHO LV POSTERIOR WALL: 0.9 CM (ref 0.6–1.1)
FRACTIONAL SHORTENING: 26 % (ref 28–44)
INTERVENTRICULAR SEPTUM: 1 CM (ref 0.6–1.1)
IVRT: 71.36 MSEC
LA MAJOR: 6.32 CM
LA MINOR: 6.62 CM
LA WIDTH: 5.34 CM
LEFT ATRIUM SIZE: 5.37 CM
LEFT ATRIUM VOLUME INDEX: 67.1 ML/M2
LEFT ATRIUM VOLUME: 157.62 CM3
LEFT INTERNAL DIMENSION IN SYSTOLE: 4.65 CM (ref 2.1–4)
LEFT VENTRICLE DIASTOLIC VOLUME INDEX: 85.73 ML/M2
LEFT VENTRICLE DIASTOLIC VOLUME: 201.44 ML
LEFT VENTRICLE MASS INDEX: 107 G/M2
LEFT VENTRICLE SYSTOLIC VOLUME INDEX: 42.5 ML/M2
LEFT VENTRICLE SYSTOLIC VOLUME: 99.76 ML
LEFT VENTRICULAR INTERNAL DIMENSION IN DIASTOLE: 6.3 CM (ref 3.5–6)
LEFT VENTRICULAR MASS: 251.3 G
LV LATERAL E/E' RATIO: 8.4 M/S
LV SEPTAL E/E' RATIO: 14 M/S
MV PEAK A VEL: 0.4 M/S
MV PEAK E VEL: 0.84 M/S
PISA TR MAX VEL: 3.12 M/S
PULM VEIN S/D RATIO: 0.33
PV PEAK D VEL: 0.94 M/S
PV PEAK S VEL: 0.31 M/S
RA MAJOR: 5.61 CM
RA PRESSURE: 3 MMHG
RA WIDTH: 4.81 CM
RIGHT VENTRICULAR END-DIASTOLIC DIMENSION: 3.99 CM
RV TISSUE DOPPLER FREE WALL SYSTOLIC VELOCITY 1 (APICAL 4 CHAMBER VIEW): 7.58 CM/S
SINUS: 4.01 CM
STJ: 3.48 CM
TDI LATERAL: 0.1 M/S
TDI SEPTAL: 0.06 M/S
TDI: 0.08 M/S
TR MAX PG: 39 MMHG
TRICUSPID ANNULAR PLANE SYSTOLIC EXCURSION: 1.92 CM
TV REST PULMONARY ARTERY PRESSURE: 42 MMHG

## 2020-05-15 PROCEDURE — 93306 TTE W/DOPPLER COMPLETE: CPT | Mod: HCNC,S$GLB,, | Performed by: INTERNAL MEDICINE

## 2020-05-15 PROCEDURE — 93306 ECHO (CUPID ONLY): ICD-10-PCS | Mod: HCNC,S$GLB,, | Performed by: INTERNAL MEDICINE

## 2020-06-23 ENCOUNTER — LAB VISIT (OUTPATIENT)
Dept: LAB | Facility: HOSPITAL | Age: 75
End: 2020-06-23
Attending: INTERNAL MEDICINE
Payer: MEDICARE

## 2020-06-23 ENCOUNTER — OFFICE VISIT (OUTPATIENT)
Dept: TRANSPLANT | Facility: CLINIC | Age: 75
End: 2020-06-23
Payer: MEDICARE

## 2020-06-23 VITALS
HEIGHT: 73 IN | BODY MASS INDEX: 32.81 KG/M2 | HEART RATE: 68 BPM | WEIGHT: 247.56 LBS | DIASTOLIC BLOOD PRESSURE: 63 MMHG | SYSTOLIC BLOOD PRESSURE: 140 MMHG

## 2020-06-23 DIAGNOSIS — I34.0 MODERATE MITRAL REGURGITATION BY PRIOR ECHOCARDIOGRAM: ICD-10-CM

## 2020-06-23 DIAGNOSIS — I51.89 MILD LEFT VENTRICULAR SYSTOLIC DYSFUNCTION: ICD-10-CM

## 2020-06-23 DIAGNOSIS — I10 ESSENTIAL HYPERTENSION: ICD-10-CM

## 2020-06-23 DIAGNOSIS — I50.22 CHRONIC SYSTOLIC CONGESTIVE HEART FAILURE: ICD-10-CM

## 2020-06-23 LAB
ANION GAP SERPL CALC-SCNC: 8 MMOL/L (ref 8–16)
BNP SERPL-MCNC: 338 PG/ML (ref 0–99)
BUN SERPL-MCNC: 28 MG/DL (ref 8–23)
CALCIUM SERPL-MCNC: 9.5 MG/DL (ref 8.7–10.5)
CHLORIDE SERPL-SCNC: 107 MMOL/L (ref 95–110)
CO2 SERPL-SCNC: 26 MMOL/L (ref 23–29)
CREAT SERPL-MCNC: 1.3 MG/DL (ref 0.5–1.4)
EST. GFR  (AFRICAN AMERICAN): >60 ML/MIN/1.73 M^2
EST. GFR  (NON AFRICAN AMERICAN): 53.8 ML/MIN/1.73 M^2
GLUCOSE SERPL-MCNC: 124 MG/DL (ref 70–110)
POTASSIUM SERPL-SCNC: 4.6 MMOL/L (ref 3.5–5.1)
SODIUM SERPL-SCNC: 141 MMOL/L (ref 136–145)

## 2020-06-23 PROCEDURE — 1159F MED LIST DOCD IN RCRD: CPT | Mod: HCNC,S$GLB,, | Performed by: INTERNAL MEDICINE

## 2020-06-23 PROCEDURE — 83880 ASSAY OF NATRIURETIC PEPTIDE: CPT | Mod: HCNC

## 2020-06-23 PROCEDURE — 1126F PR PAIN SEVERITY QUANTIFIED, NO PAIN PRESENT: ICD-10-PCS | Mod: HCNC,S$GLB,, | Performed by: INTERNAL MEDICINE

## 2020-06-23 PROCEDURE — 99999 PR PBB SHADOW E&M-EST. PATIENT-LVL IV: ICD-10-PCS | Mod: PBBFAC,HCNC,, | Performed by: INTERNAL MEDICINE

## 2020-06-23 PROCEDURE — 1101F PR PT FALLS ASSESS DOC 0-1 FALLS W/OUT INJ PAST YR: ICD-10-PCS | Mod: HCNC,CPTII,S$GLB, | Performed by: INTERNAL MEDICINE

## 2020-06-23 PROCEDURE — 99214 OFFICE O/P EST MOD 30 MIN: CPT | Mod: HCNC,S$GLB,, | Performed by: INTERNAL MEDICINE

## 2020-06-23 PROCEDURE — 99499 RISK ADDL DX/OHS AUDIT: ICD-10-PCS | Mod: S$GLB,,, | Performed by: INTERNAL MEDICINE

## 2020-06-23 PROCEDURE — 1159F PR MEDICATION LIST DOCUMENTED IN MEDICAL RECORD: ICD-10-PCS | Mod: HCNC,S$GLB,, | Performed by: INTERNAL MEDICINE

## 2020-06-23 PROCEDURE — 1126F AMNT PAIN NOTED NONE PRSNT: CPT | Mod: HCNC,S$GLB,, | Performed by: INTERNAL MEDICINE

## 2020-06-23 PROCEDURE — 3077F PR MOST RECENT SYSTOLIC BLOOD PRESSURE >= 140 MM HG: ICD-10-PCS | Mod: HCNC,CPTII,S$GLB, | Performed by: INTERNAL MEDICINE

## 2020-06-23 PROCEDURE — 1101F PT FALLS ASSESS-DOCD LE1/YR: CPT | Mod: HCNC,CPTII,S$GLB, | Performed by: INTERNAL MEDICINE

## 2020-06-23 PROCEDURE — 36415 COLL VENOUS BLD VENIPUNCTURE: CPT | Mod: HCNC

## 2020-06-23 PROCEDURE — 99499 UNLISTED E&M SERVICE: CPT | Mod: S$GLB,,, | Performed by: INTERNAL MEDICINE

## 2020-06-23 PROCEDURE — 80048 BASIC METABOLIC PNL TOTAL CA: CPT | Mod: HCNC

## 2020-06-23 PROCEDURE — 99214 PR OFFICE/OUTPT VISIT, EST, LEVL IV, 30-39 MIN: ICD-10-PCS | Mod: HCNC,S$GLB,, | Performed by: INTERNAL MEDICINE

## 2020-06-23 PROCEDURE — 3078F PR MOST RECENT DIASTOLIC BLOOD PRESSURE < 80 MM HG: ICD-10-PCS | Mod: HCNC,CPTII,S$GLB, | Performed by: INTERNAL MEDICINE

## 2020-06-23 PROCEDURE — 99999 PR PBB SHADOW E&M-EST. PATIENT-LVL IV: CPT | Mod: PBBFAC,HCNC,, | Performed by: INTERNAL MEDICINE

## 2020-06-23 PROCEDURE — 3078F DIAST BP <80 MM HG: CPT | Mod: HCNC,CPTII,S$GLB, | Performed by: INTERNAL MEDICINE

## 2020-06-23 PROCEDURE — 3077F SYST BP >= 140 MM HG: CPT | Mod: HCNC,CPTII,S$GLB, | Performed by: INTERNAL MEDICINE

## 2020-06-23 RX ORDER — METOPROLOL SUCCINATE 50 MG/1
50 TABLET, EXTENDED RELEASE ORAL NIGHTLY
Qty: 30 TABLET | Refills: 11 | Status: SHIPPED | OUTPATIENT
Start: 2020-06-23 | End: 2021-07-26 | Stop reason: SDUPTHER

## 2020-06-23 NOTE — PROGRESS NOTES
Subjective: class 2     Patient ID:  Jose Fonseca Jr. is a 74 y.o. male who presents for follow-up of Congestive Heart Failure      s/p 1996 PCI of LAD in 2013 CABG 2/3 patent graft in 2013  who comes for medical optimization.    Today, he is currently on lasix 40 BID which maintains his home weight at 108 kg (clinic weight is unchanged).  Reports that he home weight fluctuates three to five lbs per day.     Sleeps on two pillows without PND.  No edema  Admits that it is challenging to maintain low salt diet as he frequently eats out as he is a single man who travels often.     Echo May 2020  · Moderate left ventricular enlargement.  · Normal left ventricular systolic function. The estimated ejection fraction is 55%.  · Grade III (severe) left ventricular diastolic dysfunction consistent with restrictive physiology.  · Mildly reduced right ventricular systolic function.  · Severe left atrial enlargement.  · Mild mitral regurgitation.  · Mild tricuspid regurgitation.  · The sinuses of Valsalva is upper limit of normal.  · The estimated PA systolic pressure is 42 mmHg.   · Normal central venous pressure (3 mmHg).  · No wall motion abnormalities.       Sept 2019 CPX  peak VO2 was 13.6 ml/kg/min which is 62.96% of predicted equating to a functional capacity of 3.89 METS indicating moderate functional impairment.         Review of Systems   Constitution: Negative for decreased appetite, weight gain and weight loss.   Cardiovascular: Negative for chest pain, dyspnea on exertion, leg swelling, near-syncope, orthopnea and palpitations.   Respiratory: Negative for cough and shortness of breath.    Musculoskeletal: Negative for myalgias.   Gastrointestinal: Negative for jaundice.        Objective:    Physical Exam   Constitutional: He is oriented to person, place, and time. He appears well-developed and well-nourished. He is active. He is not intubated.   BP (!) 140/63 (BP Location: Left arm, Patient Position: Sitting,  "BP Method: Large (Automatic))   Pulse 68   Ht 6' 1" (1.854 m)   Wt 112.3 kg (247 lb 9.2 oz)   BMI 32.66 kg/m²            HENT:   Head: Normocephalic and atraumatic. Hair is normal.   Right Ear: External ear normal.   Left Ear: External ear normal.   Nose: Nose normal. No nasal deformity. No epistaxis.  No foreign bodies.   Mouth/Throat: Mucous membranes are normal. Mucous membranes are not cyanotic. No oropharyngeal exudate.   Eyes: Pupils are equal, round, and reactive to light. Conjunctivae and EOM are normal.   Neck: Neck supple. JVD (JVP 5) present. No hepatojugular reflux present.   Cardiovascular: Normal rate, regular rhythm and normal pulses. Exam reveals no gallop.   Murmur heard.  High-pitched blowing decrescendo early systolic murmur is present with a grade of 2/6 at the apex radiating to the axilla. Consistent with MR   Pulmonary/Chest: Effort normal and breath sounds normal. No apnea and no tachypnea. He is not intubated. No respiratory distress. He exhibits no tenderness.   Abdominal: Soft. Normal appearance and bowel sounds are normal. There is no abdominal tenderness. No hernia.   Musculoskeletal: Normal range of motion.   Neurological: He is alert and oriented to person, place, and time. He displays no seizure activity.   Skin: Skin is warm, dry and intact. No rash noted. No pallor.   Psychiatric: He has a normal mood and affect. His speech is normal and behavior is normal. Thought content normal. Cognition and memory are normal.     BMP / BNP pending       Assessment:       1. Mild left ventricular systolic dysfunction (LVEF 45% 2019)     2. Moderate ischemic mitral regurgitation by prior echocardiogram    3. Essential hypertension         Plan:       Problem List Items Addressed This Visit     HTN (hypertension)    Current Assessment & Plan     Consider digital hypertension - trouble with equipment  Keep toprol / lisinopril         Moderate ischemic mitral regurgitation by prior echocardiogram "    Current Assessment & Plan     With improving LVEF and less MR, would not recommend percutaneous intervention for mitral valve          Mild left ventricular systolic dysfunction (LVEF 45% 2019)     Overview     Due to CAD (infarct or ischemia) versus mitral regurg or both         Current Assessment & Plan     keep toprol 50 at night as I am not convinced that this is the predominant cause of fatigue  Keep on lisinopril 40 mg qd             followup six months with BMP / BNP

## 2020-10-13 DIAGNOSIS — I50.22 CHRONIC SYSTOLIC CONGESTIVE HEART FAILURE: ICD-10-CM

## 2020-10-14 RX ORDER — FUROSEMIDE 40 MG/1
40 TABLET ORAL DAILY
Qty: 30 TABLET | Refills: 12 | Status: SHIPPED | OUTPATIENT
Start: 2020-10-14 | End: 2021-01-07 | Stop reason: SDUPTHER

## 2020-12-28 ENCOUNTER — PATIENT MESSAGE (OUTPATIENT)
Dept: TRANSPLANT | Facility: CLINIC | Age: 75
End: 2020-12-28

## 2020-12-28 ENCOUNTER — PATIENT MESSAGE (OUTPATIENT)
Dept: INTERNAL MEDICINE | Facility: CLINIC | Age: 75
End: 2020-12-28

## 2021-01-04 ENCOUNTER — IMMUNIZATION (OUTPATIENT)
Dept: PRIMARY CARE CLINIC | Facility: CLINIC | Age: 76
End: 2021-01-04
Payer: MEDICARE

## 2021-01-04 DIAGNOSIS — Z23 NEED FOR VACCINATION: ICD-10-CM

## 2021-01-04 PROCEDURE — 91300 COVID-19, MRNA, LNP-S, PF, 30 MCG/0.3 ML DOSE VACCINE: CPT | Mod: PBBFAC | Performed by: EMERGENCY MEDICINE

## 2021-01-07 ENCOUNTER — OFFICE VISIT (OUTPATIENT)
Dept: TRANSPLANT | Facility: CLINIC | Age: 76
End: 2021-01-07
Payer: MEDICARE

## 2021-01-07 ENCOUNTER — LAB VISIT (OUTPATIENT)
Dept: LAB | Facility: HOSPITAL | Age: 76
End: 2021-01-07
Payer: MEDICARE

## 2021-01-07 VITALS
WEIGHT: 248.69 LBS | SYSTOLIC BLOOD PRESSURE: 122 MMHG | BODY MASS INDEX: 32.96 KG/M2 | DIASTOLIC BLOOD PRESSURE: 59 MMHG | HEIGHT: 73 IN | HEART RATE: 59 BPM

## 2021-01-07 DIAGNOSIS — I51.89 MILD LEFT VENTRICULAR SYSTOLIC DYSFUNCTION: ICD-10-CM

## 2021-01-07 DIAGNOSIS — I10 ESSENTIAL HYPERTENSION: ICD-10-CM

## 2021-01-07 DIAGNOSIS — Z95.1 S/P CABG (CORONARY ARTERY BYPASS GRAFT): Primary | Chronic | ICD-10-CM

## 2021-01-07 DIAGNOSIS — I34.0 MODERATE MITRAL REGURGITATION BY PRIOR ECHOCARDIOGRAM: ICD-10-CM

## 2021-01-07 DIAGNOSIS — I50.22 CHRONIC SYSTOLIC CONGESTIVE HEART FAILURE: ICD-10-CM

## 2021-01-07 DIAGNOSIS — Z98.61 POST PTCA: Chronic | ICD-10-CM

## 2021-01-07 DIAGNOSIS — I25.10 CORONARY ARTERY DISEASE INVOLVING NATIVE CORONARY ARTERY OF NATIVE HEART WITHOUT ANGINA PECTORIS: ICD-10-CM

## 2021-01-07 LAB
ANION GAP SERPL CALC-SCNC: 9 MMOL/L (ref 8–16)
BNP SERPL-MCNC: 272 PG/ML (ref 0–99)
BUN SERPL-MCNC: 39 MG/DL (ref 8–23)
CALCIUM SERPL-MCNC: 9.8 MG/DL (ref 8.7–10.5)
CHLORIDE SERPL-SCNC: 107 MMOL/L (ref 95–110)
CO2 SERPL-SCNC: 23 MMOL/L (ref 23–29)
COMPLEXED PSA SERPL-MCNC: 3 NG/ML (ref 0–4)
CREAT SERPL-MCNC: 1.4 MG/DL (ref 0.5–1.4)
EST. GFR  (AFRICAN AMERICAN): 56.4 ML/MIN/1.73 M^2
EST. GFR  (NON AFRICAN AMERICAN): 48.8 ML/MIN/1.73 M^2
GLUCOSE SERPL-MCNC: 122 MG/DL (ref 70–110)
POTASSIUM SERPL-SCNC: 5.4 MMOL/L (ref 3.5–5.1)
SODIUM SERPL-SCNC: 139 MMOL/L (ref 136–145)

## 2021-01-07 PROCEDURE — 3078F PR MOST RECENT DIASTOLIC BLOOD PRESSURE < 80 MM HG: ICD-10-PCS | Mod: HCNC,CPTII,S$GLB, | Performed by: INTERNAL MEDICINE

## 2021-01-07 PROCEDURE — 1101F PR PT FALLS ASSESS DOC 0-1 FALLS W/OUT INJ PAST YR: ICD-10-PCS | Mod: HCNC,CPTII,S$GLB, | Performed by: INTERNAL MEDICINE

## 2021-01-07 PROCEDURE — 83880 ASSAY OF NATRIURETIC PEPTIDE: CPT | Mod: HCNC

## 2021-01-07 PROCEDURE — 1126F AMNT PAIN NOTED NONE PRSNT: CPT | Mod: HCNC,S$GLB,, | Performed by: INTERNAL MEDICINE

## 2021-01-07 PROCEDURE — 99999 PR PBB SHADOW E&M-EST. PATIENT-LVL III: ICD-10-PCS | Mod: PBBFAC,HCNC,, | Performed by: INTERNAL MEDICINE

## 2021-01-07 PROCEDURE — 99999 PR PBB SHADOW E&M-EST. PATIENT-LVL III: CPT | Mod: PBBFAC,HCNC,, | Performed by: INTERNAL MEDICINE

## 2021-01-07 PROCEDURE — 3074F SYST BP LT 130 MM HG: CPT | Mod: HCNC,CPTII,S$GLB, | Performed by: INTERNAL MEDICINE

## 2021-01-07 PROCEDURE — 1126F PR PAIN SEVERITY QUANTIFIED, NO PAIN PRESENT: ICD-10-PCS | Mod: HCNC,S$GLB,, | Performed by: INTERNAL MEDICINE

## 2021-01-07 PROCEDURE — 99215 OFFICE O/P EST HI 40 MIN: CPT | Mod: HCNC,S$GLB,, | Performed by: INTERNAL MEDICINE

## 2021-01-07 PROCEDURE — 99499 RISK ADDL DX/OHS AUDIT: ICD-10-PCS | Mod: S$GLB,,, | Performed by: INTERNAL MEDICINE

## 2021-01-07 PROCEDURE — 99215 PR OFFICE/OUTPT VISIT, EST, LEVL V, 40-54 MIN: ICD-10-PCS | Mod: HCNC,S$GLB,, | Performed by: INTERNAL MEDICINE

## 2021-01-07 PROCEDURE — 1159F MED LIST DOCD IN RCRD: CPT | Mod: HCNC,S$GLB,, | Performed by: INTERNAL MEDICINE

## 2021-01-07 PROCEDURE — 99499 UNLISTED E&M SERVICE: CPT | Mod: S$GLB,,, | Performed by: INTERNAL MEDICINE

## 2021-01-07 PROCEDURE — 3288F FALL RISK ASSESSMENT DOCD: CPT | Mod: HCNC,CPTII,S$GLB, | Performed by: INTERNAL MEDICINE

## 2021-01-07 PROCEDURE — 1101F PT FALLS ASSESS-DOCD LE1/YR: CPT | Mod: HCNC,CPTII,S$GLB, | Performed by: INTERNAL MEDICINE

## 2021-01-07 PROCEDURE — 3288F PR FALLS RISK ASSESSMENT DOCUMENTED: ICD-10-PCS | Mod: HCNC,CPTII,S$GLB, | Performed by: INTERNAL MEDICINE

## 2021-01-07 PROCEDURE — 80048 BASIC METABOLIC PNL TOTAL CA: CPT | Mod: HCNC

## 2021-01-07 PROCEDURE — 36415 COLL VENOUS BLD VENIPUNCTURE: CPT | Mod: HCNC

## 2021-01-07 PROCEDURE — 3078F DIAST BP <80 MM HG: CPT | Mod: HCNC,CPTII,S$GLB, | Performed by: INTERNAL MEDICINE

## 2021-01-07 PROCEDURE — 1159F PR MEDICATION LIST DOCUMENTED IN MEDICAL RECORD: ICD-10-PCS | Mod: HCNC,S$GLB,, | Performed by: INTERNAL MEDICINE

## 2021-01-07 PROCEDURE — 84153 ASSAY OF PSA TOTAL: CPT | Mod: HCNC

## 2021-01-07 PROCEDURE — 3074F PR MOST RECENT SYSTOLIC BLOOD PRESSURE < 130 MM HG: ICD-10-PCS | Mod: HCNC,CPTII,S$GLB, | Performed by: INTERNAL MEDICINE

## 2021-01-07 RX ORDER — FUROSEMIDE 40 MG/1
40 TABLET ORAL 2 TIMES DAILY
Qty: 180 TABLET | Refills: 3 | Status: SHIPPED | OUTPATIENT
Start: 2021-01-07 | End: 2022-04-01

## 2021-01-25 ENCOUNTER — IMMUNIZATION (OUTPATIENT)
Dept: PRIMARY CARE CLINIC | Facility: CLINIC | Age: 76
End: 2021-01-25
Payer: MEDICARE

## 2021-01-25 DIAGNOSIS — Z23 NEED FOR VACCINATION: Primary | ICD-10-CM

## 2021-01-25 PROCEDURE — 91300 COVID-19, MRNA, LNP-S, PF, 30 MCG/0.3 ML DOSE VACCINE: CPT | Mod: PBBFAC | Performed by: FAMILY MEDICINE

## 2021-01-25 PROCEDURE — 0002A COVID-19, MRNA, LNP-S, PF, 30 MCG/0.3 ML DOSE VACCINE: CPT | Mod: PBBFAC | Performed by: FAMILY MEDICINE

## 2021-02-18 ENCOUNTER — PES CALL (OUTPATIENT)
Dept: ADMINISTRATIVE | Facility: CLINIC | Age: 76
End: 2021-02-18

## 2021-02-27 ENCOUNTER — PATIENT MESSAGE (OUTPATIENT)
Dept: TRANSPLANT | Facility: CLINIC | Age: 76
End: 2021-02-27

## 2021-04-06 ENCOUNTER — PES CALL (OUTPATIENT)
Dept: ADMINISTRATIVE | Facility: CLINIC | Age: 76
End: 2021-04-06

## 2021-04-22 ENCOUNTER — PATIENT MESSAGE (OUTPATIENT)
Dept: TRANSPLANT | Facility: CLINIC | Age: 76
End: 2021-04-22

## 2021-04-22 DIAGNOSIS — I25.10 CORONARY ARTERY DISEASE INVOLVING NATIVE CORONARY ARTERY OF NATIVE HEART WITHOUT ANGINA PECTORIS: Primary | ICD-10-CM

## 2021-04-23 ENCOUNTER — LAB VISIT (OUTPATIENT)
Dept: LAB | Facility: HOSPITAL | Age: 76
End: 2021-04-23
Attending: INTERNAL MEDICINE
Payer: MEDICARE

## 2021-04-23 DIAGNOSIS — I25.10 CORONARY ARTERY DISEASE INVOLVING NATIVE CORONARY ARTERY OF NATIVE HEART WITHOUT ANGINA PECTORIS: ICD-10-CM

## 2021-04-23 LAB
ALBUMIN SERPL BCP-MCNC: 3.8 G/DL (ref 3.5–5.2)
ALP SERPL-CCNC: 70 U/L (ref 55–135)
ALT SERPL W/O P-5'-P-CCNC: 42 U/L (ref 10–44)
ANION GAP SERPL CALC-SCNC: 10 MMOL/L (ref 8–16)
AST SERPL-CCNC: 42 U/L (ref 10–40)
BILIRUB SERPL-MCNC: 0.6 MG/DL (ref 0.1–1)
BNP SERPL-MCNC: 651 PG/ML (ref 0–99)
BUN SERPL-MCNC: 63 MG/DL (ref 8–23)
CALCIUM SERPL-MCNC: 9.3 MG/DL (ref 8.7–10.5)
CHLORIDE SERPL-SCNC: 110 MMOL/L (ref 95–110)
CO2 SERPL-SCNC: 20 MMOL/L (ref 23–29)
CREAT SERPL-MCNC: 1.8 MG/DL (ref 0.5–1.4)
EST. GFR  (AFRICAN AMERICAN): 41.6 ML/MIN/1.73 M^2
EST. GFR  (NON AFRICAN AMERICAN): 36 ML/MIN/1.73 M^2
GLUCOSE SERPL-MCNC: 115 MG/DL (ref 70–110)
MAGNESIUM SERPL-MCNC: 2.2 MG/DL (ref 1.6–2.6)
POTASSIUM SERPL-SCNC: 5.5 MMOL/L (ref 3.5–5.1)
PROT SERPL-MCNC: 7.4 G/DL (ref 6–8.4)
SODIUM SERPL-SCNC: 140 MMOL/L (ref 136–145)

## 2021-04-23 PROCEDURE — 80053 COMPREHEN METABOLIC PANEL: CPT | Performed by: INTERNAL MEDICINE

## 2021-04-23 PROCEDURE — 36415 COLL VENOUS BLD VENIPUNCTURE: CPT | Mod: PN | Performed by: INTERNAL MEDICINE

## 2021-04-23 PROCEDURE — 83735 ASSAY OF MAGNESIUM: CPT | Performed by: INTERNAL MEDICINE

## 2021-04-23 PROCEDURE — 83880 ASSAY OF NATRIURETIC PEPTIDE: CPT | Performed by: INTERNAL MEDICINE

## 2021-04-26 ENCOUNTER — TELEPHONE (OUTPATIENT)
Dept: TRANSPLANT | Facility: CLINIC | Age: 76
End: 2021-04-26

## 2021-04-26 DIAGNOSIS — I51.89 MILD LEFT VENTRICULAR SYSTOLIC DYSFUNCTION: Primary | ICD-10-CM

## 2021-04-29 ENCOUNTER — PATIENT MESSAGE (OUTPATIENT)
Dept: TRANSPLANT | Facility: CLINIC | Age: 76
End: 2021-04-29

## 2021-04-29 ENCOUNTER — LAB VISIT (OUTPATIENT)
Dept: LAB | Facility: HOSPITAL | Age: 76
End: 2021-04-29
Attending: INTERNAL MEDICINE
Payer: MEDICARE

## 2021-04-29 DIAGNOSIS — I51.89 MILD LEFT VENTRICULAR SYSTOLIC DYSFUNCTION: ICD-10-CM

## 2021-04-29 LAB
ANION GAP SERPL CALC-SCNC: 9 MMOL/L (ref 8–16)
BNP SERPL-MCNC: 208 PG/ML (ref 0–99)
BUN SERPL-MCNC: 59 MG/DL (ref 8–23)
CALCIUM SERPL-MCNC: 9.3 MG/DL (ref 8.7–10.5)
CHLORIDE SERPL-SCNC: 106 MMOL/L (ref 95–110)
CO2 SERPL-SCNC: 24 MMOL/L (ref 23–29)
CREAT SERPL-MCNC: 1.5 MG/DL (ref 0.5–1.4)
EST. GFR  (AFRICAN AMERICAN): 51.9 ML/MIN/1.73 M^2
EST. GFR  (NON AFRICAN AMERICAN): 44.9 ML/MIN/1.73 M^2
GLUCOSE SERPL-MCNC: 116 MG/DL (ref 70–110)
POTASSIUM SERPL-SCNC: 4.6 MMOL/L (ref 3.5–5.1)
SODIUM SERPL-SCNC: 139 MMOL/L (ref 136–145)

## 2021-04-29 PROCEDURE — 80048 BASIC METABOLIC PNL TOTAL CA: CPT | Performed by: INTERNAL MEDICINE

## 2021-04-29 PROCEDURE — 36415 COLL VENOUS BLD VENIPUNCTURE: CPT | Mod: PN | Performed by: INTERNAL MEDICINE

## 2021-04-29 PROCEDURE — 83880 ASSAY OF NATRIURETIC PEPTIDE: CPT | Performed by: INTERNAL MEDICINE

## 2021-04-30 ENCOUNTER — TELEPHONE (OUTPATIENT)
Dept: TRANSPLANT | Facility: CLINIC | Age: 76
End: 2021-04-30

## 2021-04-30 DIAGNOSIS — G47.62 NOCTURNAL LEG CRAMPS: Primary | ICD-10-CM

## 2021-05-05 ENCOUNTER — OFFICE VISIT (OUTPATIENT)
Dept: TRANSPLANT | Facility: CLINIC | Age: 76
End: 2021-05-05
Payer: MEDICARE

## 2021-05-05 ENCOUNTER — HOSPITAL ENCOUNTER (OUTPATIENT)
Dept: CARDIOLOGY | Facility: HOSPITAL | Age: 76
Discharge: HOME | End: 2021-05-05
Attending: INTERNAL MEDICINE
Payer: MEDICARE

## 2021-05-05 VITALS
DIASTOLIC BLOOD PRESSURE: 54 MMHG | HEART RATE: 53 BPM | HEIGHT: 72 IN | BODY MASS INDEX: 31.86 KG/M2 | SYSTOLIC BLOOD PRESSURE: 101 MMHG | WEIGHT: 235.25 LBS

## 2021-05-05 VITALS
WEIGHT: 235 LBS | HEART RATE: 70 BPM | BODY MASS INDEX: 31.83 KG/M2 | HEIGHT: 72 IN | SYSTOLIC BLOOD PRESSURE: 100 MMHG | DIASTOLIC BLOOD PRESSURE: 54 MMHG

## 2021-05-05 DIAGNOSIS — Z95.1 S/P CABG (CORONARY ARTERY BYPASS GRAFT): ICD-10-CM

## 2021-05-05 DIAGNOSIS — Z95.1 S/P CABG (CORONARY ARTERY BYPASS GRAFT): Primary | Chronic | ICD-10-CM

## 2021-05-05 DIAGNOSIS — Z98.61 POST PTCA: Chronic | ICD-10-CM

## 2021-05-05 DIAGNOSIS — I10 ESSENTIAL HYPERTENSION: ICD-10-CM

## 2021-05-05 DIAGNOSIS — I51.89 MILD LEFT VENTRICULAR SYSTOLIC DYSFUNCTION: ICD-10-CM

## 2021-05-05 LAB
ASCENDING AORTA: 3.52 CM
AV INDEX (PROSTH): 0.71
AV MEAN GRADIENT: 4 MMHG
AV PEAK GRADIENT: 9 MMHG
AV VALVE AREA: 2.91 CM2
AV VELOCITY RATIO: 0.79
BSA FOR ECHO PROCEDURE: 2.33 M2
CV ECHO LV RWT: 0.32 CM
DOP CALC AO PEAK VEL: 1.46 M/S
DOP CALC AO VTI: 29.23 CM
DOP CALC LVOT AREA: 4.1 CM2
DOP CALC LVOT DIAMETER: 2.28 CM
DOP CALC LVOT PEAK VEL: 1.16 M/S
DOP CALC LVOT STROKE VOLUME: 85.12 CM3
DOP CALCLVOT PEAK VEL VTI: 20.86 CM
E WAVE DECELERATION TIME: 231.74 MSEC
E/A RATIO: 1.51
E/E' RATIO: 12.36 M/S
ECHO LV POSTERIOR WALL: 0.98 CM (ref 0.6–1.1)
EJECTION FRACTION: 55 %
FRACTIONAL SHORTENING: 26 % (ref 28–44)
INTERVENTRICULAR SEPTUM: 0.86 CM (ref 0.6–1.1)
LA MAJOR: 7 CM
LA MINOR: 7.04 CM
LA WIDTH: 4.43 CM
LEFT ATRIUM SIZE: 5.4 CM
LEFT ATRIUM VOLUME INDEX MOD: 39.4 ML/M2
LEFT ATRIUM VOLUME INDEX: 62.6 ML/M2
LEFT ATRIUM VOLUME MOD: 89.83 CM3
LEFT ATRIUM VOLUME: 142.74 CM3
LEFT INTERNAL DIMENSION IN SYSTOLE: 4.55 CM (ref 2.1–4)
LEFT VENTRICLE DIASTOLIC VOLUME INDEX: 83.56 ML/M2
LEFT VENTRICLE DIASTOLIC VOLUME: 190.52 ML
LEFT VENTRICLE MASS INDEX: 102 G/M2
LEFT VENTRICLE SYSTOLIC VOLUME INDEX: 41.6 ML/M2
LEFT VENTRICLE SYSTOLIC VOLUME: 94.85 ML
LEFT VENTRICULAR INTERNAL DIMENSION IN DIASTOLE: 6.15 CM (ref 3.5–6)
LEFT VENTRICULAR MASS: 231.46 G
LV LATERAL E/E' RATIO: 11.33 M/S
LV SEPTAL E/E' RATIO: 13.6 M/S
MV PEAK A VEL: 0.45 M/S
MV PEAK E VEL: 0.68 M/S
MV STENOSIS PRESSURE HALF TIME: 67.2 MS
MV VALVE AREA P 1/2 METHOD: 3.27 CM2
PISA MRMAX VEL: 0.05 M/S
PISA TR MAX VEL: 3.77 M/S
RA MAJOR: 5.82 CM
RA PRESSURE: 3 MMHG
RA WIDTH: 3.73 CM
RIGHT VENTRICULAR END-DIASTOLIC DIMENSION: 3.89 CM
SINUS: 3.35 CM
STJ: 3.04 CM
TDI LATERAL: 0.06 M/S
TDI SEPTAL: 0.05 M/S
TDI: 0.06 M/S
TR MAX PG: 57 MMHG
TRICUSPID ANNULAR PLANE SYSTOLIC EXCURSION: 1.45 CM
TV REST PULMONARY ARTERY PRESSURE: 60 MMHG

## 2021-05-05 PROCEDURE — 3288F FALL RISK ASSESSMENT DOCD: CPT | Mod: CPTII,S$GLB,, | Performed by: INTERNAL MEDICINE

## 2021-05-05 PROCEDURE — 93306 TTE W/DOPPLER COMPLETE: CPT

## 2021-05-05 PROCEDURE — 1126F PR PAIN SEVERITY QUANTIFIED, NO PAIN PRESENT: ICD-10-PCS | Mod: S$GLB,,, | Performed by: INTERNAL MEDICINE

## 2021-05-05 PROCEDURE — 1101F PT FALLS ASSESS-DOCD LE1/YR: CPT | Mod: CPTII,S$GLB,, | Performed by: INTERNAL MEDICINE

## 2021-05-05 PROCEDURE — 1126F AMNT PAIN NOTED NONE PRSNT: CPT | Mod: S$GLB,,, | Performed by: INTERNAL MEDICINE

## 2021-05-05 PROCEDURE — 3288F PR FALLS RISK ASSESSMENT DOCUMENTED: ICD-10-PCS | Mod: CPTII,S$GLB,, | Performed by: INTERNAL MEDICINE

## 2021-05-05 PROCEDURE — 99499 RISK ADDL DX/OHS AUDIT: ICD-10-PCS | Mod: S$GLB,,, | Performed by: INTERNAL MEDICINE

## 2021-05-05 PROCEDURE — 93306 ECHO (CUPID ONLY): ICD-10-PCS | Mod: 26,,, | Performed by: INTERNAL MEDICINE

## 2021-05-05 PROCEDURE — 99215 PR OFFICE/OUTPT VISIT, EST, LEVL V, 40-54 MIN: ICD-10-PCS | Mod: S$GLB,,, | Performed by: INTERNAL MEDICINE

## 2021-05-05 PROCEDURE — 1101F PR PT FALLS ASSESS DOC 0-1 FALLS W/OUT INJ PAST YR: ICD-10-PCS | Mod: CPTII,S$GLB,, | Performed by: INTERNAL MEDICINE

## 2021-05-05 PROCEDURE — 99999 PR PBB SHADOW E&M-EST. PATIENT-LVL III: ICD-10-PCS | Mod: PBBFAC,,, | Performed by: INTERNAL MEDICINE

## 2021-05-05 PROCEDURE — 1159F MED LIST DOCD IN RCRD: CPT | Mod: S$GLB,,, | Performed by: INTERNAL MEDICINE

## 2021-05-05 PROCEDURE — 99215 OFFICE O/P EST HI 40 MIN: CPT | Mod: S$GLB,,, | Performed by: INTERNAL MEDICINE

## 2021-05-05 PROCEDURE — 1159F PR MEDICATION LIST DOCUMENTED IN MEDICAL RECORD: ICD-10-PCS | Mod: S$GLB,,, | Performed by: INTERNAL MEDICINE

## 2021-05-05 PROCEDURE — 99999 PR PBB SHADOW E&M-EST. PATIENT-LVL III: CPT | Mod: PBBFAC,,, | Performed by: INTERNAL MEDICINE

## 2021-05-05 PROCEDURE — 93306 TTE W/DOPPLER COMPLETE: CPT | Mod: 26,,, | Performed by: INTERNAL MEDICINE

## 2021-05-05 PROCEDURE — 99499 UNLISTED E&M SERVICE: CPT | Mod: S$GLB,,, | Performed by: INTERNAL MEDICINE

## 2021-05-05 RX ORDER — TAMSULOSIN HYDROCHLORIDE 0.4 MG/1
1 CAPSULE ORAL DAILY
COMMUNITY
Start: 2021-04-24

## 2021-05-05 RX ORDER — INDOMETHACIN 50 MG/1
50 CAPSULE ORAL 2 TIMES DAILY WITH MEALS
Status: ON HOLD | COMMUNITY
End: 2023-01-01 | Stop reason: HOSPADM

## 2021-05-05 RX ORDER — COLCHICINE 0.6 MG/1
0.6 TABLET ORAL DAILY PRN
Status: ON HOLD | COMMUNITY
Start: 2021-04-21 | End: 2023-01-01 | Stop reason: HOSPADM

## 2021-05-05 RX ORDER — ETODOLAC 400 MG/1
400 TABLET, FILM COATED ORAL
Status: ON HOLD | COMMUNITY
End: 2023-01-01 | Stop reason: HOSPADM

## 2021-05-08 ENCOUNTER — HOSPITAL ENCOUNTER (OUTPATIENT)
Dept: RADIOLOGY | Facility: OTHER | Age: 76
Discharge: HOME | End: 2021-05-08
Attending: INTERNAL MEDICINE
Payer: MEDICARE

## 2021-05-08 DIAGNOSIS — G47.62 NOCTURNAL LEG CRAMPS: ICD-10-CM

## 2021-05-08 PROCEDURE — 93922 US ARTERIAL LOWER EXTREMITY BILAT WITH ABI (XPD): ICD-10-PCS | Mod: 26,,, | Performed by: RADIOLOGY

## 2021-05-08 PROCEDURE — 93925 US ARTERIAL LOWER EXTREMITY BILAT WITH ABI (XPD): ICD-10-PCS | Mod: 26,,, | Performed by: RADIOLOGY

## 2021-05-08 PROCEDURE — 93925 LOWER EXTREMITY STUDY: CPT | Mod: 26,,, | Performed by: RADIOLOGY

## 2021-05-08 PROCEDURE — 93922 UPR/L XTREMITY ART 2 LEVELS: CPT | Mod: TC

## 2021-05-08 PROCEDURE — 93922 UPR/L XTREMITY ART 2 LEVELS: CPT | Mod: 26,,, | Performed by: RADIOLOGY

## 2021-05-21 ENCOUNTER — TELEPHONE (OUTPATIENT)
Dept: TRANSPLANT | Facility: CLINIC | Age: 76
End: 2021-05-21

## 2021-05-21 DIAGNOSIS — I10 ESSENTIAL HYPERTENSION: Primary | ICD-10-CM

## 2021-05-24 ENCOUNTER — LAB VISIT (OUTPATIENT)
Dept: LAB | Facility: HOSPITAL | Age: 76
End: 2021-05-24
Attending: INTERNAL MEDICINE
Payer: MEDICARE

## 2021-05-24 DIAGNOSIS — I10 ESSENTIAL HYPERTENSION: ICD-10-CM

## 2021-05-24 LAB
ALBUMIN SERPL BCP-MCNC: 3.4 G/DL (ref 3.5–5.2)
ALP SERPL-CCNC: 56 U/L (ref 55–135)
ALT SERPL W/O P-5'-P-CCNC: 18 U/L (ref 10–44)
ANION GAP SERPL CALC-SCNC: 9 MMOL/L (ref 8–16)
AST SERPL-CCNC: 16 U/L (ref 10–40)
BILIRUB SERPL-MCNC: 0.5 MG/DL (ref 0.1–1)
BUN SERPL-MCNC: 52 MG/DL (ref 8–23)
CALCIUM SERPL-MCNC: 9.8 MG/DL (ref 8.7–10.5)
CHLORIDE SERPL-SCNC: 109 MMOL/L (ref 95–110)
CO2 SERPL-SCNC: 24 MMOL/L (ref 23–29)
CREAT SERPL-MCNC: 1.7 MG/DL (ref 0.5–1.4)
EST. GFR  (AFRICAN AMERICAN): 44.6 ML/MIN/1.73 M^2
EST. GFR  (NON AFRICAN AMERICAN): 38.6 ML/MIN/1.73 M^2
GLUCOSE SERPL-MCNC: 148 MG/DL (ref 70–110)
POTASSIUM SERPL-SCNC: 5 MMOL/L (ref 3.5–5.1)
PROT SERPL-MCNC: 7.1 G/DL (ref 6–8.4)
SODIUM SERPL-SCNC: 142 MMOL/L (ref 136–145)

## 2021-05-24 PROCEDURE — 36415 COLL VENOUS BLD VENIPUNCTURE: CPT | Mod: PN | Performed by: INTERNAL MEDICINE

## 2021-05-24 PROCEDURE — 80053 COMPREHEN METABOLIC PANEL: CPT | Performed by: INTERNAL MEDICINE

## 2021-05-30 ENCOUNTER — PATIENT MESSAGE (OUTPATIENT)
Dept: TRANSPLANT | Facility: CLINIC | Age: 76
End: 2021-05-30

## 2021-06-01 ENCOUNTER — PATIENT MESSAGE (OUTPATIENT)
Dept: TRANSPLANT | Facility: CLINIC | Age: 76
End: 2021-06-01

## 2021-06-01 ENCOUNTER — PATIENT MESSAGE (OUTPATIENT)
Dept: INTERNAL MEDICINE | Facility: CLINIC | Age: 76
End: 2021-06-01

## 2021-06-03 DIAGNOSIS — Z12.5 PROSTATE CANCER SCREENING: ICD-10-CM

## 2021-06-03 DIAGNOSIS — I10 ESSENTIAL HYPERTENSION: Primary | ICD-10-CM

## 2021-06-03 DIAGNOSIS — R79.9 ABNORMAL FINDING OF BLOOD CHEMISTRY, UNSPECIFIED: ICD-10-CM

## 2021-06-03 DIAGNOSIS — E55.9 VITAMIN D DEFICIENCY: ICD-10-CM

## 2021-06-03 DIAGNOSIS — E78.5 DYSLIPIDEMIA: ICD-10-CM

## 2021-06-03 DIAGNOSIS — E79.0 ELEVATED BLOOD URIC ACID LEVEL: ICD-10-CM

## 2021-06-18 ENCOUNTER — LAB VISIT (OUTPATIENT)
Dept: LAB | Facility: HOSPITAL | Age: 76
End: 2021-06-18
Attending: INTERNAL MEDICINE
Payer: MEDICARE

## 2021-06-18 DIAGNOSIS — N41.2 PROSTATE ABSCESS: ICD-10-CM

## 2021-06-18 DIAGNOSIS — R73.01 IMPAIRED FASTING GLUCOSE: ICD-10-CM

## 2021-06-18 DIAGNOSIS — N18.32 CHRONIC KIDNEY DISEASE (CKD) STAGE G3B/A1, MODERATELY DECREASED GLOMERULAR FILTRATION RATE (GFR) BETWEEN 30-44 ML/MIN/1.73 SQUARE METER AND ALBUMINURIA CREATININE RATIO LESS THAN 30 MG/G: Primary | ICD-10-CM

## 2021-06-18 DIAGNOSIS — M25.50 PAIN IN JOINT, MULTIPLE SITES: ICD-10-CM

## 2021-06-18 DIAGNOSIS — M10.9 GOUT, UNSPECIFIED: ICD-10-CM

## 2021-06-18 LAB
ALBUMIN SERPL BCP-MCNC: 3.5 G/DL (ref 3.5–5.2)
ALP SERPL-CCNC: 66 U/L (ref 55–135)
ALT SERPL W/O P-5'-P-CCNC: 22 U/L (ref 10–44)
ANION GAP SERPL CALC-SCNC: 10 MMOL/L (ref 8–16)
AST SERPL-CCNC: 23 U/L (ref 10–40)
BILIRUB SERPL-MCNC: 0.6 MG/DL (ref 0.1–1)
BUN SERPL-MCNC: 67 MG/DL (ref 8–23)
CALCIUM SERPL-MCNC: 9.9 MG/DL (ref 8.7–10.5)
CHLORIDE SERPL-SCNC: 107 MMOL/L (ref 95–110)
CO2 SERPL-SCNC: 23 MMOL/L (ref 23–29)
CREAT SERPL-MCNC: 2.1 MG/DL (ref 0.5–1.4)
CRP SERPL-MCNC: 10.3 MG/L (ref 0–8.2)
ERYTHROCYTE [SEDIMENTATION RATE] IN BLOOD BY WESTERGREN METHOD: 33 MM/HR (ref 0–23)
EST. GFR  (AFRICAN AMERICAN): 34.6 ML/MIN/1.73 M^2
EST. GFR  (NON AFRICAN AMERICAN): 29.9 ML/MIN/1.73 M^2
ESTIMATED AVG GLUCOSE: 123 MG/DL (ref 68–131)
GLUCOSE SERPL-MCNC: 105 MG/DL (ref 70–110)
HBA1C MFR BLD: 5.9 % (ref 4–5.6)
MAGNESIUM SERPL-MCNC: 2.2 MG/DL (ref 1.6–2.6)
POTASSIUM SERPL-SCNC: 5.8 MMOL/L (ref 3.5–5.1)
PROT SERPL-MCNC: 7.2 G/DL (ref 6–8.4)
SODIUM SERPL-SCNC: 140 MMOL/L (ref 136–145)
URATE SERPL-MCNC: 7.8 MG/DL (ref 3.4–7)

## 2021-06-18 PROCEDURE — 83735 ASSAY OF MAGNESIUM: CPT | Performed by: INTERNAL MEDICINE

## 2021-06-18 PROCEDURE — 82043 UR ALBUMIN QUANTITATIVE: CPT | Performed by: INTERNAL MEDICINE

## 2021-06-18 PROCEDURE — 80053 COMPREHEN METABOLIC PANEL: CPT | Performed by: INTERNAL MEDICINE

## 2021-06-18 PROCEDURE — 82570 ASSAY OF URINE CREATININE: CPT | Performed by: INTERNAL MEDICINE

## 2021-06-18 PROCEDURE — 84550 ASSAY OF BLOOD/URIC ACID: CPT | Performed by: INTERNAL MEDICINE

## 2021-06-18 PROCEDURE — 85652 RBC SED RATE AUTOMATED: CPT | Performed by: INTERNAL MEDICINE

## 2021-06-18 PROCEDURE — 36415 COLL VENOUS BLD VENIPUNCTURE: CPT | Mod: PN | Performed by: INTERNAL MEDICINE

## 2021-06-18 PROCEDURE — 83036 HEMOGLOBIN GLYCOSYLATED A1C: CPT | Performed by: INTERNAL MEDICINE

## 2021-06-18 PROCEDURE — 86140 C-REACTIVE PROTEIN: CPT | Performed by: INTERNAL MEDICINE

## 2021-06-23 ENCOUNTER — PATIENT MESSAGE (OUTPATIENT)
Dept: TRANSPLANT | Facility: CLINIC | Age: 76
End: 2021-06-23

## 2021-06-23 ENCOUNTER — TELEPHONE (OUTPATIENT)
Dept: TRANSPLANT | Facility: CLINIC | Age: 76
End: 2021-06-23

## 2021-06-23 DIAGNOSIS — I50.22 CHRONIC SYSTOLIC CONGESTIVE HEART FAILURE: Primary | ICD-10-CM

## 2021-06-23 LAB
ALBUMIN/CREAT UR: 16.3 UG/MG (ref 0–30)
CREAT UR-MCNC: 86 MG/DL (ref 23–375)
CREAT UR-MCNC: 86 MG/DL (ref 23–375)
MICROALBUMIN UR DL<=1MG/L-MCNC: 14 UG/ML

## 2021-06-28 ENCOUNTER — LAB VISIT (OUTPATIENT)
Dept: LAB | Facility: HOSPITAL | Age: 76
End: 2021-06-28
Payer: MEDICARE

## 2021-06-28 DIAGNOSIS — I50.22 CHRONIC SYSTOLIC CONGESTIVE HEART FAILURE: ICD-10-CM

## 2021-06-28 DIAGNOSIS — I50.22 CHRONIC SYSTOLIC CONGESTIVE HEART FAILURE: Primary | ICD-10-CM

## 2021-06-28 LAB
ANION GAP SERPL CALC-SCNC: 10 MMOL/L (ref 8–16)
BUN SERPL-MCNC: 37 MG/DL (ref 8–23)
CALCIUM SERPL-MCNC: 9.8 MG/DL (ref 8.7–10.5)
CHLORIDE SERPL-SCNC: 114 MMOL/L (ref 95–110)
CO2 SERPL-SCNC: 19 MMOL/L (ref 23–29)
CREAT SERPL-MCNC: 1.3 MG/DL (ref 0.5–1.4)
EST. GFR  (AFRICAN AMERICAN): >60 ML/MIN/1.73 M^2
EST. GFR  (NON AFRICAN AMERICAN): 53.4 ML/MIN/1.73 M^2
GLUCOSE SERPL-MCNC: 120 MG/DL (ref 70–110)
POTASSIUM SERPL-SCNC: 5.5 MMOL/L (ref 3.5–5.1)
SODIUM SERPL-SCNC: 143 MMOL/L (ref 136–145)

## 2021-06-28 PROCEDURE — 80048 BASIC METABOLIC PNL TOTAL CA: CPT | Performed by: INTERNAL MEDICINE

## 2021-06-28 PROCEDURE — 36415 COLL VENOUS BLD VENIPUNCTURE: CPT | Mod: PN | Performed by: INTERNAL MEDICINE

## 2021-06-29 ENCOUNTER — PATIENT MESSAGE (OUTPATIENT)
Dept: TRANSPLANT | Facility: CLINIC | Age: 76
End: 2021-06-29

## 2021-06-29 DIAGNOSIS — I50.22 CHRONIC SYSTOLIC CONGESTIVE HEART FAILURE: Primary | ICD-10-CM

## 2021-06-30 ENCOUNTER — TELEPHONE (OUTPATIENT)
Dept: TRANSPLANT | Facility: CLINIC | Age: 76
End: 2021-06-30

## 2021-07-01 ENCOUNTER — TELEPHONE (OUTPATIENT)
Dept: TRANSPLANT | Facility: CLINIC | Age: 76
End: 2021-07-01

## 2021-07-01 ENCOUNTER — PATIENT MESSAGE (OUTPATIENT)
Dept: TRANSPLANT | Facility: CLINIC | Age: 76
End: 2021-07-01

## 2021-07-01 ENCOUNTER — LAB VISIT (OUTPATIENT)
Dept: LAB | Facility: HOSPITAL | Age: 76
End: 2021-07-01
Attending: INTERNAL MEDICINE
Payer: MEDICARE

## 2021-07-01 DIAGNOSIS — I50.22 CHRONIC SYSTOLIC CONGESTIVE HEART FAILURE: ICD-10-CM

## 2021-07-01 DIAGNOSIS — I50.22 CHRONIC SYSTOLIC CONGESTIVE HEART FAILURE: Primary | ICD-10-CM

## 2021-07-01 LAB
ANION GAP SERPL CALC-SCNC: 11 MMOL/L (ref 8–16)
BUN SERPL-MCNC: 38 MG/DL (ref 8–23)
CALCIUM SERPL-MCNC: 9.8 MG/DL (ref 8.7–10.5)
CHLORIDE SERPL-SCNC: 110 MMOL/L (ref 95–110)
CO2 SERPL-SCNC: 20 MMOL/L (ref 23–29)
CREAT SERPL-MCNC: 1.3 MG/DL (ref 0.5–1.4)
EST. GFR  (AFRICAN AMERICAN): >60 ML/MIN/1.73 M^2
EST. GFR  (NON AFRICAN AMERICAN): 53.4 ML/MIN/1.73 M^2
GLUCOSE SERPL-MCNC: 111 MG/DL (ref 70–110)
POTASSIUM SERPL-SCNC: 4.2 MMOL/L (ref 3.5–5.1)
SODIUM SERPL-SCNC: 141 MMOL/L (ref 136–145)

## 2021-07-01 PROCEDURE — 36415 COLL VENOUS BLD VENIPUNCTURE: CPT | Mod: PN | Performed by: INTERNAL MEDICINE

## 2021-07-01 PROCEDURE — 80048 BASIC METABOLIC PNL TOTAL CA: CPT | Performed by: INTERNAL MEDICINE

## 2021-07-08 ENCOUNTER — PATIENT MESSAGE (OUTPATIENT)
Dept: TRANSPLANT | Facility: CLINIC | Age: 76
End: 2021-07-08

## 2021-07-08 ENCOUNTER — LAB VISIT (OUTPATIENT)
Dept: LAB | Facility: HOSPITAL | Age: 76
End: 2021-07-08
Attending: INTERNAL MEDICINE
Payer: MEDICARE

## 2021-07-08 ENCOUNTER — TELEPHONE (OUTPATIENT)
Dept: TRANSPLANT | Facility: CLINIC | Age: 76
End: 2021-07-08

## 2021-07-08 DIAGNOSIS — I50.22 CHRONIC SYSTOLIC CONGESTIVE HEART FAILURE: ICD-10-CM

## 2021-07-08 PROCEDURE — 36415 COLL VENOUS BLD VENIPUNCTURE: CPT | Mod: PN | Performed by: INTERNAL MEDICINE

## 2021-07-08 PROCEDURE — 80048 BASIC METABOLIC PNL TOTAL CA: CPT | Performed by: INTERNAL MEDICINE

## 2021-07-09 LAB
ANION GAP SERPL CALC-SCNC: 14 MMOL/L (ref 8–16)
BUN SERPL-MCNC: 30 MG/DL (ref 8–23)
CALCIUM SERPL-MCNC: 10.2 MG/DL (ref 8.7–10.5)
CHLORIDE SERPL-SCNC: 113 MMOL/L (ref 95–110)
CO2 SERPL-SCNC: 15 MMOL/L (ref 23–29)
CREAT SERPL-MCNC: 1.2 MG/DL (ref 0.5–1.4)
EST. GFR  (AFRICAN AMERICAN): >60 ML/MIN/1.73 M^2
EST. GFR  (NON AFRICAN AMERICAN): 58.8 ML/MIN/1.73 M^2
GLUCOSE SERPL-MCNC: 112 MG/DL (ref 70–110)
POTASSIUM SERPL-SCNC: 4.6 MMOL/L (ref 3.5–5.1)
SODIUM SERPL-SCNC: 142 MMOL/L (ref 136–145)

## 2021-07-12 ENCOUNTER — PATIENT MESSAGE (OUTPATIENT)
Dept: TRANSPLANT | Facility: CLINIC | Age: 76
End: 2021-07-12

## 2021-07-12 ENCOUNTER — TELEPHONE (OUTPATIENT)
Dept: TRANSPLANT | Facility: CLINIC | Age: 76
End: 2021-07-12

## 2021-07-13 ENCOUNTER — HOSPITAL ENCOUNTER (OUTPATIENT)
Dept: RADIOLOGY | Facility: HOSPITAL | Age: 76
Discharge: HOME | End: 2021-07-13
Attending: PHYSICIAN ASSISTANT
Payer: MEDICARE

## 2021-07-13 ENCOUNTER — OFFICE VISIT (OUTPATIENT)
Dept: SPORTS MEDICINE | Facility: CLINIC | Age: 76
End: 2021-07-13
Payer: MEDICARE

## 2021-07-13 VITALS
BODY MASS INDEX: 31.83 KG/M2 | HEIGHT: 72 IN | DIASTOLIC BLOOD PRESSURE: 82 MMHG | SYSTOLIC BLOOD PRESSURE: 148 MMHG | WEIGHT: 235 LBS | HEART RATE: 77 BPM

## 2021-07-13 DIAGNOSIS — M25.561 RIGHT KNEE PAIN, UNSPECIFIED CHRONICITY: Primary | ICD-10-CM

## 2021-07-13 DIAGNOSIS — M25.561 ACUTE PAIN OF RIGHT KNEE: Primary | ICD-10-CM

## 2021-07-13 DIAGNOSIS — M25.561 RIGHT KNEE PAIN, UNSPECIFIED CHRONICITY: ICD-10-CM

## 2021-07-13 DIAGNOSIS — M25.461 EFFUSION, RIGHT KNEE: ICD-10-CM

## 2021-07-13 DIAGNOSIS — M17.11 PRIMARY OSTEOARTHRITIS OF RIGHT KNEE: ICD-10-CM

## 2021-07-13 PROCEDURE — 1125F AMNT PAIN NOTED PAIN PRSNT: CPT | Mod: S$GLB,,, | Performed by: PHYSICIAN ASSISTANT

## 2021-07-13 PROCEDURE — 1159F MED LIST DOCD IN RCRD: CPT | Mod: S$GLB,,, | Performed by: PHYSICIAN ASSISTANT

## 2021-07-13 PROCEDURE — 99214 OFFICE O/P EST MOD 30 MIN: CPT | Mod: 25,S$GLB,, | Performed by: PHYSICIAN ASSISTANT

## 2021-07-13 PROCEDURE — 20610 DRAIN/INJ JOINT/BURSA W/O US: CPT | Mod: RT,S$GLB,, | Performed by: PHYSICIAN ASSISTANT

## 2021-07-13 PROCEDURE — 3288F PR FALLS RISK ASSESSMENT DOCUMENTED: ICD-10-PCS | Mod: CPTII,S$GLB,, | Performed by: PHYSICIAN ASSISTANT

## 2021-07-13 PROCEDURE — 73564 X-RAY EXAM KNEE 4 OR MORE: CPT | Mod: 26,LT,, | Performed by: RADIOLOGY

## 2021-07-13 PROCEDURE — 1101F PR PT FALLS ASSESS DOC 0-1 FALLS W/OUT INJ PAST YR: ICD-10-PCS | Mod: CPTII,S$GLB,, | Performed by: PHYSICIAN ASSISTANT

## 2021-07-13 PROCEDURE — 3288F FALL RISK ASSESSMENT DOCD: CPT | Mod: CPTII,S$GLB,, | Performed by: PHYSICIAN ASSISTANT

## 2021-07-13 PROCEDURE — 99214 PR OFFICE/OUTPT VISIT, EST, LEVL IV, 30-39 MIN: ICD-10-PCS | Mod: 25,S$GLB,, | Performed by: PHYSICIAN ASSISTANT

## 2021-07-13 PROCEDURE — 20610 PR DRAIN/INJECT LARGE JOINT/BURSA: ICD-10-PCS | Mod: RT,S$GLB,, | Performed by: PHYSICIAN ASSISTANT

## 2021-07-13 PROCEDURE — 99999 PR PBB SHADOW E&M-EST. PATIENT-LVL III: CPT | Mod: PBBFAC,,, | Performed by: PHYSICIAN ASSISTANT

## 2021-07-13 PROCEDURE — 73564 X-RAY EXAM KNEE 4 OR MORE: CPT | Mod: TC,50

## 2021-07-13 PROCEDURE — 1125F PR PAIN SEVERITY QUANTIFIED, PAIN PRESENT: ICD-10-PCS | Mod: S$GLB,,, | Performed by: PHYSICIAN ASSISTANT

## 2021-07-13 PROCEDURE — 73564 XR KNEE ORTHO BILAT WITH FLEXION: ICD-10-PCS | Mod: 26,RT,, | Performed by: RADIOLOGY

## 2021-07-13 PROCEDURE — 73564 X-RAY EXAM KNEE 4 OR MORE: CPT | Mod: 26,RT,, | Performed by: RADIOLOGY

## 2021-07-13 PROCEDURE — 1159F PR MEDICATION LIST DOCUMENTED IN MEDICAL RECORD: ICD-10-PCS | Mod: S$GLB,,, | Performed by: PHYSICIAN ASSISTANT

## 2021-07-13 PROCEDURE — 1101F PT FALLS ASSESS-DOCD LE1/YR: CPT | Mod: CPTII,S$GLB,, | Performed by: PHYSICIAN ASSISTANT

## 2021-07-13 PROCEDURE — 99999 PR PBB SHADOW E&M-EST. PATIENT-LVL III: ICD-10-PCS | Mod: PBBFAC,,, | Performed by: PHYSICIAN ASSISTANT

## 2021-07-13 RX ORDER — TRIAMCINOLONE ACETONIDE 40 MG/ML
40 INJECTION, SUSPENSION INTRA-ARTICULAR; INTRAMUSCULAR
Status: COMPLETED | OUTPATIENT
Start: 2021-07-13 | End: 2021-07-13

## 2021-07-13 RX ORDER — BUPIVACAINE HYDROCHLORIDE 2.5 MG/ML
3 INJECTION, SOLUTION INFILTRATION; PERINEURAL
Status: COMPLETED | OUTPATIENT
Start: 2021-07-13 | End: 2021-07-13

## 2021-07-13 RX ADMIN — BUPIVACAINE HYDROCHLORIDE 7.5 MG: 2.5 INJECTION, SOLUTION INFILTRATION; PERINEURAL at 01:07

## 2021-07-13 RX ADMIN — TRIAMCINOLONE ACETONIDE 40 MG: 40 INJECTION, SUSPENSION INTRA-ARTICULAR; INTRAMUSCULAR at 01:07

## 2021-07-15 ENCOUNTER — LAB VISIT (OUTPATIENT)
Dept: LAB | Facility: HOSPITAL | Age: 76
End: 2021-07-15
Attending: INTERNAL MEDICINE
Payer: MEDICARE

## 2021-07-15 DIAGNOSIS — E55.9 VITAMIN D DEFICIENCY: ICD-10-CM

## 2021-07-15 DIAGNOSIS — E78.5 DYSLIPIDEMIA: ICD-10-CM

## 2021-07-15 DIAGNOSIS — R79.9 ABNORMAL FINDING OF BLOOD CHEMISTRY, UNSPECIFIED: ICD-10-CM

## 2021-07-15 DIAGNOSIS — E79.0 ELEVATED BLOOD URIC ACID LEVEL: ICD-10-CM

## 2021-07-15 DIAGNOSIS — I51.89 MILD LEFT VENTRICULAR SYSTOLIC DYSFUNCTION: ICD-10-CM

## 2021-07-15 DIAGNOSIS — I10 ESSENTIAL HYPERTENSION: ICD-10-CM

## 2021-07-15 LAB
ANION GAP SERPL CALC-SCNC: 10 MMOL/L (ref 8–16)
BASOPHILS # BLD AUTO: 0.04 K/UL (ref 0–0.2)
BASOPHILS NFR BLD: 0.6 % (ref 0–1.9)
BNP SERPL-MCNC: 797 PG/ML (ref 0–99)
BUN SERPL-MCNC: 46 MG/DL (ref 8–23)
CALCIUM SERPL-MCNC: 9.6 MG/DL (ref 8.7–10.5)
CHLORIDE SERPL-SCNC: 112 MMOL/L (ref 95–110)
CHOLEST SERPL-MCNC: 101 MG/DL (ref 120–199)
CHOLEST/HDLC SERPL: 2.6 {RATIO} (ref 2–5)
CO2 SERPL-SCNC: 20 MMOL/L (ref 23–29)
CREAT SERPL-MCNC: 1.9 MG/DL (ref 0.5–1.4)
DIFFERENTIAL METHOD: ABNORMAL
EOSINOPHIL # BLD AUTO: 0.1 K/UL (ref 0–0.5)
EOSINOPHIL NFR BLD: 1.8 % (ref 0–8)
ERYTHROCYTE [DISTWIDTH] IN BLOOD BY AUTOMATED COUNT: 16.4 % (ref 11.5–14.5)
EST. GFR  (AFRICAN AMERICAN): 39 ML/MIN/1.73 M^2
EST. GFR  (NON AFRICAN AMERICAN): 33.7 ML/MIN/1.73 M^2
ESTIMATED AVG GLUCOSE: 111 MG/DL (ref 68–131)
GLUCOSE SERPL-MCNC: 104 MG/DL (ref 70–110)
HBA1C MFR BLD: 5.5 % (ref 4–5.6)
HCT VFR BLD AUTO: 38.6 % (ref 40–54)
HDLC SERPL-MCNC: 39 MG/DL (ref 40–75)
HDLC SERPL: 38.6 % (ref 20–50)
HGB BLD-MCNC: 12.1 G/DL (ref 14–18)
IMM GRANULOCYTES # BLD AUTO: 0.02 K/UL (ref 0–0.04)
IMM GRANULOCYTES NFR BLD AUTO: 0.3 % (ref 0–0.5)
LDLC SERPL CALC-MCNC: 52.8 MG/DL (ref 63–159)
LYMPHOCYTES # BLD AUTO: 1.1 K/UL (ref 1–4.8)
LYMPHOCYTES NFR BLD: 16.2 % (ref 18–48)
MCH RBC QN AUTO: 31.3 PG (ref 27–31)
MCHC RBC AUTO-ENTMCNC: 31.3 G/DL (ref 32–36)
MCV RBC AUTO: 100 FL (ref 82–98)
MONOCYTES # BLD AUTO: 0.7 K/UL (ref 0.3–1)
MONOCYTES NFR BLD: 10 % (ref 4–15)
NEUTROPHILS # BLD AUTO: 5 K/UL (ref 1.8–7.7)
NEUTROPHILS NFR BLD: 71.1 % (ref 38–73)
NONHDLC SERPL-MCNC: 62 MG/DL
NRBC BLD-RTO: 0 /100 WBC
PLATELET # BLD AUTO: 217 K/UL (ref 150–450)
PMV BLD AUTO: 11.1 FL (ref 9.2–12.9)
POTASSIUM SERPL-SCNC: 4.7 MMOL/L (ref 3.5–5.1)
RBC # BLD AUTO: 3.87 M/UL (ref 4.6–6.2)
SODIUM SERPL-SCNC: 142 MMOL/L (ref 136–145)
TRIGL SERPL-MCNC: 46 MG/DL (ref 30–150)
TSH SERPL DL<=0.005 MIU/L-ACNC: 2.67 UIU/ML (ref 0.4–4)
URATE SERPL-MCNC: 6.9 MG/DL (ref 3.4–7)
WBC # BLD AUTO: 7.03 K/UL (ref 3.9–12.7)

## 2021-07-15 PROCEDURE — 83880 ASSAY OF NATRIURETIC PEPTIDE: CPT | Performed by: INTERNAL MEDICINE

## 2021-07-15 PROCEDURE — 86038 ANTINUCLEAR ANTIBODIES: CPT | Performed by: INTERNAL MEDICINE

## 2021-07-15 PROCEDURE — 83036 HEMOGLOBIN GLYCOSYLATED A1C: CPT | Performed by: INTERNAL MEDICINE

## 2021-07-15 PROCEDURE — 86039 ANTINUCLEAR ANTIBODIES (ANA): CPT | Performed by: INTERNAL MEDICINE

## 2021-07-15 PROCEDURE — 82306 VITAMIN D 25 HYDROXY: CPT | Performed by: INTERNAL MEDICINE

## 2021-07-15 PROCEDURE — 36415 COLL VENOUS BLD VENIPUNCTURE: CPT | Mod: PN | Performed by: INTERNAL MEDICINE

## 2021-07-15 PROCEDURE — 84550 ASSAY OF BLOOD/URIC ACID: CPT | Performed by: INTERNAL MEDICINE

## 2021-07-15 PROCEDURE — 80061 LIPID PANEL: CPT | Performed by: INTERNAL MEDICINE

## 2021-07-15 PROCEDURE — 86235 NUCLEAR ANTIGEN ANTIBODY: CPT | Mod: 59 | Performed by: INTERNAL MEDICINE

## 2021-07-15 PROCEDURE — 80048 BASIC METABOLIC PNL TOTAL CA: CPT | Performed by: INTERNAL MEDICINE

## 2021-07-15 PROCEDURE — 85025 COMPLETE CBC W/AUTO DIFF WBC: CPT | Performed by: INTERNAL MEDICINE

## 2021-07-15 PROCEDURE — 84443 ASSAY THYROID STIM HORMONE: CPT | Performed by: INTERNAL MEDICINE

## 2021-07-16 ENCOUNTER — TELEPHONE (OUTPATIENT)
Dept: TRANSPLANT | Facility: CLINIC | Age: 76
End: 2021-07-16

## 2021-07-16 ENCOUNTER — OFFICE VISIT (OUTPATIENT)
Dept: TRANSPLANT | Facility: CLINIC | Age: 76
End: 2021-07-16
Payer: MEDICARE

## 2021-07-16 VITALS
SYSTOLIC BLOOD PRESSURE: 166 MMHG | HEIGHT: 72 IN | HEART RATE: 77 BPM | BODY MASS INDEX: 32.85 KG/M2 | WEIGHT: 242.5 LBS | DIASTOLIC BLOOD PRESSURE: 72 MMHG

## 2021-07-16 DIAGNOSIS — Z95.1 S/P CABG (CORONARY ARTERY BYPASS GRAFT): Primary | Chronic | ICD-10-CM

## 2021-07-16 DIAGNOSIS — I34.0 MODERATE MITRAL REGURGITATION BY PRIOR ECHOCARDIOGRAM: ICD-10-CM

## 2021-07-16 DIAGNOSIS — N18.4 CKD (CHRONIC KIDNEY DISEASE), STAGE IV: ICD-10-CM

## 2021-07-16 DIAGNOSIS — I10 ESSENTIAL HYPERTENSION: ICD-10-CM

## 2021-07-16 DIAGNOSIS — I51.89 MILD LEFT VENTRICULAR SYSTOLIC DYSFUNCTION: ICD-10-CM

## 2021-07-16 LAB
25(OH)D3+25(OH)D2 SERPL-MCNC: 14 NG/ML (ref 30–96)
ANA PATTERN 1: NORMAL
ANA SER QL IF: POSITIVE
ANA TITR SER IF: NORMAL {TITER}

## 2021-07-16 PROCEDURE — 99214 OFFICE O/P EST MOD 30 MIN: CPT | Mod: S$GLB,,, | Performed by: INTERNAL MEDICINE

## 2021-07-16 PROCEDURE — 1126F PR PAIN SEVERITY QUANTIFIED, NO PAIN PRESENT: ICD-10-PCS | Mod: S$GLB,,, | Performed by: INTERNAL MEDICINE

## 2021-07-16 PROCEDURE — 99499 RISK ADDL DX/OHS AUDIT: ICD-10-PCS | Mod: S$GLB,,, | Performed by: INTERNAL MEDICINE

## 2021-07-16 PROCEDURE — 3078F DIAST BP <80 MM HG: CPT | Mod: CPTII,S$GLB,, | Performed by: INTERNAL MEDICINE

## 2021-07-16 PROCEDURE — 1126F AMNT PAIN NOTED NONE PRSNT: CPT | Mod: S$GLB,,, | Performed by: INTERNAL MEDICINE

## 2021-07-16 PROCEDURE — 3288F FALL RISK ASSESSMENT DOCD: CPT | Mod: CPTII,S$GLB,, | Performed by: INTERNAL MEDICINE

## 2021-07-16 PROCEDURE — 99999 PR PBB SHADOW E&M-EST. PATIENT-LVL III: CPT | Mod: PBBFAC,,, | Performed by: INTERNAL MEDICINE

## 2021-07-16 PROCEDURE — 1101F PR PT FALLS ASSESS DOC 0-1 FALLS W/OUT INJ PAST YR: ICD-10-PCS | Mod: CPTII,S$GLB,, | Performed by: INTERNAL MEDICINE

## 2021-07-16 PROCEDURE — 99499 UNLISTED E&M SERVICE: CPT | Mod: S$GLB,,, | Performed by: INTERNAL MEDICINE

## 2021-07-16 PROCEDURE — 1159F MED LIST DOCD IN RCRD: CPT | Mod: S$GLB,,, | Performed by: INTERNAL MEDICINE

## 2021-07-16 PROCEDURE — 1101F PT FALLS ASSESS-DOCD LE1/YR: CPT | Mod: CPTII,S$GLB,, | Performed by: INTERNAL MEDICINE

## 2021-07-16 PROCEDURE — 1159F PR MEDICATION LIST DOCUMENTED IN MEDICAL RECORD: ICD-10-PCS | Mod: S$GLB,,, | Performed by: INTERNAL MEDICINE

## 2021-07-16 PROCEDURE — 3288F PR FALLS RISK ASSESSMENT DOCUMENTED: ICD-10-PCS | Mod: CPTII,S$GLB,, | Performed by: INTERNAL MEDICINE

## 2021-07-16 PROCEDURE — 99214 PR OFFICE/OUTPT VISIT, EST, LEVL IV, 30-39 MIN: ICD-10-PCS | Mod: S$GLB,,, | Performed by: INTERNAL MEDICINE

## 2021-07-16 PROCEDURE — 3077F PR MOST RECENT SYSTOLIC BLOOD PRESSURE >= 140 MM HG: ICD-10-PCS | Mod: CPTII,S$GLB,, | Performed by: INTERNAL MEDICINE

## 2021-07-16 PROCEDURE — 3078F PR MOST RECENT DIASTOLIC BLOOD PRESSURE < 80 MM HG: ICD-10-PCS | Mod: CPTII,S$GLB,, | Performed by: INTERNAL MEDICINE

## 2021-07-16 PROCEDURE — 99999 PR PBB SHADOW E&M-EST. PATIENT-LVL III: ICD-10-PCS | Mod: PBBFAC,,, | Performed by: INTERNAL MEDICINE

## 2021-07-16 PROCEDURE — 3077F SYST BP >= 140 MM HG: CPT | Mod: CPTII,S$GLB,, | Performed by: INTERNAL MEDICINE

## 2021-07-20 ENCOUNTER — LAB VISIT (OUTPATIENT)
Dept: LAB | Facility: HOSPITAL | Age: 76
End: 2021-07-20
Attending: INTERNAL MEDICINE
Payer: MEDICARE

## 2021-07-20 ENCOUNTER — PATIENT MESSAGE (OUTPATIENT)
Dept: TRANSPLANT | Facility: CLINIC | Age: 76
End: 2021-07-20

## 2021-07-20 DIAGNOSIS — N18.4 CKD (CHRONIC KIDNEY DISEASE), STAGE IV: ICD-10-CM

## 2021-07-20 LAB
ANTI SM ANTIBODY: 0.07 RATIO (ref 0–0.99)
ANTI SM/RNP ANTIBODY: 0.07 RATIO (ref 0–0.99)
ANTI-SM INTERPRETATION: NEGATIVE
ANTI-SM/RNP INTERPRETATION: NEGATIVE
ANTI-SSA ANTIBODY: 0.06 RATIO (ref 0–0.99)
ANTI-SSA INTERPRETATION: NEGATIVE
ANTI-SSB ANTIBODY: 0.07 RATIO (ref 0–0.99)
ANTI-SSB INTERPRETATION: NEGATIVE
DSDNA AB SER-ACNC: NORMAL [IU]/ML

## 2021-07-20 PROCEDURE — 80048 BASIC METABOLIC PNL TOTAL CA: CPT | Performed by: INTERNAL MEDICINE

## 2021-07-20 PROCEDURE — 36415 COLL VENOUS BLD VENIPUNCTURE: CPT | Mod: PN | Performed by: INTERNAL MEDICINE

## 2021-07-21 LAB
ANION GAP SERPL CALC-SCNC: 9 MMOL/L (ref 8–16)
BUN SERPL-MCNC: 34 MG/DL (ref 8–23)
CALCIUM SERPL-MCNC: 9.9 MG/DL (ref 8.7–10.5)
CHLORIDE SERPL-SCNC: 111 MMOL/L (ref 95–110)
CO2 SERPL-SCNC: 22 MMOL/L (ref 23–29)
CREAT SERPL-MCNC: 1.3 MG/DL (ref 0.5–1.4)
EST. GFR  (AFRICAN AMERICAN): >60 ML/MIN/1.73 M^2
EST. GFR  (NON AFRICAN AMERICAN): 53.4 ML/MIN/1.73 M^2
GLUCOSE SERPL-MCNC: 127 MG/DL (ref 70–110)
POTASSIUM SERPL-SCNC: 4.2 MMOL/L (ref 3.5–5.1)
SODIUM SERPL-SCNC: 142 MMOL/L (ref 136–145)

## 2021-07-26 RX ORDER — METOPROLOL SUCCINATE 50 MG/1
50 TABLET, EXTENDED RELEASE ORAL NIGHTLY
Qty: 30 TABLET | Refills: 11 | Status: SHIPPED | OUTPATIENT
Start: 2021-07-26 | End: 2022-07-22 | Stop reason: SDUPTHER

## 2021-08-10 ENCOUNTER — PATIENT MESSAGE (OUTPATIENT)
Dept: TRANSPLANT | Facility: CLINIC | Age: 76
End: 2021-08-10

## 2021-08-16 ENCOUNTER — OFFICE VISIT (OUTPATIENT)
Dept: RHEUMATOLOGY | Facility: CLINIC | Age: 76
End: 2021-08-16
Payer: MEDICARE

## 2021-08-16 ENCOUNTER — HOSPITAL ENCOUNTER (OUTPATIENT)
Dept: RADIOLOGY | Facility: HOSPITAL | Age: 76
Discharge: HOME | End: 2021-08-16
Attending: INTERNAL MEDICINE
Payer: MEDICARE

## 2021-08-16 VITALS
HEART RATE: 71 BPM | SYSTOLIC BLOOD PRESSURE: 124 MMHG | BODY MASS INDEX: 31.56 KG/M2 | HEIGHT: 73 IN | WEIGHT: 238.13 LBS | DIASTOLIC BLOOD PRESSURE: 71 MMHG

## 2021-08-16 DIAGNOSIS — M79.642 PAIN IN BOTH HANDS: ICD-10-CM

## 2021-08-16 DIAGNOSIS — D75.89 MACROCYTOSIS: ICD-10-CM

## 2021-08-16 DIAGNOSIS — M79.641 PAIN IN BOTH HANDS: ICD-10-CM

## 2021-08-16 DIAGNOSIS — Z87.39 HX OF CALCIUM PYROPHOSPHATE DEPOSITION DISEASE (CPPD): ICD-10-CM

## 2021-08-16 DIAGNOSIS — Z87.39 HX OF CALCIUM PYROPHOSPHATE DEPOSITION DISEASE (CPPD): Primary | ICD-10-CM

## 2021-08-16 PROCEDURE — 1159F MED LIST DOCD IN RCRD: CPT | Mod: CPTII,S$GLB,, | Performed by: INTERNAL MEDICINE

## 2021-08-16 PROCEDURE — 1160F PR REVIEW ALL MEDS BY PRESCRIBER/CLIN PHARMACIST DOCUMENTED: ICD-10-PCS | Mod: CPTII,S$GLB,, | Performed by: INTERNAL MEDICINE

## 2021-08-16 PROCEDURE — 1125F PR PAIN SEVERITY QUANTIFIED, PAIN PRESENT: ICD-10-PCS | Mod: CPTII,S$GLB,, | Performed by: INTERNAL MEDICINE

## 2021-08-16 PROCEDURE — 3078F DIAST BP <80 MM HG: CPT | Mod: CPTII,S$GLB,, | Performed by: INTERNAL MEDICINE

## 2021-08-16 PROCEDURE — 1160F RVW MEDS BY RX/DR IN RCRD: CPT | Mod: CPTII,S$GLB,, | Performed by: INTERNAL MEDICINE

## 2021-08-16 PROCEDURE — 3044F PR MOST RECENT HEMOGLOBIN A1C LEVEL <7.0%: ICD-10-PCS | Mod: CPTII,S$GLB,, | Performed by: INTERNAL MEDICINE

## 2021-08-16 PROCEDURE — 3078F PR MOST RECENT DIASTOLIC BLOOD PRESSURE < 80 MM HG: ICD-10-PCS | Mod: CPTII,S$GLB,, | Performed by: INTERNAL MEDICINE

## 2021-08-16 PROCEDURE — 1159F PR MEDICATION LIST DOCUMENTED IN MEDICAL RECORD: ICD-10-PCS | Mod: CPTII,S$GLB,, | Performed by: INTERNAL MEDICINE

## 2021-08-16 PROCEDURE — 99204 OFFICE O/P NEW MOD 45 MIN: CPT | Mod: S$GLB,,, | Performed by: INTERNAL MEDICINE

## 2021-08-16 PROCEDURE — 73130 XR HAND COMPLETE 3 VIEWS BILATERAL: ICD-10-PCS | Mod: 26,50,, | Performed by: RADIOLOGY

## 2021-08-16 PROCEDURE — 1125F AMNT PAIN NOTED PAIN PRSNT: CPT | Mod: CPTII,S$GLB,, | Performed by: INTERNAL MEDICINE

## 2021-08-16 PROCEDURE — 99999 PR PBB SHADOW E&M-EST. PATIENT-LVL IV: CPT | Mod: PBBFAC,,, | Performed by: INTERNAL MEDICINE

## 2021-08-16 PROCEDURE — 3074F PR MOST RECENT SYSTOLIC BLOOD PRESSURE < 130 MM HG: ICD-10-PCS | Mod: CPTII,S$GLB,, | Performed by: INTERNAL MEDICINE

## 2021-08-16 PROCEDURE — 73130 X-RAY EXAM OF HAND: CPT | Mod: TC,50

## 2021-08-16 PROCEDURE — 73130 X-RAY EXAM OF HAND: CPT | Mod: 26,50,, | Performed by: RADIOLOGY

## 2021-08-16 PROCEDURE — 99999 PR PBB SHADOW E&M-EST. PATIENT-LVL IV: ICD-10-PCS | Mod: PBBFAC,,, | Performed by: INTERNAL MEDICINE

## 2021-08-16 PROCEDURE — 99204 PR OFFICE/OUTPT VISIT, NEW, LEVL IV, 45-59 MIN: ICD-10-PCS | Mod: S$GLB,,, | Performed by: INTERNAL MEDICINE

## 2021-08-16 PROCEDURE — 3074F SYST BP LT 130 MM HG: CPT | Mod: CPTII,S$GLB,, | Performed by: INTERNAL MEDICINE

## 2021-08-16 PROCEDURE — 3044F HG A1C LEVEL LT 7.0%: CPT | Mod: CPTII,S$GLB,, | Performed by: INTERNAL MEDICINE

## 2021-08-16 RX ORDER — AMOXICILLIN 500 MG/1
500 CAPSULE ORAL 3 TIMES DAILY
COMMUNITY
Start: 2021-03-31 | End: 2021-08-25

## 2021-08-16 RX ORDER — PREDNISOLONE ACETATE 10 MG/ML
SUSPENSION/ DROPS OPHTHALMIC
COMMUNITY
Start: 2021-04-19 | End: 2021-08-25

## 2021-08-16 RX ORDER — KETOROLAC TROMETHAMINE 5 MG/ML
SOLUTION OPHTHALMIC
COMMUNITY
Start: 2021-04-19 | End: 2022-09-16

## 2021-08-16 ASSESSMENT — ROUTINE ASSESSMENT OF PATIENT INDEX DATA (RAPID3)
TOTAL RAPID3 SCORE: 4
PSYCHOLOGICAL DISTRESS SCORE: 2.2
MDHAQ FUNCTION SCORE: 0.6
PAIN SCORE: 5
PATIENT GLOBAL ASSESSMENT SCORE: 5
FATIGUE SCORE: 10
AM STIFFNESS SCORE: 1, YES

## 2021-08-23 ENCOUNTER — PATIENT MESSAGE (OUTPATIENT)
Dept: TRANSPLANT | Facility: CLINIC | Age: 76
End: 2021-08-23

## 2021-08-25 ENCOUNTER — LAB VISIT (OUTPATIENT)
Dept: LAB | Facility: HOSPITAL | Age: 76
End: 2021-08-25
Attending: INTERNAL MEDICINE
Payer: MEDICARE

## 2021-08-25 ENCOUNTER — OFFICE VISIT (OUTPATIENT)
Dept: TRANSPLANT | Facility: CLINIC | Age: 76
End: 2021-08-25
Payer: MEDICARE

## 2021-08-25 VITALS
SYSTOLIC BLOOD PRESSURE: 145 MMHG | DIASTOLIC BLOOD PRESSURE: 67 MMHG | HEART RATE: 74 BPM | OXYGEN SATURATION: 96 % | HEIGHT: 72 IN | BODY MASS INDEX: 31.65 KG/M2 | WEIGHT: 233.69 LBS

## 2021-08-25 DIAGNOSIS — I25.10 CORONARY ARTERY DISEASE INVOLVING NATIVE CORONARY ARTERY OF NATIVE HEART WITHOUT ANGINA PECTORIS: ICD-10-CM

## 2021-08-25 DIAGNOSIS — I34.0 MODERATE MITRAL REGURGITATION BY PRIOR ECHOCARDIOGRAM: ICD-10-CM

## 2021-08-25 DIAGNOSIS — I51.89 MILD LEFT VENTRICULAR SYSTOLIC DYSFUNCTION: ICD-10-CM

## 2021-08-25 DIAGNOSIS — Z95.1 S/P CABG (CORONARY ARTERY BYPASS GRAFT): Primary | Chronic | ICD-10-CM

## 2021-08-25 DIAGNOSIS — G47.33 OBSTRUCTIVE SLEEP APNEA: ICD-10-CM

## 2021-08-25 LAB
ANION GAP SERPL CALC-SCNC: 8 MMOL/L (ref 8–16)
BNP SERPL-MCNC: 529 PG/ML (ref 0–99)
BUN SERPL-MCNC: 28 MG/DL (ref 8–23)
CALCIUM SERPL-MCNC: 10 MG/DL (ref 8.7–10.5)
CHLORIDE SERPL-SCNC: 106 MMOL/L (ref 95–110)
CO2 SERPL-SCNC: 25 MMOL/L (ref 23–29)
CREAT SERPL-MCNC: 1.1 MG/DL (ref 0.5–1.4)
EST. GFR  (AFRICAN AMERICAN): >60 ML/MIN/1.73 M^2
EST. GFR  (NON AFRICAN AMERICAN): >60 ML/MIN/1.73 M^2
GLUCOSE SERPL-MCNC: 97 MG/DL (ref 70–110)
POTASSIUM SERPL-SCNC: 4.9 MMOL/L (ref 3.5–5.1)
SODIUM SERPL-SCNC: 139 MMOL/L (ref 136–145)

## 2021-08-25 PROCEDURE — 83880 ASSAY OF NATRIURETIC PEPTIDE: CPT | Performed by: INTERNAL MEDICINE

## 2021-08-25 PROCEDURE — 1159F PR MEDICATION LIST DOCUMENTED IN MEDICAL RECORD: ICD-10-PCS | Mod: CPTII,S$GLB,, | Performed by: INTERNAL MEDICINE

## 2021-08-25 PROCEDURE — 3077F PR MOST RECENT SYSTOLIC BLOOD PRESSURE >= 140 MM HG: ICD-10-PCS | Mod: CPTII,S$GLB,, | Performed by: INTERNAL MEDICINE

## 2021-08-25 PROCEDURE — 99999 PR PBB SHADOW E&M-EST. PATIENT-LVL III: ICD-10-PCS | Mod: PBBFAC,,, | Performed by: INTERNAL MEDICINE

## 2021-08-25 PROCEDURE — 3078F DIAST BP <80 MM HG: CPT | Mod: CPTII,S$GLB,, | Performed by: INTERNAL MEDICINE

## 2021-08-25 PROCEDURE — 1126F PR PAIN SEVERITY QUANTIFIED, NO PAIN PRESENT: ICD-10-PCS | Mod: CPTII,S$GLB,, | Performed by: INTERNAL MEDICINE

## 2021-08-25 PROCEDURE — 80048 BASIC METABOLIC PNL TOTAL CA: CPT | Performed by: INTERNAL MEDICINE

## 2021-08-25 PROCEDURE — 36415 COLL VENOUS BLD VENIPUNCTURE: CPT | Performed by: INTERNAL MEDICINE

## 2021-08-25 PROCEDURE — 1159F MED LIST DOCD IN RCRD: CPT | Mod: CPTII,S$GLB,, | Performed by: INTERNAL MEDICINE

## 2021-08-25 PROCEDURE — 3044F HG A1C LEVEL LT 7.0%: CPT | Mod: CPTII,S$GLB,, | Performed by: INTERNAL MEDICINE

## 2021-08-25 PROCEDURE — 3078F PR MOST RECENT DIASTOLIC BLOOD PRESSURE < 80 MM HG: ICD-10-PCS | Mod: CPTII,S$GLB,, | Performed by: INTERNAL MEDICINE

## 2021-08-25 PROCEDURE — 3288F PR FALLS RISK ASSESSMENT DOCUMENTED: ICD-10-PCS | Mod: CPTII,S$GLB,, | Performed by: INTERNAL MEDICINE

## 2021-08-25 PROCEDURE — 1101F PT FALLS ASSESS-DOCD LE1/YR: CPT | Mod: CPTII,S$GLB,, | Performed by: INTERNAL MEDICINE

## 2021-08-25 PROCEDURE — 3077F SYST BP >= 140 MM HG: CPT | Mod: CPTII,S$GLB,, | Performed by: INTERNAL MEDICINE

## 2021-08-25 PROCEDURE — 99215 PR OFFICE/OUTPT VISIT, EST, LEVL V, 40-54 MIN: ICD-10-PCS | Mod: S$GLB,,, | Performed by: INTERNAL MEDICINE

## 2021-08-25 PROCEDURE — 99999 PR PBB SHADOW E&M-EST. PATIENT-LVL III: CPT | Mod: PBBFAC,,, | Performed by: INTERNAL MEDICINE

## 2021-08-25 PROCEDURE — 99215 OFFICE O/P EST HI 40 MIN: CPT | Mod: S$GLB,,, | Performed by: INTERNAL MEDICINE

## 2021-08-25 PROCEDURE — 3288F FALL RISK ASSESSMENT DOCD: CPT | Mod: CPTII,S$GLB,, | Performed by: INTERNAL MEDICINE

## 2021-08-25 PROCEDURE — 3044F PR MOST RECENT HEMOGLOBIN A1C LEVEL <7.0%: ICD-10-PCS | Mod: CPTII,S$GLB,, | Performed by: INTERNAL MEDICINE

## 2021-08-25 PROCEDURE — 1101F PR PT FALLS ASSESS DOC 0-1 FALLS W/OUT INJ PAST YR: ICD-10-PCS | Mod: CPTII,S$GLB,, | Performed by: INTERNAL MEDICINE

## 2021-08-25 PROCEDURE — 1126F AMNT PAIN NOTED NONE PRSNT: CPT | Mod: CPTII,S$GLB,, | Performed by: INTERNAL MEDICINE

## 2021-09-07 ENCOUNTER — OFFICE VISIT (OUTPATIENT)
Dept: CARDIOLOGY | Facility: CLINIC | Age: 76
End: 2021-09-07
Payer: MEDICARE

## 2021-09-07 DIAGNOSIS — Z95.1 S/P CABG (CORONARY ARTERY BYPASS GRAFT): Chronic | ICD-10-CM

## 2021-09-07 DIAGNOSIS — I34.0 MODERATE MITRAL REGURGITATION BY PRIOR ECHOCARDIOGRAM: ICD-10-CM

## 2021-09-07 DIAGNOSIS — I25.10 CORONARY ARTERY DISEASE INVOLVING NATIVE CORONARY ARTERY OF NATIVE HEART WITHOUT ANGINA PECTORIS: ICD-10-CM

## 2021-09-07 PROCEDURE — 99204 OFFICE O/P NEW MOD 45 MIN: CPT | Mod: 95,,, | Performed by: INTERNAL MEDICINE

## 2021-09-07 PROCEDURE — 1159F PR MEDICATION LIST DOCUMENTED IN MEDICAL RECORD: ICD-10-PCS | Mod: CPTII,95,, | Performed by: INTERNAL MEDICINE

## 2021-09-07 PROCEDURE — 1159F MED LIST DOCD IN RCRD: CPT | Mod: CPTII,95,, | Performed by: INTERNAL MEDICINE

## 2021-09-07 PROCEDURE — 3044F HG A1C LEVEL LT 7.0%: CPT | Mod: CPTII,95,, | Performed by: INTERNAL MEDICINE

## 2021-09-07 PROCEDURE — 1160F PR REVIEW ALL MEDS BY PRESCRIBER/CLIN PHARMACIST DOCUMENTED: ICD-10-PCS | Mod: CPTII,95,, | Performed by: INTERNAL MEDICINE

## 2021-09-07 PROCEDURE — 3044F PR MOST RECENT HEMOGLOBIN A1C LEVEL <7.0%: ICD-10-PCS | Mod: CPTII,95,, | Performed by: INTERNAL MEDICINE

## 2021-09-07 PROCEDURE — 99204 PR OFFICE/OUTPT VISIT, NEW, LEVL IV, 45-59 MIN: ICD-10-PCS | Mod: 95,,, | Performed by: INTERNAL MEDICINE

## 2021-09-07 PROCEDURE — 1160F RVW MEDS BY RX/DR IN RCRD: CPT | Mod: CPTII,95,, | Performed by: INTERNAL MEDICINE

## 2021-09-10 ENCOUNTER — PATIENT MESSAGE (OUTPATIENT)
Dept: CARDIOLOGY | Facility: CLINIC | Age: 76
End: 2021-09-10

## 2021-09-10 ENCOUNTER — DOCUMENTATION ONLY (OUTPATIENT)
Dept: CARDIOLOGY | Facility: CLINIC | Age: 76
End: 2021-09-10

## 2021-09-10 DIAGNOSIS — I25.10 CORONARY ARTERY DISEASE INVOLVING NATIVE CORONARY ARTERY OF NATIVE HEART WITHOUT ANGINA PECTORIS: ICD-10-CM

## 2021-09-10 DIAGNOSIS — Z95.1 S/P CABG (CORONARY ARTERY BYPASS GRAFT): Primary | ICD-10-CM

## 2021-09-10 RX ORDER — CLOPIDOGREL BISULFATE 75 MG/1
TABLET ORAL
Qty: 30 TABLET | Refills: 11 | Status: SHIPPED | OUTPATIENT
Start: 2021-09-10 | End: 2021-09-14

## 2021-09-14 RX ORDER — CLOPIDOGREL BISULFATE 75 MG/1
75 TABLET ORAL DAILY
Qty: 30 TABLET | Refills: 11 | Status: ON HOLD | OUTPATIENT
Start: 2021-09-15 | End: 2021-09-15 | Stop reason: HOSPADM

## 2021-09-14 RX ORDER — CLOPIDOGREL 300 MG/1
300 TABLET, FILM COATED ORAL ONCE
Qty: 1 TABLET | Refills: 0 | Status: ON HOLD | OUTPATIENT
Start: 2021-09-14 | End: 2021-09-15 | Stop reason: HOSPADM

## 2021-09-15 ENCOUNTER — HOSPITAL ENCOUNTER (OUTPATIENT)
Facility: HOSPITAL | Age: 76
Discharge: HOME | End: 2021-09-15
Attending: INTERNAL MEDICINE | Admitting: INTERNAL MEDICINE
Payer: MEDICARE

## 2021-09-15 VITALS
DIASTOLIC BLOOD PRESSURE: 74 MMHG | OXYGEN SATURATION: 94 % | SYSTOLIC BLOOD PRESSURE: 124 MMHG | HEIGHT: 72 IN | TEMPERATURE: 98 F | BODY MASS INDEX: 30.88 KG/M2 | WEIGHT: 228 LBS | RESPIRATION RATE: 15 BRPM | HEART RATE: 61 BPM

## 2021-09-15 DIAGNOSIS — I25.10 CAD (CORONARY ARTERY DISEASE): ICD-10-CM

## 2021-09-15 DIAGNOSIS — Z95.1 S/P CABG (CORONARY ARTERY BYPASS GRAFT): Primary | Chronic | ICD-10-CM

## 2021-09-15 DIAGNOSIS — N17.9 ACUTE KIDNEY INJURY: ICD-10-CM

## 2021-09-15 DIAGNOSIS — G47.33 OBSTRUCTIVE SLEEP APNEA: ICD-10-CM

## 2021-09-15 DIAGNOSIS — I34.0 MODERATE MITRAL REGURGITATION BY PRIOR ECHOCARDIOGRAM: ICD-10-CM

## 2021-09-15 DIAGNOSIS — Z51.89 ENCOUNTER FOR OTHER SPECIFIED AFTERCARE: ICD-10-CM

## 2021-09-15 DIAGNOSIS — Z01.810 PREOPERATIVE CARDIOVASCULAR EXAMINATION: ICD-10-CM

## 2021-09-15 DIAGNOSIS — I25.10 CORONARY ARTERY DISEASE INVOLVING NATIVE CORONARY ARTERY OF NATIVE HEART, ANGINA PRESENCE UNSPECIFIED: ICD-10-CM

## 2021-09-15 LAB
ABO + RH BLD: NORMAL
ANION GAP SERPL CALC-SCNC: 8 MMOL/L (ref 8–16)
BASOPHILS # BLD AUTO: 0.04 K/UL (ref 0–0.2)
BASOPHILS NFR BLD: 0.6 % (ref 0–1.9)
BLD GP AB SCN CELLS X3 SERPL QL: NORMAL
BUN SERPL-MCNC: 29 MG/DL (ref 8–23)
CALCIUM SERPL-MCNC: 9.6 MG/DL (ref 8.7–10.5)
CHLORIDE SERPL-SCNC: 111 MMOL/L (ref 95–110)
CO2 SERPL-SCNC: 23 MMOL/L (ref 23–29)
CREAT SERPL-MCNC: 1.1 MG/DL (ref 0.5–1.4)
DIFFERENTIAL METHOD: ABNORMAL
EOSINOPHIL # BLD AUTO: 0.2 K/UL (ref 0–0.5)
EOSINOPHIL NFR BLD: 2.8 % (ref 0–8)
ERYTHROCYTE [DISTWIDTH] IN BLOOD BY AUTOMATED COUNT: 14.9 % (ref 11.5–14.5)
EST. GFR  (AFRICAN AMERICAN): >60 ML/MIN/1.73 M^2
EST. GFR  (NON AFRICAN AMERICAN): >60 ML/MIN/1.73 M^2
GLUCOSE SERPL-MCNC: 96 MG/DL (ref 70–110)
HCT VFR BLD AUTO: 41.5 % (ref 40–54)
HGB BLD-MCNC: 13.7 G/DL (ref 14–18)
IMM GRANULOCYTES # BLD AUTO: 0.03 K/UL (ref 0–0.04)
IMM GRANULOCYTES NFR BLD AUTO: 0.4 % (ref 0–0.5)
LYMPHOCYTES # BLD AUTO: 1 K/UL (ref 1–4.8)
LYMPHOCYTES NFR BLD: 13.9 % (ref 18–48)
MCH RBC QN AUTO: 31.1 PG (ref 27–31)
MCHC RBC AUTO-ENTMCNC: 33 G/DL (ref 32–36)
MCV RBC AUTO: 94 FL (ref 82–98)
MONOCYTES # BLD AUTO: 0.7 K/UL (ref 0.3–1)
MONOCYTES NFR BLD: 9.1 % (ref 4–15)
NEUTROPHILS # BLD AUTO: 5.3 K/UL (ref 1.8–7.7)
NEUTROPHILS NFR BLD: 73.2 % (ref 38–73)
NRBC BLD-RTO: 0 /100 WBC
PLATELET # BLD AUTO: 199 K/UL (ref 150–450)
PMV BLD AUTO: 11.2 FL (ref 9.2–12.9)
POTASSIUM SERPL-SCNC: 4.1 MMOL/L (ref 3.5–5.1)
RBC # BLD AUTO: 4.4 M/UL (ref 4.6–6.2)
SARS-COV-2 RDRP RESP QL NAA+PROBE: NEGATIVE
SODIUM SERPL-SCNC: 142 MMOL/L (ref 136–145)
WBC # BLD AUTO: 7.18 K/UL (ref 3.9–12.7)

## 2021-09-15 PROCEDURE — 99152 MOD SED SAME PHYS/QHP 5/>YRS: CPT | Mod: ,,, | Performed by: INTERNAL MEDICINE

## 2021-09-15 PROCEDURE — 93010 EKG 12-LEAD: ICD-10-PCS | Mod: ,,, | Performed by: INTERNAL MEDICINE

## 2021-09-15 PROCEDURE — 85025 COMPLETE CBC W/AUTO DIFF WBC: CPT | Performed by: INTERNAL MEDICINE

## 2021-09-15 PROCEDURE — U0002 COVID-19 LAB TEST NON-CDC: HCPCS | Performed by: INTERNAL MEDICINE

## 2021-09-15 PROCEDURE — C1769 GUIDE WIRE: HCPCS | Performed by: INTERNAL MEDICINE

## 2021-09-15 PROCEDURE — 93455 CORONARY ART/GRFT ANGIO S&I: CPT | Mod: 26,,, | Performed by: INTERNAL MEDICINE

## 2021-09-15 PROCEDURE — 80048 BASIC METABOLIC PNL TOTAL CA: CPT | Performed by: INTERNAL MEDICINE

## 2021-09-15 PROCEDURE — 86900 BLOOD TYPING SEROLOGIC ABO: CPT | Performed by: INTERNAL MEDICINE

## 2021-09-15 PROCEDURE — C1894 INTRO/SHEATH, NON-LASER: HCPCS | Performed by: INTERNAL MEDICINE

## 2021-09-15 PROCEDURE — 93455 PR CATH PLACE/CORONARY ANGIO, IMG SUPER/INTERP, BYPASS ANGIO: ICD-10-PCS | Mod: 26,,, | Performed by: INTERNAL MEDICINE

## 2021-09-15 PROCEDURE — 25000003 PHARM REV CODE 250: Performed by: INTERNAL MEDICINE

## 2021-09-15 PROCEDURE — 99153 MOD SED SAME PHYS/QHP EA: CPT | Performed by: INTERNAL MEDICINE

## 2021-09-15 PROCEDURE — 25500020 PHARM REV CODE 255: Performed by: INTERNAL MEDICINE

## 2021-09-15 PROCEDURE — 93005 ELECTROCARDIOGRAM TRACING: CPT

## 2021-09-15 PROCEDURE — 99152 PR MOD CONSCIOUS SEDATION, SAME PHYS, 5+ YRS, FIRST 15 MIN: ICD-10-PCS | Mod: ,,, | Performed by: INTERNAL MEDICINE

## 2021-09-15 PROCEDURE — 36415 COLL VENOUS BLD VENIPUNCTURE: CPT | Performed by: INTERNAL MEDICINE

## 2021-09-15 PROCEDURE — 99152 MOD SED SAME PHYS/QHP 5/>YRS: CPT | Performed by: INTERNAL MEDICINE

## 2021-09-15 PROCEDURE — 63600175 PHARM REV CODE 636 W HCPCS: Performed by: INTERNAL MEDICINE

## 2021-09-15 PROCEDURE — 93010 ELECTROCARDIOGRAM REPORT: CPT | Mod: ,,, | Performed by: INTERNAL MEDICINE

## 2021-09-15 PROCEDURE — 93455 CORONARY ART/GRFT ANGIO S&I: CPT | Performed by: INTERNAL MEDICINE

## 2021-09-15 RX ORDER — SODIUM CHLORIDE 9 MG/ML
INJECTION, SOLUTION INTRAVENOUS CONTINUOUS
Status: ACTIVE | OUTPATIENT
Start: 2021-09-15 | End: 2021-09-15

## 2021-09-15 RX ORDER — SODIUM CHLORIDE 9 MG/ML
INJECTION, SOLUTION INTRAVENOUS CONTINUOUS
Status: DISCONTINUED | OUTPATIENT
Start: 2021-09-15 | End: 2021-09-17 | Stop reason: HOSPADM

## 2021-09-15 RX ORDER — LIDOCAINE HYDROCHLORIDE 20 MG/ML
INJECTION, SOLUTION EPIDURAL; INFILTRATION; INTRACAUDAL; PERINEURAL
Status: DISCONTINUED | OUTPATIENT
Start: 2021-09-15 | End: 2021-09-17 | Stop reason: HOSPADM

## 2021-09-15 RX ORDER — SPIRONOLACTONE 25 MG/1
25 TABLET ORAL DAILY
Qty: 30 TABLET | Refills: 11 | Status: SHIPPED | OUTPATIENT
Start: 2021-09-15 | End: 2021-09-15 | Stop reason: SDUPTHER

## 2021-09-15 RX ORDER — FENTANYL CITRATE 50 UG/ML
INJECTION, SOLUTION INTRAMUSCULAR; INTRAVENOUS
Status: DISCONTINUED | OUTPATIENT
Start: 2021-09-15 | End: 2021-09-17 | Stop reason: HOSPADM

## 2021-09-15 RX ORDER — DIPHENHYDRAMINE HCL 50 MG
50 CAPSULE ORAL ONCE
Status: COMPLETED | OUTPATIENT
Start: 2021-09-15 | End: 2021-09-15

## 2021-09-15 RX ORDER — HEPARIN SOD,PORCINE/0.9 % NACL 1000/500ML
INTRAVENOUS SOLUTION INTRAVENOUS
Status: DISCONTINUED | OUTPATIENT
Start: 2021-09-15 | End: 2021-09-17 | Stop reason: HOSPADM

## 2021-09-15 RX ORDER — MIDAZOLAM HYDROCHLORIDE 1 MG/ML
INJECTION, SOLUTION INTRAMUSCULAR; INTRAVENOUS
Status: DISCONTINUED | OUTPATIENT
Start: 2021-09-15 | End: 2021-09-17 | Stop reason: HOSPADM

## 2021-09-15 RX ORDER — ACETAMINOPHEN 325 MG/1
650 TABLET ORAL EVERY 4 HOURS PRN
Status: DISCONTINUED | OUTPATIENT
Start: 2021-09-15 | End: 2021-09-17 | Stop reason: HOSPADM

## 2021-09-15 RX ORDER — SPIRONOLACTONE 25 MG/1
25 TABLET ORAL DAILY
Qty: 30 TABLET | Refills: 11 | Status: SHIPPED | OUTPATIENT
Start: 2021-09-15 | End: 2023-01-01

## 2021-09-15 RX ORDER — ONDANSETRON 8 MG/1
8 TABLET, ORALLY DISINTEGRATING ORAL EVERY 8 HOURS PRN
Status: DISCONTINUED | OUTPATIENT
Start: 2021-09-15 | End: 2021-09-17 | Stop reason: HOSPADM

## 2021-09-15 RX ORDER — ASPIRIN 81 MG/1
81 TABLET ORAL ONCE
Status: COMPLETED | OUTPATIENT
Start: 2021-09-15 | End: 2021-09-15

## 2021-09-15 RX ORDER — CLOPIDOGREL BISULFATE 75 MG/1
75 TABLET ORAL ONCE
Status: COMPLETED | OUTPATIENT
Start: 2021-09-15 | End: 2021-09-15

## 2021-09-15 RX ADMIN — ASPIRIN 81 MG: 81 TABLET, COATED ORAL at 09:09

## 2021-09-15 RX ADMIN — DIPHENHYDRAMINE HYDROCHLORIDE 50 MG: 50 CAPSULE ORAL at 09:09

## 2021-09-15 RX ADMIN — CLOPIDOGREL 75 MG: 75 TABLET, FILM COATED ORAL at 09:09

## 2021-09-20 DIAGNOSIS — Z95.1 S/P CABG (CORONARY ARTERY BYPASS GRAFT): Primary | ICD-10-CM

## 2021-09-30 ENCOUNTER — HOSPITAL ENCOUNTER (OUTPATIENT)
Dept: RADIOLOGY | Facility: HOSPITAL | Age: 76
Discharge: HOME | End: 2021-09-30
Attending: INTERNAL MEDICINE
Payer: MEDICARE

## 2021-09-30 ENCOUNTER — IMMUNIZATION (OUTPATIENT)
Dept: INTERNAL MEDICINE | Facility: CLINIC | Age: 76
End: 2021-09-30
Payer: MEDICARE

## 2021-09-30 DIAGNOSIS — Z95.1 S/P CABG (CORONARY ARTERY BYPASS GRAFT): ICD-10-CM

## 2021-09-30 DIAGNOSIS — Z23 NEED FOR VACCINATION: Primary | ICD-10-CM

## 2021-09-30 PROCEDURE — 0003A COVID-19, MRNA, LNP-S, PF, 30 MCG/0.3 ML DOSE VACCINE: CPT | Mod: CV19,PBBFAC | Performed by: INTERNAL MEDICINE

## 2021-09-30 PROCEDURE — 71046 XR CHEST PA AND LATERAL: ICD-10-PCS | Mod: 26,,, | Performed by: RADIOLOGY

## 2021-09-30 PROCEDURE — 71046 X-RAY EXAM CHEST 2 VIEWS: CPT | Mod: TC

## 2021-09-30 PROCEDURE — 71046 X-RAY EXAM CHEST 2 VIEWS: CPT | Mod: 26,,, | Performed by: RADIOLOGY

## 2021-09-30 PROCEDURE — 91300 COVID-19, MRNA, LNP-S, PF, 30 MCG/0.3 ML DOSE VACCINE: CPT | Mod: PBBFAC | Performed by: INTERNAL MEDICINE

## 2021-10-08 NOTE — INTERVAL H&P NOTE
The patient has been examined and the H&P has been reviewed:    I concur with the findings and no changes have occurred since H&P was written.    Anesthesia/Surgery risks, benefits and alternative options discussed and understood by patient/family.          Active Hospital Problems    Diagnosis  POA    Stable angina [I20.8]  Yes      Resolved Hospital Problems   No resolved problems to display.      78

## 2021-12-13 ENCOUNTER — CLINICAL SUPPORT (OUTPATIENT)
Dept: URGENT CARE | Facility: CLINIC | Age: 76
End: 2021-12-13
Payer: MEDICARE

## 2021-12-13 DIAGNOSIS — Z11.9 SCREENING EXAMINATION FOR UNSPECIFIED INFECTIOUS DISEASE: Primary | ICD-10-CM

## 2021-12-13 LAB
CTP QC/QA: YES
SARS-COV-2 RDRP RESP QL NAA+PROBE: NEGATIVE

## 2021-12-13 PROCEDURE — U0002: ICD-10-PCS | Mod: QW,S$GLB,, | Performed by: FAMILY MEDICINE

## 2021-12-13 PROCEDURE — U0002 COVID-19 LAB TEST NON-CDC: HCPCS | Mod: QW,S$GLB,, | Performed by: FAMILY MEDICINE

## 2021-12-13 PROCEDURE — 99211 OFF/OP EST MAY X REQ PHY/QHP: CPT | Mod: S$GLB,,, | Performed by: FAMILY MEDICINE

## 2021-12-13 PROCEDURE — 99211 PR OFFICE/OUTPT VISIT, EST, LEVL I: ICD-10-PCS | Mod: S$GLB,,, | Performed by: FAMILY MEDICINE

## 2022-03-24 ENCOUNTER — PATIENT MESSAGE (OUTPATIENT)
Dept: TRANSPLANT | Facility: CLINIC | Age: 77
End: 2022-03-24
Payer: MEDICARE

## 2022-04-01 ENCOUNTER — PATIENT MESSAGE (OUTPATIENT)
Dept: TRANSPLANT | Facility: CLINIC | Age: 77
End: 2022-04-01
Payer: MEDICARE

## 2022-04-01 ENCOUNTER — PATIENT MESSAGE (OUTPATIENT)
Dept: INTERNAL MEDICINE | Facility: CLINIC | Age: 77
End: 2022-04-01
Payer: MEDICARE

## 2022-04-01 DIAGNOSIS — I10 HYPERTENSION, UNSPECIFIED TYPE: ICD-10-CM

## 2022-04-01 DIAGNOSIS — E79.0 ELEVATED BLOOD URIC ACID LEVEL: ICD-10-CM

## 2022-04-01 DIAGNOSIS — E53.8 VITAMIN B 12 DEFICIENCY: ICD-10-CM

## 2022-04-01 DIAGNOSIS — E78.5 DYSLIPIDEMIA: ICD-10-CM

## 2022-04-01 DIAGNOSIS — I10 ESSENTIAL HYPERTENSION: Primary | ICD-10-CM

## 2022-04-01 DIAGNOSIS — Z12.5 ENCOUNTER FOR PROSTATE CANCER SCREENING: ICD-10-CM

## 2022-04-05 NOTE — PROGRESS NOTES
[facility] Follow up.      Attending Physician: Ken Will MD.  The patient's last visit with me was on Visit date not found.         HPI.  75 yo man with PMH /o 1996 PCI of LAD in 2013 CABG 2/3 patent graft in 2013, HFrEF, LVEF improved to 55% most recently (May 2021).    Comes for regular follow up.  He feels well. Denies congestive symptoms. He has LM but he does not uses CPAP (could not tolerate any of the masks on his face).      Review of Systems   Constitutional: Negative for activity change, appetite change, chills, diaphoresis, fatigue, fever and unexpected weight change.   HENT: Negative for nasal congestion, rhinorrhea and sore throat.    Eyes: Negative for visual disturbance.   Respiratory: Negative for apnea, cough, choking, chest tightness and shortness of breath.    Cardiovascular: Negative for chest pain.   Gastrointestinal: Negative for abdominal distention, abdominal pain, diarrhea, nausea and vomiting.   Genitourinary: Negative for difficulty urinating, dysuria and hematuria.   Integumentary:  Negative for rash.   Neurological: Negative for seizures, syncope and light-headedness.   Psychiatric/Behavioral: Negative for agitation and hallucinations.        Past Medical History:   Diagnosis Date    Coronary artery disease     S/P 3 vessel CABG; stents    Glaucoma     HTN (hypertension) 4/19/2013    Hyperlipidemia     Myocardial infarction     Obstructive sleep apnea     Squamous cell carcinoma         Past Surgical History:   Procedure Laterality Date    ADENOIDECTOMY      ANGIOGRAM, CORONARY, WITH LEFT HEART CATHETERIZATION N/A 9/15/2021    Procedure: ANGIOGRAM,CORONARY,WITH LEFT HEART CATHETERIZATION;  Surgeon: Ken Ibarra MD;  Location: [facility] CATH LAB;  Service: Cardiology;  Laterality: N/A;    CATARACT EXTRACTION      patient not sure which eye     CORONARY ANGIOGRAPHY N/A 5/22/2019    Procedure: ANGIOGRAM, CORONARY ARTERY;  Surgeon: Ken Ibarra MD;  Location: [facility] CATH LAB;  Service: Cardiology;  Laterality: N/A;    CORONARY ANGIOGRAPHY INCLUDING BYPASS GRAFTS WITH CATHETERIZATION OF LEFT HEART Right 4/22/2019    Procedure: ANGIOGRAM, CORONARY, INCLUDING BYPASS GRAFT, WITH LEFT HEART CATHETERIZATION;  Surgeon: Ken Ibarra MD;  Location: [facility] CATH LAB;  Service: Cardiology;  Laterality: Right;    CORONARY ANGIOPLASTY      CORONARY ARTERY BYPASS GRAFT      CORONARY BYPASS GRAFT ANGIOGRAPHY  9/15/2021    Procedure: Bypass graft study;  Surgeon: Ken Ibarra MD;  Location: [facility] CATH LAB;  Service: Cardiology;;    LEFT HEART CATHETERIZATION N/A 5/22/2019    Procedure: CATHETERIZATION, HEART, LEFT;  Surgeon: Ken Ibarra MD;  Location: [facility] CATH LAB;  Service: Cardiology;  Laterality: N/A;    ROTATOR CUFF REPAIR      SHOULDER SURGERY      TONSILLECTOMY Left 2015        Family History   Problem Relation Age of Onset    Heart disease Father     Hypertension Father     Heart failure Father     Heart attack Father     Diabetes Maternal Grandmother     Diabetes Maternal Grandfather     Melanoma Neg Hx         Review of patient's allergies indicates:  No Known Allergies     Current Outpatient Medications   Medication Instructions    allopurinoL (ZYLOPRIM) 300 mg, Oral, Nightly    aspirin (ECOTRIN) 81 mg, Oral, Daily    atorvastatin (LIPITOR) 40 MG tablet TAKE 1 TABLET BY MOUTH EVERY DAY    colchicine (COLCRYS) 0.6 mg, Oral, Daily    cyclobenzaprine (FLEXERIL) 10 MG tablet TAKE 1 TABLET BY MOUTH TWICE DAILY FOR MUSLE SPASMS    etodolac (LODINE) 400 mg, Oral, As needed (PRN)    furosemide (LASIX) 40 MG tablet TAKE 1 TABLET(40 MG) BY MOUTH TWICE DAILY    HYDROcodone-acetaminophen (NORCO) 7.5-325 mg per tablet 1 tablet, Oral, Every 4-6 hours PRN    indomethacin (INDOCIN) 50 mg, Oral, 2 times daily with meals    ketorolac 0.5%  (ACULAR) 0.5 % Drop INSTILL 1 DROP INTO THE LEFT EYE FOUR TIMES DAILY    lisinopriL (PRINIVIL,ZESTRIL) 40 MG tablet TAKE 1 TABLET(40 MG) BY MOUTH EVERY NIGHT    metoprolol succinate (TOPROL-XL) 50 mg, Oral, Nightly    sildenafiL (VIAGRA) 100 mg, Oral, Daily PRN    spironolactone (ALDACTONE) 25 mg, Oral, Daily    tamsulosin (FLOMAX) 0.4 mg Cap 1 capsule, Oral, Daily        Vitals:    04/06/22 0858   BP: (!) 128/58   Pulse: 84        Wt Readings from Last 3 Encounters:   04/06/22 103.5 kg (228 lb 2.8 oz)   09/15/21 103.4 kg (228 lb)   08/25/21 106 kg (233 lb 11 oz)     Temp Readings from Last 3 Encounters:   09/15/21 97.9 °F (36.6 °C) (Temporal)   05/22/19 98 °F (36.7 °C) (Oral)   04/22/19 97.3 °F (36.3 °C) (Oral)     BP Readings from Last 3 Encounters:   04/06/22 (!) 128/58   09/15/21 124/74   08/25/21 (!) 145/67     Pulse Readings from Last 3 Encounters:   04/06/22 84   09/15/21 61   08/25/21 74        Body mass index is 30.95 kg/m². Estimated body surface area is 2.29 meters squared as calculated from the following:    Height as of this encounter: 6' (1.829 m).    Weight as of this encounter: 103.5 kg (228 lb 2.8 oz).     Physical Exam  Constitutional:       Appearance: He is well-developed.   HENT:      Head: Normocephalic and atraumatic.      Right Ear: External ear normal.      Left Ear: External ear normal.   Eyes:      Conjunctiva/sclera: Conjunctivae normal.      Pupils: Pupils are equal, round, and reactive to light.   Neck:      Vascular: No hepatojugular reflux or JVD.   Cardiovascular:      Rate and Rhythm: Normal rate and regular rhythm.      Pulses: Intact distal pulses.      Heart sounds: S1 normal and S2 normal. No murmur heard.    No friction rub. No gallop.   Pulmonary:      Effort: Pulmonary effort is normal.      Breath sounds: Normal breath sounds.   Abdominal:      General: Bowel sounds are normal. There is no distension.      Palpations: Abdomen is soft.      Tenderness: There is no  abdominal tenderness. There is no guarding or rebound.   Musculoskeletal:      Cervical back: Normal range of motion and neck supple.      Right lower leg: No edema.      Left lower leg: No edema.   Neurological:      Mental Status: He is alert and oriented to person, place, and time.          Lab Results   Component Value Date     (H) 08/25/2021     09/15/2021    K 4.1 09/15/2021    MG 2.2 06/18/2021     (H) 09/15/2021    CO2 23 09/15/2021    BUN 29 (H) 09/15/2021    CREATININE 1.1 09/15/2021    GLU 96 09/15/2021    HGBA1C 5.5 07/15/2021    AST 23 06/18/2021    ALT 22 06/18/2021    ALBUMIN 3.5 06/18/2021    ALBUMIN 4.0 06/21/2017    PROT 7.2 06/18/2021    BILITOT 0.6 06/18/2021    WBC 7.18 09/15/2021    HGB 13.7 (L) 09/15/2021    HCT 41.5 09/15/2021     09/15/2021    INR 1.0 04/22/2019    TSH 2.667 07/15/2021    CHOL 101 (L) 07/15/2021    HDL 39 (L) 07/15/2021    LDLCALC 52.8 (L) 07/15/2021    TRIG 46 07/15/2021         Results for orders placed during the hospital encounter of 05/05/21    Echo Color Flow Doppler? Yes    Interpretation Summary  · Severe left atrial enlargement.  · The left ventricle is mildly enlarged with normal systolic function.  · The estimated ejection fraction is 55%.  · Grade II left ventricular diastolic dysfunction.  · Mild right atrial enlargement.  · Normal right ventricular size with normal right ventricular systolic function.  · Moderate eccentric, posteriorly directed mitral regurgitation. The etiology of the eccentric mitral regurgitation is not evident on this study.  · There is pulmonary hypertension. The estimated PA systolic pressure is 60 mmHg.  · Normal central venous pressure (3 mmHg).        Results for orders placed during the hospital encounter of 09/15/21    Cardiac catheterization    Conclusion  · The Prox RCA lesion was 80% stenosed.  · The Mid RCA lesion was 80% stenosed.  · The Prox Cx lesion was 70% stenosed.  · The estimated blood loss was  none.  · There was three vessel coronary artery disease.  · Patent LIMA to OM and SVG to RCA.  · No change in anatomy since prior angiogram.    The procedure log was documented by Documenter: Bj Rosas and verified by Ken Ibarra MD.    Date: 9/15/2021  Time: 2:29 PM         Assessment and Plan:  Chronic systolic congestive heart failure  -     CBC Auto Differential; Future; Expected date: 04/06/2022  -     Comprehensive Metabolic Panel; Future; Expected date: 04/06/2022  -     BNP; Future; Expected date: 04/06/2022    S/P CABG (coronary artery bypass graft)    Post PTCA    Coronary artery disease involving native coronary artery of native heart without angina pectoris    Primary hypertension    Dyslipidemia    Mild left ventricular systolic dysfunction (LVEF 45% 2019)     Obesity (BMI 30-39.9)          1. Chronic systolic HF with recovered EF to 55% (from 45%), NYHA class II, stage C.  Etiology: ICM CAD.  Hemodynamic status: warm, normotensive, euvolemic.  Clinical course:  No admissions or ER visits for HF.  Plan:  -no changes on medications today.  -follow up in 6 months with labs.        2. CAD s/p CABG.  Last [facility] in 2021 with unchanged anatomy and no PCI was done.    3.  Dyslipidemia.  LDL chol 62 today.

## 2022-04-06 ENCOUNTER — OFFICE VISIT (OUTPATIENT)
Dept: TRANSPLANT | Facility: CLINIC | Age: 77
End: 2022-04-06
Payer: MEDICARE

## 2022-04-06 ENCOUNTER — LAB VISIT (OUTPATIENT)
Dept: LAB | Facility: HOSPITAL | Age: 77
End: 2022-04-06
Payer: MEDICARE

## 2022-04-06 VITALS
BODY MASS INDEX: 30.91 KG/M2 | WEIGHT: 228.19 LBS | SYSTOLIC BLOOD PRESSURE: 128 MMHG | DIASTOLIC BLOOD PRESSURE: 58 MMHG | HEIGHT: 72 IN | HEART RATE: 84 BPM

## 2022-04-06 DIAGNOSIS — E79.0 ELEVATED BLOOD URIC ACID LEVEL: ICD-10-CM

## 2022-04-06 DIAGNOSIS — E78.5 DYSLIPIDEMIA: ICD-10-CM

## 2022-04-06 DIAGNOSIS — I10 PRIMARY HYPERTENSION: ICD-10-CM

## 2022-04-06 DIAGNOSIS — E66.9 OBESITY (BMI 30-39.9): Chronic | ICD-10-CM

## 2022-04-06 DIAGNOSIS — I51.89 MILD LEFT VENTRICULAR SYSTOLIC DYSFUNCTION: ICD-10-CM

## 2022-04-06 DIAGNOSIS — I50.22 CHRONIC SYSTOLIC CONGESTIVE HEART FAILURE: Primary | ICD-10-CM

## 2022-04-06 DIAGNOSIS — Z95.1 S/P CABG (CORONARY ARTERY BYPASS GRAFT): Chronic | ICD-10-CM

## 2022-04-06 DIAGNOSIS — E53.8 VITAMIN B 12 DEFICIENCY: ICD-10-CM

## 2022-04-06 DIAGNOSIS — Z12.5 ENCOUNTER FOR PROSTATE CANCER SCREENING: ICD-10-CM

## 2022-04-06 DIAGNOSIS — I25.10 CORONARY ARTERY DISEASE INVOLVING NATIVE CORONARY ARTERY OF NATIVE HEART WITHOUT ANGINA PECTORIS: ICD-10-CM

## 2022-04-06 DIAGNOSIS — Z98.61 POST PTCA: Chronic | ICD-10-CM

## 2022-04-06 DIAGNOSIS — I10 ESSENTIAL HYPERTENSION: ICD-10-CM

## 2022-04-06 LAB
ALBUMIN SERPL BCP-MCNC: 3.8 G/DL (ref 3.5–5.2)
ALP SERPL-CCNC: 81 U/L (ref 55–135)
ALT SERPL W/O P-5'-P-CCNC: 36 U/L (ref 10–44)
ANION GAP SERPL CALC-SCNC: 11 MMOL/L (ref 8–16)
ANION GAP SERPL CALC-SCNC: 11 MMOL/L (ref 8–16)
AST SERPL-CCNC: 22 U/L (ref 10–40)
BASOPHILS # BLD AUTO: 0.09 K/UL (ref 0–0.2)
BASOPHILS NFR BLD: 1 % (ref 0–1.9)
BILIRUB SERPL-MCNC: 0.8 MG/DL (ref 0.1–1)
BNP SERPL-MCNC: 336 PG/ML (ref 0–99)
BUN SERPL-MCNC: 39 MG/DL (ref 8–23)
BUN SERPL-MCNC: 39 MG/DL (ref 8–23)
CALCIUM SERPL-MCNC: 10 MG/DL (ref 8.7–10.5)
CALCIUM SERPL-MCNC: 10 MG/DL (ref 8.7–10.5)
CHLORIDE SERPL-SCNC: 109 MMOL/L (ref 95–110)
CHLORIDE SERPL-SCNC: 109 MMOL/L (ref 95–110)
CHOLEST SERPL-MCNC: 117 MG/DL (ref 120–199)
CHOLEST/HDLC SERPL: 3.3 {RATIO} (ref 2–5)
CO2 SERPL-SCNC: 23 MMOL/L (ref 23–29)
CO2 SERPL-SCNC: 23 MMOL/L (ref 23–29)
COMPLEXED PSA SERPL-MCNC: 2.5 NG/ML (ref 0–4)
CREAT SERPL-MCNC: 1.1 MG/DL (ref 0.5–1.4)
CREAT SERPL-MCNC: 1.1 MG/DL (ref 0.5–1.4)
DIFFERENTIAL METHOD: ABNORMAL
EOSINOPHIL # BLD AUTO: 0.7 K/UL (ref 0–0.5)
EOSINOPHIL NFR BLD: 7.5 % (ref 0–8)
ERYTHROCYTE [DISTWIDTH] IN BLOOD BY AUTOMATED COUNT: 14.5 % (ref 11.5–14.5)
EST. GFR  (AFRICAN AMERICAN): >60 ML/MIN/1.73 M^2
EST. GFR  (AFRICAN AMERICAN): >60 ML/MIN/1.73 M^2
EST. GFR  (NON AFRICAN AMERICAN): >60 ML/MIN/1.73 M^2
EST. GFR  (NON AFRICAN AMERICAN): >60 ML/MIN/1.73 M^2
GLUCOSE SERPL-MCNC: 118 MG/DL (ref 70–110)
GLUCOSE SERPL-MCNC: 118 MG/DL (ref 70–110)
HCT VFR BLD AUTO: 43.6 % (ref 40–54)
HDLC SERPL-MCNC: 36 MG/DL (ref 40–75)
HDLC SERPL: 30.8 % (ref 20–50)
HGB BLD-MCNC: 14.2 G/DL (ref 14–18)
IMM GRANULOCYTES # BLD AUTO: 0.05 K/UL (ref 0–0.04)
IMM GRANULOCYTES NFR BLD AUTO: 0.5 % (ref 0–0.5)
LDLC SERPL CALC-MCNC: 62 MG/DL (ref 63–159)
LYMPHOCYTES # BLD AUTO: 1.6 K/UL (ref 1–4.8)
LYMPHOCYTES NFR BLD: 16.8 % (ref 18–48)
MCH RBC QN AUTO: 31.5 PG (ref 27–31)
MCHC RBC AUTO-ENTMCNC: 32.6 G/DL (ref 32–36)
MCV RBC AUTO: 97 FL (ref 82–98)
MONOCYTES # BLD AUTO: 0.7 K/UL (ref 0.3–1)
MONOCYTES NFR BLD: 7.3 % (ref 4–15)
NEUTROPHILS # BLD AUTO: 6.2 K/UL (ref 1.8–7.7)
NEUTROPHILS NFR BLD: 66.9 % (ref 38–73)
NONHDLC SERPL-MCNC: 81 MG/DL
NRBC BLD-RTO: 0 /100 WBC
PLATELET # BLD AUTO: 235 K/UL (ref 150–450)
PMV BLD AUTO: 11.6 FL (ref 9.2–12.9)
POTASSIUM SERPL-SCNC: 4.6 MMOL/L (ref 3.5–5.1)
POTASSIUM SERPL-SCNC: 4.6 MMOL/L (ref 3.5–5.1)
PROT SERPL-MCNC: 7.1 G/DL (ref 6–8.4)
RBC # BLD AUTO: 4.51 M/UL (ref 4.6–6.2)
SODIUM SERPL-SCNC: 143 MMOL/L (ref 136–145)
SODIUM SERPL-SCNC: 143 MMOL/L (ref 136–145)
TRIGL SERPL-MCNC: 95 MG/DL (ref 30–150)
TSH SERPL DL<=0.005 MIU/L-ACNC: 1.86 UIU/ML (ref 0.4–4)
URATE SERPL-MCNC: 6.3 MG/DL (ref 3.4–7)
VIT B12 SERPL-MCNC: 565 PG/ML (ref 210–950)
WBC # BLD AUTO: 9.2 K/UL (ref 3.9–12.7)

## 2022-04-06 PROCEDURE — 84443 ASSAY THYROID STIM HORMONE: CPT | Performed by: INTERNAL MEDICINE

## 2022-04-06 PROCEDURE — 3078F PR MOST RECENT DIASTOLIC BLOOD PRESSURE < 80 MM HG: ICD-10-PCS | Mod: CPTII,S$GLB,, | Performed by: INTERNAL MEDICINE

## 2022-04-06 PROCEDURE — 85025 COMPLETE CBC W/AUTO DIFF WBC: CPT | Performed by: INTERNAL MEDICINE

## 2022-04-06 PROCEDURE — 99999 PR PBB SHADOW E&M-EST. PATIENT-LVL IV: ICD-10-PCS | Mod: PBBFAC,,, | Performed by: INTERNAL MEDICINE

## 2022-04-06 PROCEDURE — 36415 COLL VENOUS BLD VENIPUNCTURE: CPT | Performed by: INTERNAL MEDICINE

## 2022-04-06 PROCEDURE — 1159F MED LIST DOCD IN RCRD: CPT | Mod: CPTII,S$GLB,, | Performed by: INTERNAL MEDICINE

## 2022-04-06 PROCEDURE — 3288F FALL RISK ASSESSMENT DOCD: CPT | Mod: CPTII,S$GLB,, | Performed by: INTERNAL MEDICINE

## 2022-04-06 PROCEDURE — 3074F SYST BP LT 130 MM HG: CPT | Mod: CPTII,S$GLB,, | Performed by: INTERNAL MEDICINE

## 2022-04-06 PROCEDURE — 84153 ASSAY OF PSA TOTAL: CPT | Performed by: INTERNAL MEDICINE

## 2022-04-06 PROCEDURE — 1159F PR MEDICATION LIST DOCUMENTED IN MEDICAL RECORD: ICD-10-PCS | Mod: CPTII,S$GLB,, | Performed by: INTERNAL MEDICINE

## 2022-04-06 PROCEDURE — 3074F PR MOST RECENT SYSTOLIC BLOOD PRESSURE < 130 MM HG: ICD-10-PCS | Mod: CPTII,S$GLB,, | Performed by: INTERNAL MEDICINE

## 2022-04-06 PROCEDURE — 1101F PT FALLS ASSESS-DOCD LE1/YR: CPT | Mod: CPTII,S$GLB,, | Performed by: INTERNAL MEDICINE

## 2022-04-06 PROCEDURE — 99214 OFFICE O/P EST MOD 30 MIN: CPT | Mod: S$GLB,,, | Performed by: INTERNAL MEDICINE

## 2022-04-06 PROCEDURE — 99999 PR PBB SHADOW E&M-EST. PATIENT-LVL IV: CPT | Mod: PBBFAC,,, | Performed by: INTERNAL MEDICINE

## 2022-04-06 PROCEDURE — 1126F PR PAIN SEVERITY QUANTIFIED, NO PAIN PRESENT: ICD-10-PCS | Mod: CPTII,S$GLB,, | Performed by: INTERNAL MEDICINE

## 2022-04-06 PROCEDURE — 84550 ASSAY OF BLOOD/URIC ACID: CPT | Performed by: INTERNAL MEDICINE

## 2022-04-06 PROCEDURE — 1126F AMNT PAIN NOTED NONE PRSNT: CPT | Mod: CPTII,S$GLB,, | Performed by: INTERNAL MEDICINE

## 2022-04-06 PROCEDURE — 1101F PR PT FALLS ASSESS DOC 0-1 FALLS W/OUT INJ PAST YR: ICD-10-PCS | Mod: CPTII,S$GLB,, | Performed by: INTERNAL MEDICINE

## 2022-04-06 PROCEDURE — 3078F DIAST BP <80 MM HG: CPT | Mod: CPTII,S$GLB,, | Performed by: INTERNAL MEDICINE

## 2022-04-06 PROCEDURE — 83880 ASSAY OF NATRIURETIC PEPTIDE: CPT | Performed by: INTERNAL MEDICINE

## 2022-04-06 PROCEDURE — 80061 LIPID PANEL: CPT | Performed by: INTERNAL MEDICINE

## 2022-04-06 PROCEDURE — 3288F PR FALLS RISK ASSESSMENT DOCUMENTED: ICD-10-PCS | Mod: CPTII,S$GLB,, | Performed by: INTERNAL MEDICINE

## 2022-04-06 PROCEDURE — 82607 VITAMIN B-12: CPT | Performed by: INTERNAL MEDICINE

## 2022-04-06 PROCEDURE — 80053 COMPREHEN METABOLIC PANEL: CPT | Performed by: INTERNAL MEDICINE

## 2022-04-06 PROCEDURE — 99214 PR OFFICE/OUTPT VISIT, EST, LEVL IV, 30-39 MIN: ICD-10-PCS | Mod: S$GLB,,, | Performed by: INTERNAL MEDICINE

## 2022-04-06 RX ORDER — CYCLOBENZAPRINE HCL 10 MG
TABLET ORAL
Status: ON HOLD | COMMUNITY
Start: 2022-03-23 | End: 2023-01-01 | Stop reason: HOSPADM

## 2022-04-06 RX ORDER — HYDROCODONE BITARTRATE AND ACETAMINOPHEN 7.5; 325 MG/1; MG/1
1 TABLET ORAL
Status: ON HOLD | COMMUNITY
Start: 2022-03-23 | End: 2023-01-01

## 2022-04-06 NOTE — PATIENT INSTRUCTIONS
You have just the right amount of fluid on you.  Please adhere to a low sodium diet (no more than 1.5 grams of sodium in 24h).  3.   Follow fluid restriction of  1. no more than 2 liters in 24 hours..   4.   Follow up in 6 month with labs.  5.   No changes on your medications today.

## 2022-05-04 ENCOUNTER — IMMUNIZATION (OUTPATIENT)
Dept: INTERNAL MEDICINE | Facility: CLINIC | Age: 77
End: 2022-05-04
Payer: MEDICARE

## 2022-05-04 DIAGNOSIS — Z23 NEED FOR VACCINATION: Primary | ICD-10-CM

## 2022-05-04 PROCEDURE — 91305 COVID-19, MRNA, LNP-S, PF, 30 MCG/0.3 ML DOSE VACCINE (PFIZER): CPT | Mod: PBBFAC | Performed by: INTERNAL MEDICINE

## 2022-07-12 ENCOUNTER — PES CALL (OUTPATIENT)
Dept: ADMINISTRATIVE | Facility: CLINIC | Age: 77
End: 2022-07-12
Payer: MEDICARE

## 2022-07-24 RX ORDER — METOPROLOL SUCCINATE 50 MG/1
50 TABLET, EXTENDED RELEASE ORAL NIGHTLY
Qty: 30 TABLET | Refills: 11 | Status: SHIPPED | OUTPATIENT
Start: 2022-07-24 | End: 2023-01-01

## 2022-08-15 ENCOUNTER — TELEPHONE (OUTPATIENT)
Dept: TRANSPLANT | Facility: CLINIC | Age: 77
End: 2022-08-15
Payer: MEDICARE

## 2022-09-16 ENCOUNTER — OFFICE VISIT (OUTPATIENT)
Dept: TRANSPLANT | Facility: CLINIC | Age: 77
End: 2022-09-16
Payer: MEDICARE

## 2022-09-16 ENCOUNTER — LAB VISIT (OUTPATIENT)
Dept: LAB | Facility: HOSPITAL | Age: 77
End: 2022-09-16
Attending: INTERNAL MEDICINE
Payer: MEDICARE

## 2022-09-16 VITALS
BODY MASS INDEX: 29.54 KG/M2 | HEART RATE: 62 BPM | WEIGHT: 222.88 LBS | SYSTOLIC BLOOD PRESSURE: 109 MMHG | DIASTOLIC BLOOD PRESSURE: 55 MMHG | HEIGHT: 73 IN

## 2022-09-16 DIAGNOSIS — G47.33 OBSTRUCTIVE SLEEP APNEA: ICD-10-CM

## 2022-09-16 DIAGNOSIS — N18.31 STAGE 3A CHRONIC KIDNEY DISEASE: ICD-10-CM

## 2022-09-16 DIAGNOSIS — I25.10 CORONARY ARTERY DISEASE INVOLVING NATIVE CORONARY ARTERY OF NATIVE HEART WITHOUT ANGINA PECTORIS: ICD-10-CM

## 2022-09-16 DIAGNOSIS — I50.22 CHRONIC SYSTOLIC CONGESTIVE HEART FAILURE: ICD-10-CM

## 2022-09-16 DIAGNOSIS — Z01.810 PREOPERATIVE CARDIOVASCULAR EXAMINATION: ICD-10-CM

## 2022-09-16 DIAGNOSIS — I10 PRIMARY HYPERTENSION: ICD-10-CM

## 2022-09-16 DIAGNOSIS — Z95.1 S/P CABG (CORONARY ARTERY BYPASS GRAFT): Chronic | ICD-10-CM

## 2022-09-16 DIAGNOSIS — I50.22 CHRONIC SYSTOLIC CONGESTIVE HEART FAILURE: Primary | ICD-10-CM

## 2022-09-16 DIAGNOSIS — E78.5 DYSLIPIDEMIA: ICD-10-CM

## 2022-09-16 PROBLEM — N17.9 ACUTE KIDNEY INJURY: Status: RESOLVED | Noted: 2019-04-25 | Resolved: 2022-09-16

## 2022-09-16 PROBLEM — N18.4 CKD (CHRONIC KIDNEY DISEASE), STAGE IV: Status: RESOLVED | Noted: 2021-07-16 | Resolved: 2022-09-16

## 2022-09-16 PROBLEM — R39.198 URINE STREAM SPRAYING: Status: RESOLVED | Noted: 2019-01-04 | Resolved: 2022-09-16

## 2022-09-16 PROBLEM — N18.30 CKD (CHRONIC KIDNEY DISEASE) STAGE 3, GFR 30-59 ML/MIN: Status: ACTIVE | Noted: 2022-09-16

## 2022-09-16 PROBLEM — I34.0 MODERATE MITRAL REGURGITATION BY PRIOR ECHOCARDIOGRAM: Status: RESOLVED | Noted: 2019-02-16 | Resolved: 2022-09-16

## 2022-09-16 PROBLEM — I51.89 MILD LEFT VENTRICULAR SYSTOLIC DYSFUNCTION: Status: RESOLVED | Noted: 2019-02-16 | Resolved: 2022-09-16

## 2022-09-16 LAB
ALBUMIN SERPL BCP-MCNC: 3.5 G/DL (ref 3.5–5.2)
ALP SERPL-CCNC: 63 U/L (ref 55–135)
ALT SERPL W/O P-5'-P-CCNC: 22 U/L (ref 10–44)
ANION GAP SERPL CALC-SCNC: 8 MMOL/L (ref 8–16)
AST SERPL-CCNC: 18 U/L (ref 10–40)
BASOPHILS # BLD AUTO: 0.06 K/UL (ref 0–0.2)
BASOPHILS NFR BLD: 0.5 % (ref 0–1.9)
BILIRUB SERPL-MCNC: 0.4 MG/DL (ref 0.1–1)
BNP SERPL-MCNC: 365 PG/ML (ref 0–99)
BUN SERPL-MCNC: 51 MG/DL (ref 8–23)
CALCIUM SERPL-MCNC: 9.8 MG/DL (ref 8.7–10.5)
CHLORIDE SERPL-SCNC: 111 MMOL/L (ref 95–110)
CO2 SERPL-SCNC: 19 MMOL/L (ref 23–29)
CREAT SERPL-MCNC: 1.5 MG/DL (ref 0.5–1.4)
DIFFERENTIAL METHOD: ABNORMAL
EOSINOPHIL # BLD AUTO: 0.7 K/UL (ref 0–0.5)
EOSINOPHIL NFR BLD: 5.6 % (ref 0–8)
ERYTHROCYTE [DISTWIDTH] IN BLOOD BY AUTOMATED COUNT: 14.3 % (ref 11.5–14.5)
EST. GFR  (NO RACE VARIABLE): 48 ML/MIN/1.73 M^2
GLUCOSE SERPL-MCNC: 97 MG/DL (ref 70–110)
HCT VFR BLD AUTO: 38.6 % (ref 40–54)
HGB BLD-MCNC: 13.1 G/DL (ref 14–18)
IMM GRANULOCYTES # BLD AUTO: 0.12 K/UL (ref 0–0.04)
IMM GRANULOCYTES NFR BLD AUTO: 1 % (ref 0–0.5)
LYMPHOCYTES # BLD AUTO: 2 K/UL (ref 1–4.8)
LYMPHOCYTES NFR BLD: 16.4 % (ref 18–48)
MCH RBC QN AUTO: 32 PG (ref 27–31)
MCHC RBC AUTO-ENTMCNC: 33.9 G/DL (ref 32–36)
MCV RBC AUTO: 94 FL (ref 82–98)
MONOCYTES # BLD AUTO: 1.1 K/UL (ref 0.3–1)
MONOCYTES NFR BLD: 9.4 % (ref 4–15)
NEUTROPHILS # BLD AUTO: 7.9 K/UL (ref 1.8–7.7)
NEUTROPHILS NFR BLD: 67.1 % (ref 38–73)
NRBC BLD-RTO: 0 /100 WBC
PLATELET # BLD AUTO: 218 K/UL (ref 150–450)
PMV BLD AUTO: 10.6 FL (ref 9.2–12.9)
POTASSIUM SERPL-SCNC: 4.2 MMOL/L (ref 3.5–5.1)
PROT SERPL-MCNC: 7.1 G/DL (ref 6–8.4)
RBC # BLD AUTO: 4.09 M/UL (ref 4.6–6.2)
SODIUM SERPL-SCNC: 138 MMOL/L (ref 136–145)
WBC # BLD AUTO: 11.86 K/UL (ref 3.9–12.7)

## 2022-09-16 PROCEDURE — 1126F AMNT PAIN NOTED NONE PRSNT: CPT | Mod: CPTII,S$GLB,, | Performed by: INTERNAL MEDICINE

## 2022-09-16 PROCEDURE — 3074F SYST BP LT 130 MM HG: CPT | Mod: CPTII,S$GLB,, | Performed by: INTERNAL MEDICINE

## 2022-09-16 PROCEDURE — 83880 ASSAY OF NATRIURETIC PEPTIDE: CPT | Performed by: INTERNAL MEDICINE

## 2022-09-16 PROCEDURE — 99214 PR OFFICE/OUTPT VISIT, EST, LEVL IV, 30-39 MIN: ICD-10-PCS | Mod: S$GLB,,, | Performed by: INTERNAL MEDICINE

## 2022-09-16 PROCEDURE — 3288F FALL RISK ASSESSMENT DOCD: CPT | Mod: CPTII,S$GLB,, | Performed by: INTERNAL MEDICINE

## 2022-09-16 PROCEDURE — 80053 COMPREHEN METABOLIC PANEL: CPT | Performed by: INTERNAL MEDICINE

## 2022-09-16 PROCEDURE — 3078F DIAST BP <80 MM HG: CPT | Mod: CPTII,S$GLB,, | Performed by: INTERNAL MEDICINE

## 2022-09-16 PROCEDURE — 99214 OFFICE O/P EST MOD 30 MIN: CPT | Mod: S$GLB,,, | Performed by: INTERNAL MEDICINE

## 2022-09-16 PROCEDURE — 1159F MED LIST DOCD IN RCRD: CPT | Mod: CPTII,S$GLB,, | Performed by: INTERNAL MEDICINE

## 2022-09-16 PROCEDURE — 99499 UNLISTED E&M SERVICE: CPT | Mod: HCNC,S$GLB,, | Performed by: INTERNAL MEDICINE

## 2022-09-16 PROCEDURE — 3078F PR MOST RECENT DIASTOLIC BLOOD PRESSURE < 80 MM HG: ICD-10-PCS | Mod: CPTII,S$GLB,, | Performed by: INTERNAL MEDICINE

## 2022-09-16 PROCEDURE — 3288F PR FALLS RISK ASSESSMENT DOCUMENTED: ICD-10-PCS | Mod: CPTII,S$GLB,, | Performed by: INTERNAL MEDICINE

## 2022-09-16 PROCEDURE — 99499 RISK ADDL DX/OHS AUDIT: ICD-10-PCS | Mod: HCNC,S$GLB,, | Performed by: INTERNAL MEDICINE

## 2022-09-16 PROCEDURE — 1126F PR PAIN SEVERITY QUANTIFIED, NO PAIN PRESENT: ICD-10-PCS | Mod: CPTII,S$GLB,, | Performed by: INTERNAL MEDICINE

## 2022-09-16 PROCEDURE — 99999 PR PBB SHADOW E&M-EST. PATIENT-LVL IV: CPT | Mod: PBBFAC,,, | Performed by: INTERNAL MEDICINE

## 2022-09-16 PROCEDURE — 85025 COMPLETE CBC W/AUTO DIFF WBC: CPT | Performed by: INTERNAL MEDICINE

## 2022-09-16 PROCEDURE — 1101F PT FALLS ASSESS-DOCD LE1/YR: CPT | Mod: CPTII,S$GLB,, | Performed by: INTERNAL MEDICINE

## 2022-09-16 PROCEDURE — 36415 COLL VENOUS BLD VENIPUNCTURE: CPT | Performed by: INTERNAL MEDICINE

## 2022-09-16 PROCEDURE — 3074F PR MOST RECENT SYSTOLIC BLOOD PRESSURE < 130 MM HG: ICD-10-PCS | Mod: CPTII,S$GLB,, | Performed by: INTERNAL MEDICINE

## 2022-09-16 PROCEDURE — 1159F PR MEDICATION LIST DOCUMENTED IN MEDICAL RECORD: ICD-10-PCS | Mod: CPTII,S$GLB,, | Performed by: INTERNAL MEDICINE

## 2022-09-16 PROCEDURE — 1101F PR PT FALLS ASSESS DOC 0-1 FALLS W/OUT INJ PAST YR: ICD-10-PCS | Mod: CPTII,S$GLB,, | Performed by: INTERNAL MEDICINE

## 2022-09-16 PROCEDURE — 99999 PR PBB SHADOW E&M-EST. PATIENT-LVL IV: ICD-10-PCS | Mod: PBBFAC,,, | Performed by: INTERNAL MEDICINE

## 2022-09-16 RX ORDER — FUROSEMIDE 40 MG/1
40 TABLET ORAL DAILY
Qty: 45 TABLET | Refills: 11 | Status: SHIPPED | OUTPATIENT
Start: 2022-09-16 | End: 2023-01-01

## 2022-09-16 NOTE — PROGRESS NOTES
[facility] Follow up.        Attending Physician: Ken Will MD.  The patient's last visit with me was on 4/6/2022.         HPI.  75 yo man with PMH /o 1996 PCI of LAD in 2013 CABG 2/3 patent graft in 2013, HFrEF, LVEF improved to 55% most recently (May 2021).     Comes for regular follow up.  He feels well. Denies congestive symptoms. He has LM but he does not uses CPAP (could not tolerate any of the masks on his face).       Review of Systems   Constitutional:  Negative for activity change, appetite change, chills, diaphoresis, fever and unexpected weight change.   HENT:  Negative for nasal congestion, rhinorrhea and sore throat.    Eyes:  Negative for visual disturbance.   Respiratory:  Negative for apnea, cough, choking, chest tightness and shortness of breath.    Cardiovascular:  Negative for chest pain, palpitations and leg swelling.   Gastrointestinal:  Negative for abdominal pain, diarrhea, nausea and vomiting.   Genitourinary:  Negative for difficulty urinating, dysuria and hematuria.   Integumentary:  Negative for rash.   Neurological:  Negative for seizures, syncope and light-headedness.   Psychiatric/Behavioral:  Negative for agitation and hallucinations.       Past Medical History:   Diagnosis Date    Coronary artery disease     S/P 3 vessel CABG; stents    Glaucoma     HTN (hypertension) 4/19/2013    Hyperlipidemia     Myocardial infarction     Obstructive sleep apnea     Squamous cell carcinoma         Past Surgical History:   Procedure Laterality Date    ADENOIDECTOMY      ANGIOGRAM, CORONARY, WITH LEFT HEART CATHETERIZATION N/A 9/15/2021    Procedure: ANGIOGRAM,CORONARY,WITH LEFT HEART CATHETERIZATION;  Surgeon: Ken Ibarra MD;  Location: [facility] CATH LAB;  Service: Cardiology;  Laterality: N/A;    CATARACT EXTRACTION      patient not sure which eye    CORONARY  ANGIOGRAPHY N/A 5/22/2019    Procedure: ANGIOGRAM, CORONARY ARTERY;  Surgeon: Ken Ibarra MD;  Location: [facility] CATH LAB;  Service: Cardiology;  Laterality: N/A;    CORONARY ANGIOGRAPHY INCLUDING BYPASS GRAFTS WITH CATHETERIZATION OF LEFT HEART Right 4/22/2019    Procedure: ANGIOGRAM, CORONARY, INCLUDING BYPASS GRAFT, WITH LEFT HEART CATHETERIZATION;  Surgeon: Ken Ibarra MD;  Location: [facility] CATH LAB;  Service: Cardiology;  Laterality: Right;    CORONARY ANGIOPLASTY      CORONARY ARTERY BYPASS GRAFT      CORONARY BYPASS GRAFT ANGIOGRAPHY  9/15/2021    Procedure: Bypass graft study;  Surgeon: Ken Ibarra MD;  Location: [facility] CATH LAB;  Service: Cardiology;;    LEFT HEART CATHETERIZATION N/A 5/22/2019    Procedure: CATHETERIZATION, HEART, LEFT;  Surgeon: Ken Ibarra MD;  Location: [facility] CATH LAB;  Service: Cardiology;  Laterality: N/A;    ROTATOR CUFF REPAIR      SHOULDER SURGERY      TONSILLECTOMY Left 2015        Family History   Problem Relation Age of Onset    Heart disease Father     Hypertension Father     Heart failure Father     Heart attack Father     Diabetes Maternal Grandmother     Diabetes Maternal Grandfather     Melanoma Neg Hx         Review of patient's allergies indicates:  No Known Allergies     Current Outpatient Medications   Medication Instructions    allopurinoL (ZYLOPRIM) 300 mg, Oral, Nightly    aspirin (ECOTRIN) 81 mg, Oral, Daily    atorvastatin (LIPITOR) 40 MG tablet TAKE 1 TABLET BY MOUTH EVERY DAY    colchicine (COLCRYS) 0.6 mg, Oral, Daily    cyclobenzaprine (FLEXERIL) 10 MG tablet TAKE 1 TABLET BY MOUTH TWICE DAILY FOR MUSLE SPASMS    etodolac (LODINE) 400 mg, Oral, As needed (PRN)    furosemide (LASIX) 40 MG tablet TAKE 1 TABLET(40 MG) BY MOUTH TWICE DAILY    HYDROcodone-acetaminophen (NORCO) 7.5-325 mg per tablet 1 tablet, Oral, Every 4-6 hours PRN    indomethacin (INDOCIN) 50 mg, Oral, 2 times daily with meals    ketorolac 0.5% (ACULAR) 0.5 % Drop INSTILL 1 DROP  INTO THE LEFT EYE FOUR TIMES DAILY    lisinopriL (PRINIVIL,ZESTRIL) 40 MG tablet TAKE 1 TABLET(40 MG) BY MOUTH EVERY NIGHT    metoprolol succinate (TOPROL-XL) 50 mg, Oral, Nightly    sildenafiL (VIAGRA) 100 mg, Oral, Daily PRN    spironolactone (ALDACTONE) 25 mg, Oral, Daily    tamsulosin (FLOMAX) 0.4 mg Cap 1 capsule, Oral, Daily        There were no vitals filed for this visit.     Wt Readings from Last 3 Encounters:   04/06/22 103.5 kg (228 lb 2.8 oz)   09/15/21 103.4 kg (228 lb)   08/25/21 106 kg (233 lb 11 oz)     Temp Readings from Last 3 Encounters:   09/15/21 97.9 °F (36.6 °C) (Temporal)   05/22/19 98 °F (36.7 °C) (Oral)   04/22/19 97.3 °F (36.3 °C) (Oral)     BP Readings from Last 3 Encounters:   04/06/22 (!) 128/58   09/15/21 124/74   08/25/21 (!) 145/67     Pulse Readings from Last 3 Encounters:   04/06/22 84   09/15/21 61   08/25/21 74        There is no height or weight on file to calculate BMI. Estimated body surface area is 2.29 meters squared as calculated from the following:    Height as of 4/6/22: 6' (1.829 m).    Weight as of 4/6/22: 103.5 kg (228 lb 2.8 oz).     Physical Exam  Constitutional:       Appearance: He is well-developed.   HENT:      Head: Normocephalic and atraumatic.      Right Ear: External ear normal.      Left Ear: External ear normal.   Eyes:      Conjunctiva/sclera: Conjunctivae normal.      Pupils: Pupils are equal, round, and reactive to light.   Neck:      Vascular: No hepatojugular reflux or JVD.   Cardiovascular:      Rate and Rhythm: Normal rate and regular rhythm.      Pulses: Intact distal pulses.      Heart sounds: S1 normal and S2 normal. No murmur heard.    No friction rub. No gallop.   Pulmonary:      Effort: Pulmonary effort is normal.      Breath sounds: Normal breath sounds.   Abdominal:      General: Bowel sounds are normal. There is no distension.      Palpations: Abdomen is soft.      Tenderness: There is no abdominal tenderness. There is no guarding or  rebound.   Musculoskeletal:      Cervical back: Normal range of motion and neck supple.      Right lower leg: No edema.      Left lower leg: No edema.   Neurological:      Mental Status: He is alert and oriented to person, place, and time.        Lab Results   Component Value Date     (H) 04/06/2022     04/06/2022     04/06/2022    K 4.6 04/06/2022    K 4.6 04/06/2022    MG 2.2 06/18/2021     04/06/2022     04/06/2022    CO2 23 04/06/2022    CO2 23 04/06/2022    BUN 39 (H) 04/06/2022    BUN 39 (H) 04/06/2022    CREATININE 1.1 04/06/2022    CREATININE 1.1 04/06/2022     (H) 04/06/2022     (H) 04/06/2022    HGBA1C 5.5 07/15/2021    AST 22 04/06/2022    ALT 36 04/06/2022    ALBUMIN 3.8 04/06/2022    ALBUMIN 4.0 06/21/2017    PROT 7.1 04/06/2022    BILITOT 0.8 04/06/2022    WBC 9.20 04/06/2022    HGB 14.2 04/06/2022    HCT 43.6 04/06/2022     04/06/2022    INR 1.0 04/22/2019    TSH 1.856 04/06/2022    CHOL 117 (L) 04/06/2022    HDL 36 (L) 04/06/2022    LDLCALC 62.0 (L) 04/06/2022    TRIG 95 04/06/2022         Results for orders placed during the hospital encounter of 05/05/21    Echo Color Flow Doppler? Yes    Interpretation Summary  · Severe left atrial enlargement.  · The left ventricle is mildly enlarged with normal systolic function.  · The estimated ejection fraction is 55%.  · Grade II left ventricular diastolic dysfunction.  · Mild right atrial enlargement.  · Normal right ventricular size with normal right ventricular systolic function.  · Moderate eccentric, posteriorly directed mitral regurgitation. The etiology of the eccentric mitral regurgitation is not evident on this study.  · There is pulmonary hypertension. The estimated PA systolic pressure is 60 mmHg.  · Normal central venous pressure (3 mmHg).        Results for orders placed during the hospital encounter of 09/15/21    Cardiac catheterization    Conclusion  · The Prox RCA lesion was 80%  stenosed.  · The Mid RCA lesion was 80% stenosed.  · The Prox Cx lesion was 70% stenosed.  · The estimated blood loss was none.  · There was three vessel coronary artery disease.  · Patent LIMA to OM and SVG to RCA.  · No change in anatomy since prior angiogram.    The procedure log was documented by Documenter: Bj Rosas and verified by Ken Ibarra MD.    Date: 9/15/2021  Time: 2:29 PM         Assessment and Plan:  Chronic systolic congestive heart failure  -     CBC Auto Differential; Future; Expected date: 03/16/2023  -     Comprehensive Metabolic Panel; Future; Expected date: 03/16/2023  -     BNP; Future; Expected date: 03/16/2023    Coronary artery disease involving native coronary artery of native heart without angina pectoris. Stable.    Dyslipidemia. On target with aotrvastatin.    Primary hypertension. Controlled.    S/P CABG (coronary artery bypass graft)    Preoperative cardiovascular examination    Obstructive sleep apnea. Stable.    Stage 3a chronic kidney disease    Other orders  -     furosemide (LASIX) 40 MG tablet; Take 1 tablet (40 mg total) by mouth once daily.  Dispense: 45 tablet; Refill: 11              Chronic systolic HF with recovered EF to 55% (from 45%), NYHA class II, stage C.  Etiology: ICM CAD.  Hemodynamic status: warm, normotensive, euvolemic.  Clinical course:  No admissions or ER visits for HF.  Plan:  -no changes on medications today.  -follow up in 6 months with labs.     Addendum 10/13/2022  Based on my most recent assessment, patient is euvolemic and optimized from cardiac stand point. Please proceed with planned elective surgery (back surgery_ without additional cardiac testing. No absolute contraindication to such intervention.

## 2022-09-16 NOTE — PATIENT INSTRUCTIONS
You have just the right amount of fluid on you.  Please adhere to a low sodium diet (no more than 1.5 grams of sodium in 24h).  3.   Follow fluid restriction of  1. no more than 2 liters in 24 hours..   4. No changes on medications today.  5. F/u in 6 months with labs.

## 2022-10-06 ENCOUNTER — PATIENT MESSAGE (OUTPATIENT)
Dept: INTERNAL MEDICINE | Facility: CLINIC | Age: 77
End: 2022-10-06
Payer: MEDICARE

## 2022-10-12 ENCOUNTER — PATIENT MESSAGE (OUTPATIENT)
Dept: TRANSPLANT | Facility: CLINIC | Age: 77
End: 2022-10-12
Payer: MEDICARE

## 2022-10-17 ENCOUNTER — TELEPHONE (OUTPATIENT)
Dept: TRANSPLANT | Facility: CLINIC | Age: 77
End: 2022-10-17
Payer: MEDICARE

## 2022-10-17 NOTE — TELEPHONE ENCOUNTER
1:00 pm:  See message below  Review of chart , see other HFN and HF MA are working on this w/ Dr. Sonam Will  Forwarded this message to PAMELA Dow MA asking she call pt and let him know where they are in the process         ----- Message from Carla Peace MA sent at 10/17/2022 12:08 PM CDT -----  The patient would like to talk to you about his back surgery and he need to schedule an echo. Please call [phone]. Thank you.

## 2022-10-24 ENCOUNTER — PATIENT MESSAGE (OUTPATIENT)
Dept: INTERNAL MEDICINE | Facility: CLINIC | Age: 77
End: 2022-10-24
Payer: MEDICARE

## 2022-10-24 ENCOUNTER — PATIENT MESSAGE (OUTPATIENT)
Dept: TRANSPLANT | Facility: CLINIC | Age: 77
End: 2022-10-24
Payer: MEDICARE

## 2022-10-25 ENCOUNTER — TELEPHONE (OUTPATIENT)
Dept: TRANSPLANT | Facility: CLINIC | Age: 77
End: 2022-10-25
Payer: MEDICARE

## 2022-10-25 NOTE — TELEPHONE ENCOUNTER
10/25/22 - See My Chart message thread in Encounters.  Reviewed thread with LARRY Del Cid M.D., VORB: LARRY Del Cid M.D./LARRY Noble RN: No need to order any additional cardiac testing, for back surgery.

## 2022-11-15 ENCOUNTER — PATIENT MESSAGE (OUTPATIENT)
Dept: TRANSPLANT | Facility: CLINIC | Age: 77
End: 2022-11-15
Payer: MEDICARE

## 2023-01-01 ENCOUNTER — PATIENT OUTREACH (OUTPATIENT)
Dept: ADMINISTRATIVE | Facility: CLINIC | Age: 78
End: 2023-01-01
Payer: MEDICARE

## 2023-01-01 ENCOUNTER — TELEPHONE (OUTPATIENT)
Dept: TRANSPLANT | Facility: CLINIC | Age: 78
End: 2023-01-01
Payer: MEDICARE

## 2023-01-01 ENCOUNTER — PATIENT MESSAGE (OUTPATIENT)
Dept: CARDIOLOGY | Facility: CLINIC | Age: 78
End: 2023-01-01
Payer: MEDICARE

## 2023-01-01 ENCOUNTER — OFFICE VISIT (OUTPATIENT)
Dept: SPORTS MEDICINE | Facility: CLINIC | Age: 78
End: 2023-01-01
Payer: MEDICARE

## 2023-01-01 ENCOUNTER — LAB VISIT (OUTPATIENT)
Dept: LAB | Facility: HOSPITAL | Age: 78
End: 2023-01-01
Attending: STUDENT IN AN ORGANIZED HEALTH CARE EDUCATION/TRAINING PROGRAM
Payer: MEDICARE

## 2023-01-01 ENCOUNTER — HOSPITAL ENCOUNTER (OUTPATIENT)
Dept: CARDIOLOGY | Facility: HOSPITAL | Age: 78
Discharge: HOME | End: 2023-07-03
Attending: INTERNAL MEDICINE
Payer: MEDICARE

## 2023-01-01 ENCOUNTER — OFFICE VISIT (OUTPATIENT)
Dept: TRANSPLANT | Facility: CLINIC | Age: 78
End: 2023-01-01
Payer: MEDICARE

## 2023-01-01 ENCOUNTER — LAB VISIT (OUTPATIENT)
Dept: LAB | Facility: HOSPITAL | Age: 78
End: 2023-01-01
Attending: INTERNAL MEDICINE
Payer: MEDICARE

## 2023-01-01 ENCOUNTER — PATIENT MESSAGE (OUTPATIENT)
Dept: UROLOGY | Facility: CLINIC | Age: 78
End: 2023-01-01
Payer: MEDICARE

## 2023-01-01 ENCOUNTER — PATIENT MESSAGE (OUTPATIENT)
Dept: TRANSPLANT | Facility: CLINIC | Age: 78
End: 2023-01-01
Payer: MEDICARE

## 2023-01-01 ENCOUNTER — LAB VISIT (OUTPATIENT)
Dept: LAB | Facility: OTHER | Age: 78
End: 2023-01-01
Attending: INTERNAL MEDICINE
Payer: MEDICARE

## 2023-01-01 ENCOUNTER — OFFICE VISIT (OUTPATIENT)
Dept: UROLOGY | Facility: CLINIC | Age: 78
End: 2023-01-01
Payer: MEDICARE

## 2023-01-01 ENCOUNTER — DOCUMENTATION ONLY (OUTPATIENT)
Dept: CARDIOLOGY | Facility: CLINIC | Age: 78
End: 2023-01-01
Payer: MEDICARE

## 2023-01-01 ENCOUNTER — OFFICE VISIT (OUTPATIENT)
Dept: CARDIOLOGY | Facility: CLINIC | Age: 78
End: 2023-01-01
Payer: MEDICARE

## 2023-01-01 ENCOUNTER — HOSPITAL ENCOUNTER (OUTPATIENT)
Dept: CARDIOLOGY | Facility: HOSPITAL | Age: 78
Discharge: HOME | End: 2023-08-16
Attending: INTERNAL MEDICINE
Payer: MEDICARE

## 2023-01-01 ENCOUNTER — PATIENT MESSAGE (OUTPATIENT)
Dept: INTERNAL MEDICINE | Facility: CLINIC | Age: 78
End: 2023-01-01
Payer: MEDICARE

## 2023-01-01 ENCOUNTER — TELEPHONE (OUTPATIENT)
Dept: SPORTS MEDICINE | Facility: CLINIC | Age: 78
End: 2023-01-01
Payer: MEDICARE

## 2023-01-01 ENCOUNTER — HOSPITAL ENCOUNTER (INPATIENT)
Facility: HOSPITAL | Age: 78
LOS: 6 days | Discharge: HOME | DRG: 286 | End: 2023-07-20
Attending: INTERNAL MEDICINE | Admitting: INTERNAL MEDICINE
Payer: MEDICARE

## 2023-01-01 ENCOUNTER — PATIENT MESSAGE (OUTPATIENT)
Dept: SPORTS MEDICINE | Facility: CLINIC | Age: 78
End: 2023-01-01
Payer: MEDICARE

## 2023-01-01 ENCOUNTER — PATIENT MESSAGE (OUTPATIENT)
Dept: CARDIOLOGY | Facility: CLINIC | Age: 78
End: 2023-01-01

## 2023-01-01 ENCOUNTER — OFFICE VISIT (OUTPATIENT)
Dept: URGENT CARE | Facility: CLINIC | Age: 78
End: 2023-01-01
Payer: MEDICARE

## 2023-01-01 ENCOUNTER — HOSPITAL ENCOUNTER (OUTPATIENT)
Dept: RADIOLOGY | Facility: HOSPITAL | Age: 78
Discharge: HOME | End: 2023-05-10
Attending: PHYSICIAN ASSISTANT
Payer: MEDICARE

## 2023-01-01 ENCOUNTER — PATIENT MESSAGE (OUTPATIENT)
Dept: ADMINISTRATIVE | Facility: CLINIC | Age: 78
End: 2023-01-01
Payer: MEDICARE

## 2023-01-01 ENCOUNTER — ANESTHESIA (OUTPATIENT)
Dept: MEDSURG UNIT | Facility: HOSPITAL | Age: 78
DRG: 266 | End: 2023-01-01
Payer: MEDICARE

## 2023-01-01 ENCOUNTER — PES CALL (OUTPATIENT)
Dept: ADMINISTRATIVE | Facility: CLINIC | Age: 78
End: 2023-01-01
Payer: MEDICARE

## 2023-01-01 ENCOUNTER — OFFICE VISIT (OUTPATIENT)
Dept: INTERNAL MEDICINE | Facility: CLINIC | Age: 78
End: 2023-01-01
Payer: MEDICARE

## 2023-01-01 ENCOUNTER — HOSPITAL ENCOUNTER (INPATIENT)
Facility: HOSPITAL | Age: 78
LOS: 3 days | Discharge: HOME | DRG: 266 | End: 2023-11-04
Attending: INTERNAL MEDICINE | Admitting: INTERNAL MEDICINE
Payer: MEDICARE

## 2023-01-01 ENCOUNTER — ANESTHESIA EVENT (OUTPATIENT)
Dept: MEDSURG UNIT | Facility: HOSPITAL | Age: 78
DRG: 266 | End: 2023-01-01
Payer: MEDICARE

## 2023-01-01 ENCOUNTER — OFFICE VISIT (OUTPATIENT)
Dept: CARDIOTHORACIC SURGERY | Facility: CLINIC | Age: 78
End: 2023-01-01
Payer: MEDICARE

## 2023-01-01 ENCOUNTER — TELEPHONE (OUTPATIENT)
Dept: ADMINISTRATIVE | Facility: CLINIC | Age: 78
End: 2023-01-01
Payer: MEDICARE

## 2023-01-01 ENCOUNTER — HOSPITAL ENCOUNTER (OUTPATIENT)
Dept: CARDIOLOGY | Facility: HOSPITAL | Age: 78
Discharge: HOME | End: 2023-12-04
Attending: INTERNAL MEDICINE
Payer: MEDICARE

## 2023-01-01 VITALS
WEIGHT: 223 LBS | DIASTOLIC BLOOD PRESSURE: 56 MMHG | HEART RATE: 80 BPM | BODY MASS INDEX: 29.42 KG/M2 | SYSTOLIC BLOOD PRESSURE: 108 MMHG

## 2023-01-01 VITALS
HEIGHT: 72 IN | WEIGHT: 201 LBS | SYSTOLIC BLOOD PRESSURE: 120 MMHG | BODY MASS INDEX: 27.22 KG/M2 | HEART RATE: 75 BPM | DIASTOLIC BLOOD PRESSURE: 70 MMHG

## 2023-01-01 VITALS
SYSTOLIC BLOOD PRESSURE: 122 MMHG | HEIGHT: 73 IN | WEIGHT: 215.63 LBS | BODY MASS INDEX: 28.58 KG/M2 | HEART RATE: 61 BPM | DIASTOLIC BLOOD PRESSURE: 59 MMHG

## 2023-01-01 VITALS
OXYGEN SATURATION: 97 % | TEMPERATURE: 98 F | BODY MASS INDEX: 28.7 KG/M2 | SYSTOLIC BLOOD PRESSURE: 109 MMHG | WEIGHT: 211.88 LBS | HEIGHT: 72 IN | DIASTOLIC BLOOD PRESSURE: 51 MMHG | RESPIRATION RATE: 18 BRPM | HEART RATE: 67 BPM

## 2023-01-01 VITALS
WEIGHT: 208.13 LBS | SYSTOLIC BLOOD PRESSURE: 130 MMHG | BODY MASS INDEX: 27.59 KG/M2 | DIASTOLIC BLOOD PRESSURE: 65 MMHG | OXYGEN SATURATION: 96 % | HEIGHT: 73 IN | HEART RATE: 74 BPM

## 2023-01-01 VITALS
SYSTOLIC BLOOD PRESSURE: 130 MMHG | WEIGHT: 223 LBS | HEART RATE: 73 BPM | BODY MASS INDEX: 29.55 KG/M2 | DIASTOLIC BLOOD PRESSURE: 80 MMHG | HEIGHT: 73 IN

## 2023-01-01 VITALS
WEIGHT: 201.94 LBS | OXYGEN SATURATION: 95 % | HEART RATE: 66 BPM | DIASTOLIC BLOOD PRESSURE: 63 MMHG | BODY MASS INDEX: 27.35 KG/M2 | RESPIRATION RATE: 24 BRPM | TEMPERATURE: 98 F | HEIGHT: 72 IN | SYSTOLIC BLOOD PRESSURE: 123 MMHG

## 2023-01-01 VITALS
DIASTOLIC BLOOD PRESSURE: 54 MMHG | HEART RATE: 58 BPM | WEIGHT: 202.38 LBS | BODY MASS INDEX: 26.82 KG/M2 | SYSTOLIC BLOOD PRESSURE: 101 MMHG | HEIGHT: 73 IN

## 2023-01-01 VITALS
HEIGHT: 73 IN | SYSTOLIC BLOOD PRESSURE: 116 MMHG | HEART RATE: 73 BPM | WEIGHT: 211.63 LBS | OXYGEN SATURATION: 96 % | DIASTOLIC BLOOD PRESSURE: 53 MMHG | BODY MASS INDEX: 28.05 KG/M2

## 2023-01-01 VITALS
WEIGHT: 214 LBS | HEIGHT: 72 IN | BODY MASS INDEX: 28.99 KG/M2 | HEART RATE: 64 BPM | DIASTOLIC BLOOD PRESSURE: 60 MMHG | SYSTOLIC BLOOD PRESSURE: 120 MMHG

## 2023-01-01 VITALS
WEIGHT: 223 LBS | SYSTOLIC BLOOD PRESSURE: 127 MMHG | OXYGEN SATURATION: 96 % | DIASTOLIC BLOOD PRESSURE: 79 MMHG | HEART RATE: 69 BPM | BODY MASS INDEX: 29.55 KG/M2 | RESPIRATION RATE: 18 BRPM | HEIGHT: 73 IN | TEMPERATURE: 98 F

## 2023-01-01 VITALS
OXYGEN SATURATION: 99 % | SYSTOLIC BLOOD PRESSURE: 104 MMHG | WEIGHT: 209.44 LBS | BODY MASS INDEX: 27.76 KG/M2 | DIASTOLIC BLOOD PRESSURE: 47 MMHG | HEIGHT: 73 IN | HEART RATE: 52 BPM

## 2023-01-01 VITALS
HEART RATE: 70 BPM | BODY MASS INDEX: 28.23 KG/M2 | HEIGHT: 72 IN | WEIGHT: 208.44 LBS | SYSTOLIC BLOOD PRESSURE: 137 MMHG | OXYGEN SATURATION: 98 % | DIASTOLIC BLOOD PRESSURE: 65 MMHG

## 2023-01-01 VITALS
HEIGHT: 73 IN | BODY MASS INDEX: 29.22 KG/M2 | HEART RATE: 53 BPM | DIASTOLIC BLOOD PRESSURE: 56 MMHG | SYSTOLIC BLOOD PRESSURE: 114 MMHG | WEIGHT: 220.44 LBS

## 2023-01-01 VITALS
BODY MASS INDEX: 28.99 KG/M2 | HEART RATE: 88 BPM | SYSTOLIC BLOOD PRESSURE: 109 MMHG | DIASTOLIC BLOOD PRESSURE: 55 MMHG | HEIGHT: 72 IN | OXYGEN SATURATION: 98 % | WEIGHT: 214.06 LBS

## 2023-01-01 DIAGNOSIS — N17.9 AKI (ACUTE KIDNEY INJURY): ICD-10-CM

## 2023-01-01 DIAGNOSIS — I50.32 CHRONIC DIASTOLIC HEART FAILURE: ICD-10-CM

## 2023-01-01 DIAGNOSIS — I50.22 CHRONIC SYSTOLIC HEART FAILURE: ICD-10-CM

## 2023-01-01 DIAGNOSIS — E55.9 VITAMIN D DEFICIENCY: ICD-10-CM

## 2023-01-01 DIAGNOSIS — N18.30 STAGE 3 CHRONIC KIDNEY DISEASE, UNSPECIFIED WHETHER STAGE 3A OR 3B CKD: ICD-10-CM

## 2023-01-01 DIAGNOSIS — I10 HYPERTENSION, UNSPECIFIED TYPE: ICD-10-CM

## 2023-01-01 DIAGNOSIS — G47.33 OBSTRUCTIVE SLEEP APNEA: ICD-10-CM

## 2023-01-01 DIAGNOSIS — Z95.818 S/P MITRAL VALVE CLIP IMPLANTATION: Primary | ICD-10-CM

## 2023-01-01 DIAGNOSIS — R15.0 INCOMPLETE DEFECATION: ICD-10-CM

## 2023-01-01 DIAGNOSIS — I25.10 CORONARY ARTERY DISEASE INVOLVING NATIVE CORONARY ARTERY OF NATIVE HEART WITHOUT ANGINA PECTORIS: ICD-10-CM

## 2023-01-01 DIAGNOSIS — Z00.00 ENCOUNTER FOR MEDICAL EXAMINATION TO ESTABLISH CARE: Primary | ICD-10-CM

## 2023-01-01 DIAGNOSIS — I70.0 ATHEROSCLEROSIS OF AORTA: Primary | ICD-10-CM

## 2023-01-01 DIAGNOSIS — Z95.818 STATUS POST IMPLANTATION OF MITRAL VALVE LEAFLET CLIP: Primary | ICD-10-CM

## 2023-01-01 DIAGNOSIS — N18.31 STAGE 3A CHRONIC KIDNEY DISEASE: ICD-10-CM

## 2023-01-01 DIAGNOSIS — I50.22 CHRONIC SYSTOLIC CONGESTIVE HEART FAILURE: Primary | ICD-10-CM

## 2023-01-01 DIAGNOSIS — D64.9 NORMOCYTIC ANEMIA: ICD-10-CM

## 2023-01-01 DIAGNOSIS — I50.22 CHRONIC SYSTOLIC CONGESTIVE HEART FAILURE: ICD-10-CM

## 2023-01-01 DIAGNOSIS — L97.921: Primary | ICD-10-CM

## 2023-01-01 DIAGNOSIS — Z01.810 PREOPERATIVE CARDIOVASCULAR EXAMINATION: ICD-10-CM

## 2023-01-01 DIAGNOSIS — I50.43 ACUTE ON CHRONIC COMBINED SYSTOLIC AND DIASTOLIC HEART FAILURE: ICD-10-CM

## 2023-01-01 DIAGNOSIS — I25.810 CORONARY ARTERY DISEASE INVOLVING CORONARY BYPASS GRAFT OF NATIVE HEART WITHOUT ANGINA PECTORIS: ICD-10-CM

## 2023-01-01 DIAGNOSIS — E78.5 HYPERLIPIDEMIA, UNSPECIFIED HYPERLIPIDEMIA TYPE: ICD-10-CM

## 2023-01-01 DIAGNOSIS — R79.9 ABNORMAL FINDING OF BLOOD CHEMISTRY, UNSPECIFIED: ICD-10-CM

## 2023-01-01 DIAGNOSIS — R15.1 FECAL SMEARING: ICD-10-CM

## 2023-01-01 DIAGNOSIS — I34.0 SEVERE MITRAL REGURGITATION: ICD-10-CM

## 2023-01-01 DIAGNOSIS — E78.5 DYSLIPIDEMIA: ICD-10-CM

## 2023-01-01 DIAGNOSIS — N40.1 BPH WITH URINARY OBSTRUCTION: ICD-10-CM

## 2023-01-01 DIAGNOSIS — N40.0 BENIGN PROSTATIC HYPERPLASIA, UNSPECIFIED WHETHER LOWER URINARY TRACT SYMPTOMS PRESENT: Primary | ICD-10-CM

## 2023-01-01 DIAGNOSIS — M25.511 ACUTE PAIN OF RIGHT SHOULDER: Primary | ICD-10-CM

## 2023-01-01 DIAGNOSIS — I27.20 PULMONARY HYPERTENSION: ICD-10-CM

## 2023-01-01 DIAGNOSIS — Z98.890 S/P ARTHROSCOPY OF SHOULDER: ICD-10-CM

## 2023-01-01 DIAGNOSIS — M25.511 RIGHT SHOULDER PAIN, UNSPECIFIED CHRONICITY: ICD-10-CM

## 2023-01-01 DIAGNOSIS — J40 BRONCHITIS: ICD-10-CM

## 2023-01-01 DIAGNOSIS — I10 PRIMARY HYPERTENSION: ICD-10-CM

## 2023-01-01 DIAGNOSIS — I50.9 ACUTE DECOMPENSATED HEART FAILURE: ICD-10-CM

## 2023-01-01 DIAGNOSIS — Z95.1 S/P CABG (CORONARY ARTERY BYPASS GRAFT): Chronic | ICD-10-CM

## 2023-01-01 DIAGNOSIS — Z95.818 STATUS POST IMPLANTATION OF MITRAL VALVE LEAFLET CLIP: ICD-10-CM

## 2023-01-01 DIAGNOSIS — I50.42 CHRONIC COMBINED SYSTOLIC (CONGESTIVE) AND DIASTOLIC (CONGESTIVE) HEART FAILURE: ICD-10-CM

## 2023-01-01 DIAGNOSIS — Z98.890 STATUS POST IMPLANTATION OF MITRAL VALVE LEAFLET CLIP: ICD-10-CM

## 2023-01-01 DIAGNOSIS — I50.9 HEART FAILURE: ICD-10-CM

## 2023-01-01 DIAGNOSIS — Z12.5 ENCOUNTER FOR SCREENING FOR MALIGNANT NEOPLASM OF PROSTATE: ICD-10-CM

## 2023-01-01 DIAGNOSIS — I50.9 ACUTE ON CHRONIC HEART FAILURE, UNSPECIFIED HEART FAILURE TYPE: Primary | ICD-10-CM

## 2023-01-01 DIAGNOSIS — Z00.00 HEALTHCARE MAINTENANCE: ICD-10-CM

## 2023-01-01 DIAGNOSIS — N52.9 ED (ERECTILE DYSFUNCTION) OF ORGANIC ORIGIN: ICD-10-CM

## 2023-01-01 DIAGNOSIS — Z98.890 S/P MITRAL VALVE CLIP IMPLANTATION: ICD-10-CM

## 2023-01-01 DIAGNOSIS — I34.0 SEVERE MITRAL REGURGITATION: Primary | ICD-10-CM

## 2023-01-01 DIAGNOSIS — Z98.890 STATUS POST IMPLANTATION OF MITRAL VALVE LEAFLET CLIP: Primary | ICD-10-CM

## 2023-01-01 DIAGNOSIS — I25.10 CORONARY ARTERY DISEASE INVOLVING NATIVE CORONARY ARTERY OF NATIVE HEART WITHOUT ANGINA PECTORIS: Primary | ICD-10-CM

## 2023-01-01 DIAGNOSIS — I50.9 ACUTE DECOMPENSATED HEART FAILURE: Primary | ICD-10-CM

## 2023-01-01 DIAGNOSIS — I10 HYPERTENSION, UNSPECIFIED TYPE: Primary | ICD-10-CM

## 2023-01-01 DIAGNOSIS — K62.9 ANAL LESION: ICD-10-CM

## 2023-01-01 DIAGNOSIS — Z98.890 S/P MITRAL VALVE CLIP IMPLANTATION: Primary | ICD-10-CM

## 2023-01-01 DIAGNOSIS — I50.22 CHRONIC SYSTOLIC HEART FAILURE: Primary | ICD-10-CM

## 2023-01-01 DIAGNOSIS — Z51.81 ENCOUNTER FOR THERAPEUTIC DRUG LEVEL MONITORING: ICD-10-CM

## 2023-01-01 DIAGNOSIS — Z95.818 S/P MITRAL VALVE CLIP IMPLANTATION: ICD-10-CM

## 2023-01-01 DIAGNOSIS — I34.0 NONRHEUMATIC MITRAL VALVE REGURGITATION: ICD-10-CM

## 2023-01-01 DIAGNOSIS — D51.8 OTHER VITAMIN B12 DEFICIENCY ANEMIAS: ICD-10-CM

## 2023-01-01 DIAGNOSIS — Z86.010 PERSONAL HISTORY OF COLONIC POLYPS: ICD-10-CM

## 2023-01-01 DIAGNOSIS — K64.5 THROMBOSED EXTERNAL HEMORRHOID: ICD-10-CM

## 2023-01-01 DIAGNOSIS — L08.9: Primary | ICD-10-CM

## 2023-01-01 DIAGNOSIS — N13.8 BPH WITH URINARY OBSTRUCTION: ICD-10-CM

## 2023-01-01 DIAGNOSIS — Z98.61 POST PTCA: Chronic | ICD-10-CM

## 2023-01-01 DIAGNOSIS — Z87.39 HX OF CALCIUM PYROPHOSPHATE DEPOSITION DISEASE (CPPD): ICD-10-CM

## 2023-01-01 DIAGNOSIS — I34.0 NONRHEUMATIC MITRAL VALVE REGURGITATION: Primary | ICD-10-CM

## 2023-01-01 DIAGNOSIS — I50.9 ACUTE ON CHRONIC HEART FAILURE, UNSPECIFIED HEART FAILURE TYPE: ICD-10-CM

## 2023-01-01 DIAGNOSIS — R07.9 CHEST PAIN: ICD-10-CM

## 2023-01-01 LAB
ABO + RH BLD: NORMAL
ACTH PLAS-MCNC: 18 PG/ML (ref 0–46)
ALBUMIN SERPL BCP-MCNC: 2.9 G/DL (ref 3.5–5.2)
ALBUMIN SERPL BCP-MCNC: 3 G/DL (ref 3.5–5.2)
ALBUMIN SERPL BCP-MCNC: 3 G/DL (ref 3.5–5.2)
ALBUMIN SERPL BCP-MCNC: 3.1 G/DL (ref 3.5–5.2)
ALBUMIN SERPL BCP-MCNC: 3.2 G/DL (ref 3.5–5.2)
ALBUMIN SERPL BCP-MCNC: 3.3 G/DL (ref 3.5–5.2)
ALBUMIN SERPL BCP-MCNC: 3.4 G/DL (ref 3.5–5.2)
ALBUMIN SERPL BCP-MCNC: 3.6 G/DL (ref 3.5–5.2)
ALBUMIN SERPL BCP-MCNC: 3.6 G/DL (ref 3.5–5.2)
ALBUMIN SERPL BCP-MCNC: 3.8 G/DL (ref 3.5–5.2)
ALBUMIN SERPL ELPH-MCNC: 3.24 G/DL (ref 3.35–5.55)
ALLENS TEST: ABNORMAL
ALLENS TEST: NORMAL
ALP SERPL-CCNC: 68 U/L (ref 55–135)
ALP SERPL-CCNC: 71 U/L (ref 55–135)
ALP SERPL-CCNC: 73 U/L (ref 55–135)
ALP SERPL-CCNC: 73 U/L (ref 55–135)
ALP SERPL-CCNC: 74 U/L (ref 55–135)
ALP SERPL-CCNC: 75 U/L (ref 55–135)
ALP SERPL-CCNC: 75 U/L (ref 55–135)
ALP SERPL-CCNC: 76 U/L (ref 55–135)
ALP SERPL-CCNC: 76 U/L (ref 55–135)
ALP SERPL-CCNC: 77 U/L (ref 55–135)
ALP SERPL-CCNC: 79 U/L (ref 55–135)
ALP SERPL-CCNC: 90 U/L (ref 55–135)
ALPHA1 GLOB SERPL ELPH-MCNC: 0.33 G/DL (ref 0.17–0.41)
ALPHA2 GLOB SERPL ELPH-MCNC: 0.76 G/DL (ref 0.43–0.99)
ALT SERPL W/O P-5'-P-CCNC: 10 U/L (ref 10–44)
ALT SERPL W/O P-5'-P-CCNC: 11 U/L (ref 10–44)
ALT SERPL W/O P-5'-P-CCNC: 15 U/L (ref 10–44)
ALT SERPL W/O P-5'-P-CCNC: 18 U/L (ref 10–44)
ALT SERPL W/O P-5'-P-CCNC: 24 U/L (ref 10–44)
ALT SERPL W/O P-5'-P-CCNC: 56 U/L (ref 10–44)
ALT SERPL W/O P-5'-P-CCNC: 8 U/L (ref 10–44)
ALT SERPL W/O P-5'-P-CCNC: 8 U/L (ref 10–44)
ALT SERPL W/O P-5'-P-CCNC: 9 U/L (ref 10–44)
ANION GAP SERPL CALC-SCNC: 10 MMOL/L (ref 8–16)
ANION GAP SERPL CALC-SCNC: 11 MMOL/L (ref 8–16)
ANION GAP SERPL CALC-SCNC: 11 MMOL/L (ref 8–16)
ANION GAP SERPL CALC-SCNC: 12 MMOL/L (ref 8–16)
ANION GAP SERPL CALC-SCNC: 13 MMOL/L (ref 8–16)
ANION GAP SERPL CALC-SCNC: 14 MMOL/L (ref 8–16)
ANION GAP SERPL CALC-SCNC: 15 MMOL/L (ref 8–16)
ANION GAP SERPL CALC-SCNC: 16 MMOL/L (ref 8–16)
ANION GAP SERPL CALC-SCNC: 17 MMOL/L (ref 8–16)
ANION GAP SERPL CALC-SCNC: 5 MMOL/L (ref 8–16)
ANION GAP SERPL CALC-SCNC: 6 MMOL/L (ref 8–16)
ANION GAP SERPL CALC-SCNC: 6 MMOL/L (ref 8–16)
ANION GAP SERPL CALC-SCNC: 7 MMOL/L (ref 8–16)
ANION GAP SERPL CALC-SCNC: 7 MMOL/L (ref 8–16)
ANION GAP SERPL CALC-SCNC: 8 MMOL/L (ref 8–16)
ANION GAP SERPL CALC-SCNC: 9 MMOL/L (ref 8–16)
ASCENDING AORTA: 2.89 CM
ASCENDING AORTA: 3.54 CM
ASCENDING AORTA: 3.66 CM
ASCENDING AORTA: 3.7 CM
AST SERPL-CCNC: 11 U/L (ref 10–40)
AST SERPL-CCNC: 12 U/L (ref 10–40)
AST SERPL-CCNC: 13 U/L (ref 10–40)
AST SERPL-CCNC: 14 U/L (ref 10–40)
AST SERPL-CCNC: 15 U/L (ref 10–40)
AST SERPL-CCNC: 15 U/L (ref 10–40)
AST SERPL-CCNC: 16 U/L (ref 10–40)
AST SERPL-CCNC: 19 U/L (ref 10–40)
AST SERPL-CCNC: 23 U/L (ref 10–40)
AST SERPL-CCNC: 61 U/L (ref 10–40)
AV INDEX (PROSTH): 0.59
AV INDEX (PROSTH): 0.71
AV INDEX (PROSTH): 0.75
AV INDEX (PROSTH): 1.04
AV MEAN GRADIENT: 2 MMHG
AV MEAN GRADIENT: 2 MMHG
AV MEAN GRADIENT: 3 MMHG
AV MEAN GRADIENT: 3 MMHG
AV PEAK GRADIENT: 4 MMHG
AV PEAK GRADIENT: 5 MMHG
AV PEAK GRADIENT: 6 MMHG
AV PEAK GRADIENT: 6 MMHG
AV VALVE AREA BY VELOCITY RATIO: 1.87 CM²
AV VALVE AREA BY VELOCITY RATIO: 3.63 CM²
AV VALVE AREA: 1.66 CM²
AV VALVE AREA: 2.36 CM2
AV VALVE AREA: 2.36 CM2
AV VALVE AREA: 4.61 CM²
AV VELOCITY RATIO: 0.66
AV VELOCITY RATIO: 0.66
AV VELOCITY RATIO: 0.82
AV VELOCITY RATIO: 0.83
B-GLOBULIN SERPL ELPH-MCNC: 0.8 G/DL (ref 0.5–1.1)
BACTERIA BLD CULT: NORMAL
BACTERIA BLD CULT: NORMAL
BASOPHILS # BLD AUTO: 0.01 K/UL (ref 0–0.2)
BASOPHILS # BLD AUTO: 0.03 K/UL (ref 0–0.2)
BASOPHILS # BLD AUTO: 0.04 K/UL (ref 0–0.2)
BASOPHILS # BLD AUTO: 0.04 K/UL (ref 0–0.2)
BASOPHILS # BLD AUTO: 0.05 K/UL (ref 0–0.2)
BASOPHILS # BLD AUTO: 0.06 K/UL (ref 0–0.2)
BASOPHILS NFR BLD: 0.1 % (ref 0–1.9)
BASOPHILS NFR BLD: 0.3 % (ref 0–1.9)
BASOPHILS NFR BLD: 0.4 % (ref 0–1.9)
BASOPHILS NFR BLD: 0.5 % (ref 0–1.9)
BASOPHILS NFR BLD: 0.6 % (ref 0–1.9)
BASOPHILS NFR BLD: 0.7 % (ref 0–1.9)
BILIRUB SERPL-MCNC: 0.3 MG/DL (ref 0.1–1)
BILIRUB SERPL-MCNC: 0.3 MG/DL (ref 0.1–1)
BILIRUB SERPL-MCNC: 0.4 MG/DL (ref 0.1–1)
BILIRUB SERPL-MCNC: 0.5 MG/DL (ref 0.1–1)
BILIRUB SERPL-MCNC: 0.6 MG/DL (ref 0.1–1)
BILIRUB SERPL-MCNC: 0.7 MG/DL (ref 0.1–1)
BILIRUB SERPL-MCNC: 0.7 MG/DL (ref 0.1–1)
BILIRUB SERPL-MCNC: 0.8 MG/DL (ref 0.1–1)
BILIRUB SERPL-MCNC: 0.8 MG/DL (ref 0.1–1)
BLD GP AB SCN CELLS X3 SERPL QL: NORMAL
BNP SERPL-MCNC: 1059 PG/ML (ref 0–99)
BNP SERPL-MCNC: 368 PG/ML (ref 0–99)
BNP SERPL-MCNC: 654 PG/ML (ref 0–99)
BSA FOR ECHO PROCEDURE: 2.15 M2
BSA FOR ECHO PROCEDURE: 2.18 M2
BSA FOR ECHO PROCEDURE: 2.22 M2
BSA FOR ECHO PROCEDURE: 2.28 M2
BUN SERPL-MCNC: 105 MG/DL (ref 8–23)
BUN SERPL-MCNC: 106 MG/DL (ref 8–23)
BUN SERPL-MCNC: 115 MG/DL (ref 8–23)
BUN SERPL-MCNC: 126 MG/DL (ref 8–23)
BUN SERPL-MCNC: 133 MG/DL (ref 8–23)
BUN SERPL-MCNC: 135 MG/DL (ref 8–23)
BUN SERPL-MCNC: 140 MG/DL (ref 8–23)
BUN SERPL-MCNC: 41 MG/DL (ref 8–23)
BUN SERPL-MCNC: 42 MG/DL (ref 8–23)
BUN SERPL-MCNC: 43 MG/DL (ref 8–23)
BUN SERPL-MCNC: 44 MG/DL (ref 8–23)
BUN SERPL-MCNC: 46 MG/DL (ref 8–23)
BUN SERPL-MCNC: 47 MG/DL (ref 8–23)
BUN SERPL-MCNC: 48 MG/DL (ref 8–23)
BUN SERPL-MCNC: 48 MG/DL (ref 8–23)
BUN SERPL-MCNC: 49 MG/DL (ref 8–23)
BUN SERPL-MCNC: 51 MG/DL (ref 8–23)
BUN SERPL-MCNC: 51 MG/DL (ref 8–23)
BUN SERPL-MCNC: 52 MG/DL (ref 8–23)
BUN SERPL-MCNC: 57 MG/DL (ref 8–23)
BUN SERPL-MCNC: 65 MG/DL (ref 8–23)
BUN SERPL-MCNC: 66 MG/DL (ref 8–23)
BUN SERPL-MCNC: 68 MG/DL (ref 8–23)
BUN SERPL-MCNC: 73 MG/DL (ref 8–23)
BUN SERPL-MCNC: 77 MG/DL (ref 8–23)
BUN SERPL-MCNC: 77 MG/DL (ref 8–23)
BUN SERPL-MCNC: 78 MG/DL (ref 8–23)
BUN SERPL-MCNC: 85 MG/DL (ref 8–23)
BUN SERPL-MCNC: 93 MG/DL (ref 8–23)
BUN SERPL-MCNC: 93 MG/DL (ref 8–23)
CALCIUM SERPL-MCNC: 10 MG/DL (ref 8.7–10.5)
CALCIUM SERPL-MCNC: 10.1 MG/DL (ref 8.7–10.5)
CALCIUM SERPL-MCNC: 10.3 MG/DL (ref 8.7–10.5)
CALCIUM SERPL-MCNC: 10.5 MG/DL (ref 8.7–10.5)
CALCIUM SERPL-MCNC: 10.7 MG/DL (ref 8.7–10.5)
CALCIUM SERPL-MCNC: 8.4 MG/DL (ref 8.7–10.5)
CALCIUM SERPL-MCNC: 8.5 MG/DL (ref 8.7–10.5)
CALCIUM SERPL-MCNC: 8.5 MG/DL (ref 8.7–10.5)
CALCIUM SERPL-MCNC: 8.6 MG/DL (ref 8.7–10.5)
CALCIUM SERPL-MCNC: 8.8 MG/DL (ref 8.7–10.5)
CALCIUM SERPL-MCNC: 8.8 MG/DL (ref 8.7–10.5)
CALCIUM SERPL-MCNC: 8.9 MG/DL (ref 8.7–10.5)
CALCIUM SERPL-MCNC: 9 MG/DL (ref 8.7–10.5)
CALCIUM SERPL-MCNC: 9.1 MG/DL (ref 8.7–10.5)
CALCIUM SERPL-MCNC: 9.1 MG/DL (ref 8.7–10.5)
CALCIUM SERPL-MCNC: 9.2 MG/DL (ref 8.7–10.5)
CALCIUM SERPL-MCNC: 9.2 MG/DL (ref 8.7–10.5)
CALCIUM SERPL-MCNC: 9.3 MG/DL (ref 8.7–10.5)
CALCIUM SERPL-MCNC: 9.3 MG/DL (ref 8.7–10.5)
CALCIUM SERPL-MCNC: 9.4 MG/DL (ref 8.7–10.5)
CALCIUM SERPL-MCNC: 9.4 MG/DL (ref 8.7–10.5)
CALCIUM SERPL-MCNC: 9.5 MG/DL (ref 8.7–10.5)
CALCIUM SERPL-MCNC: 9.6 MG/DL (ref 8.7–10.5)
CALCIUM SERPL-MCNC: 9.7 MG/DL (ref 8.7–10.5)
CALCIUM SERPL-MCNC: 9.7 MG/DL (ref 8.7–10.5)
CALCIUM SERPL-MCNC: 9.8 MG/DL (ref 8.7–10.5)
CALCIUM SERPL-MCNC: 9.9 MG/DL (ref 8.7–10.5)
CHLORIDE SERPL-SCNC: 102 MMOL/L (ref 95–110)
CHLORIDE SERPL-SCNC: 104 MMOL/L (ref 95–110)
CHLORIDE SERPL-SCNC: 105 MMOL/L (ref 95–110)
CHLORIDE SERPL-SCNC: 106 MMOL/L (ref 95–110)
CHLORIDE SERPL-SCNC: 106 MMOL/L (ref 95–110)
CHLORIDE SERPL-SCNC: 110 MMOL/L (ref 95–110)
CHLORIDE SERPL-SCNC: 110 MMOL/L (ref 95–110)
CHLORIDE SERPL-SCNC: 111 MMOL/L (ref 95–110)
CHLORIDE SERPL-SCNC: 111 MMOL/L (ref 95–110)
CHLORIDE SERPL-SCNC: 112 MMOL/L (ref 95–110)
CHLORIDE SERPL-SCNC: 113 MMOL/L (ref 95–110)
CHLORIDE SERPL-SCNC: 113 MMOL/L (ref 95–110)
CHLORIDE SERPL-SCNC: 114 MMOL/L (ref 95–110)
CHLORIDE SERPL-SCNC: 114 MMOL/L (ref 95–110)
CHLORIDE SERPL-SCNC: 115 MMOL/L (ref 95–110)
CHLORIDE SERPL-SCNC: 116 MMOL/L (ref 95–110)
CHLORIDE SERPL-SCNC: 118 MMOL/L (ref 95–110)
CHLORIDE SERPL-SCNC: 119 MMOL/L (ref 95–110)
CHLORIDE SERPL-SCNC: 93 MMOL/L (ref 95–110)
CHLORIDE SERPL-SCNC: 96 MMOL/L (ref 95–110)
CHLORIDE SERPL-SCNC: 98 MMOL/L (ref 95–110)
CHOLEST SERPL-MCNC: 87 MG/DL (ref 120–199)
CHOLEST/HDLC SERPL: 2.7 {RATIO} (ref 2–5)
CO2 SERPL-SCNC: 11 MMOL/L (ref 23–29)
CO2 SERPL-SCNC: 12 MMOL/L (ref 23–29)
CO2 SERPL-SCNC: 13 MMOL/L (ref 23–29)
CO2 SERPL-SCNC: 15 MMOL/L (ref 23–29)
CO2 SERPL-SCNC: 16 MMOL/L (ref 23–29)
CO2 SERPL-SCNC: 18 MMOL/L (ref 23–29)
CO2 SERPL-SCNC: 18 MMOL/L (ref 23–29)
CO2 SERPL-SCNC: 19 MMOL/L (ref 23–29)
CO2 SERPL-SCNC: 20 MMOL/L (ref 23–29)
CO2 SERPL-SCNC: 21 MMOL/L (ref 23–29)
CO2 SERPL-SCNC: 23 MMOL/L (ref 23–29)
CO2 SERPL-SCNC: 24 MMOL/L (ref 23–29)
CO2 SERPL-SCNC: 25 MMOL/L (ref 23–29)
CO2 SERPL-SCNC: 26 MMOL/L (ref 23–29)
CO2 SERPL-SCNC: 26 MMOL/L (ref 23–29)
CO2 SERPL-SCNC: 27 MMOL/L (ref 23–29)
CO2 SERPL-SCNC: 28 MMOL/L (ref 23–29)
CO2 SERPL-SCNC: 29 MMOL/L (ref 23–29)
COMPLEXED PSA SERPL-MCNC: 2.9 NG/ML (ref 0–4)
CORTIS SERPL-MCNC: 16.5 UG/DL
CORTIS SERPL-MCNC: 19.8 UG/DL
CORTIS SERPL-MCNC: 9.8 UG/DL
CREAT SERPL-MCNC: 0.9 MG/DL (ref 0.5–1.4)
CREAT SERPL-MCNC: 1.1 MG/DL (ref 0.5–1.4)
CREAT SERPL-MCNC: 1.1 MG/DL (ref 0.5–1.4)
CREAT SERPL-MCNC: 1.2 MG/DL (ref 0.5–1.4)
CREAT SERPL-MCNC: 1.2 MG/DL (ref 0.5–1.4)
CREAT SERPL-MCNC: 1.3 MG/DL (ref 0.5–1.4)
CREAT SERPL-MCNC: 1.4 MG/DL (ref 0.5–1.4)
CREAT SERPL-MCNC: 1.5 MG/DL (ref 0.5–1.4)
CREAT SERPL-MCNC: 1.5 MG/DL (ref 0.5–1.4)
CREAT SERPL-MCNC: 1.6 MG/DL (ref 0.5–1.4)
CREAT SERPL-MCNC: 1.6 MG/DL (ref 0.5–1.4)
CREAT SERPL-MCNC: 1.7 MG/DL (ref 0.5–1.4)
CREAT SERPL-MCNC: 1.8 MG/DL (ref 0.5–1.4)
CREAT SERPL-MCNC: 1.8 MG/DL (ref 0.5–1.4)
CREAT SERPL-MCNC: 1.9 MG/DL (ref 0.5–1.4)
CREAT SERPL-MCNC: 1.9 MG/DL (ref 0.5–1.4)
CREAT SERPL-MCNC: 2.1 MG/DL (ref 0.5–1.4)
CREAT SERPL-MCNC: 2.1 MG/DL (ref 0.5–1.4)
CREAT SERPL-MCNC: 2.4 MG/DL (ref 0.5–1.4)
CREAT SERPL-MCNC: 2.4 MG/DL (ref 0.5–1.4)
CREAT SERPL-MCNC: 2.5 MG/DL (ref 0.5–1.4)
CREAT SERPL-MCNC: 2.6 MG/DL (ref 0.5–1.4)
CREAT SERPL-MCNC: 2.8 MG/DL (ref 0.5–1.4)
CREAT SERPL-MCNC: 3.1 MG/DL (ref 0.5–1.4)
CREAT UR-MCNC: 33 MG/DL (ref 23–375)
CV ECHO LV RWT: 0.25 CM
CV ECHO LV RWT: 0.3 CM
CV ECHO LV RWT: 0.38 CM
CV ECHO LV RWT: 0.4 CM
DELSYS: ABNORMAL
DELSYS: NORMAL
DIFFERENTIAL METHOD: ABNORMAL
DOP CALC AO PEAK VEL: 1 M/S
DOP CALC AO PEAK VEL: 1.15 M/S
DOP CALC AO PEAK VEL: 1.2 M/S
DOP CALC AO PEAK VEL: 1.21 M/S
DOP CALC AO VTI: 19.87 CM
DOP CALC AO VTI: 21.14 CM
DOP CALC AO VTI: 21.3 CM
DOP CALC AO VTI: 24.75 CM
DOP CALC LVOT AREA: 2.8 CM2
DOP CALC LVOT AREA: 3.2 CM2
DOP CALC LVOT AREA: 3.3 CM2
DOP CALC LVOT AREA: 4.4 CM2
DOP CALC LVOT DIAMETER: 1.9 CM
DOP CALC LVOT DIAMETER: 2.01 CM
DOP CALC LVOT DIAMETER: 2.06 CM
DOP CALC LVOT DIAMETER: 2.38 CM
DOP CALC LVOT PEAK VEL: 0.79 M/S
DOP CALC LVOT PEAK VEL: 0.8 M/S
DOP CALC LVOT PEAK VEL: 0.83 M/S
DOP CALC LVOT PEAK VEL: 0.94 M/S
DOP CALC LVOT STROKE VOLUME: 41.12 CM3
DOP CALC LVOT STROKE VOLUME: 49.8 CM3
DOP CALC LVOT STROKE VOLUME: 50.36 CM3
DOP CALC LVOT STROKE VOLUME: 91.64 CM3
DOP CALC MV VTI: 17.05 CM
DOP CALCLVOT PEAK VEL VTI: 14.51 CM
DOP CALCLVOT PEAK VEL VTI: 14.95 CM
DOP CALCLVOT PEAK VEL VTI: 15.88 CM
DOP CALCLVOT PEAK VEL VTI: 20.61 CM
E WAVE DECELERATION TIME: 137.04 MSEC
E WAVE DECELERATION TIME: 221.21 MSEC
E WAVE DECELERATION TIME: 278.04 MSEC
E/A RATIO: 1.41
E/A RATIO: 1.66
E/A RATIO: 2.41
E/E' RATIO: 11.25 M/S
E/E' RATIO: 11.75 M/S
E/E' RATIO: 12.17 M/S
E/E' RATIO: 16.6 M/S
ECHO LV POSTERIOR WALL: 0.75 CM (ref 0.6–1.1)
ECHO LV POSTERIOR WALL: 1 CM (ref 0.6–1.1)
ECHO LV POSTERIOR WALL: 1.14 CM (ref 0.6–1.1)
ECHO LV POSTERIOR WALL: 1.19 CM (ref 0.6–1.1)
EJECTION FRACTION: 50 %
EJECTION FRACTION: 60 %
EOSINOPHIL # BLD AUTO: 0.1 K/UL (ref 0–0.5)
EOSINOPHIL # BLD AUTO: 0.2 K/UL (ref 0–0.5)
EOSINOPHIL # BLD AUTO: 0.3 K/UL (ref 0–0.5)
EOSINOPHIL # BLD AUTO: 0.4 K/UL (ref 0–0.5)
EOSINOPHIL NFR BLD: 1.2 % (ref 0–8)
EOSINOPHIL NFR BLD: 1.3 % (ref 0–8)
EOSINOPHIL NFR BLD: 1.4 % (ref 0–8)
EOSINOPHIL NFR BLD: 1.9 % (ref 0–8)
EOSINOPHIL NFR BLD: 2.2 % (ref 0–8)
EOSINOPHIL NFR BLD: 2.3 % (ref 0–8)
EOSINOPHIL NFR BLD: 2.5 % (ref 0–8)
EOSINOPHIL NFR BLD: 2.9 % (ref 0–8)
EOSINOPHIL NFR BLD: 3.2 % (ref 0–8)
EOSINOPHIL NFR BLD: 3.2 % (ref 0–8)
EOSINOPHIL NFR BLD: 3.6 % (ref 0–8)
EOSINOPHIL NFR BLD: 3.7 % (ref 0–8)
EOSINOPHIL NFR BLD: 3.9 % (ref 0–8)
ERYTHROCYTE [DISTWIDTH] IN BLOOD BY AUTOMATED COUNT: 14.2 % (ref 11.5–14.5)
ERYTHROCYTE [DISTWIDTH] IN BLOOD BY AUTOMATED COUNT: 14.2 % (ref 11.5–14.5)
ERYTHROCYTE [DISTWIDTH] IN BLOOD BY AUTOMATED COUNT: 14.7 % (ref 11.5–14.5)
ERYTHROCYTE [DISTWIDTH] IN BLOOD BY AUTOMATED COUNT: 14.8 % (ref 11.5–14.5)
ERYTHROCYTE [DISTWIDTH] IN BLOOD BY AUTOMATED COUNT: 15 % (ref 11.5–14.5)
ERYTHROCYTE [DISTWIDTH] IN BLOOD BY AUTOMATED COUNT: 15 % (ref 11.5–14.5)
ERYTHROCYTE [DISTWIDTH] IN BLOOD BY AUTOMATED COUNT: 15.1 % (ref 11.5–14.5)
ERYTHROCYTE [DISTWIDTH] IN BLOOD BY AUTOMATED COUNT: 15.3 % (ref 11.5–14.5)
ERYTHROCYTE [DISTWIDTH] IN BLOOD BY AUTOMATED COUNT: 16.8 % (ref 11.5–14.5)
EST. GFR  (NO RACE VARIABLE): 22.4 ML/MIN/1.73 M^2
EST. GFR  (NO RACE VARIABLE): 24.5 ML/MIN/1.73 M^2
EST. GFR  (NO RACE VARIABLE): 25.7 ML/MIN/1.73 M^2
EST. GFR  (NO RACE VARIABLE): 27 ML/MIN/1.73 M^2
EST. GFR  (NO RACE VARIABLE): 27.1 ML/MIN/1.73 M^2
EST. GFR  (NO RACE VARIABLE): 31.8 ML/MIN/1.73 M^2
EST. GFR  (NO RACE VARIABLE): 31.8 ML/MIN/1.73 M^2
EST. GFR  (NO RACE VARIABLE): 35.9 ML/MIN/1.73 M^2
EST. GFR  (NO RACE VARIABLE): 35.9 ML/MIN/1.73 M^2
EST. GFR  (NO RACE VARIABLE): 38.3 ML/MIN/1.73 M^2
EST. GFR  (NO RACE VARIABLE): 38.3 ML/MIN/1.73 M^2
EST. GFR  (NO RACE VARIABLE): 40.8 ML/MIN/1.73 M^2
EST. GFR  (NO RACE VARIABLE): 41 ML/MIN/1.73 M^2
EST. GFR  (NO RACE VARIABLE): 43.8 ML/MIN/1.73 M^2
EST. GFR  (NO RACE VARIABLE): 44.1 ML/MIN/1.73 M^2
EST. GFR  (NO RACE VARIABLE): 47.7 ML/MIN/1.73 M^2
EST. GFR  (NO RACE VARIABLE): 47.7 ML/MIN/1.73 M^2
EST. GFR  (NO RACE VARIABLE): 51.4 ML/MIN/1.73 M^2
EST. GFR  (NO RACE VARIABLE): 51.8 ML/MIN/1.73 M^2
EST. GFR  (NO RACE VARIABLE): 51.8 ML/MIN/1.73 M^2
EST. GFR  (NO RACE VARIABLE): 56.2 ML/MIN/1.73 M^2
EST. GFR  (NO RACE VARIABLE): 56.6 ML/MIN/1.73 M^2
EST. GFR  (NO RACE VARIABLE): 56.6 ML/MIN/1.73 M^2
EST. GFR  (NO RACE VARIABLE): >60 ML/MIN/1.73 M^2
ESTIMATED AVG GLUCOSE: 108 MG/DL (ref 68–131)
FRACTIONAL SHORTENING: 22 % (ref 28–44)
FRACTIONAL SHORTENING: 24 % (ref 28–44)
FRACTIONAL SHORTENING: 26 % (ref 28–44)
FRACTIONAL SHORTENING: 26 % (ref 28–44)
GAMMA GLOB SERPL ELPH-MCNC: 0.98 G/DL (ref 0.67–1.58)
GLUCOSE SERPL-MCNC: 100 MG/DL (ref 70–110)
GLUCOSE SERPL-MCNC: 100 MG/DL (ref 70–110)
GLUCOSE SERPL-MCNC: 101 MG/DL (ref 70–110)
GLUCOSE SERPL-MCNC: 102 MG/DL (ref 70–110)
GLUCOSE SERPL-MCNC: 102 MG/DL (ref 70–110)
GLUCOSE SERPL-MCNC: 104 MG/DL (ref 70–110)
GLUCOSE SERPL-MCNC: 105 MG/DL (ref 70–110)
GLUCOSE SERPL-MCNC: 108 MG/DL (ref 70–110)
GLUCOSE SERPL-MCNC: 108 MG/DL (ref 70–110)
GLUCOSE SERPL-MCNC: 109 MG/DL (ref 70–110)
GLUCOSE SERPL-MCNC: 110 MG/DL (ref 70–110)
GLUCOSE SERPL-MCNC: 111 MG/DL (ref 70–110)
GLUCOSE SERPL-MCNC: 112 MG/DL (ref 70–110)
GLUCOSE SERPL-MCNC: 113 MG/DL (ref 70–110)
GLUCOSE SERPL-MCNC: 113 MG/DL (ref 70–110)
GLUCOSE SERPL-MCNC: 117 MG/DL (ref 70–110)
GLUCOSE SERPL-MCNC: 123 MG/DL (ref 70–110)
GLUCOSE SERPL-MCNC: 126 MG/DL (ref 70–110)
GLUCOSE SERPL-MCNC: 127 MG/DL (ref 70–110)
GLUCOSE SERPL-MCNC: 127 MG/DL (ref 70–110)
GLUCOSE SERPL-MCNC: 130 MG/DL (ref 70–110)
GLUCOSE SERPL-MCNC: 131 MG/DL (ref 70–110)
GLUCOSE SERPL-MCNC: 135 MG/DL (ref 70–110)
GLUCOSE SERPL-MCNC: 139 MG/DL (ref 70–110)
GLUCOSE SERPL-MCNC: 145 MG/DL (ref 70–110)
GLUCOSE SERPL-MCNC: 152 MG/DL (ref 70–110)
GLUCOSE SERPL-MCNC: 169 MG/DL (ref 70–110)
GLUCOSE SERPL-MCNC: 71 MG/DL (ref 70–110)
GLUCOSE SERPL-MCNC: 81 MG/DL (ref 70–110)
GLUCOSE SERPL-MCNC: 91 MG/DL (ref 70–110)
GLUCOSE SERPL-MCNC: 95 MG/DL (ref 70–110)
GLUCOSE SERPL-MCNC: 96 MG/DL (ref 70–110)
GLUCOSE SERPL-MCNC: 97 MG/DL (ref 70–110)
HBA1C MFR BLD: 5.4 % (ref 4–5.6)
HCO3 UR-SCNC: 13 MMOL/L (ref 24–28)
HCO3 UR-SCNC: 14.7 MMOL/L (ref 24–28)
HCO3 UR-SCNC: 14.8 MMOL/L (ref 24–28)
HCO3 UR-SCNC: 16.3 MMOL/L (ref 24–28)
HCO3 UR-SCNC: 16.9 MMOL/L (ref 24–28)
HCO3 UR-SCNC: 17.3 MMOL/L (ref 24–28)
HCO3 UR-SCNC: 18 MMOL/L (ref 24–28)
HCO3 UR-SCNC: 27.4 MMOL/L (ref 24–28)
HCO3 UR-SCNC: 28.6 MMOL/L (ref 24–28)
HCO3 UR-SCNC: 30.7 MMOL/L (ref 24–28)
HCO3 UR-SCNC: 31.3 MMOL/L (ref 24–28)
HCO3 UR-SCNC: 31.4 MMOL/L (ref 24–28)
HCO3 UR-SCNC: 32.5 MMOL/L (ref 24–28)
HCT VFR BLD AUTO: 28.9 % (ref 40–54)
HCT VFR BLD AUTO: 31.4 % (ref 40–54)
HCT VFR BLD AUTO: 31.5 % (ref 40–54)
HCT VFR BLD AUTO: 31.9 % (ref 40–54)
HCT VFR BLD AUTO: 32.2 % (ref 40–54)
HCT VFR BLD AUTO: 37.4 % (ref 40–54)
HCT VFR BLD AUTO: 41 % (ref 40–54)
HCT VFR BLD AUTO: 41.6 % (ref 40–54)
HCT VFR BLD AUTO: 43.8 % (ref 40–54)
HCT VFR BLD AUTO: 46.3 % (ref 40–54)
HCT VFR BLD AUTO: 47.9 % (ref 40–54)
HCT VFR BLD AUTO: 47.9 % (ref 40–54)
HCT VFR BLD AUTO: 48.1 % (ref 40–54)
HCT VFR BLD CALC: 26 %PCV (ref 36–54)
HCT VFR BLD CALC: 29 %PCV (ref 36–54)
HCT VFR BLD CALC: 31 %PCV (ref 36–54)
HCT VFR BLD CALC: 31 %PCV (ref 36–54)
HCT VFR BLD CALC: 43 %PCV (ref 36–54)
HCT VFR BLD CALC: 44 %PCV (ref 36–54)
HCV AB SERPL QL IA: NORMAL
HDLC SERPL-MCNC: 32 MG/DL (ref 40–75)
HDLC SERPL: 36.8 % (ref 20–50)
HGB BLD-MCNC: 10 G/DL (ref 14–18)
HGB BLD-MCNC: 10.1 G/DL (ref 14–18)
HGB BLD-MCNC: 10.2 G/DL (ref 14–18)
HGB BLD-MCNC: 12.3 G/DL (ref 14–18)
HGB BLD-MCNC: 13.3 G/DL (ref 14–18)
HGB BLD-MCNC: 13.4 G/DL (ref 14–18)
HGB BLD-MCNC: 14.3 G/DL (ref 14–18)
HGB BLD-MCNC: 15.1 G/DL (ref 14–18)
HGB BLD-MCNC: 15.5 G/DL (ref 14–18)
HGB BLD-MCNC: 15.7 G/DL (ref 14–18)
HGB BLD-MCNC: 16 G/DL (ref 14–18)
HGB BLD-MCNC: 9.7 G/DL (ref 14–18)
HGB BLD-MCNC: 9.8 G/DL (ref 14–18)
IMM GRANULOCYTES # BLD AUTO: 0.02 K/UL (ref 0–0.04)
IMM GRANULOCYTES # BLD AUTO: 0.03 K/UL (ref 0–0.04)
IMM GRANULOCYTES # BLD AUTO: 0.04 K/UL (ref 0–0.04)
IMM GRANULOCYTES # BLD AUTO: 0.05 K/UL (ref 0–0.04)
IMM GRANULOCYTES # BLD AUTO: 0.06 K/UL (ref 0–0.04)
IMM GRANULOCYTES # BLD AUTO: 0.07 K/UL (ref 0–0.04)
IMM GRANULOCYTES NFR BLD AUTO: 0.3 % (ref 0–0.5)
IMM GRANULOCYTES NFR BLD AUTO: 0.3 % (ref 0–0.5)
IMM GRANULOCYTES NFR BLD AUTO: 0.4 % (ref 0–0.5)
IMM GRANULOCYTES NFR BLD AUTO: 0.5 % (ref 0–0.5)
IMM GRANULOCYTES NFR BLD AUTO: 0.5 % (ref 0–0.5)
IMM GRANULOCYTES NFR BLD AUTO: 0.6 % (ref 0–0.5)
IMM GRANULOCYTES NFR BLD AUTO: 0.7 % (ref 0–0.5)
INR PPP: 1.1 (ref 0.8–1.2)
INTERVENTRICULAR SEPTUM: 0.76 CM (ref 0.6–1.1)
INTERVENTRICULAR SEPTUM: 0.83 CM (ref 0.6–1.1)
INTERVENTRICULAR SEPTUM: 0.84 CM (ref 0.6–1.1)
INTERVENTRICULAR SEPTUM: 0.94 CM (ref 0.6–1.1)
IVRT: 108.47 MSEC
IVRT: 74.22 MSEC
KAPPA LC SER QL IA: 7.62 MG/DL (ref 0.33–1.94)
KAPPA LC/LAMBDA SER IA: 1.71 (ref 0.26–1.65)
LA MAJOR: 5.87 CM
LA MAJOR: 6.23 CM
LA MAJOR: 6.88 CM
LA MAJOR: 7.19 CM
LA MINOR: 6.26 CM
LA MINOR: 6.37 CM
LA MINOR: 6.92 CM
LA MINOR: 7.11 CM
LA WIDTH: 4.01 CM
LA WIDTH: 4.98 CM
LA WIDTH: 5.05 CM
LA WIDTH: 5.07 CM
LACTATE SERPL-SCNC: 0.7 MMOL/L (ref 0.5–2.2)
LACTATE SERPL-SCNC: 0.8 MMOL/L (ref 0.5–2.2)
LACTATE SERPL-SCNC: 0.9 MMOL/L (ref 0.5–2.2)
LACTATE SERPL-SCNC: 1.1 MMOL/L (ref 0.5–2.2)
LAMBDA LC SER QL IA: 4.45 MG/DL (ref 0.57–2.63)
LDH SERPL L TO P-CCNC: 0.34 MMOL/L (ref 0.36–1.25)
LDLC SERPL CALC-MCNC: 45 MG/DL (ref 63–159)
LEFT ATRIUM SIZE: 5.09 CM
LEFT ATRIUM SIZE: 5.5 CM
LEFT ATRIUM SIZE: 5.57 CM
LEFT ATRIUM SIZE: 5.66 CM
LEFT ATRIUM VOLUME INDEX MOD: 38.3 ML/M2
LEFT ATRIUM VOLUME INDEX MOD: 45.7 ML/M2
LEFT ATRIUM VOLUME INDEX MOD: 46.8 ML/M2
LEFT ATRIUM VOLUME INDEX MOD: 59.7 ML/M2
LEFT ATRIUM VOLUME INDEX: 52.3 ML/M2
LEFT ATRIUM VOLUME INDEX: 67.3 ML/M2
LEFT ATRIUM VOLUME INDEX: 69.1 ML/M2
LEFT ATRIUM VOLUME INDEX: 80.4 ML/M2
LEFT ATRIUM VOLUME MOD: 100 CM3
LEFT ATRIUM VOLUME MOD: 105.39 CM3
LEFT ATRIUM VOLUME MOD: 127.08 CM3
LEFT ATRIUM VOLUME MOD: 82.74 CM3
LEFT ATRIUM VOLUME: 114.54 CM3
LEFT ATRIUM VOLUME: 147.24 CM3
LEFT ATRIUM VOLUME: 151.35 CM3
LEFT ATRIUM VOLUME: 173.71 CM3
LEFT INTERNAL DIMENSION IN SYSTOLE: 4.51 CM (ref 2.1–4)
LEFT INTERNAL DIMENSION IN SYSTOLE: 4.55 CM (ref 2.1–4)
LEFT INTERNAL DIMENSION IN SYSTOLE: 4.69 CM (ref 2.1–4)
LEFT INTERNAL DIMENSION IN SYSTOLE: 4.89 CM (ref 2.1–4)
LEFT VENTRICLE DIASTOLIC VOLUME INDEX: 105.88 ML/M2
LEFT VENTRICLE DIASTOLIC VOLUME INDEX: 79 ML/M2
LEFT VENTRICLE DIASTOLIC VOLUME INDEX: 84 ML/M2
LEFT VENTRICLE DIASTOLIC VOLUME INDEX: 84.88 ML/M2
LEFT VENTRICLE DIASTOLIC VOLUME: 177.74 ML
LEFT VENTRICLE DIASTOLIC VOLUME: 183.34 ML
LEFT VENTRICLE DIASTOLIC VOLUME: 183.97 ML
LEFT VENTRICLE DIASTOLIC VOLUME: 225.52 ML
LEFT VENTRICLE MASS INDEX: 111 G/M2
LEFT VENTRICLE MASS INDEX: 114 G/M2
LEFT VENTRICLE MASS INDEX: 117 G/M2
LEFT VENTRICLE MASS INDEX: 93 G/M2
LEFT VENTRICLE SYSTOLIC VOLUME INDEX: 42.2 ML/M2
LEFT VENTRICLE SYSTOLIC VOLUME INDEX: 42.4 ML/M2
LEFT VENTRICLE SYSTOLIC VOLUME INDEX: 47.1 ML/M2
LEFT VENTRICLE SYSTOLIC VOLUME INDEX: 52.7 ML/M2
LEFT VENTRICLE SYSTOLIC VOLUME: 101.68 ML
LEFT VENTRICLE SYSTOLIC VOLUME: 112.29 ML
LEFT VENTRICLE SYSTOLIC VOLUME: 92.83 ML
LEFT VENTRICLE SYSTOLIC VOLUME: 95.05 ML
LEFT VENTRICULAR INTERNAL DIMENSION IN DIASTOLE: 5.97 CM (ref 3.5–6)
LEFT VENTRICULAR INTERNAL DIMENSION IN DIASTOLE: 6.05 CM (ref 3.5–6)
LEFT VENTRICULAR INTERNAL DIMENSION IN DIASTOLE: 6.06 CM (ref 3.5–6)
LEFT VENTRICULAR INTERNAL DIMENSION IN DIASTOLE: 6.62 CM (ref 3.5–6)
LEFT VENTRICULAR MASS: 202.73 G
LEFT VENTRICULAR MASS: 245.54 G
LEFT VENTRICULAR MASS: 248.84 G
LEFT VENTRICULAR MASS: 249.55 G
LV LATERAL E/E' RATIO: 10.43 M/S
LV LATERAL E/E' RATIO: 11.75 M/S
LV LATERAL E/E' RATIO: 16.6 M/S
LV LATERAL E/E' RATIO: 9 M/S
LV SEPTAL E/E' RATIO: 11.75 M/S
LV SEPTAL E/E' RATIO: 14.6 M/S
LV SEPTAL E/E' RATIO: 15 M/S
LV SEPTAL E/E' RATIO: 16.6 M/S
LYMPHOCYTES # BLD AUTO: 0.7 K/UL (ref 1–4.8)
LYMPHOCYTES # BLD AUTO: 0.9 K/UL (ref 1–4.8)
LYMPHOCYTES # BLD AUTO: 1 K/UL (ref 1–4.8)
LYMPHOCYTES # BLD AUTO: 1.3 K/UL (ref 1–4.8)
LYMPHOCYTES # BLD AUTO: 1.4 K/UL (ref 1–4.8)
LYMPHOCYTES # BLD AUTO: 1.5 K/UL (ref 1–4.8)
LYMPHOCYTES # BLD AUTO: 1.7 K/UL (ref 1–4.8)
LYMPHOCYTES # BLD AUTO: 1.7 K/UL (ref 1–4.8)
LYMPHOCYTES # BLD AUTO: 1.8 K/UL (ref 1–4.8)
LYMPHOCYTES # BLD AUTO: 2.1 K/UL (ref 1–4.8)
LYMPHOCYTES # BLD AUTO: 2.1 K/UL (ref 1–4.8)
LYMPHOCYTES NFR BLD: 10.6 % (ref 18–48)
LYMPHOCYTES NFR BLD: 15.2 % (ref 18–48)
LYMPHOCYTES NFR BLD: 15.2 % (ref 18–48)
LYMPHOCYTES NFR BLD: 16.6 % (ref 18–48)
LYMPHOCYTES NFR BLD: 17.9 % (ref 18–48)
LYMPHOCYTES NFR BLD: 18.4 % (ref 18–48)
LYMPHOCYTES NFR BLD: 19 % (ref 18–48)
LYMPHOCYTES NFR BLD: 20.6 % (ref 18–48)
LYMPHOCYTES NFR BLD: 21.7 % (ref 18–48)
LYMPHOCYTES NFR BLD: 23.5 % (ref 18–48)
LYMPHOCYTES NFR BLD: 25.1 % (ref 18–48)
LYMPHOCYTES NFR BLD: 6.8 % (ref 18–48)
LYMPHOCYTES NFR BLD: 8.8 % (ref 18–48)
MAGNESIUM SERPL-MCNC: 2 MG/DL (ref 1.6–2.6)
MAGNESIUM SERPL-MCNC: 2 MG/DL (ref 1.6–2.6)
MAGNESIUM SERPL-MCNC: 2.1 MG/DL (ref 1.6–2.6)
MAGNESIUM SERPL-MCNC: 2.2 MG/DL (ref 1.6–2.6)
MAGNESIUM SERPL-MCNC: 2.4 MG/DL (ref 1.6–2.6)
MAGNESIUM SERPL-MCNC: 2.4 MG/DL (ref 1.6–2.6)
MAGNESIUM SERPL-MCNC: 2.5 MG/DL (ref 1.6–2.6)
MAGNESIUM SERPL-MCNC: 2.7 MG/DL (ref 1.6–2.6)
MCH RBC QN AUTO: 29.7 PG (ref 27–31)
MCH RBC QN AUTO: 30.1 PG (ref 27–31)
MCH RBC QN AUTO: 30.1 PG (ref 27–31)
MCH RBC QN AUTO: 30.3 PG (ref 27–31)
MCH RBC QN AUTO: 30.4 PG (ref 27–31)
MCH RBC QN AUTO: 30.4 PG (ref 27–31)
MCH RBC QN AUTO: 30.5 PG (ref 27–31)
MCH RBC QN AUTO: 30.6 PG (ref 27–31)
MCH RBC QN AUTO: 30.6 PG (ref 27–31)
MCH RBC QN AUTO: 30.7 PG (ref 27–31)
MCH RBC QN AUTO: 30.8 PG (ref 27–31)
MCH RBC QN AUTO: 30.9 PG (ref 27–31)
MCH RBC QN AUTO: 30.9 PG (ref 27–31)
MCHC RBC AUTO-ENTMCNC: 30.4 G/DL (ref 32–36)
MCHC RBC AUTO-ENTMCNC: 31.3 G/DL (ref 32–36)
MCHC RBC AUTO-ENTMCNC: 32.1 G/DL (ref 32–36)
MCHC RBC AUTO-ENTMCNC: 32.2 G/DL (ref 32–36)
MCHC RBC AUTO-ENTMCNC: 32.4 G/DL (ref 32–36)
MCHC RBC AUTO-ENTMCNC: 32.4 G/DL (ref 32–36)
MCHC RBC AUTO-ENTMCNC: 32.5 G/DL (ref 32–36)
MCHC RBC AUTO-ENTMCNC: 32.6 G/DL (ref 32–36)
MCHC RBC AUTO-ENTMCNC: 32.6 G/DL (ref 32–36)
MCHC RBC AUTO-ENTMCNC: 32.8 G/DL (ref 32–36)
MCHC RBC AUTO-ENTMCNC: 32.9 G/DL (ref 32–36)
MCHC RBC AUTO-ENTMCNC: 33.3 G/DL (ref 32–36)
MCHC RBC AUTO-ENTMCNC: 33.6 G/DL (ref 32–36)
MCV RBC AUTO: 91 FL (ref 82–98)
MCV RBC AUTO: 92 FL (ref 82–98)
MCV RBC AUTO: 92 FL (ref 82–98)
MCV RBC AUTO: 93 FL (ref 82–98)
MCV RBC AUTO: 94 FL (ref 82–98)
MCV RBC AUTO: 95 FL (ref 82–98)
MCV RBC AUTO: 98 FL (ref 82–98)
MCV RBC AUTO: 98 FL (ref 82–98)
MODE: ABNORMAL
MODE: ABNORMAL
MONOCYTES # BLD AUTO: 0.5 K/UL (ref 0.3–1)
MONOCYTES # BLD AUTO: 0.7 K/UL (ref 0.3–1)
MONOCYTES # BLD AUTO: 0.8 K/UL (ref 0.3–1)
MONOCYTES # BLD AUTO: 0.9 K/UL (ref 0.3–1)
MONOCYTES # BLD AUTO: 1 K/UL (ref 0.3–1)
MONOCYTES # BLD AUTO: 1.3 K/UL (ref 0.3–1)
MONOCYTES NFR BLD: 10.4 % (ref 4–15)
MONOCYTES NFR BLD: 10.5 % (ref 4–15)
MONOCYTES NFR BLD: 11.2 % (ref 4–15)
MONOCYTES NFR BLD: 11.2 % (ref 4–15)
MONOCYTES NFR BLD: 11.4 % (ref 4–15)
MONOCYTES NFR BLD: 11.5 % (ref 4–15)
MONOCYTES NFR BLD: 12.1 % (ref 4–15)
MONOCYTES NFR BLD: 15.3 % (ref 4–15)
MONOCYTES NFR BLD: 7.1 % (ref 4–15)
MONOCYTES NFR BLD: 7.6 % (ref 4–15)
MONOCYTES NFR BLD: 8.5 % (ref 4–15)
MONOCYTES NFR BLD: 9 % (ref 4–15)
MONOCYTES NFR BLD: 9.5 % (ref 4–15)
MV A" WAVE DURATION": 6.85 MSEC
MV MEAN GRADIENT: 1 MMHG
MV PEAK A VEL: 0.39 M/S
MV PEAK A VEL: 0.5 M/S
MV PEAK A VEL: 0.64 M/S
MV PEAK E VEL: 0.73 M/S
MV PEAK E VEL: 0.83 M/S
MV PEAK E VEL: 0.9 M/S
MV PEAK E VEL: 0.94 M/S
MV PEAK GRADIENT: 2 MMHG
MV STENOSIS PRESSURE HALF TIME: 39.74 MS
MV STENOSIS PRESSURE HALF TIME: 64.15 MS
MV STENOSIS PRESSURE HALF TIME: 80.63 MS
MV VALVE AREA BY CONTINUITY EQUATION: 2.95 CM2
MV VALVE AREA P 1/2 METHOD: 2.73 CM2
MV VALVE AREA P 1/2 METHOD: 3.43 CM2
MV VALVE AREA P 1/2 METHOD: 5.54 CM2
NEUTROPHILS # BLD AUTO: 4.5 K/UL (ref 1.8–7.7)
NEUTROPHILS # BLD AUTO: 4.8 K/UL (ref 1.8–7.7)
NEUTROPHILS # BLD AUTO: 4.9 K/UL (ref 1.8–7.7)
NEUTROPHILS # BLD AUTO: 5.1 K/UL (ref 1.8–7.7)
NEUTROPHILS # BLD AUTO: 5.3 K/UL (ref 1.8–7.7)
NEUTROPHILS # BLD AUTO: 5.4 K/UL (ref 1.8–7.7)
NEUTROPHILS # BLD AUTO: 6.2 K/UL (ref 1.8–7.7)
NEUTROPHILS # BLD AUTO: 6.3 K/UL (ref 1.8–7.7)
NEUTROPHILS # BLD AUTO: 6.4 K/UL (ref 1.8–7.7)
NEUTROPHILS # BLD AUTO: 6.6 K/UL (ref 1.8–7.7)
NEUTROPHILS # BLD AUTO: 7.3 K/UL (ref 1.8–7.7)
NEUTROPHILS # BLD AUTO: 8 K/UL (ref 1.8–7.7)
NEUTROPHILS # BLD AUTO: 8.7 K/UL (ref 1.8–7.7)
NEUTROPHILS NFR BLD: 54.7 % (ref 38–73)
NEUTROPHILS NFR BLD: 59.8 % (ref 38–73)
NEUTROPHILS NFR BLD: 64 % (ref 38–73)
NEUTROPHILS NFR BLD: 67.1 % (ref 38–73)
NEUTROPHILS NFR BLD: 67.4 % (ref 38–73)
NEUTROPHILS NFR BLD: 68.2 % (ref 38–73)
NEUTROPHILS NFR BLD: 68.3 % (ref 38–73)
NEUTROPHILS NFR BLD: 69.2 % (ref 38–73)
NEUTROPHILS NFR BLD: 69.5 % (ref 38–73)
NEUTROPHILS NFR BLD: 71.1 % (ref 38–73)
NEUTROPHILS NFR BLD: 77.6 % (ref 38–73)
NEUTROPHILS NFR BLD: 80.5 % (ref 38–73)
NEUTROPHILS NFR BLD: 82.4 % (ref 38–73)
NONHDLC SERPL-MCNC: 55 MG/DL
NRBC BLD-RTO: 0 /100 WBC
NT-PROBNP SERPL IA-MCNC: 1602 PG/ML
NT-PROBNP SERPL IA-MCNC: 2266 PG/ML
NT-PROBNP SERPL IA-MCNC: 3185 PG/ML
NT-PROBNP SERPL IA-MCNC: 957 PG/ML
PATHOLOGIST INTERPRETATION SPE: NORMAL
PCO2 BLDA: 28.2 MMHG (ref 35–45)
PCO2 BLDA: 35 MMHG (ref 35–45)
PCO2 BLDA: 36.4 MMHG (ref 35–45)
PCO2 BLDA: 36.8 MMHG (ref 35–45)
PCO2 BLDA: 36.9 MMHG (ref 35–45)
PCO2 BLDA: 40.2 MMHG (ref 35–45)
PCO2 BLDA: 44.1 MMHG (ref 35–45)
PCO2 BLDA: 46.2 MMHG (ref 35–45)
PCO2 BLDA: 47.4 MMHG (ref 35–45)
PCO2 BLDA: 48.1 MMHG (ref 35–45)
PCO2 BLDA: 48.2 MMHG (ref 35–45)
PCO2 BLDA: 50.4 MMHG (ref 35–45)
PCO2 BLDA: 50.9 MMHG (ref 35–45)
PH SMN: 7.11 [PH] (ref 7.35–7.45)
PH SMN: 7.17 [PH] (ref 7.35–7.45)
PH SMN: 7.21 [PH] (ref 7.35–7.45)
PH SMN: 7.27 [PH] (ref 7.35–7.45)
PH SMN: 7.28 [PH] (ref 7.35–7.45)
PH SMN: 7.28 [PH] (ref 7.35–7.45)
PH SMN: 7.32 [PH] (ref 7.35–7.45)
PH SMN: 7.4 [PH] (ref 7.35–7.45)
PH SMN: 7.41 [PH] (ref 7.35–7.45)
PH SMN: 7.42 [PH] (ref 7.35–7.45)
PH SMN: 7.45 [PH] (ref 7.35–7.45)
PHOSPHATE SERPL-MCNC: 1.9 MG/DL (ref 2.7–4.5)
PHOSPHATE SERPL-MCNC: 2.9 MG/DL (ref 2.7–4.5)
PHOSPHATE SERPL-MCNC: 5.1 MG/DL (ref 2.7–4.5)
PISA MRMAX VEL: 0.04 M/S
PISA MRMAX VEL: 0.05 M/S
PISA TR MAX VEL: 3.82 M/S
PISA TR MAX VEL: 3.85 M/S
PISA TR MAX VEL: 4.05 M/S
PISA TR MAX VEL: 4.22 M/S
PLATELET # BLD AUTO: 169 K/UL (ref 150–450)
PLATELET # BLD AUTO: 171 K/UL (ref 150–450)
PLATELET # BLD AUTO: 191 K/UL (ref 150–450)
PLATELET # BLD AUTO: 197 K/UL (ref 150–450)
PLATELET # BLD AUTO: 200 K/UL (ref 150–450)
PLATELET # BLD AUTO: 204 K/UL (ref 150–450)
PLATELET # BLD AUTO: 216 K/UL (ref 150–450)
PLATELET # BLD AUTO: 230 K/UL (ref 150–450)
PLATELET # BLD AUTO: 235 K/UL (ref 150–450)
PLATELET # BLD AUTO: 246 K/UL (ref 150–450)
PLATELET # BLD AUTO: 252 K/UL (ref 150–450)
PLATELET # BLD AUTO: 255 K/UL (ref 150–450)
PLATELET # BLD AUTO: 256 K/UL (ref 150–450)
PMV BLD AUTO: 10.2 FL (ref 9.2–12.9)
PMV BLD AUTO: 10.7 FL (ref 9.2–12.9)
PMV BLD AUTO: 10.9 FL (ref 9.2–12.9)
PMV BLD AUTO: 11 FL (ref 9.2–12.9)
PMV BLD AUTO: 11 FL (ref 9.2–12.9)
PMV BLD AUTO: 11.2 FL (ref 9.2–12.9)
PMV BLD AUTO: 11.3 FL (ref 9.2–12.9)
PMV BLD AUTO: 11.5 FL (ref 9.2–12.9)
PMV BLD AUTO: 11.5 FL (ref 9.2–12.9)
PMV BLD AUTO: 11.6 FL (ref 9.2–12.9)
PMV BLD AUTO: 11.9 FL (ref 9.2–12.9)
PMV BLD AUTO: 11.9 FL (ref 9.2–12.9)
PMV BLD AUTO: 12.4 FL (ref 9.2–12.9)
PO2 BLDA: 109 MMHG (ref 80–100)
PO2 BLDA: 110 MMHG (ref 80–100)
PO2 BLDA: 151 MMHG (ref 80–100)
PO2 BLDA: 30 MMHG (ref 40–60)
PO2 BLDA: 30 MMHG (ref 40–60)
PO2 BLDA: 31 MMHG (ref 40–60)
PO2 BLDA: 32 MMHG (ref 40–60)
PO2 BLDA: 33 MMHG (ref 40–60)
PO2 BLDA: 34 MMHG (ref 40–60)
PO2 BLDA: 35 MMHG (ref 40–60)
PO2 BLDA: 80 MMHG (ref 80–100)
POC ACTIVATED CLOTTING TIME K: 119 SEC (ref 74–137)
POC ACTIVATED CLOTTING TIME K: 125 SEC (ref 74–137)
POC ACTIVATED CLOTTING TIME K: 215 SEC (ref 74–137)
POC BE: -10 MMOL/L
POC BE: -13 MMOL/L
POC BE: -13 MMOL/L
POC BE: -14 MMOL/L
POC BE: -14 MMOL/L
POC BE: -8 MMOL/L
POC BE: -9 MMOL/L
POC BE: 3 MMOL/L
POC BE: 4 MMOL/L
POC BE: 6 MMOL/L
POC BE: 7 MMOL/L
POC BE: 7 MMOL/L
POC BE: 8 MMOL/L
POC IONIZED CALCIUM: 1.19 MMOL/L (ref 1.06–1.42)
POC IONIZED CALCIUM: 1.22 MMOL/L (ref 1.06–1.42)
POC IONIZED CALCIUM: 1.23 MMOL/L (ref 1.06–1.42)
POC IONIZED CALCIUM: 1.29 MMOL/L (ref 1.06–1.42)
POC IONIZED CALCIUM: 1.29 MMOL/L (ref 1.06–1.42)
POC SATURATED O2: 57 % (ref 95–100)
POC SATURATED O2: 58 % (ref 95–100)
POC SATURATED O2: 59 % (ref 95–100)
POC SATURATED O2: 59 % (ref 95–100)
POC SATURATED O2: 60 % (ref 95–100)
POC SATURATED O2: 61 % (ref 95–100)
POC SATURATED O2: 61 % (ref 95–100)
POC SATURATED O2: 64 % (ref 95–100)
POC SATURATED O2: 66 % (ref 95–100)
POC SATURATED O2: 94 % (ref 95–100)
POC SATURATED O2: 97 % (ref 95–100)
POC SATURATED O2: 97 % (ref 95–100)
POC SATURATED O2: 98 % (ref 95–100)
POC TCO2: 14 MMOL/L (ref 23–27)
POC TCO2: 16 MMOL/L (ref 23–27)
POC TCO2: 16 MMOL/L (ref 23–27)
POC TCO2: 18 MMOL/L (ref 23–27)
POC TCO2: 18 MMOL/L (ref 24–29)
POC TCO2: 18 MMOL/L (ref 24–29)
POC TCO2: 19 MMOL/L (ref 24–29)
POC TCO2: 29 MMOL/L (ref 24–29)
POC TCO2: 30 MMOL/L (ref 24–29)
POC TCO2: 32 MMOL/L (ref 24–29)
POC TCO2: 33 MMOL/L (ref 24–29)
POC TCO2: 33 MMOL/L (ref 24–29)
POC TCO2: 34 MMOL/L (ref 24–29)
POCT GLUCOSE: 142 MG/DL (ref 70–110)
POTASSIUM BLD-SCNC: 3.5 MMOL/L (ref 3.5–5.1)
POTASSIUM BLD-SCNC: 5 MMOL/L (ref 3.5–5.1)
POTASSIUM BLD-SCNC: 5.3 MMOL/L (ref 3.5–5.1)
POTASSIUM BLD-SCNC: 5.6 MMOL/L (ref 3.5–5.1)
POTASSIUM BLD-SCNC: 5.7 MMOL/L (ref 3.5–5.1)
POTASSIUM BLD-SCNC: 6.3 MMOL/L (ref 3.5–5.1)
POTASSIUM SERPL-SCNC: 3.2 MMOL/L (ref 3.5–5.1)
POTASSIUM SERPL-SCNC: 3.3 MMOL/L (ref 3.5–5.1)
POTASSIUM SERPL-SCNC: 3.7 MMOL/L (ref 3.5–5.1)
POTASSIUM SERPL-SCNC: 3.8 MMOL/L (ref 3.5–5.1)
POTASSIUM SERPL-SCNC: 4 MMOL/L (ref 3.5–5.1)
POTASSIUM SERPL-SCNC: 4 MMOL/L (ref 3.5–5.1)
POTASSIUM SERPL-SCNC: 4.1 MMOL/L (ref 3.5–5.1)
POTASSIUM SERPL-SCNC: 4.2 MMOL/L (ref 3.5–5.1)
POTASSIUM SERPL-SCNC: 4.2 MMOL/L (ref 3.5–5.1)
POTASSIUM SERPL-SCNC: 4.3 MMOL/L (ref 3.5–5.1)
POTASSIUM SERPL-SCNC: 4.3 MMOL/L (ref 3.5–5.1)
POTASSIUM SERPL-SCNC: 4.4 MMOL/L (ref 3.5–5.1)
POTASSIUM SERPL-SCNC: 4.5 MMOL/L (ref 3.5–5.1)
POTASSIUM SERPL-SCNC: 4.5 MMOL/L (ref 3.5–5.1)
POTASSIUM SERPL-SCNC: 4.6 MMOL/L (ref 3.5–5.1)
POTASSIUM SERPL-SCNC: 4.7 MMOL/L (ref 3.5–5.1)
POTASSIUM SERPL-SCNC: 4.7 MMOL/L (ref 3.5–5.1)
POTASSIUM SERPL-SCNC: 4.8 MMOL/L (ref 3.5–5.1)
POTASSIUM SERPL-SCNC: 4.9 MMOL/L (ref 3.5–5.1)
POTASSIUM SERPL-SCNC: 5 MMOL/L (ref 3.5–5.1)
POTASSIUM SERPL-SCNC: 5 MMOL/L (ref 3.5–5.1)
POTASSIUM SERPL-SCNC: 5.1 MMOL/L (ref 3.5–5.1)
POTASSIUM SERPL-SCNC: 5.2 MMOL/L (ref 3.5–5.1)
POTASSIUM SERPL-SCNC: 6.1 MMOL/L (ref 3.5–5.1)
PROCALCITONIN SERPL IA-MCNC: 0.05 NG/ML
PROT SERPL-MCNC: 5.8 G/DL (ref 6–8.4)
PROT SERPL-MCNC: 6.1 G/DL (ref 6–8.4)
PROT SERPL-MCNC: 6.2 G/DL (ref 6–8.4)
PROT SERPL-MCNC: 6.4 G/DL (ref 6–8.4)
PROT SERPL-MCNC: 6.5 G/DL (ref 6–8.4)
PROT SERPL-MCNC: 6.5 G/DL (ref 6–8.4)
PROT SERPL-MCNC: 6.9 G/DL (ref 6–8.4)
PROT SERPL-MCNC: 6.9 G/DL (ref 6–8.4)
PROT SERPL-MCNC: 7.3 G/DL (ref 6–8.4)
PROT SERPL-MCNC: 7.6 G/DL (ref 6–8.4)
PROT UR-MCNC: <7 MG/DL (ref 0–15)
PROTHROMBIN TIME: 11.5 SEC (ref 9–12.5)
PULM VEIN S/D RATIO: 0.28
PV PEAK D VEL: 0.96 M/S
PV PEAK S VEL: 0.27 M/S
RA MAJOR: 5.3 CM
RA MAJOR: 5.69 CM
RA MAJOR: 5.81 CM
RA MAJOR: 6.05 CM
RA PRESSURE ESTIMATED: 15 MMHG
RA PRESSURE ESTIMATED: 8 MMHG
RA PRESSURE: 8 MMHG
RA PRESSURE: 8 MMHG
RA WIDTH: 4.09 CM
RA WIDTH: 4.35 CM
RA WIDTH: 4.53 CM
RA WIDTH: 5.14 CM
RBC # BLD AUTO: 3.19 M/UL (ref 4.6–6.2)
RBC # BLD AUTO: 3.27 M/UL (ref 4.6–6.2)
RBC # BLD AUTO: 3.3 M/UL (ref 4.6–6.2)
RBC # BLD AUTO: 3.33 M/UL (ref 4.6–6.2)
RBC # BLD AUTO: 3.39 M/UL (ref 4.6–6.2)
RBC # BLD AUTO: 3.98 M/UL (ref 4.6–6.2)
RBC # BLD AUTO: 4.31 M/UL (ref 4.6–6.2)
RBC # BLD AUTO: 4.41 M/UL (ref 4.6–6.2)
RBC # BLD AUTO: 4.68 M/UL (ref 4.6–6.2)
RBC # BLD AUTO: 4.91 M/UL (ref 4.6–6.2)
RBC # BLD AUTO: 5.05 M/UL (ref 4.6–6.2)
RBC # BLD AUTO: 5.21 M/UL (ref 4.6–6.2)
RBC # BLD AUTO: 5.24 M/UL (ref 4.6–6.2)
RIGHT ATRIAL AREA: 20 CM2
RIGHT VENTRICLE GLOBAL SYSTOLIC STRAIN: 10 %
RIGHT VENTRICULAR END-DIASTOLIC DIMENSION: 4.68 CM
RIGHT VENTRICULAR END-DIASTOLIC DIMENSION: 4.71 CM
RIGHT VENTRICULAR END-DIASTOLIC DIMENSION: 4.72 CM
RIGHT VENTRICULAR END-DIASTOLIC DIMENSION: 4.81 CM
RV TB RVSP: 12 MMHG
RV TB RVSP: 19 MMHG
SAMPLE: ABNORMAL
SAMPLE: NORMAL
SINUS: 2.93 CM
SINUS: 3.17 CM
SINUS: 3.46 CM
SINUS: 3.71 CM
SITE: ABNORMAL
SITE: NORMAL
SODIUM BLD-SCNC: 138 MMOL/L (ref 136–145)
SODIUM BLD-SCNC: 140 MMOL/L (ref 136–145)
SODIUM BLD-SCNC: 140 MMOL/L (ref 136–145)
SODIUM BLD-SCNC: 142 MMOL/L (ref 136–145)
SODIUM BLD-SCNC: 143 MMOL/L (ref 136–145)
SODIUM SERPL-SCNC: 133 MMOL/L (ref 136–145)
SODIUM SERPL-SCNC: 135 MMOL/L (ref 136–145)
SODIUM SERPL-SCNC: 136 MMOL/L (ref 136–145)
SODIUM SERPL-SCNC: 137 MMOL/L (ref 136–145)
SODIUM SERPL-SCNC: 137 MMOL/L (ref 136–145)
SODIUM SERPL-SCNC: 138 MMOL/L (ref 136–145)
SODIUM SERPL-SCNC: 139 MMOL/L (ref 136–145)
SODIUM SERPL-SCNC: 140 MMOL/L (ref 136–145)
SODIUM SERPL-SCNC: 141 MMOL/L (ref 136–145)
SODIUM SERPL-SCNC: 142 MMOL/L (ref 136–145)
SODIUM SERPL-SCNC: 143 MMOL/L (ref 136–145)
SODIUM SERPL-SCNC: 144 MMOL/L (ref 136–145)
SODIUM SERPL-SCNC: 144 MMOL/L (ref 136–145)
SODIUM SERPL-SCNC: 145 MMOL/L (ref 136–145)
SODIUM SERPL-SCNC: 145 MMOL/L (ref 136–145)
SODIUM SERPL-SCNC: 146 MMOL/L (ref 136–145)
SODIUM UR-SCNC: 85 MMOL/L (ref 20–250)
STJ: 2.15 CM
STJ: 2.75 CM
STJ: 2.82 CM
STJ: 3.67 CM
TDI LATERAL: 0.05 M/S
TDI LATERAL: 0.07 M/S
TDI LATERAL: 0.08 M/S
TDI LATERAL: 0.1 M/S
TDI SEPTAL: 0.05 M/S
TDI SEPTAL: 0.05 M/S
TDI SEPTAL: 0.06 M/S
TDI SEPTAL: 0.08 M/S
TDI: 0.05 M/S
TDI: 0.06 M/S
TDI: 0.08 M/S
TDI: 0.08 M/S
TR MAX PG: 58 MMHG
TR MAX PG: 59 MMHG
TR MAX PG: 66 MMHG
TR MAX PG: 71 MMHG
TRICUSPID ANNULAR PLANE SYSTOLIC EXCURSION: 1.1 CM
TRICUSPID ANNULAR PLANE SYSTOLIC EXCURSION: 1.19 CM
TRICUSPID ANNULAR PLANE SYSTOLIC EXCURSION: 1.59 CM
TRICUSPID ANNULAR PLANE SYSTOLIC EXCURSION: 1.62 CM
TRIGL SERPL-MCNC: 50 MG/DL (ref 30–150)
TSH SERPL DL<=0.005 MIU/L-ACNC: 1.98 UIU/ML (ref 0.4–4)
TV REST PULMONARY ARTERY PRESSURE: 67 MMHG
TV REST PULMONARY ARTERY PRESSURE: 73 MMHG
TV REST PULMONARY ARTERY PRESSURE: 74 MMHG
TV REST PULMONARY ARTERY PRESSURE: 79 MMHG
UUN UR-MCNC: 257 MG/DL (ref 140–1050)
WBC # BLD AUTO: 10.52 K/UL (ref 3.9–12.7)
WBC # BLD AUTO: 7 K/UL (ref 3.9–12.7)
WBC # BLD AUTO: 7.22 K/UL (ref 3.9–12.7)
WBC # BLD AUTO: 7.59 K/UL (ref 3.9–12.7)
WBC # BLD AUTO: 8.17 K/UL (ref 3.9–12.7)
WBC # BLD AUTO: 8.48 K/UL (ref 3.9–12.7)
WBC # BLD AUTO: 8.92 K/UL (ref 3.9–12.7)
WBC # BLD AUTO: 9 K/UL (ref 3.9–12.7)
WBC # BLD AUTO: 9.2 K/UL (ref 3.9–12.7)
WBC # BLD AUTO: 9.23 K/UL (ref 3.9–12.7)
WBC # BLD AUTO: 9.29 K/UL (ref 3.9–12.7)
WBC # BLD AUTO: 9.45 K/UL (ref 3.9–12.7)
WBC # BLD AUTO: 9.94 K/UL (ref 3.9–12.7)
Z-SCORE OF LEFT VENTRICULAR DIMENSION IN END DIASTOLE: -0.25
Z-SCORE OF LEFT VENTRICULAR DIMENSION IN END DIASTOLE: -1.99
Z-SCORE OF LEFT VENTRICULAR DIMENSION IN END SYSTOLE: 0.02
Z-SCORE OF LEFT VENTRICULAR DIMENSION IN END SYSTOLE: 1.31

## 2023-01-01 PROCEDURE — 1126F PR PAIN SEVERITY QUANTIFIED, NO PAIN PRESENT: ICD-10-PCS | Mod: HCNC,CPTII,S$GLB, | Performed by: THORACIC SURGERY (CARDIOTHORACIC VASCULAR SURGERY)

## 2023-01-01 PROCEDURE — 3288F PR FALLS RISK ASSESSMENT DOCUMENTED: ICD-10-PCS | Mod: HCNC,CPTII,S$GLB, | Performed by: INTERNAL MEDICINE

## 2023-01-01 PROCEDURE — 85025 COMPLETE CBC W/AUTO DIFF WBC: CPT | Performed by: PHYSICIAN ASSISTANT

## 2023-01-01 PROCEDURE — 82803 BLOOD GASES ANY COMBINATION: CPT | Mod: HCNC

## 2023-01-01 PROCEDURE — 84100 ASSAY OF PHOSPHORUS: CPT | Mod: HCNC | Performed by: STUDENT IN AN ORGANIZED HEALTH CARE EDUCATION/TRAINING PROGRAM

## 2023-01-01 PROCEDURE — 83605 ASSAY OF LACTIC ACID: CPT | Mod: 91,HCNC | Performed by: STUDENT IN AN ORGANIZED HEALTH CARE EDUCATION/TRAINING PROGRAM

## 2023-01-01 PROCEDURE — 36415 COLL VENOUS BLD VENIPUNCTURE: CPT | Mod: HCNC,PN | Performed by: INTERNAL MEDICINE

## 2023-01-01 PROCEDURE — 99900035 HC TECH TIME PER 15 MIN (STAT)

## 2023-01-01 PROCEDURE — 83880 ASSAY OF NATRIURETIC PEPTIDE: CPT | Mod: HCNC | Performed by: INTERNAL MEDICINE

## 2023-01-01 PROCEDURE — 85025 COMPLETE CBC W/AUTO DIFF WBC: CPT | Mod: HCNC | Performed by: STUDENT IN AN ORGANIZED HEALTH CARE EDUCATION/TRAINING PROGRAM

## 2023-01-01 PROCEDURE — 36620 ARTERIAL: ICD-10-PCS | Mod: 59,HCNC,, | Performed by: ANESTHESIOLOGY

## 2023-01-01 PROCEDURE — A4216 STERILE WATER/SALINE, 10 ML: HCPCS | Performed by: INTERNAL MEDICINE

## 2023-01-01 PROCEDURE — 99233 PR SUBSEQUENT HOSPITAL CARE,LEVL III: ICD-10-PCS | Mod: ,,, | Performed by: INTERNAL MEDICINE

## 2023-01-01 PROCEDURE — 83735 ASSAY OF MAGNESIUM: CPT | Performed by: PHYSICIAN ASSISTANT

## 2023-01-01 PROCEDURE — 1101F PR PT FALLS ASSESS DOC 0-1 FALLS W/OUT INJ PAST YR: ICD-10-PCS | Mod: HCNC,CPTII,S$GLB, | Performed by: INTERNAL MEDICINE

## 2023-01-01 PROCEDURE — 3074F PR MOST RECENT SYSTOLIC BLOOD PRESSURE < 130 MM HG: ICD-10-PCS | Mod: HCNC,CPTII,S$GLB, | Performed by: UROLOGY

## 2023-01-01 PROCEDURE — 99223 PR INITIAL HOSPITAL CARE,LEVL III: ICD-10-PCS | Mod: AI,25,, | Performed by: INTERNAL MEDICINE

## 2023-01-01 PROCEDURE — 99291 PR CRITICAL CARE, E/M 30-74 MINUTES: ICD-10-PCS | Mod: HCNC,GC,, | Performed by: INTERNAL MEDICINE

## 2023-01-01 PROCEDURE — 63600175 PHARM REV CODE 636 W HCPCS: Performed by: STUDENT IN AN ORGANIZED HEALTH CARE EDUCATION/TRAINING PROGRAM

## 2023-01-01 PROCEDURE — 3078F PR MOST RECENT DIASTOLIC BLOOD PRESSURE < 80 MM HG: ICD-10-PCS | Mod: HCNC,CPTII,S$GLB, | Performed by: INTERNAL MEDICINE

## 2023-01-01 PROCEDURE — 80053 COMPREHEN METABOLIC PANEL: CPT | Mod: 91,HCNC | Performed by: STUDENT IN AN ORGANIZED HEALTH CARE EDUCATION/TRAINING PROGRAM

## 2023-01-01 PROCEDURE — 3075F PR MOST RECENT SYSTOLIC BLOOD PRESS GE 130-139MM HG: ICD-10-PCS | Mod: HCNC,CPTII,S$GLB, | Performed by: INTERNAL MEDICINE

## 2023-01-01 PROCEDURE — 80048 BASIC METABOLIC PNL TOTAL CA: CPT | Mod: HCNC | Performed by: STUDENT IN AN ORGANIZED HEALTH CARE EDUCATION/TRAINING PROGRAM

## 2023-01-01 PROCEDURE — 25000003 PHARM REV CODE 250: Performed by: STUDENT IN AN ORGANIZED HEALTH CARE EDUCATION/TRAINING PROGRAM

## 2023-01-01 PROCEDURE — 97535 SELF CARE MNGMENT TRAINING: CPT | Mod: HCNC

## 2023-01-01 PROCEDURE — 83735 ASSAY OF MAGNESIUM: CPT | Mod: 91,HCNC | Performed by: INTERNAL MEDICINE

## 2023-01-01 PROCEDURE — 99233 PR SUBSEQUENT HOSPITAL CARE,LEVL III: ICD-10-PCS | Mod: HCNC,,, | Performed by: INTERNAL MEDICINE

## 2023-01-01 PROCEDURE — 63600175 PHARM REV CODE 636 W HCPCS: Mod: HCNC | Performed by: STUDENT IN AN ORGANIZED HEALTH CARE EDUCATION/TRAINING PROGRAM

## 2023-01-01 PROCEDURE — 1160F RVW MEDS BY RX/DR IN RCRD: CPT | Mod: CPTII,S$GLB,, | Performed by: PHYSICIAN ASSISTANT

## 2023-01-01 PROCEDURE — 99999 PR PBB SHADOW E&M-EST. PATIENT-LVL V: ICD-10-PCS | Mod: PBBFAC,HCNC,, | Performed by: INTERNAL MEDICINE

## 2023-01-01 PROCEDURE — 25000003 PHARM REV CODE 250: Performed by: INTERNAL MEDICINE

## 2023-01-01 PROCEDURE — 1126F PR PAIN SEVERITY QUANTIFIED, NO PAIN PRESENT: ICD-10-PCS | Mod: HCNC,CPTII,S$GLB, | Performed by: UROLOGY

## 2023-01-01 PROCEDURE — 20000000 HC ICU ROOM: Mod: HCNC

## 2023-01-01 PROCEDURE — 36573 INSJ PICC RS&I 5 YR+: CPT

## 2023-01-01 PROCEDURE — 3074F SYST BP LT 130 MM HG: CPT | Mod: CPTII,S$GLB,, | Performed by: PHYSICIAN ASSISTANT

## 2023-01-01 PROCEDURE — 3288F FALL RISK ASSESSMENT DOCD: CPT | Mod: HCNC,CPTII,S$GLB, | Performed by: INTERNAL MEDICINE

## 2023-01-01 PROCEDURE — 99233 SBSQ HOSP IP/OBS HIGH 50: CPT | Mod: ,,, | Performed by: INTERNAL MEDICINE

## 2023-01-01 PROCEDURE — 63600175 PHARM REV CODE 636 W HCPCS: Performed by: PHYSICIAN ASSISTANT

## 2023-01-01 PROCEDURE — 37000008 HC ANESTHESIA 1ST 15 MINUTES: Mod: HCNC | Performed by: INTERNAL MEDICINE

## 2023-01-01 PROCEDURE — 93451 RIGHT HEART CATH: CPT | Performed by: INTERNAL MEDICINE

## 2023-01-01 PROCEDURE — 99233 SBSQ HOSP IP/OBS HIGH 50: CPT | Mod: HCNC,,, | Performed by: INTERNAL MEDICINE

## 2023-01-01 PROCEDURE — 99999 PR PBB SHADOW E&M-EST. PATIENT-LVL III: CPT | Mod: PBBFAC,HCNC,, | Performed by: INTERNAL MEDICINE

## 2023-01-01 PROCEDURE — 73030 XR SHOULDER COMPLETE 2 OR MORE VIEWS RIGHT: ICD-10-PCS | Mod: 26,RT,, | Performed by: RADIOLOGY

## 2023-01-01 PROCEDURE — 1160F PR REVIEW ALL MEDS BY PRESCRIBER/CLIN PHARMACIST DOCUMENTED: ICD-10-PCS | Mod: HCNC,CPTII,S$GLB, | Performed by: STUDENT IN AN ORGANIZED HEALTH CARE EDUCATION/TRAINING PROGRAM

## 2023-01-01 PROCEDURE — 93005 ELECTROCARDIOGRAM TRACING: CPT

## 2023-01-01 PROCEDURE — 85025 COMPLETE CBC W/AUTO DIFF WBC: CPT | Performed by: STUDENT IN AN ORGANIZED HEALTH CARE EDUCATION/TRAINING PROGRAM

## 2023-01-01 PROCEDURE — 99999 PR PBB SHADOW E&M-EST. PATIENT-LVL IV: CPT | Mod: PBBFAC,HCNC,, | Performed by: STUDENT IN AN ORGANIZED HEALTH CARE EDUCATION/TRAINING PROGRAM

## 2023-01-01 PROCEDURE — 80053 COMPREHEN METABOLIC PANEL: CPT | Mod: HCNC | Performed by: STUDENT IN AN ORGANIZED HEALTH CARE EDUCATION/TRAINING PROGRAM

## 2023-01-01 PROCEDURE — 3074F SYST BP LT 130 MM HG: CPT | Mod: HCNC,CPTII,S$GLB, | Performed by: INTERNAL MEDICINE

## 2023-01-01 PROCEDURE — 3078F DIAST BP <80 MM HG: CPT | Mod: HCNC,CPTII,S$GLB, | Performed by: UROLOGY

## 2023-01-01 PROCEDURE — 36620 INSERTION CATHETER ARTERY: CPT | Mod: 59,HCNC,, | Performed by: ANESTHESIOLOGY

## 2023-01-01 PROCEDURE — 25000003 PHARM REV CODE 250: Mod: HCNC

## 2023-01-01 PROCEDURE — 3075F SYST BP GE 130 - 139MM HG: CPT | Mod: HCNC,CPTII,S$GLB, | Performed by: THORACIC SURGERY (CARDIOTHORACIC VASCULAR SURGERY)

## 2023-01-01 PROCEDURE — 97162 PT EVAL MOD COMPLEX 30 MIN: CPT | Mod: HCNC

## 2023-01-01 PROCEDURE — 25000003 PHARM REV CODE 250: Mod: HCNC | Performed by: STUDENT IN AN ORGANIZED HEALTH CARE EDUCATION/TRAINING PROGRAM

## 2023-01-01 PROCEDURE — 1126F AMNT PAIN NOTED NONE PRSNT: CPT | Mod: HCNC,CPTII,S$GLB, | Performed by: INTERNAL MEDICINE

## 2023-01-01 PROCEDURE — 1126F PR PAIN SEVERITY QUANTIFIED, NO PAIN PRESENT: ICD-10-PCS | Mod: HCNC,CPTII,S$GLB, | Performed by: INTERNAL MEDICINE

## 2023-01-01 PROCEDURE — 84165 PROTEIN E-PHORESIS SERUM: CPT | Mod: HCNC | Performed by: INTERNAL MEDICINE

## 2023-01-01 PROCEDURE — 99215 OFFICE O/P EST HI 40 MIN: CPT | Mod: 25,S$GLB,, | Performed by: PHYSICIAN ASSISTANT

## 2023-01-01 PROCEDURE — 20600001 HC STEP DOWN PRIVATE ROOM: Mod: HCNC

## 2023-01-01 PROCEDURE — 80053 COMPREHEN METABOLIC PANEL: CPT | Performed by: PHYSICIAN ASSISTANT

## 2023-01-01 PROCEDURE — 1126F AMNT PAIN NOTED NONE PRSNT: CPT | Mod: HCNC,CPTII,S$GLB, | Performed by: THORACIC SURGERY (CARDIOTHORACIC VASCULAR SURGERY)

## 2023-01-01 PROCEDURE — 99999 PR PBB SHADOW E&M-EST. PATIENT-LVL IV: CPT | Mod: PBBFAC,HCNC,, | Performed by: INTERNAL MEDICINE

## 2023-01-01 PROCEDURE — 76937 US GUIDE VASCULAR ACCESS: CPT

## 2023-01-01 PROCEDURE — 93306 TTE W/DOPPLER COMPLETE: CPT | Mod: 26,HCNC,, | Performed by: INTERNAL MEDICINE

## 2023-01-01 PROCEDURE — 97116 GAIT TRAINING THERAPY: CPT | Mod: HCNC

## 2023-01-01 PROCEDURE — 33418 PR REPAIR MITRAL VALVE PERC APPRCH, INCL TRANSSEPTAL PUNCTRE;INITIAL: ICD-10-PCS | Mod: Q0,HCNC,, | Performed by: INTERNAL MEDICINE

## 2023-01-01 PROCEDURE — 93010 ELECTROCARDIOGRAM REPORT: CPT | Mod: HCNC,,, | Performed by: INTERNAL MEDICINE

## 2023-01-01 PROCEDURE — 1159F PR MEDICATION LIST DOCUMENTED IN MEDICAL RECORD: ICD-10-PCS | Mod: CPTII,S$GLB,, | Performed by: PHYSICIAN ASSISTANT

## 2023-01-01 PROCEDURE — 99900035 HC TECH TIME PER 15 MIN (STAT): Mod: HCNC

## 2023-01-01 PROCEDURE — 85347 COAGULATION TIME ACTIVATED: CPT

## 2023-01-01 PROCEDURE — 99999 PR PBB SHADOW E&M-EST. PATIENT-LVL III: ICD-10-PCS | Mod: PBBFAC,HCNC,, | Performed by: THORACIC SURGERY (CARDIOTHORACIC VASCULAR SURGERY)

## 2023-01-01 PROCEDURE — 84540 ASSAY OF URINE/UREA-N: CPT | Performed by: STUDENT IN AN ORGANIZED HEALTH CARE EDUCATION/TRAINING PROGRAM

## 2023-01-01 PROCEDURE — 99215 PR OFFICE/OUTPT VISIT, EST, LEVL V, 40-54 MIN: ICD-10-PCS | Mod: 25,S$GLB,, | Performed by: PHYSICIAN ASSISTANT

## 2023-01-01 PROCEDURE — 99291 PR CRITICAL CARE, E/M 30-74 MINUTES: ICD-10-PCS | Mod: HCNC,,, | Performed by: INTERNAL MEDICINE

## 2023-01-01 PROCEDURE — 63600175 PHARM REV CODE 636 W HCPCS: Mod: HCNC | Performed by: ANESTHESIOLOGY

## 2023-01-01 PROCEDURE — 1159F MED LIST DOCD IN RCRD: CPT | Mod: CPTII,S$GLB,, | Performed by: PHYSICIAN ASSISTANT

## 2023-01-01 PROCEDURE — 99999 PR PBB SHADOW E&M-EST. PATIENT-LVL III: ICD-10-PCS | Mod: PBBFAC,HCNC,, | Performed by: INTERNAL MEDICINE

## 2023-01-01 PROCEDURE — 3078F DIAST BP <80 MM HG: CPT | Mod: CPTII,S$GLB,, | Performed by: PHYSICIAN ASSISTANT

## 2023-01-01 PROCEDURE — 1126F AMNT PAIN NOTED NONE PRSNT: CPT | Mod: HCNC,CPTII,S$GLB, | Performed by: UROLOGY

## 2023-01-01 PROCEDURE — 1125F AMNT PAIN NOTED PAIN PRSNT: CPT | Mod: CPTII,S$GLB,, | Performed by: PHYSICIAN ASSISTANT

## 2023-01-01 PROCEDURE — 83735 ASSAY OF MAGNESIUM: CPT | Performed by: STUDENT IN AN ORGANIZED HEALTH CARE EDUCATION/TRAINING PROGRAM

## 2023-01-01 PROCEDURE — 1159F MED LIST DOCD IN RCRD: CPT | Mod: HCNC,CPTII,S$GLB, | Performed by: THORACIC SURGERY (CARDIOTHORACIC VASCULAR SURGERY)

## 2023-01-01 PROCEDURE — 25000003 PHARM REV CODE 250: Performed by: PHYSICIAN ASSISTANT

## 2023-01-01 PROCEDURE — 36415 COLL VENOUS BLD VENIPUNCTURE: CPT | Mod: HCNC | Performed by: INTERNAL MEDICINE

## 2023-01-01 PROCEDURE — 99215 OFFICE O/P EST HI 40 MIN: CPT | Mod: HCNC,S$GLB,, | Performed by: INTERNAL MEDICINE

## 2023-01-01 PROCEDURE — 99233 PR SUBSEQUENT HOSPITAL CARE,LEVL III: ICD-10-PCS | Mod: HCNC,GC,, | Performed by: INTERNAL MEDICINE

## 2023-01-01 PROCEDURE — C1751 CATH, INF, PER/CENT/MIDLINE: HCPCS

## 2023-01-01 PROCEDURE — 99233 PR SUBSEQUENT HOSPITAL CARE,LEVL III: ICD-10-PCS | Mod: ,,, | Performed by: PHYSICIAN ASSISTANT

## 2023-01-01 PROCEDURE — 37799 UNLISTED PX VASCULAR SURGERY: CPT | Mod: HCNC

## 2023-01-01 PROCEDURE — 3074F SYST BP LT 130 MM HG: CPT | Mod: HCNC,CPTII,S$GLB, | Performed by: STUDENT IN AN ORGANIZED HEALTH CARE EDUCATION/TRAINING PROGRAM

## 2023-01-01 PROCEDURE — 86803 HEPATITIS C AB TEST: CPT | Mod: HCNC | Performed by: STUDENT IN AN ORGANIZED HEALTH CARE EDUCATION/TRAINING PROGRAM

## 2023-01-01 PROCEDURE — D9220A PRA ANESTHESIA: ICD-10-PCS | Mod: Q0,HCNC,, | Performed by: ANESTHESIOLOGY

## 2023-01-01 PROCEDURE — 1125F PR PAIN SEVERITY QUANTIFIED, PAIN PRESENT: ICD-10-PCS | Mod: HCNC,CPTII,S$GLB, | Performed by: INTERNAL MEDICINE

## 2023-01-01 PROCEDURE — 83735 ASSAY OF MAGNESIUM: CPT | Mod: 91 | Performed by: STUDENT IN AN ORGANIZED HEALTH CARE EDUCATION/TRAINING PROGRAM

## 2023-01-01 PROCEDURE — 80048 BASIC METABOLIC PNL TOTAL CA: CPT | Mod: XB | Performed by: STUDENT IN AN ORGANIZED HEALTH CARE EDUCATION/TRAINING PROGRAM

## 2023-01-01 PROCEDURE — 93451 PR RIGHT HEART CATH O2 SATURATION & CARDIAC OUTPUT: ICD-10-PCS | Mod: 26,,, | Performed by: INTERNAL MEDICINE

## 2023-01-01 PROCEDURE — 99215 PR OFFICE/OUTPT VISIT, EST, LEVL V, 40-54 MIN: ICD-10-PCS | Mod: HCNC,S$GLB,, | Performed by: INTERNAL MEDICINE

## 2023-01-01 PROCEDURE — 80048 BASIC METABOLIC PNL TOTAL CA: CPT | Mod: HCNC | Performed by: INTERNAL MEDICINE

## 2023-01-01 PROCEDURE — 99223 PR INITIAL HOSPITAL CARE,LEVL III: ICD-10-PCS | Mod: GC,,, | Performed by: INTERNAL MEDICINE

## 2023-01-01 PROCEDURE — 80048 BASIC METABOLIC PNL TOTAL CA: CPT | Performed by: STUDENT IN AN ORGANIZED HEALTH CARE EDUCATION/TRAINING PROGRAM

## 2023-01-01 PROCEDURE — 1100F PTFALLS ASSESS-DOCD GE2>/YR: CPT | Mod: CPTII,S$GLB,, | Performed by: PHYSICIAN ASSISTANT

## 2023-01-01 PROCEDURE — 1111F DSCHRG MED/CURRENT MED MERGE: CPT | Mod: HCNC,CPTII,S$GLB, | Performed by: INTERNAL MEDICINE

## 2023-01-01 PROCEDURE — 99999 PR PBB SHADOW E&M-EST. PATIENT-LVL IV: ICD-10-PCS | Mod: PBBFAC,HCNC,, | Performed by: UROLOGY

## 2023-01-01 PROCEDURE — 1101F PT FALLS ASSESS-DOCD LE1/YR: CPT | Mod: HCNC,CPTII,S$GLB, | Performed by: INTERNAL MEDICINE

## 2023-01-01 PROCEDURE — 99204 PR OFFICE/OUTPT VISIT, NEW, LEVL IV, 45-59 MIN: ICD-10-PCS | Mod: HCNC,S$GLB,, | Performed by: UROLOGY

## 2023-01-01 PROCEDURE — 36415 COLL VENOUS BLD VENIPUNCTURE: CPT | Performed by: STUDENT IN AN ORGANIZED HEALTH CARE EDUCATION/TRAINING PROGRAM

## 2023-01-01 PROCEDURE — 99214 PR OFFICE/OUTPT VISIT, EST, LEVL IV, 30-39 MIN: ICD-10-PCS | Mod: HCNC,S$GLB,, | Performed by: INTERNAL MEDICINE

## 2023-01-01 PROCEDURE — 3078F DIAST BP <80 MM HG: CPT | Mod: HCNC,CPTII,S$GLB, | Performed by: INTERNAL MEDICINE

## 2023-01-01 PROCEDURE — 83521 IG LIGHT CHAINS FREE EACH: CPT | Mod: 59,HCNC | Performed by: INTERNAL MEDICINE

## 2023-01-01 PROCEDURE — 94761 N-INVAS EAR/PLS OXIMETRY MLT: CPT

## 2023-01-01 PROCEDURE — 99499 UNLISTED E&M SERVICE: CPT | Mod: S$GLB,,, | Performed by: PHYSICIAN ASSISTANT

## 2023-01-01 PROCEDURE — 1159F PR MEDICATION LIST DOCUMENTED IN MEDICAL RECORD: ICD-10-PCS | Mod: HCNC,CPTII,S$GLB, | Performed by: INTERNAL MEDICINE

## 2023-01-01 PROCEDURE — 83036 HEMOGLOBIN GLYCOSYLATED A1C: CPT | Mod: HCNC | Performed by: STUDENT IN AN ORGANIZED HEALTH CARE EDUCATION/TRAINING PROGRAM

## 2023-01-01 PROCEDURE — 36415 COLL VENOUS BLD VENIPUNCTURE: CPT | Mod: HCNC | Performed by: STUDENT IN AN ORGANIZED HEALTH CARE EDUCATION/TRAINING PROGRAM

## 2023-01-01 PROCEDURE — 73030 X-RAY EXAM OF SHOULDER: CPT | Mod: 26,RT,, | Performed by: RADIOLOGY

## 2023-01-01 PROCEDURE — 3075F PR MOST RECENT SYSTOLIC BLOOD PRESS GE 130-139MM HG: ICD-10-PCS | Mod: HCNC,CPTII,S$GLB, | Performed by: THORACIC SURGERY (CARDIOTHORACIC VASCULAR SURGERY)

## 2023-01-01 PROCEDURE — 82024 ASSAY OF ACTH: CPT | Mod: HCNC | Performed by: STUDENT IN AN ORGANIZED HEALTH CARE EDUCATION/TRAINING PROGRAM

## 2023-01-01 PROCEDURE — 85025 COMPLETE CBC W/AUTO DIFF WBC: CPT | Mod: HCNC

## 2023-01-01 PROCEDURE — 84165 PROTEIN E-PHORESIS SERUM: CPT | Mod: 26,HCNC,, | Performed by: PATHOLOGY

## 2023-01-01 PROCEDURE — 20610 PR DRAIN/INJECT LARGE JOINT/BURSA: ICD-10-PCS | Mod: RT,S$GLB,, | Performed by: PHYSICIAN ASSISTANT

## 2023-01-01 PROCEDURE — C1894 INTRO/SHEATH, NON-LASER: HCPCS | Performed by: INTERNAL MEDICINE

## 2023-01-01 PROCEDURE — 1159F MED LIST DOCD IN RCRD: CPT | Mod: HCNC,CPTII,S$GLB, | Performed by: INTERNAL MEDICINE

## 2023-01-01 PROCEDURE — 99999 PR PBB SHADOW E&M-EST. PATIENT-LVL IV: ICD-10-PCS | Mod: PBBFAC,HCNC,, | Performed by: STUDENT IN AN ORGANIZED HEALTH CARE EDUCATION/TRAINING PROGRAM

## 2023-01-01 PROCEDURE — 99223 1ST HOSP IP/OBS HIGH 75: CPT | Mod: GC,,, | Performed by: INTERNAL MEDICINE

## 2023-01-01 PROCEDURE — 80048 BASIC METABOLIC PNL TOTAL CA: CPT | Mod: 91,HCNC | Performed by: STUDENT IN AN ORGANIZED HEALTH CARE EDUCATION/TRAINING PROGRAM

## 2023-01-01 PROCEDURE — 82533 TOTAL CORTISOL: CPT | Mod: 91,HCNC | Performed by: STUDENT IN AN ORGANIZED HEALTH CARE EDUCATION/TRAINING PROGRAM

## 2023-01-01 PROCEDURE — 85610 PROTHROMBIN TIME: CPT | Mod: HCNC | Performed by: INTERNAL MEDICINE

## 2023-01-01 PROCEDURE — 99499 NO LOS: ICD-10-PCS | Mod: HCNC,S$GLB,, | Performed by: INTERNAL MEDICINE

## 2023-01-01 PROCEDURE — 93010 ELECTROCARDIOGRAM REPORT: CPT | Mod: ,,, | Performed by: INTERNAL MEDICINE

## 2023-01-01 PROCEDURE — 20600001 HC STEP DOWN PRIVATE ROOM

## 2023-01-01 PROCEDURE — 1160F PR REVIEW ALL MEDS BY PRESCRIBER/CLIN PHARMACIST DOCUMENTED: ICD-10-PCS | Mod: CPTII,S$GLB,, | Performed by: PHYSICIAN ASSISTANT

## 2023-01-01 PROCEDURE — 25000003 PHARM REV CODE 250: Mod: HCNC | Performed by: INTERNAL MEDICINE

## 2023-01-01 PROCEDURE — 99499 UNLISTED E&M SERVICE: CPT | Mod: HCNC,S$GLB,, | Performed by: INTERNAL MEDICINE

## 2023-01-01 PROCEDURE — 36415 COLL VENOUS BLD VENIPUNCTURE: CPT | Mod: HCNC,PN | Performed by: STUDENT IN AN ORGANIZED HEALTH CARE EDUCATION/TRAINING PROGRAM

## 2023-01-01 PROCEDURE — 1160F RVW MEDS BY RX/DR IN RCRD: CPT | Mod: HCNC,CPTII,S$GLB, | Performed by: INTERNAL MEDICINE

## 2023-01-01 PROCEDURE — 85025 COMPLETE CBC W/AUTO DIFF WBC: CPT | Mod: HCNC | Performed by: INTERNAL MEDICINE

## 2023-01-01 PROCEDURE — 99233 SBSQ HOSP IP/OBS HIGH 50: CPT | Mod: ,,, | Performed by: PHYSICIAN ASSISTANT

## 2023-01-01 PROCEDURE — 93306 ECHO (CUPID ONLY): ICD-10-PCS | Mod: 26,HCNC,, | Performed by: INTERNAL MEDICINE

## 2023-01-01 PROCEDURE — 63600175 PHARM REV CODE 636 W HCPCS: Mod: HCNC

## 2023-01-01 PROCEDURE — 3074F PR MOST RECENT SYSTOLIC BLOOD PRESSURE < 130 MM HG: ICD-10-PCS | Mod: HCNC,CPTII,S$GLB, | Performed by: INTERNAL MEDICINE

## 2023-01-01 PROCEDURE — 3075F SYST BP GE 130 - 139MM HG: CPT | Mod: HCNC,CPTII,S$GLB, | Performed by: INTERNAL MEDICINE

## 2023-01-01 PROCEDURE — 99999 PR PBB SHADOW E&M-EST. PATIENT-LVL IV: ICD-10-PCS | Mod: PBBFAC,HCNC,, | Performed by: INTERNAL MEDICINE

## 2023-01-01 PROCEDURE — 99204 PR OFFICE/OUTPT VISIT, NEW, LEVL IV, 45-59 MIN: ICD-10-PCS | Mod: HCNC,S$GLB,, | Performed by: THORACIC SURGERY (CARDIOTHORACIC VASCULAR SURGERY)

## 2023-01-01 PROCEDURE — 1159F MED LIST DOCD IN RCRD: CPT | Mod: HCNC,CPTII,S$GLB, | Performed by: UROLOGY

## 2023-01-01 PROCEDURE — 3078F DIAST BP <80 MM HG: CPT | Mod: HCNC,CPTII,S$GLB, | Performed by: STUDENT IN AN ORGANIZED HEALTH CARE EDUCATION/TRAINING PROGRAM

## 2023-01-01 PROCEDURE — C1751 CATH, INF, PER/CENT/MIDLINE: HCPCS | Mod: HCNC

## 2023-01-01 PROCEDURE — 1159F PR MEDICATION LIST DOCUMENTED IN MEDICAL RECORD: ICD-10-PCS | Mod: HCNC,CPTII,S$GLB, | Performed by: STUDENT IN AN ORGANIZED HEALTH CARE EDUCATION/TRAINING PROGRAM

## 2023-01-01 PROCEDURE — 36415 COLL VENOUS BLD VENIPUNCTURE: CPT | Performed by: PHYSICIAN ASSISTANT

## 2023-01-01 PROCEDURE — 82570 ASSAY OF URINE CREATININE: CPT | Performed by: STUDENT IN AN ORGANIZED HEALTH CARE EDUCATION/TRAINING PROGRAM

## 2023-01-01 PROCEDURE — C1889 IMPLANT/INSERT DEVICE, NOC: HCPCS | Mod: HCNC | Performed by: INTERNAL MEDICINE

## 2023-01-01 PROCEDURE — 93306 TTE W/DOPPLER COMPLETE: CPT | Mod: HCNC

## 2023-01-01 PROCEDURE — 3078F PR MOST RECENT DIASTOLIC BLOOD PRESSURE < 80 MM HG: ICD-10-PCS | Mod: CPTII,S$GLB,, | Performed by: PHYSICIAN ASSISTANT

## 2023-01-01 PROCEDURE — 1160F RVW MEDS BY RX/DR IN RCRD: CPT | Mod: HCNC,CPTII,S$GLB, | Performed by: UROLOGY

## 2023-01-01 PROCEDURE — 1160F PR REVIEW ALL MEDS BY PRESCRIBER/CLIN PHARMACIST DOCUMENTED: ICD-10-PCS | Mod: HCNC,CPTII,S$GLB, | Performed by: INTERNAL MEDICINE

## 2023-01-01 PROCEDURE — 99999 PR PBB SHADOW E&M-EST. PATIENT-LVL IV: CPT | Mod: PBBFAC,HCNC,, | Performed by: UROLOGY

## 2023-01-01 PROCEDURE — 82962 GLUCOSE BLOOD TEST: CPT | Mod: HCNC | Performed by: INTERNAL MEDICINE

## 2023-01-01 PROCEDURE — 3074F SYST BP LT 130 MM HG: CPT | Mod: HCNC,CPTII,S$GLB, | Performed by: UROLOGY

## 2023-01-01 PROCEDURE — 99499 RISK ADDL DX/OHS AUDIT: ICD-10-PCS | Mod: S$GLB,,, | Performed by: PHYSICIAN ASSISTANT

## 2023-01-01 PROCEDURE — 99214 OFFICE O/P EST MOD 30 MIN: CPT | Mod: HCNC,S$GLB,, | Performed by: INTERNAL MEDICINE

## 2023-01-01 PROCEDURE — 94761 N-INVAS EAR/PLS OXIMETRY MLT: CPT | Mod: HCNC

## 2023-01-01 PROCEDURE — 1160F PR REVIEW ALL MEDS BY PRESCRIBER/CLIN PHARMACIST DOCUMENTED: ICD-10-PCS | Mod: HCNC,CPTII,S$GLB, | Performed by: UROLOGY

## 2023-01-01 PROCEDURE — 83880 ASSAY OF NATRIURETIC PEPTIDE: CPT | Mod: HCNC | Performed by: STUDENT IN AN ORGANIZED HEALTH CARE EDUCATION/TRAINING PROGRAM

## 2023-01-01 PROCEDURE — 1159F PR MEDICATION LIST DOCUMENTED IN MEDICAL RECORD: ICD-10-PCS | Mod: HCNC,CPTII,S$GLB, | Performed by: THORACIC SURGERY (CARDIOTHORACIC VASCULAR SURGERY)

## 2023-01-01 PROCEDURE — 33418 REPAIR TCAT MITRAL VALVE: CPT | Mod: Q0,HCNC,, | Performed by: INTERNAL MEDICINE

## 2023-01-01 PROCEDURE — 80061 LIPID PANEL: CPT | Mod: HCNC | Performed by: STUDENT IN AN ORGANIZED HEALTH CARE EDUCATION/TRAINING PROGRAM

## 2023-01-01 PROCEDURE — C1751 CATH, INF, PER/CENT/MIDLINE: HCPCS | Performed by: INTERNAL MEDICINE

## 2023-01-01 PROCEDURE — 73030 X-RAY EXAM OF SHOULDER: CPT | Mod: TC,RT

## 2023-01-01 PROCEDURE — 99387 PR PREVENTIVE VISIT,NEW,65 & OVER: ICD-10-PCS | Mod: HCNC,S$GLB,, | Performed by: STUDENT IN AN ORGANIZED HEALTH CARE EDUCATION/TRAINING PROGRAM

## 2023-01-01 PROCEDURE — 1101F PR PT FALLS ASSESS DOC 0-1 FALLS W/OUT INJ PAST YR: ICD-10-PCS | Mod: HCNC,CPTII,S$GLB, | Performed by: STUDENT IN AN ORGANIZED HEALTH CARE EDUCATION/TRAINING PROGRAM

## 2023-01-01 PROCEDURE — 27000221 HC OXYGEN, UP TO 24 HOURS: Mod: HCNC

## 2023-01-01 PROCEDURE — 99213 OFFICE O/P EST LOW 20 MIN: CPT | Mod: S$GLB,,, | Performed by: PHYSICIAN ASSISTANT

## 2023-01-01 PROCEDURE — 84165 PATHOLOGIST INTERPRETATION SPE: ICD-10-PCS | Mod: 26,HCNC,, | Performed by: PATHOLOGY

## 2023-01-01 PROCEDURE — 99223 1ST HOSP IP/OBS HIGH 75: CPT | Mod: AI,25,, | Performed by: INTERNAL MEDICINE

## 2023-01-01 PROCEDURE — 3078F PR MOST RECENT DIASTOLIC BLOOD PRESSURE < 80 MM HG: ICD-10-PCS | Mod: HCNC,CPTII,S$GLB, | Performed by: THORACIC SURGERY (CARDIOTHORACIC VASCULAR SURGERY)

## 2023-01-01 PROCEDURE — 99999 PR PBB SHADOW E&M-EST. PATIENT-LVL V: CPT | Mod: PBBFAC,HCNC,, | Performed by: INTERNAL MEDICINE

## 2023-01-01 PROCEDURE — 93010 EKG 12-LEAD: ICD-10-PCS | Mod: HCNC,,, | Performed by: INTERNAL MEDICINE

## 2023-01-01 PROCEDURE — 80048 BASIC METABOLIC PNL TOTAL CA: CPT | Mod: 91 | Performed by: STUDENT IN AN ORGANIZED HEALTH CARE EDUCATION/TRAINING PROGRAM

## 2023-01-01 PROCEDURE — 80053 COMPREHEN METABOLIC PANEL: CPT | Mod: HCNC | Performed by: INTERNAL MEDICINE

## 2023-01-01 PROCEDURE — 25000003 PHARM REV CODE 250: Mod: HCNC | Performed by: ANESTHESIOLOGY

## 2023-01-01 PROCEDURE — 99291 CRITICAL CARE FIRST HOUR: CPT | Mod: HCNC,GC,, | Performed by: INTERNAL MEDICINE

## 2023-01-01 PROCEDURE — 99204 OFFICE O/P NEW MOD 45 MIN: CPT | Mod: HCNC,S$GLB,, | Performed by: UROLOGY

## 2023-01-01 PROCEDURE — 3074F PR MOST RECENT SYSTOLIC BLOOD PRESSURE < 130 MM HG: ICD-10-PCS | Mod: CPTII,S$GLB,, | Performed by: PHYSICIAN ASSISTANT

## 2023-01-01 PROCEDURE — 82803 BLOOD GASES ANY COMBINATION: CPT

## 2023-01-01 PROCEDURE — 27201423 OPTIME MED/SURG SUP & DEVICES STERILE SUPPLY: Mod: HCNC | Performed by: INTERNAL MEDICINE

## 2023-01-01 PROCEDURE — 84300 ASSAY OF URINE SODIUM: CPT | Performed by: STUDENT IN AN ORGANIZED HEALTH CARE EDUCATION/TRAINING PROGRAM

## 2023-01-01 PROCEDURE — 97530 THERAPEUTIC ACTIVITIES: CPT | Mod: HCNC

## 2023-01-01 PROCEDURE — 93451 RIGHT HEART CATH: CPT | Mod: 26,,, | Performed by: INTERNAL MEDICINE

## 2023-01-01 PROCEDURE — 83735 ASSAY OF MAGNESIUM: CPT | Mod: HCNC | Performed by: STUDENT IN AN ORGANIZED HEALTH CARE EDUCATION/TRAINING PROGRAM

## 2023-01-01 PROCEDURE — 93005 ELECTROCARDIOGRAM TRACING: CPT | Mod: HCNC

## 2023-01-01 PROCEDURE — 37000009 HC ANESTHESIA EA ADD 15 MINS: Mod: HCNC | Performed by: INTERNAL MEDICINE

## 2023-01-01 PROCEDURE — 25500020 PHARM REV CODE 255: Mod: HCNC | Performed by: INTERNAL MEDICINE

## 2023-01-01 PROCEDURE — 93010 EKG 12-LEAD: ICD-10-PCS | Mod: ,,, | Performed by: INTERNAL MEDICINE

## 2023-01-01 PROCEDURE — 3288F FALL RISK ASSESSMENT DOCD: CPT | Mod: CPTII,S$GLB,, | Performed by: PHYSICIAN ASSISTANT

## 2023-01-01 PROCEDURE — 1160F RVW MEDS BY RX/DR IN RCRD: CPT | Mod: HCNC,CPTII,S$GLB, | Performed by: STUDENT IN AN ORGANIZED HEALTH CARE EDUCATION/TRAINING PROGRAM

## 2023-01-01 PROCEDURE — 99233 SBSQ HOSP IP/OBS HIGH 50: CPT | Mod: HCNC,GC,, | Performed by: INTERNAL MEDICINE

## 2023-01-01 PROCEDURE — 1100F PR PT FALLS ASSESS DOC 2+ FALLS/FALL W/INJURY/YR: ICD-10-PCS | Mod: CPTII,S$GLB,, | Performed by: PHYSICIAN ASSISTANT

## 2023-01-01 PROCEDURE — 3078F PR MOST RECENT DIASTOLIC BLOOD PRESSURE < 80 MM HG: ICD-10-PCS | Mod: HCNC,CPTII,S$GLB, | Performed by: UROLOGY

## 2023-01-01 PROCEDURE — 99999 PR PBB SHADOW E&M-EST. PATIENT-LVL III: CPT | Mod: PBBFAC,,, | Performed by: PHYSICIAN ASSISTANT

## 2023-01-01 PROCEDURE — 1125F PR PAIN SEVERITY QUANTIFIED, PAIN PRESENT: ICD-10-PCS | Mod: CPTII,S$GLB,, | Performed by: PHYSICIAN ASSISTANT

## 2023-01-01 PROCEDURE — 1101F PT FALLS ASSESS-DOCD LE1/YR: CPT | Mod: HCNC,CPTII,S$GLB, | Performed by: STUDENT IN AN ORGANIZED HEALTH CARE EDUCATION/TRAINING PROGRAM

## 2023-01-01 PROCEDURE — 87040 BLOOD CULTURE FOR BACTERIA: CPT | Mod: HCNC | Performed by: STUDENT IN AN ORGANIZED HEALTH CARE EDUCATION/TRAINING PROGRAM

## 2023-01-01 PROCEDURE — 99291 CRITICAL CARE FIRST HOUR: CPT | Mod: HCNC,,, | Performed by: INTERNAL MEDICINE

## 2023-01-01 PROCEDURE — 3288F PR FALLS RISK ASSESSMENT DOCUMENTED: ICD-10-PCS | Mod: CPTII,S$GLB,, | Performed by: PHYSICIAN ASSISTANT

## 2023-01-01 PROCEDURE — 3288F PR FALLS RISK ASSESSMENT DOCUMENTED: ICD-10-PCS | Mod: HCNC,CPTII,S$GLB, | Performed by: STUDENT IN AN ORGANIZED HEALTH CARE EDUCATION/TRAINING PROGRAM

## 2023-01-01 PROCEDURE — 1159F MED LIST DOCD IN RCRD: CPT | Mod: HCNC,CPTII,S$GLB, | Performed by: STUDENT IN AN ORGANIZED HEALTH CARE EDUCATION/TRAINING PROGRAM

## 2023-01-01 PROCEDURE — 1159F PR MEDICATION LIST DOCUMENTED IN MEDICAL RECORD: ICD-10-PCS | Mod: HCNC,CPTII,S$GLB, | Performed by: UROLOGY

## 2023-01-01 PROCEDURE — 86850 RBC ANTIBODY SCREEN: CPT | Mod: HCNC | Performed by: INTERNAL MEDICINE

## 2023-01-01 PROCEDURE — 84443 ASSAY THYROID STIM HORMONE: CPT | Mod: HCNC | Performed by: INTERNAL MEDICINE

## 2023-01-01 PROCEDURE — D9220A PRA ANESTHESIA: Mod: Q0,HCNC,, | Performed by: ANESTHESIOLOGY

## 2023-01-01 PROCEDURE — 84156 ASSAY OF PROTEIN URINE: CPT | Performed by: STUDENT IN AN ORGANIZED HEALTH CARE EDUCATION/TRAINING PROGRAM

## 2023-01-01 PROCEDURE — 84145 PROCALCITONIN (PCT): CPT | Mod: HCNC | Performed by: STUDENT IN AN ORGANIZED HEALTH CARE EDUCATION/TRAINING PROGRAM

## 2023-01-01 PROCEDURE — 3078F PR MOST RECENT DIASTOLIC BLOOD PRESSURE < 80 MM HG: ICD-10-PCS | Mod: HCNC,CPTII,S$GLB, | Performed by: STUDENT IN AN ORGANIZED HEALTH CARE EDUCATION/TRAINING PROGRAM

## 2023-01-01 PROCEDURE — 99999 PR PBB SHADOW E&M-EST. PATIENT-LVL III: CPT | Mod: PBBFAC,HCNC,, | Performed by: THORACIC SURGERY (CARDIOTHORACIC VASCULAR SURGERY)

## 2023-01-01 PROCEDURE — 33418 REPAIR TCAT MITRAL VALVE: CPT | Mod: Q0,HCNC | Performed by: INTERNAL MEDICINE

## 2023-01-01 PROCEDURE — 99204 OFFICE O/P NEW MOD 45 MIN: CPT | Mod: HCNC,S$GLB,, | Performed by: THORACIC SURGERY (CARDIOTHORACIC VASCULAR SURGERY)

## 2023-01-01 PROCEDURE — 27000923 HC TRIALYSIS CATHETER, ANY SIZE: Mod: HCNC

## 2023-01-01 PROCEDURE — 3074F PR MOST RECENT SYSTOLIC BLOOD PRESSURE < 130 MM HG: ICD-10-PCS | Mod: HCNC,CPTII,S$GLB, | Performed by: STUDENT IN AN ORGANIZED HEALTH CARE EDUCATION/TRAINING PROGRAM

## 2023-01-01 PROCEDURE — 84132 ASSAY OF SERUM POTASSIUM: CPT | Mod: HCNC

## 2023-01-01 PROCEDURE — 3078F DIAST BP <80 MM HG: CPT | Mod: HCNC,CPTII,S$GLB, | Performed by: THORACIC SURGERY (CARDIOTHORACIC VASCULAR SURGERY)

## 2023-01-01 PROCEDURE — 1111F PR DISCHARGE MEDS RECONCILED W/ CURRENT OUTPATIENT MED LIST: ICD-10-PCS | Mod: HCNC,CPTII,S$GLB, | Performed by: INTERNAL MEDICINE

## 2023-01-01 PROCEDURE — C1769 GUIDE WIRE: HCPCS | Mod: HCNC | Performed by: INTERNAL MEDICINE

## 2023-01-01 PROCEDURE — 99999 PR PBB SHADOW E&M-EST. PATIENT-LVL III: ICD-10-PCS | Mod: PBBFAC,,, | Performed by: PHYSICIAN ASSISTANT

## 2023-01-01 PROCEDURE — 20610 DRAIN/INJ JOINT/BURSA W/O US: CPT | Mod: RT,S$GLB,, | Performed by: PHYSICIAN ASSISTANT

## 2023-01-01 PROCEDURE — 99213 PR OFFICE/OUTPT VISIT, EST, LEVL III, 20-29 MIN: ICD-10-PCS | Mod: S$GLB,,, | Performed by: PHYSICIAN ASSISTANT

## 2023-01-01 PROCEDURE — 97165 OT EVAL LOW COMPLEX 30 MIN: CPT | Mod: HCNC

## 2023-01-01 PROCEDURE — 99387 INIT PM E/M NEW PAT 65+ YRS: CPT | Mod: HCNC,S$GLB,, | Performed by: STUDENT IN AN ORGANIZED HEALTH CARE EDUCATION/TRAINING PROGRAM

## 2023-01-01 PROCEDURE — C1894 INTRO/SHEATH, NON-LASER: HCPCS | Mod: HCNC | Performed by: INTERNAL MEDICINE

## 2023-01-01 PROCEDURE — C8929 TTE W OR WO FOL WCON,DOPPLER: HCPCS | Mod: HCNC

## 2023-01-01 PROCEDURE — 1125F AMNT PAIN NOTED PAIN PRSNT: CPT | Mod: HCNC,CPTII,S$GLB, | Performed by: INTERNAL MEDICINE

## 2023-01-01 PROCEDURE — 3288F FALL RISK ASSESSMENT DOCD: CPT | Mod: HCNC,CPTII,S$GLB, | Performed by: STUDENT IN AN ORGANIZED HEALTH CARE EDUCATION/TRAINING PROGRAM

## 2023-01-01 PROCEDURE — 84153 ASSAY OF PSA TOTAL: CPT | Mod: HCNC | Performed by: STUDENT IN AN ORGANIZED HEALTH CARE EDUCATION/TRAINING PROGRAM

## 2023-01-01 DEVICE — STEERABLE GUIDE CATHETER
Type: IMPLANTABLE DEVICE | Site: OTHER (ADD COMMENT) | Status: FUNCTIONAL
Brand: MITRACLIP

## 2023-01-01 DEVICE — SYS MITRACLIP G4 DELIVERY XTW: Type: IMPLANTABLE DEVICE | Site: HEART | Status: FUNCTIONAL

## 2023-01-01 RX ORDER — TORSEMIDE 20 MG/1
40 TABLET ORAL DAILY
Qty: 45 TABLET | Refills: 4 | Status: SHIPPED | OUTPATIENT
Start: 2023-01-01 | End: 2023-01-01 | Stop reason: SDUPTHER

## 2023-01-01 RX ORDER — SODIUM BICARBONATE 650 MG/1
650 TABLET ORAL 2 TIMES DAILY
Status: DISCONTINUED | OUTPATIENT
Start: 2023-01-01 | End: 2023-01-01 | Stop reason: HOSPADM

## 2023-01-01 RX ORDER — GABAPENTIN 100 MG/1
100 CAPSULE ORAL 3 TIMES DAILY
Status: ON HOLD | COMMUNITY
End: 2023-01-01

## 2023-01-01 RX ORDER — TORSEMIDE 20 MG/1
40 TABLET ORAL 2 TIMES DAILY
Qty: 45 TABLET | Refills: 4 | Status: ON HOLD | OUTPATIENT
Start: 2023-01-01 | End: 2023-01-01 | Stop reason: SDUPTHER

## 2023-01-01 RX ORDER — DEXTROSE MONOHYDRATE 50 MG/ML
INJECTION, SOLUTION INTRAVENOUS CONTINUOUS PRN
Status: DISCONTINUED | OUTPATIENT
Start: 2023-01-01 | End: 2023-01-01

## 2023-01-01 RX ORDER — EPINEPHRINE 1 MG/ML
INJECTION, SOLUTION, CONCENTRATE INTRAVENOUS
Status: DISCONTINUED | OUTPATIENT
Start: 2023-01-01 | End: 2023-01-01

## 2023-01-01 RX ORDER — SPIRONOLACTONE 25 MG/1
25 TABLET ORAL DAILY
Qty: 30 TABLET | Refills: 11 | Status: ON HOLD | OUTPATIENT
Start: 2023-01-01 | End: 2023-01-01 | Stop reason: HOSPADM

## 2023-01-01 RX ORDER — CEFAZOLIN 2 G/1
INJECTION, POWDER, FOR SOLUTION INTRAMUSCULAR; INTRAVENOUS
Status: DISCONTINUED | OUTPATIENT
Start: 2023-01-01 | End: 2023-01-01

## 2023-01-01 RX ORDER — NOREPINEPHRINE BITARTRATE/D5W 4MG/250ML
0-.2 PLASTIC BAG, INJECTION (ML) INTRAVENOUS CONTINUOUS
Status: DISCONTINUED | OUTPATIENT
Start: 2023-01-01 | End: 2023-01-01

## 2023-01-01 RX ORDER — SODIUM CHLORIDE 0.9 % (FLUSH) 0.9 %
10 SYRINGE (ML) INJECTION
Status: DISCONTINUED | OUTPATIENT
Start: 2023-01-01 | End: 2023-01-01 | Stop reason: HOSPADM

## 2023-01-01 RX ORDER — SULFAMETHOXAZOLE AND TRIMETHOPRIM 800; 160 MG/1; MG/1
1 TABLET ORAL 2 TIMES DAILY
Qty: 14 TABLET | Refills: 0 | Status: SHIPPED | OUTPATIENT
Start: 2023-01-01 | End: 2023-01-01

## 2023-01-01 RX ORDER — METHOCARBAMOL 500 MG/1
500 TABLET, FILM COATED ORAL 4 TIMES DAILY PRN
Status: DISPENSED | OUTPATIENT
Start: 2023-01-01 | End: 2023-01-01

## 2023-01-01 RX ORDER — GABAPENTIN 300 MG/1
CAPSULE ORAL
Status: ON HOLD | COMMUNITY
Start: 2023-01-01 | End: 2023-01-01

## 2023-01-01 RX ORDER — ONDANSETRON 4 MG/1
8 TABLET, ORALLY DISINTEGRATING ORAL EVERY 8 HOURS PRN
Status: DISCONTINUED | OUTPATIENT
Start: 2023-01-01 | End: 2023-01-01 | Stop reason: HOSPADM

## 2023-01-01 RX ORDER — CYANOCOBALAMIN 1000 UG/ML
INJECTION, SOLUTION INTRAMUSCULAR; SUBCUTANEOUS
COMMUNITY
Start: 2023-01-01

## 2023-01-01 RX ORDER — SODIUM CHLORIDE 9 MG/ML
INJECTION, SOLUTION INTRAVENOUS CONTINUOUS
Status: DISCONTINUED | OUTPATIENT
Start: 2023-01-01 | End: 2023-01-01

## 2023-01-01 RX ORDER — ONDANSETRON 2 MG/ML
INJECTION INTRAMUSCULAR; INTRAVENOUS
Status: DISCONTINUED | OUTPATIENT
Start: 2023-01-01 | End: 2023-01-01

## 2023-01-01 RX ORDER — POTASSIUM CHLORIDE 20 MEQ/1
40 TABLET, EXTENDED RELEASE ORAL ONCE
Status: COMPLETED | OUTPATIENT
Start: 2023-01-01 | End: 2023-01-01

## 2023-01-01 RX ORDER — TRIAMCINOLONE ACETONIDE 40 MG/ML
40 INJECTION, SUSPENSION INTRA-ARTICULAR; INTRAMUSCULAR
Status: COMPLETED | OUTPATIENT
Start: 2023-01-01 | End: 2023-01-01

## 2023-01-01 RX ORDER — LIDOCAINE HYDROCHLORIDE 20 MG/ML
INJECTION, SOLUTION EPIDURAL; INFILTRATION; INTRACAUDAL; PERINEURAL
Status: DISCONTINUED | OUTPATIENT
Start: 2023-01-01 | End: 2023-01-01 | Stop reason: HOSPADM

## 2023-01-01 RX ORDER — IBUPROFEN 200 MG
16 TABLET ORAL
Status: DISCONTINUED | OUTPATIENT
Start: 2023-01-01 | End: 2023-01-01 | Stop reason: HOSPADM

## 2023-01-01 RX ORDER — ACETAMINOPHEN 325 MG/1
650 TABLET ORAL EVERY 6 HOURS PRN
Status: DISCONTINUED | OUTPATIENT
Start: 2023-01-01 | End: 2023-01-01 | Stop reason: HOSPADM

## 2023-01-01 RX ORDER — FERROUS SULFATE 325(65) MG
325 TABLET ORAL
Qty: 90 TABLET | Refills: 3 | Status: SHIPPED | OUTPATIENT
Start: 2023-01-01 | End: 2024-11-26

## 2023-01-01 RX ORDER — ASPIRIN 81 MG/1
81 TABLET ORAL DAILY
Status: DISCONTINUED | OUTPATIENT
Start: 2023-01-01 | End: 2023-01-01 | Stop reason: HOSPADM

## 2023-01-01 RX ORDER — POTASSIUM CHLORIDE 20 MEQ/1
40 TABLET, EXTENDED RELEASE ORAL DAILY
Qty: 30 TABLET | Refills: 50 | Status: SHIPPED | OUTPATIENT
Start: 2023-01-01 | End: 2023-01-01

## 2023-01-01 RX ORDER — DIPHENHYDRAMINE HCL 50 MG
50 CAPSULE ORAL ONCE
Status: COMPLETED | OUTPATIENT
Start: 2023-01-01 | End: 2023-01-01

## 2023-01-01 RX ORDER — METOPROLOL SUCCINATE 25 MG/1
25 TABLET, EXTENDED RELEASE ORAL DAILY
Qty: 90 TABLET | Refills: 3 | Status: SHIPPED | OUTPATIENT
Start: 2023-01-01 | End: 2024-11-03

## 2023-01-01 RX ORDER — VASOPRESSIN 20 [USP'U]/ML
INJECTION, SOLUTION INTRAMUSCULAR; SUBCUTANEOUS
Status: DISCONTINUED | OUTPATIENT
Start: 2023-01-01 | End: 2023-01-01

## 2023-01-01 RX ORDER — POTASSIUM CHLORIDE 20 MEQ/1
20 TABLET, EXTENDED RELEASE ORAL DAILY
Qty: 45 TABLET | Refills: 5 | Status: SHIPPED | OUTPATIENT
Start: 2023-01-01 | End: 2023-01-01 | Stop reason: SDUPTHER

## 2023-01-01 RX ORDER — TORSEMIDE 20 MG/1
40 TABLET ORAL 2 TIMES DAILY
Qty: 120 TABLET | Refills: 4 | Status: SHIPPED | OUTPATIENT
Start: 2023-01-01 | End: 2023-01-01

## 2023-01-01 RX ORDER — FUROSEMIDE 80 MG/1
80 TABLET ORAL 2 TIMES DAILY
Qty: 60 TABLET | Refills: 11 | Status: ON HOLD | OUTPATIENT
Start: 2023-01-01 | End: 2023-01-01 | Stop reason: HOSPADM

## 2023-01-01 RX ORDER — COSYNTROPIN 0.25 MG/ML
0.25 INJECTION, POWDER, FOR SOLUTION INTRAMUSCULAR; INTRAVENOUS ONCE
Status: COMPLETED | OUTPATIENT
Start: 2023-01-01 | End: 2023-01-01

## 2023-01-01 RX ORDER — INDOMETHACIN 25 MG/1
1 CAPSULE ORAL ONCE
Status: DISCONTINUED | OUTPATIENT
Start: 2023-01-01 | End: 2023-01-01

## 2023-01-01 RX ORDER — HEPARIN SOD,PORCINE/0.9 % NACL 1000/500ML
INTRAVENOUS SOLUTION INTRAVENOUS
Status: DISCONTINUED | OUTPATIENT
Start: 2023-01-01 | End: 2023-01-01 | Stop reason: HOSPADM

## 2023-01-01 RX ORDER — POTASSIUM CHLORIDE 20 MEQ/1
20 TABLET, EXTENDED RELEASE ORAL DAILY
Qty: 45 TABLET | Refills: 5 | Status: ON HOLD | OUTPATIENT
Start: 2023-01-01 | End: 2023-01-01 | Stop reason: HOSPADM

## 2023-01-01 RX ORDER — TORSEMIDE 20 MG/1
40 TABLET ORAL
Qty: 360 TABLET | Refills: 3 | Status: SHIPPED | OUTPATIENT
Start: 2023-01-01 | End: 2024-02-02

## 2023-01-01 RX ORDER — FUROSEMIDE 10 MG/ML
80 INJECTION INTRAMUSCULAR; INTRAVENOUS
Status: DISCONTINUED | OUTPATIENT
Start: 2023-01-01 | End: 2023-01-01

## 2023-01-01 RX ORDER — TORSEMIDE 20 MG/1
40 TABLET ORAL DAILY
Qty: 60 TABLET | Refills: 1 | Status: SHIPPED | OUTPATIENT
Start: 2023-01-01 | End: 2023-01-01 | Stop reason: SDUPTHER

## 2023-01-01 RX ORDER — MELOXICAM 15 MG/1
15 TABLET ORAL DAILY PRN
Status: ON HOLD | COMMUNITY
Start: 2023-01-01 | End: 2023-01-01 | Stop reason: HOSPADM

## 2023-01-01 RX ORDER — METOPROLOL SUCCINATE 50 MG/1
50 TABLET, EXTENDED RELEASE ORAL DAILY
Qty: 30 TABLET | Refills: 11 | Status: ON HOLD | OUTPATIENT
Start: 2023-01-01 | End: 2023-01-01 | Stop reason: SDUPTHER

## 2023-01-01 RX ORDER — ONDANSETRON 2 MG/ML
4 INJECTION INTRAMUSCULAR; INTRAVENOUS DAILY PRN
Status: DISCONTINUED | OUTPATIENT
Start: 2023-01-01 | End: 2023-01-01 | Stop reason: HOSPADM

## 2023-01-01 RX ORDER — METOPROLOL SUCCINATE 25 MG/1
25 TABLET, EXTENDED RELEASE ORAL DAILY
Status: DISCONTINUED | OUTPATIENT
Start: 2023-01-01 | End: 2023-01-01 | Stop reason: HOSPADM

## 2023-01-01 RX ORDER — DAPAGLIFLOZIN 5 MG/1
5 TABLET, FILM COATED ORAL DAILY
Qty: 90 TABLET | Refills: 3 | Status: SHIPPED | OUTPATIENT
Start: 2023-01-01 | End: 2024-11-20

## 2023-01-01 RX ORDER — MAGNESIUM SULFATE HEPTAHYDRATE 40 MG/ML
2 INJECTION, SOLUTION INTRAVENOUS ONCE
Status: COMPLETED | OUTPATIENT
Start: 2023-01-01 | End: 2023-01-01

## 2023-01-01 RX ORDER — LIDOCAINE HYDROCHLORIDE 20 MG/ML
INJECTION, SOLUTION INFILTRATION; PERINEURAL
Status: DISCONTINUED | OUTPATIENT
Start: 2023-01-01 | End: 2023-01-01 | Stop reason: HOSPADM

## 2023-01-01 RX ORDER — LANOLIN ALCOHOL/MO/W.PET/CERES
400 CREAM (GRAM) TOPICAL ONCE
Status: COMPLETED | OUTPATIENT
Start: 2023-01-01 | End: 2023-01-01

## 2023-01-01 RX ORDER — HEPARIN SODIUM 5000 [USP'U]/ML
5000 INJECTION, SOLUTION INTRAVENOUS; SUBCUTANEOUS EVERY 8 HOURS
Status: DISCONTINUED | OUTPATIENT
Start: 2023-01-01 | End: 2023-01-01 | Stop reason: HOSPADM

## 2023-01-01 RX ORDER — ROCURONIUM BROMIDE 10 MG/ML
INJECTION, SOLUTION INTRAVENOUS
Status: DISCONTINUED | OUTPATIENT
Start: 2023-01-01 | End: 2023-01-01

## 2023-01-01 RX ORDER — COLCHICINE 0.6 MG/1
TABLET ORAL
COMMUNITY
Start: 2023-01-01

## 2023-01-01 RX ORDER — TAMSULOSIN HYDROCHLORIDE 0.4 MG/1
0.4 CAPSULE ORAL DAILY
Status: DISCONTINUED | OUTPATIENT
Start: 2023-01-01 | End: 2023-01-01 | Stop reason: HOSPADM

## 2023-01-01 RX ORDER — LISINOPRIL 40 MG/1
40 TABLET ORAL NIGHTLY
Qty: 90 TABLET | Refills: 3 | Status: ON HOLD | OUTPATIENT
Start: 2023-01-01 | End: 2023-01-01

## 2023-01-01 RX ORDER — METOPROLOL SUCCINATE 50 MG/1
50 TABLET, EXTENDED RELEASE ORAL DAILY
Status: DISCONTINUED | OUTPATIENT
Start: 2023-01-01 | End: 2023-01-01 | Stop reason: HOSPADM

## 2023-01-01 RX ORDER — COLCHICINE 0.6 MG/1
0.6 TABLET, FILM COATED ORAL DAILY
Status: DISCONTINUED | OUTPATIENT
Start: 2023-01-01 | End: 2023-01-01 | Stop reason: HOSPADM

## 2023-01-01 RX ORDER — MELOXICAM 7.5 MG/1
7.5 TABLET ORAL DAILY
COMMUNITY

## 2023-01-01 RX ORDER — SPIRONOLACTONE 25 MG/1
25 TABLET ORAL DAILY
Status: DISCONTINUED | OUTPATIENT
Start: 2023-01-01 | End: 2023-01-01 | Stop reason: HOSPADM

## 2023-01-01 RX ORDER — GLUCAGON 1 MG
1 KIT INJECTION
Status: DISCONTINUED | OUTPATIENT
Start: 2023-01-01 | End: 2023-01-01 | Stop reason: HOSPADM

## 2023-01-01 RX ORDER — CHOLECALCIFEROL (VITAMIN D3) 125 MCG
5000 CAPSULE ORAL DAILY
Qty: 90 CAPSULE | Refills: 3 | Status: SHIPPED | OUTPATIENT
Start: 2023-01-01 | End: 2024-11-20

## 2023-01-01 RX ORDER — FERROUS SULFATE 325(65) MG
325 TABLET ORAL
Qty: 90 TABLET | Refills: 3 | Status: SHIPPED | OUTPATIENT
Start: 2023-01-01 | End: 2023-01-01 | Stop reason: SDUPTHER

## 2023-01-01 RX ORDER — IBUPROFEN 200 MG
24 TABLET ORAL
Status: DISCONTINUED | OUTPATIENT
Start: 2023-01-01 | End: 2023-01-01 | Stop reason: HOSPADM

## 2023-01-01 RX ORDER — ATORVASTATIN CALCIUM 40 MG/1
80 TABLET, FILM COATED ORAL DAILY
Status: DISCONTINUED | OUTPATIENT
Start: 2023-01-01 | End: 2023-01-01 | Stop reason: HOSPADM

## 2023-01-01 RX ORDER — FENTANYL CITRATE 50 UG/ML
INJECTION, SOLUTION INTRAMUSCULAR; INTRAVENOUS
Status: DISCONTINUED | OUTPATIENT
Start: 2023-01-01 | End: 2023-01-01

## 2023-01-01 RX ORDER — SODIUM CHLORIDE 0.9 % (FLUSH) 0.9 %
10 SYRINGE (ML) INJECTION EVERY 12 HOURS PRN
Status: DISCONTINUED | OUTPATIENT
Start: 2023-01-01 | End: 2023-01-01 | Stop reason: HOSPADM

## 2023-01-01 RX ORDER — ACETAMINOPHEN 325 MG/1
650 TABLET ORAL EVERY 4 HOURS PRN
Status: DISCONTINUED | OUTPATIENT
Start: 2023-01-01 | End: 2023-01-01 | Stop reason: HOSPADM

## 2023-01-01 RX ORDER — DEXMEDETOMIDINE HYDROCHLORIDE 100 UG/ML
INJECTION, SOLUTION INTRAVENOUS
Status: DISCONTINUED | OUTPATIENT
Start: 2023-01-01 | End: 2023-01-01

## 2023-01-01 RX ORDER — ETOMIDATE 2 MG/ML
INJECTION INTRAVENOUS
Status: DISCONTINUED | OUTPATIENT
Start: 2023-01-01 | End: 2023-01-01

## 2023-01-01 RX ORDER — ALLOPURINOL 300 MG/1
300 TABLET ORAL DAILY
Status: DISCONTINUED | OUTPATIENT
Start: 2023-01-01 | End: 2023-01-01 | Stop reason: HOSPADM

## 2023-01-01 RX ORDER — NOREPINEPHRINE BITARTRATE/D5W 4MG/250ML
0-3 PLASTIC BAG, INJECTION (ML) INTRAVENOUS CONTINUOUS
Status: DISCONTINUED | OUTPATIENT
Start: 2023-01-01 | End: 2023-01-01

## 2023-01-01 RX ORDER — PROTAMINE SULFATE 10 MG/ML
INJECTION, SOLUTION INTRAVENOUS
Status: DISCONTINUED | OUTPATIENT
Start: 2023-01-01 | End: 2023-01-01

## 2023-01-01 RX ORDER — FUROSEMIDE 10 MG/ML
80 INJECTION INTRAMUSCULAR; INTRAVENOUS ONCE
Status: COMPLETED | OUTPATIENT
Start: 2023-01-01 | End: 2023-01-01

## 2023-01-01 RX ORDER — HEPARIN SODIUM 1000 [USP'U]/ML
INJECTION, SOLUTION INTRAVENOUS; SUBCUTANEOUS
Status: DISCONTINUED | OUTPATIENT
Start: 2023-01-01 | End: 2023-01-01

## 2023-01-01 RX ORDER — LIDOCAINE HYDROCHLORIDE 20 MG/ML
INJECTION, SOLUTION EPIDURAL; INFILTRATION; INTRACAUDAL; PERINEURAL
Status: DISCONTINUED | OUTPATIENT
Start: 2023-01-01 | End: 2023-01-01

## 2023-01-01 RX ORDER — INDOMETHACIN 25 MG/1
50 CAPSULE ORAL ONCE
Status: COMPLETED | OUTPATIENT
Start: 2023-01-01 | End: 2023-01-01

## 2023-01-01 RX ORDER — TORSEMIDE 20 MG/1
40 TABLET ORAL DAILY
Status: DISCONTINUED | OUTPATIENT
Start: 2023-01-01 | End: 2023-01-01 | Stop reason: HOSPADM

## 2023-01-01 RX ORDER — ATORVASTATIN CALCIUM 80 MG/1
80 TABLET, FILM COATED ORAL DAILY
Qty: 90 TABLET | Refills: 3 | Status: SHIPPED | OUTPATIENT
Start: 2023-01-01 | End: 2024-07-20

## 2023-01-01 RX ORDER — TAMSULOSIN HYDROCHLORIDE 0.4 MG/1
1 CAPSULE ORAL DAILY
Status: DISCONTINUED | OUTPATIENT
Start: 2023-01-01 | End: 2023-01-01 | Stop reason: HOSPADM

## 2023-01-01 RX ORDER — FUROSEMIDE 10 MG/ML
80 INJECTION INTRAMUSCULAR; INTRAVENOUS 3 TIMES DAILY
Status: DISCONTINUED | OUTPATIENT
Start: 2023-01-01 | End: 2023-01-01

## 2023-01-01 RX ORDER — METOLAZONE 5 MG/1
5 TABLET ORAL ONCE
Status: COMPLETED | OUTPATIENT
Start: 2023-01-01 | End: 2023-01-01

## 2023-01-01 RX ORDER — SODIUM CHLORIDE 0.9 % (FLUSH) 0.9 %
10 SYRINGE (ML) INJECTION EVERY 6 HOURS
Status: DISCONTINUED | OUTPATIENT
Start: 2023-01-01 | End: 2023-01-01 | Stop reason: HOSPADM

## 2023-01-01 RX ORDER — METOPROLOL SUCCINATE 25 MG/1
25 TABLET, EXTENDED RELEASE ORAL DAILY
Qty: 30 TABLET | Refills: 11 | Status: ON HOLD | OUTPATIENT
Start: 2023-01-01 | End: 2023-01-01 | Stop reason: HOSPADM

## 2023-01-01 RX ORDER — NALOXONE HCL 0.4 MG/ML
0.02 VIAL (ML) INJECTION
Status: DISCONTINUED | OUTPATIENT
Start: 2023-01-01 | End: 2023-01-01 | Stop reason: HOSPADM

## 2023-01-01 RX ADMIN — TORSEMIDE 40 MG: 20 TABLET ORAL at 11:07

## 2023-01-01 RX ADMIN — HUMAN ALBUMIN MICROSPHERES AND PERFLUTREN 0.11 MG: 10; .22 INJECTION, SOLUTION INTRAVENOUS at 10:08

## 2023-01-01 RX ADMIN — POTASSIUM PHOSPHATE, MONOBASIC POTASSIUM PHOSPHATE, DIBASIC 30 MMOL: 224; 236 INJECTION, SOLUTION, CONCENTRATE INTRAVENOUS at 08:11

## 2023-01-01 RX ADMIN — DOCUSATE SODIUM 50 MG: 50 CAPSULE, LIQUID FILLED ORAL at 09:07

## 2023-01-01 RX ADMIN — COLCHICINE 0.6 MG: 0.6 TABLET, FILM COATED ORAL at 08:07

## 2023-01-01 RX ADMIN — FUROSEMIDE 20 MG/HR: 10 INJECTION, SOLUTION INTRAMUSCULAR; INTRAVENOUS at 04:07

## 2023-01-01 RX ADMIN — Medication 10 ML: at 12:07

## 2023-01-01 RX ADMIN — ATORVASTATIN CALCIUM 80 MG: 40 TABLET, FILM COATED ORAL at 10:11

## 2023-01-01 RX ADMIN — ATORVASTATIN CALCIUM 80 MG: 40 TABLET, FILM COATED ORAL at 08:07

## 2023-01-01 RX ADMIN — DOCUSATE SODIUM 50 MG: 50 CAPSULE, LIQUID FILLED ORAL at 08:07

## 2023-01-01 RX ADMIN — Medication 10 ML: at 06:07

## 2023-01-01 RX ADMIN — VASOPRESSIN 2 UNITS: 20 INJECTION INTRAVENOUS at 02:11

## 2023-01-01 RX ADMIN — CEFAZOLIN 2 G: 330 INJECTION, POWDER, FOR SOLUTION INTRAMUSCULAR; INTRAVENOUS at 02:11

## 2023-01-01 RX ADMIN — ATORVASTATIN CALCIUM 80 MG: 40 TABLET, FILM COATED ORAL at 08:11

## 2023-01-01 RX ADMIN — METOPROLOL SUCCINATE 50 MG: 50 TABLET, EXTENDED RELEASE ORAL at 08:07

## 2023-01-01 RX ADMIN — ASPIRIN 81 MG: 81 TABLET, COATED ORAL at 08:11

## 2023-01-01 RX ADMIN — ALLOPURINOL 300 MG: 300 TABLET ORAL at 10:11

## 2023-01-01 RX ADMIN — TAMSULOSIN HYDROCHLORIDE 0.4 MG: 0.4 CAPSULE ORAL at 08:07

## 2023-01-01 RX ADMIN — VASOPRESSIN 1 UNITS: 20 INJECTION INTRAVENOUS at 02:11

## 2023-01-01 RX ADMIN — ALLOPURINOL 300 MG: 300 TABLET ORAL at 08:11

## 2023-01-01 RX ADMIN — FUROSEMIDE 80 MG: 10 INJECTION, SOLUTION INTRAMUSCULAR; INTRAVENOUS at 03:07

## 2023-01-01 RX ADMIN — SODIUM BICARBONATE 650 MG TABLET 650 MG: at 08:11

## 2023-01-01 RX ADMIN — DEXMEDETOMIDINE 8 MCG: 100 INJECTION, SOLUTION, CONCENTRATE INTRAVENOUS at 02:11

## 2023-01-01 RX ADMIN — POTASSIUM CHLORIDE 40 MEQ: 1500 TABLET, EXTENDED RELEASE ORAL at 10:07

## 2023-01-01 RX ADMIN — Medication 400 MG: at 10:07

## 2023-01-01 RX ADMIN — NOREPINEPHRINE BITARTRATE 0.04 MCG/KG/MIN: 4 INJECTION, SOLUTION INTRAVENOUS at 10:11

## 2023-01-01 RX ADMIN — SODIUM BICARBONATE 50 MEQ: 84 INJECTION, SOLUTION INTRAVENOUS at 11:11

## 2023-01-01 RX ADMIN — FUROSEMIDE 80 MG: 10 INJECTION, SOLUTION INTRAMUSCULAR; INTRAVENOUS at 11:07

## 2023-01-01 RX ADMIN — NOREPINEPHRINE BITARTRATE 0.08 MCG/KG/MIN: 4 INJECTION, SOLUTION INTRAVENOUS at 12:11

## 2023-01-01 RX ADMIN — FUROSEMIDE 20 MG/HR: 10 INJECTION, SOLUTION INTRAMUSCULAR; INTRAVENOUS at 06:07

## 2023-01-01 RX ADMIN — NOREPINEPHRINE BITARTRATE 0.09 MCG/KG/MIN: 4 INJECTION, SOLUTION INTRAVENOUS at 01:11

## 2023-01-01 RX ADMIN — TAMSULOSIN HYDROCHLORIDE 0.4 MG: 0.4 CAPSULE ORAL at 10:11

## 2023-01-01 RX ADMIN — FUROSEMIDE 80 MG: 10 INJECTION, SOLUTION INTRAMUSCULAR; INTRAVENOUS at 10:07

## 2023-01-01 RX ADMIN — ACETAMINOPHEN 650 MG: 325 TABLET ORAL at 01:07

## 2023-01-01 RX ADMIN — COLCHICINE 0.6 MG: 0.6 TABLET, FILM COATED ORAL at 09:07

## 2023-01-01 RX ADMIN — HEPARIN SODIUM 5000 UNITS: 5000 INJECTION INTRAVENOUS; SUBCUTANEOUS at 05:11

## 2023-01-01 RX ADMIN — ONDANSETRON 4 MG: 2 INJECTION INTRAMUSCULAR; INTRAVENOUS at 03:11

## 2023-01-01 RX ADMIN — ROCURONIUM BROMIDE 50 MG: 10 INJECTION INTRAVENOUS at 01:11

## 2023-01-01 RX ADMIN — TRIAMCINOLONE ACETONIDE 40 MG: 40 INJECTION, SUSPENSION INTRA-ARTICULAR; INTRAMUSCULAR at 12:05

## 2023-01-01 RX ADMIN — SPIRONOLACTONE 25 MG: 25 TABLET, FILM COATED ORAL at 09:07

## 2023-01-01 RX ADMIN — ACETAMINOPHEN 650 MG: 325 TABLET ORAL at 08:11

## 2023-01-01 RX ADMIN — HEPARIN SODIUM 5000 UNITS: 5000 INJECTION INTRAVENOUS; SUBCUTANEOUS at 08:11

## 2023-01-01 RX ADMIN — SUGAMMADEX 200 MG: 100 INJECTION, SOLUTION INTRAVENOUS at 03:11

## 2023-01-01 RX ADMIN — MAGNESIUM SULFATE 2 G: 2 INJECTION INTRAVENOUS at 08:07

## 2023-01-01 RX ADMIN — METOLAZONE 5 MG: 5 TABLET ORAL at 10:07

## 2023-01-01 RX ADMIN — HEPARIN SODIUM 5000 UNITS: 5000 INJECTION INTRAVENOUS; SUBCUTANEOUS at 06:11

## 2023-01-01 RX ADMIN — FUROSEMIDE 20 MG/HR: 10 INJECTION, SOLUTION INTRAMUSCULAR; INTRAVENOUS at 05:07

## 2023-01-01 RX ADMIN — DEXTROSE MONOHYDRATE: 5 INJECTION, SOLUTION INTRAVENOUS at 02:11

## 2023-01-01 RX ADMIN — ACETAMINOPHEN 650 MG: 325 TABLET ORAL at 05:11

## 2023-01-01 RX ADMIN — VASOPRESSIN 2 UNITS: 20 INJECTION INTRAVENOUS at 03:11

## 2023-01-01 RX ADMIN — SODIUM CHLORIDE 250 ML: 9 INJECTION, SOLUTION INTRAVENOUS at 11:11

## 2023-01-01 RX ADMIN — TAMSULOSIN HYDROCHLORIDE 0.4 MG: 0.4 CAPSULE ORAL at 09:07

## 2023-01-01 RX ADMIN — DIPHENHYDRAMINE HYDROCHLORIDE 50 MG: 50 CAPSULE ORAL at 12:11

## 2023-01-01 RX ADMIN — VASOPRESSIN 2 UNITS: 20 INJECTION INTRAVENOUS at 01:11

## 2023-01-01 RX ADMIN — METOPROLOL SUCCINATE 25 MG: 25 TABLET, EXTENDED RELEASE ORAL at 08:11

## 2023-01-01 RX ADMIN — SODIUM CHLORIDE: 9 INJECTION, SOLUTION INTRAVENOUS at 12:11

## 2023-01-01 RX ADMIN — Medication 10 ML: at 11:07

## 2023-01-01 RX ADMIN — FUROSEMIDE 80 MG: 10 INJECTION, SOLUTION INTRAVENOUS at 08:07

## 2023-01-01 RX ADMIN — SPIRONOLACTONE 25 MG: 25 TABLET, FILM COATED ORAL at 08:07

## 2023-01-01 RX ADMIN — HEPARIN SODIUM 5000 UNITS: 5000 INJECTION INTRAVENOUS; SUBCUTANEOUS at 04:11

## 2023-01-01 RX ADMIN — HEPARIN SODIUM 5000 UNITS: 5000 INJECTION INTRAVENOUS; SUBCUTANEOUS at 09:11

## 2023-01-01 RX ADMIN — Medication 10 ML: at 05:07

## 2023-01-01 RX ADMIN — METHOCARBAMOL 500 MG: 500 TABLET ORAL at 11:07

## 2023-01-01 RX ADMIN — SODIUM CHLORIDE 500 ML: 0.9 INJECTION, SOLUTION INTRAVENOUS at 04:11

## 2023-01-01 RX ADMIN — LIDOCAINE HYDROCHLORIDE 80 MG: 20 INJECTION, SOLUTION EPIDURAL; INFILTRATION; INTRACAUDAL at 01:11

## 2023-01-01 RX ADMIN — FUROSEMIDE 80 MG: 10 INJECTION, SOLUTION INTRAMUSCULAR; INTRAVENOUS at 12:07

## 2023-01-01 RX ADMIN — HEPARIN SODIUM 3000 UNITS: 1000 INJECTION, SOLUTION INTRAVENOUS; SUBCUTANEOUS at 02:11

## 2023-01-01 RX ADMIN — EPINEPHRINE 0.01 MG: 1 INJECTION, SOLUTION, CONCENTRATE INTRAVENOUS at 02:11

## 2023-01-01 RX ADMIN — ETOMIDATE 10 MG: 2 INJECTION INTRAVENOUS at 01:11

## 2023-01-01 RX ADMIN — ATORVASTATIN CALCIUM 80 MG: 40 TABLET, FILM COATED ORAL at 09:07

## 2023-01-01 RX ADMIN — TAMSULOSIN HYDROCHLORIDE 0.4 MG: 0.4 CAPSULE ORAL at 08:11

## 2023-01-01 RX ADMIN — COSYNTROPIN 0.25 MG: 0.25 INJECTION, POWDER, LYOPHILIZED, FOR SOLUTION INTRAVENOUS at 08:11

## 2023-01-01 RX ADMIN — HEPARIN SODIUM 10000 UNITS: 1000 INJECTION, SOLUTION INTRAVENOUS; SUBCUTANEOUS at 02:11

## 2023-01-01 RX ADMIN — HEPARIN SODIUM 5000 UNITS: 5000 INJECTION INTRAVENOUS; SUBCUTANEOUS at 02:11

## 2023-01-01 RX ADMIN — ROCURONIUM BROMIDE 10 MG: 10 INJECTION INTRAVENOUS at 03:11

## 2023-01-01 RX ADMIN — PROTAMINE SULFATE 20 MG: 10 INJECTION, SOLUTION INTRAVENOUS at 03:11

## 2023-01-01 RX ADMIN — SODIUM CHLORIDE, POTASSIUM CHLORIDE, SODIUM LACTATE AND CALCIUM CHLORIDE 500 ML: 600; 310; 30; 20 INJECTION, SOLUTION INTRAVENOUS at 10:11

## 2023-01-01 RX ADMIN — FUROSEMIDE 80 MG: 10 INJECTION, SOLUTION INTRAVENOUS at 03:07

## 2023-01-01 RX ADMIN — ACETAMINOPHEN 650 MG: 325 TABLET ORAL at 10:11

## 2023-01-01 RX ADMIN — POTASSIUM CHLORIDE 40 MEQ: 1500 TABLET, EXTENDED RELEASE ORAL at 08:07

## 2023-01-01 RX ADMIN — HEPARIN SODIUM 5000 UNITS: 5000 INJECTION INTRAVENOUS; SUBCUTANEOUS at 10:11

## 2023-01-01 RX ADMIN — ASPIRIN 81 MG: 81 TABLET, COATED ORAL at 10:11

## 2023-01-01 RX ADMIN — SODIUM BICARBONATE 650 MG TABLET 650 MG: at 10:11

## 2023-01-01 RX ADMIN — DOCUSATE SODIUM 50 MG: 50 CAPSULE, LIQUID FILLED ORAL at 10:07

## 2023-01-01 RX ADMIN — METOPROLOL SUCCINATE 50 MG: 50 TABLET, EXTENDED RELEASE ORAL at 09:07

## 2023-01-01 RX ADMIN — SPIRONOLACTONE 25 MG: 25 TABLET, FILM COATED ORAL at 03:07

## 2023-01-01 RX ADMIN — FENTANYL CITRATE 50 MCG: 50 INJECTION INTRAMUSCULAR; INTRAVENOUS at 01:11

## 2023-01-01 RX ADMIN — NOREPINEPHRINE BITARTRATE 0.02 MCG/KG/MIN: 4 INJECTION, SOLUTION INTRAVENOUS at 04:11

## 2023-01-01 RX ADMIN — INSULIN HUMAN 10 UNITS: 100 INJECTION, SOLUTION PARENTERAL at 02:11

## 2023-03-27 PROBLEM — I50.22 CHRONIC SYSTOLIC HEART FAILURE: Status: ACTIVE | Noted: 2023-01-01

## 2023-03-27 PROBLEM — I20.89 STABLE ANGINA: Status: RESOLVED | Noted: 2019-04-22 | Resolved: 2023-01-01

## 2023-03-27 NOTE — PATIENT INSTRUCTIONS
You have just the right amount of fluid on you.  Please adhere to a low sodium diet (no more than 1.5 grams of sodium in 24h).  3.   Follow fluid restriction of  1. no more than 2 liters in 24 hours..   4. Please have echocardiogram done ASAP.  5. Follow up appt in 3 months with labs.  6. Please decrease metoprolol xl from 50 mg to 25 mg daily (your heart rate is lower than desired today).

## 2023-03-27 NOTE — PROGRESS NOTES
Advanced Heart Failure and Transplantation Clinic Follow up.      Attending Physician: Ken Will MD.  The patient's last visit with me was on 9/16/2022.         HPI.  78 yo man with PMH /o  MI in 1996 s/p PCI of LAD and CABG.  HFrEF, LVEF improved to 55% most recently (May 2021).     Comes for regular follow up.  He reports 2 months ago he started noticing weight gain, increasing dyspnea. He knew how to recognize fluid was building up and increased his lasix to 80 mg BID. Since then he had some improvement in symptoms. He also reported fatigue that he attributes in part to B-blockers he is using. His BNP is the highest in long time 1059.    Review of Systems   Constitutional:  Positive for fatigue and unexpected weight change. Negative for activity change, appetite change, chills, diaphoresis and fever.   HENT:  Negative for nasal congestion, rhinorrhea and sore throat.    Eyes:  Negative for visual disturbance.   Respiratory:  Positive for shortness of breath. Negative for cough, choking, wheezing and stridor.    Cardiovascular:  Negative for chest pain, palpitations and leg swelling.   Gastrointestinal:  Negative for abdominal distention, abdominal pain, diarrhea, nausea and vomiting.   Genitourinary:  Negative for difficulty urinating, dysuria and hematuria.   Integumentary:  Negative for rash.   Neurological:  Negative for seizures, syncope and light-headedness.   Psychiatric/Behavioral:  Negative for agitation and hallucinations.       Past Medical History:   Diagnosis Date    Coronary artery disease     S/P 3 vessel CABG; stents    Glaucoma     HTN (hypertension) 4/19/2013    Hyperlipidemia     Myocardial infarction     Obstructive sleep apnea     Squamous cell carcinoma         Past Surgical History:   Procedure Laterality Date    ADENOIDECTOMY      ANGIOGRAM, CORONARY, WITH LEFT HEART CATHETERIZATION N/A  9/15/2021    Procedure: ANGIOGRAM,CORONARY,WITH LEFT HEART CATHETERIZATION;  Surgeon: Ken Ibarra MD;  Location: [facility] CATH LAB;  Service: Cardiology;  Laterality: N/A;    CATARACT EXTRACTION      patient not sure which eye    CORONARY ANGIOGRAPHY N/A 5/22/2019    Procedure: ANGIOGRAM, CORONARY ARTERY;  Surgeon: Ken Ibarra MD;  Location: [facility] CATH LAB;  Service: Cardiology;  Laterality: N/A;    CORONARY ANGIOGRAPHY INCLUDING BYPASS GRAFTS WITH CATHETERIZATION OF LEFT HEART Right 4/22/2019    Procedure: ANGIOGRAM, CORONARY, INCLUDING BYPASS GRAFT, WITH LEFT HEART CATHETERIZATION;  Surgeon: Ken Ibarra MD;  Location: [facility] CATH LAB;  Service: Cardiology;  Laterality: Right;    CORONARY ANGIOPLASTY      CORONARY ARTERY BYPASS GRAFT      CORONARY BYPASS GRAFT ANGIOGRAPHY  9/15/2021    Procedure: Bypass graft study;  Surgeon: Ken Ibarra MD;  Location: [facility] CATH LAB;  Service: Cardiology;;    LEFT HEART CATHETERIZATION N/A 5/22/2019    Procedure: CATHETERIZATION, HEART, LEFT;  Surgeon: Ken Ibarra MD;  Location: [facility] CATH LAB;  Service: Cardiology;  Laterality: N/A;    ROTATOR CUFF REPAIR      SHOULDER SURGERY      TONSILLECTOMY Left 2015        Family History   Problem Relation Age of Onset    Heart disease Father     Hypertension Father     Heart failure Father     Heart attack Father     Diabetes Maternal Grandmother     Diabetes Maternal Grandfather     Melanoma Neg Hx         Review of patient's allergies indicates:  No Known Allergies     Current Outpatient Medications   Medication Instructions    allopurinoL (ZYLOPRIM) 300 mg, Oral, Nightly    aspirin (ECOTRIN) 81 mg, Oral, Daily    atorvastatin (LIPITOR) 40 MG tablet TAKE 1 TABLET BY MOUTH EVERY DAY    colchicine (COLCRYS) 0.6 mg, Oral, Daily    cyclobenzaprine (FLEXERIL) 10 MG tablet TAKE 1 TABLET BY MOUTH TWICE DAILY FOR MUSLE SPASMS    etodolac (LODINE) 400 mg, Oral, As needed (PRN)    furosemide (LASIX) 80 mg, Oral, 2 times daily  "   HYDROcodone-acetaminophen (NORCO) 7.5-325 mg per tablet 1 tablet, Oral, Every 4-6 hours PRN    indomethacin (INDOCIN) 50 mg, Oral, 2 times daily with meals    lisinopriL (PRINIVIL,ZESTRIL) 40 mg, Oral, Nightly    metoprolol succinate (TOPROL-XL) 50 mg, Oral, Daily    sildenafiL (VIAGRA) 100 mg, Oral, Daily PRN    tamsulosin (FLOMAX) 0.4 mg Cap 1 capsule, Oral, Daily        Vitals:    03/27/23 1539   BP: (!) 114/56   Pulse: (!) 53        Wt Readings from Last 3 Encounters:   03/27/23 100 kg (220 lb 7.4 oz)   09/16/22 101.1 kg (222 lb 14.2 oz)   04/06/22 103.5 kg (228 lb 2.8 oz)     Temp Readings from Last 3 Encounters:   09/15/21 97.9 °F (36.6 °C) (Temporal)   05/22/19 98 °F (36.7 °C) (Oral)   04/22/19 97.3 °F (36.3 °C) (Oral)     BP Readings from Last 3 Encounters:   03/27/23 (!) 114/56   09/16/22 (!) 109/55   04/06/22 (!) 128/58     Pulse Readings from Last 3 Encounters:   03/27/23 (!) 53   09/16/22 62   04/06/22 84        Body mass index is 29.09 kg/m². Estimated body surface area is 2.27 meters squared as calculated from the following:    Height as of this encounter: 6' 1" (1.854 m).    Weight as of this encounter: 100 kg (220 lb 7.4 oz).     Physical Exam  Constitutional:       Appearance: He is well-developed.   HENT:      Head: Normocephalic and atraumatic.      Right Ear: External ear normal.      Left Ear: External ear normal.   Eyes:      Conjunctiva/sclera: Conjunctivae normal.      Pupils: Pupils are equal, round, and reactive to light.   Neck:      Vascular: JVD present.   Cardiovascular:      Rate and Rhythm: Normal rate and regular rhythm.      Pulses: Intact distal pulses.      Heart sounds: S1 normal and S2 normal. No murmur heard.    No friction rub. No gallop.   Pulmonary:      Effort: Pulmonary effort is normal.      Breath sounds: Normal breath sounds.   Abdominal:      General: Bowel sounds are normal. There is no distension.      Palpations: Abdomen is soft.      Tenderness: There is no " abdominal tenderness. There is no guarding or rebound.   Musculoskeletal:      Cervical back: Normal range of motion and neck supple.      Right lower leg: No edema.      Left lower leg: No edema.   Neurological:      Mental Status: He is alert and oriented to person, place, and time.        Lab Results   Component Value Date    BNP 1,059 (H) 03/24/2023     03/24/2023    K 4.6 03/24/2023    MG 2.2 06/18/2021     (H) 03/24/2023    CO2 20 (L) 03/24/2023    BUN 85 (H) 03/24/2023    CREATININE 1.5 (H) 03/24/2023    GLU 71 03/24/2023    HGBA1C 5.5 07/15/2021    AST 61 (H) 03/24/2023    ALT 56 (H) 03/24/2023    ALBUMIN 3.6 03/24/2023    ALBUMIN 4.0 06/21/2017    PROT 6.9 03/24/2023    BILITOT 0.3 03/24/2023    WBC 9.20 03/24/2023    HGB 13.3 (L) 03/24/2023    HCT 41.0 03/24/2023     03/24/2023    INR 1.0 04/22/2019    TSH 1.856 04/06/2022    CHOL 117 (L) 04/06/2022    HDL 36 (L) 04/06/2022    LDLCALC 62.0 (L) 04/06/2022    TRIG 95 04/06/2022       Results for orders placed during the hospital encounter of 05/05/21    Echo Color Flow Doppler? Yes    Interpretation Summary  · Severe left atrial enlargement.  · The left ventricle is mildly enlarged with normal systolic function.  · The estimated ejection fraction is 55%.  · Grade II left ventricular diastolic dysfunction.  · Mild right atrial enlargement.  · Normal right ventricular size with normal right ventricular systolic function.  · Moderate eccentric, posteriorly directed mitral regurgitation. The etiology of the eccentric mitral regurgitation is not evident on this study.  · There is pulmonary hypertension. The estimated PA systolic pressure is 60 mmHg.  · Normal central venous pressure (3 mmHg).        Results for orders placed during the hospital encounter of 09/15/21    Cardiac catheterization    Conclusion  · The Prox RCA lesion was 80% stenosed.  · The Mid RCA lesion was 80% stenosed.  · The Prox Cx lesion was 70% stenosed.  · The estimated  blood loss was none.  · There was three vessel coronary artery disease.  · Patent LIMA to OM and SVG to RCA.  · No change in anatomy since prior angiogram.    The procedure log was documented by Documenter: Bj Rosas and verified by Ken Ibarra MD.    Date: 9/15/2021  Time: 2:29 PM         Assessment and Plan:  Chronic systolic congestive heart failure  -     Echo; Future  -     CBC Auto Differential; Future; Expected date: 06/27/2023  -     Comprehensive Metabolic Panel; Future; Expected date: 06/27/2023  -     BNP; Future; Expected date: 06/27/2023  -     Lipid panel; Future; Expected date: 06/27/2023    Other orders  -     furosemide (LASIX) 80 MG tablet; Take 1 tablet (80 mg total) by mouth 2 (two) times daily.  Dispense: 60 tablet; Refill: 11  -     metoprolol succinate (TOPROL-XL) 25 MG 24 hr tablet; Take 1 tablet (25 mg total) by mouth once daily.  Dispense: 30 tablet; Refill: 11          Chronic systolic HF with recovered EF to 55% (from 45%), NYHA class II-III, stage C.  Etiology: ICM CAD.  Hemodynamic status: warm, normotensive, seems centrally hypervolemic. Strong carotid pulsation makes accurate determination difficult. High BNP suggest persistent hypervolemia despite his increased dose of lasix.  Clinical course:  No admissions or ER visits for HF.  Plan:  -Echo ASAP to determine volume status and rule out new decline in LVEF.      2. CAD s/p CABG.  Last [facility] in 2021 with unchanged anatomy and no PCI was done. On bblocker, ACEI, lipitor, asa.     3.  Dyslipidemia.  Last LDL chol 62. Lipitor.    4. Fatigue. Likely due to decompensated HF. However, noted HR low 50s. Plan to reduce dose of metoprolol xl.

## 2023-05-09 NOTE — TELEPHONE ENCOUNTER
Patient scheduled with Vicente Marie for tomorrow.   Reminded to bring MRI disc and read to visit so Vicente will be able to best evaluate and treat.     Patient understood.       ----- Message from Ale Bishop sent at 5/9/2023 12:32 PM CDT -----  Regarding: pt called  Name of Who is Calling:    Pt       What is the request in detail:  The pt is in excruciating pain due to a fall that happened on thrusday  he will have a mri complete tomorrow morning and he needs to be seen after 10:30          Can the clinic reply by [facility]:    no      What Number to Call Back if not in [facility]: [phone] (home)

## 2023-05-10 NOTE — PROGRESS NOTES
CC: right Shoulder pain (Dr. Lindsey patient)    77 y.o. Male  who reports that the pain is severe and not responding to any conservative care.      Dr. Lindsey previously operated on his left shoulder.     He reports that his shoulder pain began on 5/4/23 after a fall in his bedroom where he hit his arm on the dresser and it pushed his arm in a abducted fashion. He is now having trouble moving his shoulder. He tried indomethacin which helped a little with the pain. Pain was improving until 4 days ago when pain worsened a great deal.     Got an outside MRI and has with him today.     He has a big trial coming up and does not have time to have surgery. Just states that he needs pain relief for the trial.     He denies numbness, tingling, paresthesias at this time.     He reports that the pain is worse with overhead activity. It also bothers him at night.        PAST MEDICAL HISTORY:   Past Medical History:   Diagnosis Date    Coronary artery disease     S/P 3 vessel CABG; stents    Glaucoma     HTN (hypertension) 4/19/2013    Hyperlipidemia     Myocardial infarction     Obstructive sleep apnea     Squamous cell carcinoma      PAST SURGICAL HISTORY:   Past Surgical History:   Procedure Laterality Date    ADENOIDECTOMY      ANGIOGRAM, CORONARY, WITH LEFT HEART CATHETERIZATION N/A 09/15/2021    Procedure: ANGIOGRAM,CORONARY,WITH LEFT HEART CATHETERIZATION;  Surgeon: Ken Ibarra MD;  Location: [facility] CATH LAB;  Service: Cardiology;  Laterality: N/A;    CATARACT EXTRACTION      patient not sure which eye    CORONARY ANGIOGRAPHY N/A 05/22/2019    Procedure: ANGIOGRAM, CORONARY ARTERY;  Surgeon: Ken Ibarra MD;  Location: [facility] CATH LAB;  Service: Cardiology;  Laterality: N/A;    CORONARY ANGIOGRAPHY INCLUDING BYPASS GRAFTS WITH CATHETERIZATION OF LEFT HEART Right 04/22/2019    Procedure: ANGIOGRAM, CORONARY, INCLUDING BYPASS GRAFT, WITH LEFT HEART CATHETERIZATION;  Surgeon: Ken Ibarra MD;   Location: [facility] CATH LAB;  Service: Cardiology;  Laterality: Right;    CORONARY ANGIOPLASTY      CORONARY ARTERY BYPASS GRAFT      CORONARY BYPASS GRAFT ANGIOGRAPHY  09/15/2021    Procedure: Bypass graft study;  Surgeon: Ken Ibarra MD;  Location: [facility] CATH LAB;  Service: Cardiology;;    LEFT HEART CATHETERIZATION N/A 2019    Procedure: CATHETERIZATION, HEART, LEFT;  Surgeon: Ken Ibarra MD;  Location: [facility] CATH LAB;  Service: Cardiology;  Laterality: N/A;    LUMBAR DISCECTOMY  2022    LUMBAR LAMINECTOMY      ROTATOR CUFF REPAIR      SHOULDER SURGERY      TONSILLECTOMY Left 2015     FAMILY HISTORY:   Family History   Problem Relation Age of Onset    Heart disease Father     Hypertension Father     Heart failure Father     Heart attack Father     Diabetes Maternal Grandmother     Diabetes Maternal Grandfather     Melanoma Neg Hx      SOCIAL HISTORY:   Social History     Socioeconomic History    Marital status:    Tobacco Use    Smoking status: Former     Packs/day: 1.00     Years: 31.00     Pack years: 31.00     Types: Cigarettes     Quit date: 1991     Years since quittin.0    Smokeless tobacco: Never   Substance and Sexual Activity    Alcohol use: Yes     Alcohol/week: 2.0 standard drinks     Types: 2 Glasses of wine per week     Comment: Socially    Drug use: No       MEDICATIONS:   Current Outpatient Medications:     allopurinol (ZYLOPRIM) 300 MG tablet, Take 1 tablet (300 mg total) by mouth every evening., Disp: 30 tablet, Rfl: 6    aspirin (ECOTRIN) 81 MG EC tablet, Take 1 tablet (81 mg total) by mouth once daily., Disp: 30 tablet, Rfl: 11    atorvastatin (LIPITOR) 40 MG tablet, TAKE 1 TABLET BY MOUTH EVERY DAY, Disp: 90 tablet, Rfl: 3    colchicine (COLCRYS) 0.6 mg tablet, Take 0.6 mg by mouth once daily., Disp: , Rfl:     cyclobenzaprine (FLEXERIL) 10 MG tablet, TAKE 1 TABLET BY MOUTH TWICE DAILY FOR MUSLE SPASMS, Disp: , Rfl:     etodolac (LODINE) 400 MG tablet, Take  400 mg by mouth as needed., Disp: , Rfl:     furosemide (LASIX) 80 MG tablet, Take 1 tablet (80 mg total) by mouth 2 (two) times daily., Disp: 60 tablet, Rfl: 11    HYDROcodone-acetaminophen (NORCO) 7.5-325 mg per tablet, Take 1 tablet by mouth every 4 to 6 hours as needed., Disp: , Rfl:     indomethacin (INDOCIN) 50 MG capsule, Take 50 mg by mouth 2 (two) times daily with meals., Disp: , Rfl:     lisinopriL (PRINIVIL,ZESTRIL) 40 MG tablet, Take 1 tablet (40 mg total) by mouth every evening., Disp: 90 tablet, Rfl: 3    metoprolol succinate (TOPROL-XL) 25 MG 24 hr tablet, Take 1 tablet (25 mg total) by mouth once daily., Disp: 30 tablet, Rfl: 11    sildenafil (VIAGRA) 100 MG tablet, Take 1 tablet (100 mg total) by mouth daily as needed for Erectile Dysfunction., Disp: 6 tablet, Rfl: 12    tamsulosin (FLOMAX) 0.4 mg Cap, Take 1 capsule by mouth once daily., Disp: , Rfl:   ALLERGIES: Review of patient's allergies indicates:  No Known Allergies    VITAL SIGNS: BP (!) 108/56   Pulse 80   Wt 101.2 kg (223 lb)   BMI 29.42 kg/m²      Review of Systems   Constitution: Negative. Negative for chills, fever and night sweats.   HENT: Negative for congestion and headaches.    Eyes: Negative for blurred vision, left vision loss and right vision loss.   Cardiovascular: Negative for chest pain and syncope.   Respiratory: Negative for cough and shortness of breath.    Endocrine: Negative for polydipsia, polyphagia and polyuria.   Hematologic/Lymphatic: Negative for bleeding problem. Does not bruise/bleed easily.   Skin: Negative for dry skin, itching and rash.   Musculoskeletal: Negative for falls and muscle weakness.   Gastrointestinal: Negative for abdominal pain and bowel incontinence.   Genitourinary: Negative for bladder incontinence and nocturia.   Neurological: Negative for disturbances in coordination, loss of balance and seizures.   Psychiatric/Behavioral: Negative for depression. The patient does not have insomnia.     Allergic/Immunologic: Negative for hives and persistent infections.       PHYSICAL EXAMINATION:  General:  The patient is alert and oriented x 3.  Mood is pleasant.  Observation of ears, eyes and nose reveal no gross abnormalities.  HEENT: NCAT, sclera nonicteric  Lungs: Respirations are equal and unlabored.  Gait is coordinated. Patient can toe walk and heel walk without difficulty.  Cardiovascular: There are no swelling or varicosities present.   Lymphatic: Negative for adenopathy       right SHOULDER / UPPER EXTREMITY EXAM    OBSERVATION:     Swelling  none  Deformity  none   Discoloration  none   Scapular winging none   Scars   none  Atrophy  none    TENDERNESS / CREPITUS (T/C):          T/C      T/C   Clavicle   + distal/-  SUPRAspinatus    -/-   AC Jt.    +/-  INFRAspinatus  -/-   SC Jt.    -/-  Deltoid    -/-   G. Tuberosity  -/-  LH BICEP groove  +/-   Acromion:  -/-  Midline Neck   -/-   Scapular Spine -/-  Trapezium   -/-   SMA Scapula  -/-  GH jt. line - post  -/-   Scapulothoracic  -/-         ROM: (* = with pain)  Right shoulder   Left shoulder        AROM (PROM)   AROM (PROM)   FE    55° (75°)*     170° (175°)     ER at 0°    30°  (35°) *   60°  (65°)   ER at 90° ABD  NT   90°  (90°)   IR at 90°  ABD   NA  (40°)     NA  (40°)      IR (spine level)   Buttock *    T10    STRENGTH: (* = with pain) RIGHT SHOULDER  LEFT SHOULDER   SCAPTION at 0   4/5*    5/5    SCAPTION at 30             4+/5*    5/5    IR    5/5    5/5   ER    4+/5*    5/5   BICEPS   5/5    5/5   Deltoid    5/5    5/5    Unable to do bear hug test     SIGNS:  Painful side       NEER   +    OSANAS  +   BERNARD   +    SPEEDS  +   DROP ARM   +   BELLY PRESS Neg   Superior escape none    LIFT-OFF  Neg   X-Body ADD    +   MOVING VALGUS Neg      STABILITY TESTING    RIGHT SHOULDER   LEFT SHOULDER       Translation    Anterior  up face     up face    Posterior  up face    up face    Sulcus   < 10mm    < 10 mm    Signs    Apprehension    neg      Neg    Relocation   no change     no change    Jerk test  neg     Neg      EXTREMITY NEURO-VASCULAR EXAM    Sensation grossly intact to light touch all dermatomal regions.    DTR 2+ Biceps, Triceps, BR and Negative Magdys sign   Grossly intact motor function at Elbow, Wrist and Hand   Distal pulses radial and ulnar 2+, brisk cap refill, symmetric.      NECK:  Painless FROM and spinous processes non-tender. Negative Spurlings sign.      OTHER FINDINGS:    XRAYS:  Shoulder trauma series right,  were ordered and reviewed and interpreted by me. No convincing fracture or dislocation is noted.  +AC moderate hypertrophy, degenerative changes    External MRI 5/10/23:  Independently reviewed and interpreted by me today.   Full thickness full width supraspinatus with retraction and some mild to moderate muscle atrophy  Partial tears of infraspinatus and subscapularis.   Biceps tendinitis with split tear  Mild glenohumeral DJD  AC joint contusion edema and DJD    right   1. Shoulder pain,RTC tear, complicated       Plan:         Long discussion with patient that best option is surgical repair of rotator cuff. Holding off surgery could cause further injury, increase muscle atrophy and retraction. I advised that I would not recommend CSI today if he would like to treat this surgically but I certainly understanding wanting to improve his pain for upcoming trial.   Try ice compresses to shoulder.   OTC pain control   PROCEDURE NOTE:   After cortisone consent and post-injection information was given, the shoulder was prepped with iodine. The right subacromial space of the shoulder was injected with 1 cc 40 mg kenalog and 4 cc lidocaine and 4 cc 0.25% bupivacaine.   Bandaid was applied. Patient was reminded to rest with RICE for 48 hours post injection and to call clinic immediately for any adverse side effects as explained in clinic today.  Patient was warned of possible blood sugar and/or blood pressure changes  during that time. Told to call clinic or go to ED if the changes become concerning.     5. Gentle ROM with shoulder and arm but no lifting or strenuous activity with RUE.   6. RTC with Dr. César esteban to discussed surgical management.     All patients questions were answered. Patient was advised to call us with any concerns or questions.      I made the decision to obtain old records of the patient including previous notes and imaging. New imaging was ordered today of the extremity or extremities evaluated. I independently reviewed and interpreted the radiographs and/or MRIs today as well as prior imaging.     The total face-to-face encounter time with this patient was 55 minutes and greater than 50% of of the encounter time was spent counseling the patient, discussion of imaging findings, diagnosis, possibilities, treatment options, and coordinating care. Significant time was also spent educating the patient, giving detail post-visit instructions, and discussing risks and benefits of treatment. Patient also had many specific questions regarding the options and long-term treatment effects that were answered during the visit today.     There was also significant non-face to face time spent preparing to see this patient which included reviewing and obtaining tests or imaging results.

## 2023-05-18 NOTE — PROGRESS NOTES
"Subjective:      Patient ID: Jose Fonseca Jr. is a 77 y.o. male.    Vitals:  height is 6' 1" (1.854 m) and weight is 101.2 kg (223 lb). His temperature is 98.1 °F (36.7 °C). His blood pressure is 127/79 and his pulse is 69. His respiration is 18 and oxygen saturation is 96%.     Chief Complaint: Leg Pain    Patient with lesion of the left leg that he noticed earlier this week.  Patient states that he saw bump on his leg and when he pressed on it, his skin tore off with clear discharge.  He has been applying antibiotic ointment to the area.  But recently there has had increased swelling and redness surrounding the lesion.  No known injury or trauma aside from pressing on it.  No fever.  Has history of staph infections in the past which  Last tdap 2014.  no other blistering to rest of body.    Leg Pain   Incident onset: 2 days. The incident occurred at home. There was no injury mechanism. The pain is present in the left leg. The quality of the pain is described as aching. Pertinent negatives include no numbness. He reports no foreign bodies present. The symptoms are aggravated by palpation. Treatments tried: otc antitboitic ointment.     Past Medical History:   Diagnosis Date    Coronary artery disease     S/P 3 vessel CABG; stents    Glaucoma     HTN (hypertension) 4/19/2013    Hyperlipidemia     Myocardial infarction     Obstructive sleep apnea     Squamous cell carcinoma      Patient Active Problem List   Diagnosis    CAD (coronary artery disease)    HTN (hypertension)    Dyslipidemia    ED (erectile dysfunction) of organic origin    BPH (benign prostatic hypertrophy)    Anal lesion    Personal history of colonic polyps    Preoperative cardiovascular examination    S/P CABG (coronary artery bypass graft)    Post PTCA    Rotator cuff tear    S/P L arthroscopy of shoulder (3/18/15)    Cataracts, bilateral    Bronchitis    Incomplete defecation    Fecal smearing    Thrombosed external hemorrhoid    " Obstructive sleep apnea    Hx of calcium pyrophosphate deposition disease (CPPD)    CKD (chronic kidney disease) stage 3, GFR 30-59 ml/min    Chronic systolic heart failure           Constitution: Negative for chills, sweating, fatigue and fever.   HENT:  Negative for ear pain, congestion and sore throat.    Neck: Negative for neck pain and neck stiffness.   Cardiovascular:  Negative for chest pain, leg swelling, palpitations and sob on exertion.   Eyes:  Negative for eye itching, eye pain and eye redness.   Respiratory:  Negative for cough, sputum production and shortness of breath.    Gastrointestinal:  Negative for abdominal pain, nausea, vomiting and diarrhea.   Genitourinary:  Negative for dysuria, frequency, urgency, flank pain and hematuria.   Musculoskeletal:  Positive for pain. Negative for joint pain.   Skin:  Positive for color change, wound, lesion and erythema. Negative for rash.   Neurological:  Negative for dizziness, passing out, headaches, disorientation and numbness.   Psychiatric/Behavioral:  Negative for disorientation.     Objective:     Physical Exam   Constitutional: He is oriented to person, place, and time. He appears well-developed.  Non-toxic appearance. He does not appear ill. No distress.   HENT:   Head: Normocephalic and atraumatic.   Ears:   Right Ear: External ear normal.   Left Ear: External ear normal.   Eyes: Conjunctivae are normal. Right eye exhibits no discharge. Left eye exhibits no discharge. No scleral icterus.   Pulmonary/Chest: Effort normal. No stridor. No respiratory distress. He has no wheezes.   Musculoskeletal:        Legs:    Neurological: He is alert and oriented to person, place, and time.   Skin: Skin is not diaphoretic. erythema   Psychiatric: His behavior is normal. Judgment and thought content normal.   Nursing note and vitals reviewed.    Assessment:     1. Traumatic ulcer of left lower extremity, limited to breakdown of skin with infection        Plan:      Patient with what sounds like blister to left lower leg of unclear etiology.  Patient pressed on it which tore the epidermis and now with wound of left lower extremity which is well healing but now with evidence of infection.  Will begin antibiotic treatment.  Instructed to monitor closely, discussed home wound care.  Seek medical attention immediately for new or worsening symptoms.  Patient expressed understanding, agrees with plan.  - Discussed ddx, home care, tx options, and given follow up precautions.  I have reviewed the patient's chart to view previous visits, labs, and imaging to assess PMH and look for any trends or previous treatments.    Traumatic ulcer of left lower extremity, limited to breakdown of skin with infection  -     sulfamethoxazole-trimethoprim 800-160mg (BACTRIM DS) 800-160 mg Tab; Take 1 tablet by mouth 2 (two) times daily. for 7 days  Dispense: 14 tablet; Refill: 0      Patient Instructions   - Rest.    - Drink plenty of fluids.    - Acetaminophen (tylenol) or Ibuprofen (advil,motrin) as directed as needed for fever/pain. Avoid tylenol if you have a history of liver disease. Do not take ibuprofen if you have a history of GI bleeding, kidney disease, or if you take blood thinners.     - You have been given an antibiotic to treat your condition today.    - Please complete the antibiotic as directed on the bottle.   - If you are female and on oral birth control pills, use additional methods to prevent pregnancy while on antibiotics and for one cycle after.   - you can take otc probiotic to limit upset stomach    - wash gently daily with soap and water  Antibiotic ointment regularly with bandage for protection    - watch for signs of worsening infection including increased redness, swelling, discharge, increased pain, red streaking, fever.  Seek medical attention immediately if these occur.    - Follow up with your PCP or specialty clinic as directed in the next 1-2 weeks if not improved  or as needed.  You can call [phone] to schedule an appointment with the appropriate provider.    - Go to the ER or seek medical attention immediately if you develop new or worsening symptoms.     - You must understand that you have received an Urgent Care treatment only and that you may be released before all of your medical problems are known or treated.   - You, the patient, will arrange for follow up care as instructed.   - If your condition worsens or fails to improve we recommend that you receive another evaluation at the ER immediately or contact your PCP to discuss your concerns or return here.

## 2023-05-18 NOTE — PATIENT INSTRUCTIONS
- Rest.    - Drink plenty of fluids.    - Acetaminophen (tylenol) or Ibuprofen (advil,motrin) as directed as needed for fever/pain. Avoid tylenol if you have a history of liver disease. Do not take ibuprofen if you have a history of GI bleeding, kidney disease, or if you take blood thinners.     - You have been given an antibiotic to treat your condition today.    - Please complete the antibiotic as directed on the bottle.   - If you are female and on oral birth control pills, use additional methods to prevent pregnancy while on antibiotics and for one cycle after.   - you can take otc probiotic to limit upset stomach    - wash gently daily with soap and water  Antibiotic ointment regularly with bandage for protection    - watch for signs of worsening infection including increased redness, swelling, discharge, increased pain, red streaking, fever.  Seek medical attention immediately if these occur.    - Follow up with your PCP or specialty clinic as directed in the next 1-2 weeks if not improved or as needed.  You can call [phone] to schedule an appointment with the appropriate provider.    - Go to the ER or seek medical attention immediately if you develop new or worsening symptoms.     - You must understand that you have received an Urgent Care treatment only and that you may be released before all of your medical problems are known or treated.   - You, the patient, will arrange for follow up care as instructed.   - If your condition worsens or fails to improve we recommend that you receive another evaluation at the ER immediately or contact your PCP to discuss your concerns or return here.

## 2023-07-03 NOTE — TELEPHONE ENCOUNTER
7/3/2023    Lab review following Remind Me that was placed in March ; 3 month follow up with MD Sonam Will.   Month follow up with labs and echocardiogram scheduled for today 7/3/2023; booked out with MD Sonam Will.  Appointment rescheduled with MD Asencio on 7/18/2023 at 1430.  Lab values ordered are present in Epic, no new labs scheduled at this time; lab remain within normal range or improved from 3/24/2023.

## 2023-07-14 NOTE — H&P
"[facility]  Transplant Heart  H&P Note    Patient Name: Jose Fonseca Jr.  MRN: [id number]  Admission Date: 7/14/2023  Hospital Length of Stay: 0 days  Code Status: Full Code   Attending Physician: Dalia Amezquita DO   Expected Discharge Date:       HPI   77-yo M with hx of CAD (s/p PCI to and CABG ~ 1996), HFrEF (EF between 40-50% in the last year) and LM not on CPAP. He has noticed that over the past month his functional capacity and SOB have worsened with OCONNELL with minimal activity and PND worsening and now with orthopnea. Denied CP, papitations. He had a RHC today showing elevated biV pressures and low output so he had to be admitted emergently for IV diuresis.    Also refers he was taking Lisinopril 40mg po daily before but because his BP was "too low", it was discontinued.    Of note, he is a  and is still working full time but notes that his job is very sedentary.    At home he was taking Metoprolol XL 50mg po daily, Tamsulosin 0.4, Lasix 80 po daily, ASA 81 and atorva 40    Objective     Vitals:    07/14/23 1324 07/14/23 1327   BP:  138/63   BP Location:  Right arm   Patient Position:  Sitting   Pulse:  61   Resp:  16   Temp:  97.6 °F (36.4 °C)   TempSrc:  Oral   SpO2:  96%   Weight: 100.2 kg (220 lb 14.4 oz)    Height: 6' (1.829 m)        No intake/output data recorded.  No intake/output data recorded.         Physical Examination: General appearance - alert, well appearing, and in no distress  Mental status - alert, oriented to person, place, and time  Neck - JVD present  Chest - clear to auscultation, no wheezes, rales or rhonchi, symmetric air entry  Heart - normal rate, regular rhythm, normal S1, S2, no murmurs, rubs, clicks or gallops,   Abdomen - soft, nontender, nondistended, no masses or organomegaly  Neurological - alert, oriented, normal speech, no focal findings or movement disorder noted  Extremities - BLE edema none    Scheduled Meds:   [START ON 7/15/2023] " aspirin  81 mg Oral Daily    [START ON 7/15/2023] atorvastatin  80 mg Oral Daily    [START ON 7/15/2023] colchicine  0.6 mg Oral Daily    [START ON 7/15/2023] metoprolol succinate  50 mg Oral Daily    [START ON 7/15/2023] tamsulosin  1 capsule Oral Daily     Continuous Infusions:   furosemide (LASIX) 10 mg/mL infusion (non-titrating)       PRN Meds:.sodium chloride 0.9%    Labs  BMP  Lab Results   Component Value Date     07/14/2023    K 4.3 07/14/2023     07/14/2023    CO2 28 07/14/2023    BUN 45 (H) 07/14/2023    CREATININE 1.8 (H) 07/14/2023    CALCIUM 9.4 07/14/2023    ANIONGAP 7 (L) 07/14/2023    ESTGFRAFRICA >60.0 04/06/2022    ESTGFRAFRICA >60.0 04/06/2022    EGFRNONAA >60.0 04/06/2022    EGFRNONAA >60.0 04/06/2022       Lab Results   Component Value Date    WBC 7.64 07/14/2023    HGB 13.8 (L) 07/14/2023    HCT 44.5 07/14/2023    MCV 96 07/14/2023     07/14/2023         BNP  No results for input(s): BNP, BNPTRIAGEBLO in the last 168 hours.    Lab Results   Component Value Date    ALT 19 07/14/2023    AST 23 07/14/2023    ALKPHOS 69 07/14/2023    BILITOT 0.4 07/14/2023       Lab Results   Component Value Date    CHOL 164 07/03/2023    CHOL 117 (L) 04/06/2022    CHOL 101 (L) 07/15/2021     Lab Results   Component Value Date    HDL 46 07/03/2023    HDL 36 (L) 04/06/2022    HDL 39 (L) 07/15/2021     Lab Results   Component Value Date    LDLCALC 103.6 07/03/2023    LDLCALC 62.0 (L) 04/06/2022    LDLCALC 52.8 (L) 07/15/2021     Lab Results   Component Value Date    TRIG 72 07/03/2023    TRIG 95 04/06/2022    TRIG 46 07/15/2021     Lab Results   Component Value Date    CHOLHDL 28.0 07/03/2023    CHOLHDL 30.8 04/06/2022    CHOLHDL 38.6 07/15/2021       Cardiac studies  Cardiac studies  Results for orders placed during the hospital encounter of 07/03/23    Echo    Interpretation Summary  · The left ventricle is normal in size with low normal systolic function. The estimated ejection fraction is  50%.  · There is abnormal septal wall motion. There is systolic flattening of the interventricular septum consistent with right ventricle pressure overload.  · Moderate right ventricular enlargement with moderately to severely reduced right ventricular systolic function.  · Grade II left ventricular diastolic dysfunction.  · Biatrial enlargement.  · Mild tricuspid regurgitation.  · Moderate mitral regurgitation.  · The estimated PA systolic pressure is 79 mmHg.  · Intermediate central venous pressure (8 mmHg).      Results for orders placed or performed during the hospital encounter of 09/15/21   EKG 12-lead    Collection Time: 09/15/21  8:50 AM    Narrative    Test Reason : Z95.1,I25.10,    Vent. Rate : 071 BPM     Atrial Rate : 071 BPM     P-R Int : 174 ms          QRS Dur : 150 ms      QT Int : 452 ms       P-R-T Axes : 052 -74 -01 degrees     QTc Int : 491 ms    Normal sinus rhythm  Right bundle branch block  Left anterior fascicular block   Bifascicular block   Marked ST abnormality, possible anterior subendocardial injury  Inferior infarct ,age undetermined  Abnormal ECG  When compared with ECG of 30-SEP-2019 10:35,  Previous ECG has undetermined rhythm, needs review  Left anterior fascicular block is now Present  Inferior infarct is now Present  T wave inversion now evident in Inferior leads  Confirmed by STEVE RAY MD ([id number]) on 9/15/2021 9:16:00 AM    Referred By: GASTON CERVANTES           Confirmed By:STEVE RAY MD       RHC:    RA 20  PA 90/40 (57)  PCWP 27    CO 3.3 l/min  CI 1.5 l/min/m2.    PVR 9 APPIAH.    Large V wave on PCWP  O2 Sat: PA 51%.   SaO2 98% on RA  HGB 13.8 g/dl    BSA 2.20 m2  /60 (MAP 91)  SVT 1721      Assessment / Plan:   77-yo M with hx of CAD (s/p PCI to and CABG ~ 1996), HFrEF (EF between 40-50% in the last year) and LM not on CPAP. Admitted emergently for diuresis. After diuresis, will reassess right sided pressures.      #HFmrEF   #Pulmonary hypertension, tbd  etiology  #CAD s/p pci and CABG in 1996 (unknown etiology)  -Strict I/O,   -Continue atorvastatin and ASA  -Continue Metoprolol XL 50mg po daily   -Strict I/O  -Preload: diuresis with lasix 80mg iv once followed by drip at 20mg/hr    -BMP ordered for tonight to f/u lytes  -Will need to reassess right sided function/pressures once euvolemic.      #KASSIE  -Baseline Cr 1.2-1.3 now 1.8, urine lytes ordered     #LM  -Needs to use cpap at home    #HLD  -Atorva increased to 80mg     #BPH  -continue tamsulosin      DVT: early ambulation  Diet : Cardiac   Dispo: Admit under HTS in [facility].    Rolf Falk MD  Cardiovascular diseases  Transplant Heart  [facility]

## 2023-07-14 NOTE — Clinical Note
The PA catheter was repositioned to the main pulmonary artery. Hemodynamics were performed. O2 saturation was measured at 66%. CO=4.76  CI=2.21

## 2023-07-15 NOTE — PROCEDURES
Jose Fonseca Jr. is a 77 y.o. male patient.    Temp: 97.8 °F (36.6 °C) (07/14/23 1545)  Pulse: 65 (07/14/23 1719)  Resp: 18 (07/14/23 1545)  BP: 122/67 (07/14/23 1545)  SpO2: 96 % (07/14/23 1545)  Weight: 100.2 kg (220 lb 14.4 oz) (07/14/23 1324)  Height: 6' (182.9 cm) (07/14/23 1324)    PICC  Date/Time: 7/14/2023 7:42 PM  Performed by: Bree Mercado RN  Consent Done: Yes  Time out: Immediately prior to procedure a time out was called to verify the correct patient, procedure, equipment, support staff and site/side marked as required  Indications: med administration and vascular access  Anesthesia: local infiltration  Local anesthetic: lidocaine 1% without epinephrine  Anesthetic Total (mL): 3  Preparation: skin prepped with ChloraPrep  Skin prep agent dried: skin prep agent completely dried prior to procedure  Sterile barriers: all five maximum sterile barriers used - cap, mask, sterile gown, sterile gloves, and large sterile sheet  Hand hygiene: hand hygiene performed prior to central venous catheter insertion  Location details: right brachial  Catheter type: double lumen  Catheter size: 5 Fr  Catheter Length: 42cm    Ultrasound guidance: yes  Vessel Caliber: large and patent, compressibility normal  Vascular Doppler: not done  Needle advanced into vessel with real time Ultrasound guidance.  Guidewire confirmed in vessel.  Image recorded and saved.  Sterile sheath used.  Number of attempts: 1  Post-procedure: blood return through all ports, chlorhexidine patch and sterile dressing applied  Technical procedures used: 3CG  Specimens: No  Implants: No  Assessment: placement verified by x-ray  Complications: none        Name FABY Mercado RN  7/14/2023

## 2023-07-15 NOTE — PROGRESS NOTES
[facility]  Heart Transplant  Progress Note    Patient Name: Jose Fonseca Jr.  MRN: [id number]  Admission Date: 7/14/2023  Hospital Length of Stay: 1 days  Attending Physician: Dalia Amezquita DO  Primary Care Provider: Vasyl Morris MD  Principal Problem:<principal problem not specified>    Subjective:     Interval History: No acute overnight events. Patient denies any complaints this morning. Remains on Lasix 20 mg/hr    Hemodynamic Parameters:  CVP: 8, CO: 5.09, CI: 2.25, SVR: 1,115, SVO2:61    Lines  - R.PICC Line      Continuous Infusions:   furosemide (LASIX) 10 mg/mL infusion (non-titrating) 20 mg/hr (07/15/23 0628)     Scheduled Meds:   aspirin  81 mg Oral Daily    atorvastatin  80 mg Oral Daily    colchicine  0.6 mg Oral Daily    metoprolol succinate  50 mg Oral Daily    sodium chloride 0.9%  10 mL Intravenous Q6H    tamsulosin  1 capsule Oral Daily     PRN Meds:sodium chloride 0.9%, Flushing PICC Protocol **AND** sodium chloride 0.9% **AND** sodium chloride 0.9%    Review of patient's allergies indicates:  No Known Allergies  Objective:     Vital Signs (Most Recent):  Temp: 97.5 °F (36.4 °C) (07/15/23 0524)  Pulse: 72 (07/15/23 0524)  Resp: 16 (07/15/23 0524)  BP: 124/68 (07/15/23 0524)  SpO2: (!) 94 % (07/15/23 0524) Vital Signs (24h Range):  Temp:  [96.1 °F (35.6 °C)-98 °F (36.7 °C)] 97.5 °F (36.4 °C)  Pulse:  [60-81] 72  Resp:  [16-20] 16  SpO2:  [94 %-98 %] 94 %  BP: (109-138)/(59-68) 124/68     Patient Vitals for the past 72 hrs (Last 3 readings):   Weight   07/14/23 1324 100.2 kg (220 lb 14.4 oz)     Body mass index is 29.96 kg/m².      Intake/Output Summary (Last 24 hours) at 7/15/2023 0716  Last data filed at 7/15/2023 0632  Gross per 24 hour   Intake 600 ml   Output 2150 ml   Net -1550 ml        Physical Exam  Constitutional:       Appearance: Normal appearance. He is obese.   Eyes:      General: No scleral icterus.     Conjunctiva/sclera: Conjunctivae  normal.   Cardiovascular:      Rate and Rhythm: Normal rate and regular rhythm.   Pulmonary:      Effort: Pulmonary effort is normal. No respiratory distress.      Breath sounds: Normal breath sounds. No wheezing.   Abdominal:      General: There is no distension.      Palpations: Abdomen is soft.      Tenderness: There is no abdominal tenderness.   Musculoskeletal:      Right lower leg: No edema.      Left lower leg: No edema.   Skin:     General: Skin is warm.   Neurological:      General: No focal deficit present.      Mental Status: He is alert.   Psychiatric:         Mood and Affect: Mood normal.       Significant Labs:  CBC:  Recent Labs   Lab 07/14/23  0757 07/15/23  0518   WBC 7.64 7.22   RBC 4.64 4.41*   HGB 13.8* 13.4*   HCT 44.5 41.6    191   MCV 96 94   MCH 29.7 30.4   MCHC 31.0* 32.2     BNP:  No results for input(s): BNP in the last 168 hours.    Invalid input(s): BNPTRIAGELBLO  CMP:  Recent Labs   Lab 07/14/23  0757 07/14/23  2107 07/15/23  0518   * 111* 104   CALCIUM 9.4 9.2 9.3   ALBUMIN 3.3*  --   --    PROT 6.8  --   --     143 139   K 4.3 4.0 3.8   CO2 28 26 23    106 104   BUN 45* 48* 46*   CREATININE 1.8* 2.1* 1.8*   ALKPHOS 69  --   --    ALT 19  --   --    AST 23  --   --    BILITOT 0.4  --   --       Coagulation:   No results for input(s): PT, INR, APTT in the last 168 hours.  LDH:  No results for input(s): LDH in the last 72 hours.  Microbiology:  Microbiology Results (last 7 days)       ** No results found for the last 168 hours. **            I have reviewed all pertinent labs within the past 24 hours.    Estimated Creatinine Clearance: 42.1 mL/min (A) (based on SCr of 1.8 mg/dL (H)).    Diagnostic Results:  I have reviewed and interpreted all pertinent imaging results/findings within the past 24 hours.    Assessment and Plan:     #HFmrEF   #Pulmonary hypertension, tbd etiology  #CAD s/p pci and CABG in 1996 (unknown etiology)  - Continue atorvastatin and ASA  -  Continue Metoprolol XL 50mg po daily   - Continue with Lasix 20 mg/hr  - Monitor electrolytes closely. Maintain Mg >2 and K >4  - HF Pathway: Sodium restriction <2 g, Fluid restriction < 1500 mL, Strict I/Os, Daily weights    #KASSIE  - Monitor renal function  - Avoid Nephrotoxic agents  - Monitor urine output   - Baseline Cr 1.2-1.3      #LM  -Needs to use cpap at home     #HLD  -Atorvastatin 80 mg, daily      #BPH  -continue tamsulosin       Emperatriz Knowles MD  Heart Transplant  [facility]

## 2023-07-15 NOTE — SUBJECTIVE & OBJECTIVE
Interval History: No acute overnight events. Patient denies any complaints this morning. Remains on Lasix 20 mg/hr    Hemodynamic Parameters:  CVP: 8, CO: 5.09, CI: 2.25, SVR: 1,115, SVO2:61    Lines  - R.PICC Line      Continuous Infusions:   furosemide (LASIX) 10 mg/mL infusion (non-titrating) 20 mg/hr (07/15/23 0628)     Scheduled Meds:   aspirin  81 mg Oral Daily    atorvastatin  80 mg Oral Daily    colchicine  0.6 mg Oral Daily    metoprolol succinate  50 mg Oral Daily    sodium chloride 0.9%  10 mL Intravenous Q6H    tamsulosin  1 capsule Oral Daily     PRN Meds:sodium chloride 0.9%, Flushing PICC Protocol **AND** sodium chloride 0.9% **AND** sodium chloride 0.9%    Review of patient's allergies indicates:  No Known Allergies  Objective:     Vital Signs (Most Recent):  Temp: 97.5 °F (36.4 °C) (07/15/23 0524)  Pulse: 72 (07/15/23 0524)  Resp: 16 (07/15/23 0524)  BP: 124/68 (07/15/23 0524)  SpO2: (!) 94 % (07/15/23 0524) Vital Signs (24h Range):  Temp:  [96.1 °F (35.6 °C)-98 °F (36.7 °C)] 97.5 °F (36.4 °C)  Pulse:  [60-81] 72  Resp:  [16-20] 16  SpO2:  [94 %-98 %] 94 %  BP: (109-138)/(59-68) 124/68     Patient Vitals for the past 72 hrs (Last 3 readings):   Weight   07/14/23 1324 100.2 kg (220 lb 14.4 oz)     Body mass index is 29.96 kg/m².      Intake/Output Summary (Last 24 hours) at 7/15/2023 0716  Last data filed at 7/15/2023 0632  Gross per 24 hour   Intake 600 ml   Output 2150 ml   Net -1550 ml        Physical Exam  Constitutional:       Appearance: Normal appearance. He is obese.   Eyes:      General: No scleral icterus.     Conjunctiva/sclera: Conjunctivae normal.   Cardiovascular:      Rate and Rhythm: Normal rate and regular rhythm.   Pulmonary:      Effort: Pulmonary effort is normal. No respiratory distress.      Breath sounds: Normal breath sounds. No wheezing.   Abdominal:      General: There is no distension.      Palpations: Abdomen is soft.      Tenderness: There is no abdominal tenderness.    Musculoskeletal:      Right lower leg: No edema.      Left lower leg: No edema.   Skin:     General: Skin is warm.   Neurological:      General: No focal deficit present.      Mental Status: He is alert.   Psychiatric:         Mood and Affect: Mood normal.       Significant Labs:  CBC:  Recent Labs   Lab 07/14/23  0757 07/15/23  0518   WBC 7.64 7.22   RBC 4.64 4.41*   HGB 13.8* 13.4*   HCT 44.5 41.6    191   MCV 96 94   MCH 29.7 30.4   MCHC 31.0* 32.2     BNP:  No results for input(s): BNP in the last 168 hours.    Invalid input(s): BNPTRIAGELBLO  CMP:  Recent Labs   Lab 07/14/23  0757 07/14/23  2107 07/15/23  0518   * 111* 104   CALCIUM 9.4 9.2 9.3   ALBUMIN 3.3*  --   --    PROT 6.8  --   --     143 139   K 4.3 4.0 3.8   CO2 28 26 23    106 104   BUN 45* 48* 46*   CREATININE 1.8* 2.1* 1.8*   ALKPHOS 69  --   --    ALT 19  --   --    AST 23  --   --    BILITOT 0.4  --   --       Coagulation:   No results for input(s): PT, INR, APTT in the last 168 hours.  LDH:  No results for input(s): LDH in the last 72 hours.  Microbiology:  Microbiology Results (last 7 days)       ** No results found for the last 168 hours. **            I have reviewed all pertinent labs within the past 24 hours.    Estimated Creatinine Clearance: 42.1 mL/min (A) (based on SCr of 1.8 mg/dL (H)).    Diagnostic Results:  I have reviewed and interpreted all pertinent imaging results/findings within the past 24 hours.

## 2023-07-15 NOTE — CONSULTS
EBONI placed double lumen PICC to right brachial vein using u/s guidance.  42 cm in length, 31 cm arm circumference and 0 cm exposed.   Lot # [id number].    PICC inserted for hemodynamic monitoring

## 2023-07-15 NOTE — PLAN OF CARE
Problem: Adult Inpatient Plan of Care  Goal: Plan of Care Review  Outcome: Ongoing, Progressing  Goal: Optimal Comfort and Wellbeing  Outcome: Ongoing, Progressing     Problem: Fall Injury Risk  Goal: Absence of Fall and Fall-Related Injury  Outcome: Ongoing, Progressing     Problem: Adjustment to Illness (Heart Failure)  Goal: Optimal Coping  Outcome: Ongoing, Progressing     Problem: Fluid Imbalance (Heart Failure)  Goal: Fluid Balance  Outcome: Ongoing, Progressing     Problem: Respiratory Compromise (Heart Failure)  Goal: Effective Oxygenation and Ventilation  Outcome: Ongoing, Progressing

## 2023-07-16 PROBLEM — N17.9 AKI (ACUTE KIDNEY INJURY): Status: ACTIVE | Noted: 2023-01-01

## 2023-07-16 PROBLEM — I50.9 ACUTE ON CHRONIC HEART FAILURE: Status: ACTIVE | Noted: 2023-01-01

## 2023-07-16 NOTE — ASSESSMENT & PLAN NOTE
Resolved  - KASSIE on admission with Cr of 1.8-2.1, Current Cr 1.4  - Monitor renal function  - Avoid Nephrotoxic agents  - Monitor urine output

## 2023-07-16 NOTE — PROGRESS NOTES
[facility]  Heart Transplant  Progress Note    Patient Name: Jose Fonseca Jr.  MRN: [id number]  Admission Date: 7/14/2023  Hospital Length of Stay: 2 days  Attending Physician: Dalia Amezquita DO  Primary Care Provider: Vasyl Morris MD  Principal Problem:Acute on chronic heart failure    Subjective:     Interval History:   No overnight acute events. Patient this morning reports experiencing cramps in his lower extremities. Appears euvolemic one exam. Total UOP (24h): 4.4L, Net: - 3.2L. Will transition from Lasix 20 mg/hr to Lasix 80 TID IVP and monitor response.     Hemodynamic Parameters:  CVP: 7, CO: 4.84, CI: 2.16, SVR: 1,123, SVO2:66    Lines  - R.PICC Line (7/14--)    Scheduled Meds:   aspirin  81 mg Oral Daily    atorvastatin  80 mg Oral Daily    colchicine  0.6 mg Oral Daily    docusate sodium  50 mg Oral Daily    furosemide (LASIX) injection  80 mg Intravenous Q12H    metoprolol succinate  50 mg Oral Daily    sodium chloride 0.9%  10 mL Intravenous Q6H    tamsulosin  1 capsule Oral Daily     PRN Meds:acetaminophen, methocarbamoL, sodium chloride 0.9%, Flushing PICC Protocol **AND** sodium chloride 0.9% **AND** sodium chloride 0.9%    Review of patient's allergies indicates:  No Known Allergies  Objective:     Vital Signs (Most Recent):  Temp: 97.8 °F (36.6 °C) (07/16/23 0744)  Pulse: 68 (07/16/23 0744)  Resp: 18 (07/16/23 0744)  BP: 130/71 (07/16/23 0744)  SpO2: (!) 93 % (07/16/23 0744) Vital Signs (24h Range):  Temp:  [97.3 °F (36.3 °C)-98.5 °F (36.9 °C)] 97.8 °F (36.6 °C)  Pulse:  [48-77] 68  Resp:  [16-20] 18  SpO2:  [92 %-96 %] 93 %  BP: (104-148)/(58-71) 130/71     Patient Vitals for the past 72 hrs (Last 3 readings):   Weight   07/16/23 0755 93.5 kg (206 lb 2.1 oz)   07/15/23 1023 99 kg (218 lb 4.1 oz)   07/14/23 1324 100.2 kg (220 lb 14.4 oz)       Body mass index is 27.96 kg/m².      Intake/Output Summary (Last 24 hours) at 7/16/2023 1050  Last data  filed at 7/16/2023 0755  Gross per 24 hour   Intake 1230.46 ml   Output 3850 ml   Net -2619.54 ml          Physical Exam  Constitutional:       Appearance: Normal appearance. He is obese.   Eyes:      General: No scleral icterus.     Conjunctiva/sclera: Conjunctivae normal.   Cardiovascular:      Rate and Rhythm: Normal rate and regular rhythm.   Pulmonary:      Effort: Pulmonary effort is normal. No respiratory distress.      Breath sounds: Normal breath sounds. No wheezing.   Abdominal:      General: There is no distension.      Palpations: Abdomen is soft.      Tenderness: There is no abdominal tenderness.   Musculoskeletal:      Right lower leg: No edema.      Left lower leg: No edema.   Skin:     General: Skin is warm.   Neurological:      General: No focal deficit present.      Mental Status: He is alert.   Psychiatric:         Mood and Affect: Mood normal.       Significant Labs:  CBC:  Recent Labs   Lab 07/14/23  0757 07/15/23  0518 07/15/23  0900 07/16/23  0516 07/16/23  0856   WBC 7.64 7.22  --   --  7.00   RBC 4.64 4.41*  --   --  4.68   HGB 13.8* 13.4*  --   --  14.3   HCT 44.5 41.6 43 44 43.8    191  --   --  216   MCV 96 94  --   --  94   MCH 29.7 30.4  --   --  30.6   MCHC 31.0* 32.2  --   --  32.6       BNP:  No results for input(s): BNP in the last 168 hours.    Invalid input(s): BNPTRIAGELBLO  CMP:  Recent Labs   Lab 07/14/23  0757 07/14/23  2107 07/15/23  0518 07/15/23  1536 07/16/23  0510   *   < > 104 110 112*   CALCIUM 9.4   < > 9.3 9.3 9.9   ALBUMIN 3.3*  --   --   --   --    PROT 6.8  --   --   --   --       < > 139 143 143   K 4.3   < > 3.8 3.8 3.8   CO2 28   < > 23 29 27      < > 104 102 102   BUN 45*   < > 46* 44* 43*   CREATININE 1.8*   < > 1.8* 1.6* 1.4   ALKPHOS 69  --   --   --   --    ALT 19  --   --   --   --    AST 23  --   --   --   --    BILITOT 0.4  --   --   --   --     < > = values in this interval not displayed.        Coagulation:   No results for  input(s): PT, INR, APTT in the last 168 hours.  LDH:  No results for input(s): LDH in the last 72 hours.  Microbiology:  Microbiology Results (last 7 days)       ** No results found for the last 168 hours. **            I have reviewed all pertinent labs within the past 24 hours.    Estimated Creatinine Clearance: 52.5 mL/min (based on SCr of 1.4 mg/dL).    Diagnostic Results:  I have reviewed and interpreted all pertinent imaging results/findings within the past 24 hours.    Assessment and Plan:     * Acute on chronic heart failure  - Ischemic Cardiomyopathy (ICM)  - Last Echo (7/3): LV is normal in size and systolic function. LVEF 50%, Grade II LVDD, Biatrial enlargement. Mild TR. Moderate MR  - Home GDMT: Metoprolol XL 50 mg, daily  - Current Diuretics: Transition from Lasix 20 mg/hr to Lasix 80 IVP TID  - Monitor electrolytes closely. Maintain Mg >2 and K >4  - HF Pathway: Sodium restriction <2 g, Fluid restriction < 1500 mL, Strict I/Os, Daily weights    KASSIE (acute kidney injury)  Resolved  - KASSIE on admission with Cr of 1.8-2.1, Current Cr 1.4  - Monitor renal function  - Avoid Nephrotoxic agents  - Monitor urine output     CAD (coronary artery disease)  - CAD s/p PCI and CABG in 1996   - Continue with Aspirin and Atorvastatin 40 mg, daily  -Oct 2013 significant for patent pLAD stent and LIMA-OM and VG-PDA grafts and known LANCE-LAD occlusion  - 2019 angiogram    · Dist LAD lesion , 95% stenosed.  · Dist RCA lesion , 100% stenosed.  · Estimated blood loss: none  · Patent LIMA to to OMB and patent SVG to RCA.   - 2019 significant change in coronary anatomy compared to angiogram performed October 2013  - 2021:    The Prox RCA lesion was 80% stenosed.The Mid RCA lesion was 80% stenosed.   The Prox Cx lesion was 70% stenosed.   Patent LIMA to OM and SVG to RCA.    S/P CABG (coronary artery bypass graft)  - See CAD    BPH (benign prostatic hypertrophy)  - Continue with Flomax      Emperatriz Knowles MD  Heart  Transplant  [facility]

## 2023-07-16 NOTE — PLAN OF CARE
Problem: Adult Inpatient Plan of Care  Goal: Plan of Care Review  Outcome: Ongoing, Progressing  Goal: Optimal Comfort and Wellbeing  Outcome: Ongoing, Progressing     Problem: Fall Injury Risk  Goal: Absence of Fall and Fall-Related Injury  Outcome: Ongoing, Progressing     Problem: Adjustment to Illness (Heart Failure)  Goal: Optimal Coping  Outcome: Ongoing, Progressing     Problem: Fluid Imbalance (Heart Failure)  Goal: Fluid Balance  Outcome: Ongoing, Progressing     Problem: Respiratory Compromise (Heart Failure)  Goal: Effective Oxygenation and Ventilation  Outcome: Ongoing, Progressing     Problem: Fluid and Electrolyte Imbalance (Acute Kidney Injury/Impairment)  Goal: Fluid and Electrolyte Balance  Outcome: Ongoing, Progressing     Problem: Oral Intake Inadequate (Acute Kidney Injury/Impairment)  Goal: Optimal Nutrition Intake  Outcome: Ongoing, Progressing

## 2023-07-16 NOTE — ASSESSMENT & PLAN NOTE
- Ischemic Cardiomyopathy (ICM)  - Last Echo (7/3): LV is normal in size and systolic function. LVEF 50%, Grade II LVDD, Biatrial enlargement. Mild TR. Moderate MR  - Home GDMT: Metoprolol XL 50 mg, daily  - Current Diuretics: Transition from Lasix 20 mg/hr to Lasix 80 IVP TID  - Monitor electrolytes closely. Maintain Mg >2 and K >4  - HF Pathway: Sodium restriction <2 g, Fluid restriction < 1500 mL, Strict I/Os, Daily weights

## 2023-07-16 NOTE — ASSESSMENT & PLAN NOTE
- CAD s/p PCI and CABG in 1996   - Continue with Aspirin and Atorvastatin 40 mg, daily  -Oct 2013 significant for patent pLAD stent and LIMA-OM and VG-PDA grafts and known LANCE-LAD occlusion  - 2019 angiogram    · Dist LAD lesion , 95% stenosed.  · Dist RCA lesion , 100% stenosed.  · Estimated blood loss: none  · Patent LIMA to to OMB and patent SVG to RCA.   - 2019 significant change in coronary anatomy compared to angiogram performed October 2013  - 2021:    The Prox RCA lesion was 80% stenosed.The Mid RCA lesion was 80% stenosed.   The Prox Cx lesion was 70% stenosed.   Patent LIMA to OM and SVG to RCA.

## 2023-07-16 NOTE — SUBJECTIVE & OBJECTIVE
Interval History:   No overnight acute events. Patient this morning reports experiencing cramps in his lower extremities. Appears euvolemic one exam. Total UOP (24h): 4.4L, Net: - 3.2L. Will transition from Lasix 20 mg/hr to Lasix 80 TID IVP and monitor response.     Hemodynamic Parameters:  CVP: 7, CO: 4.84, CI: 2.16, SVR: 1,123, SVO2:66    Lines  - R.PICC Line (7/14--)    Scheduled Meds:   aspirin  81 mg Oral Daily    atorvastatin  80 mg Oral Daily    colchicine  0.6 mg Oral Daily    docusate sodium  50 mg Oral Daily    furosemide (LASIX) injection  80 mg Intravenous Q12H    metoprolol succinate  50 mg Oral Daily    sodium chloride 0.9%  10 mL Intravenous Q6H    tamsulosin  1 capsule Oral Daily     PRN Meds:acetaminophen, methocarbamoL, sodium chloride 0.9%, Flushing PICC Protocol **AND** sodium chloride 0.9% **AND** sodium chloride 0.9%    Review of patient's allergies indicates:  No Known Allergies  Objective:     Vital Signs (Most Recent):  Temp: 97.8 °F (36.6 °C) (07/16/23 0744)  Pulse: 68 (07/16/23 0744)  Resp: 18 (07/16/23 0744)  BP: 130/71 (07/16/23 0744)  SpO2: (!) 93 % (07/16/23 0744) Vital Signs (24h Range):  Temp:  [97.3 °F (36.3 °C)-98.5 °F (36.9 °C)] 97.8 °F (36.6 °C)  Pulse:  [48-77] 68  Resp:  [16-20] 18  SpO2:  [92 %-96 %] 93 %  BP: (104-148)/(58-71) 130/71     Patient Vitals for the past 72 hrs (Last 3 readings):   Weight   07/16/23 0755 93.5 kg (206 lb 2.1 oz)   07/15/23 1023 99 kg (218 lb 4.1 oz)   07/14/23 1324 100.2 kg (220 lb 14.4 oz)       Body mass index is 27.96 kg/m².      Intake/Output Summary (Last 24 hours) at 7/16/2023 1050  Last data filed at 7/16/2023 0755  Gross per 24 hour   Intake 1230.46 ml   Output 3850 ml   Net -2619.54 ml          Physical Exam  Constitutional:       Appearance: Normal appearance. He is obese.   Eyes:      General: No scleral icterus.     Conjunctiva/sclera: Conjunctivae normal.   Cardiovascular:      Rate and Rhythm: Normal rate and regular rhythm.    Pulmonary:      Effort: Pulmonary effort is normal. No respiratory distress.      Breath sounds: Normal breath sounds. No wheezing.   Abdominal:      General: There is no distension.      Palpations: Abdomen is soft.      Tenderness: There is no abdominal tenderness.   Musculoskeletal:      Right lower leg: No edema.      Left lower leg: No edema.   Skin:     General: Skin is warm.   Neurological:      General: No focal deficit present.      Mental Status: He is alert.   Psychiatric:         Mood and Affect: Mood normal.       Significant Labs:  CBC:  Recent Labs   Lab 07/14/23  0757 07/15/23  0518 07/15/23  0900 07/16/23  0516 07/16/23  0856   WBC 7.64 7.22  --   --  7.00   RBC 4.64 4.41*  --   --  4.68   HGB 13.8* 13.4*  --   --  14.3   HCT 44.5 41.6 43 44 43.8    191  --   --  216   MCV 96 94  --   --  94   MCH 29.7 30.4  --   --  30.6   MCHC 31.0* 32.2  --   --  32.6       BNP:  No results for input(s): BNP in the last 168 hours.    Invalid input(s): BNPTRIAGELBLO  CMP:  Recent Labs   Lab 07/14/23  0757 07/14/23  2107 07/15/23  0518 07/15/23  1536 07/16/23  0510   *   < > 104 110 112*   CALCIUM 9.4   < > 9.3 9.3 9.9   ALBUMIN 3.3*  --   --   --   --    PROT 6.8  --   --   --   --       < > 139 143 143   K 4.3   < > 3.8 3.8 3.8   CO2 28   < > 23 29 27      < > 104 102 102   BUN 45*   < > 46* 44* 43*   CREATININE 1.8*   < > 1.8* 1.6* 1.4   ALKPHOS 69  --   --   --   --    ALT 19  --   --   --   --    AST 23  --   --   --   --    BILITOT 0.4  --   --   --   --     < > = values in this interval not displayed.        Coagulation:   No results for input(s): PT, INR, APTT in the last 168 hours.  LDH:  No results for input(s): LDH in the last 72 hours.  Microbiology:  Microbiology Results (last 7 days)       ** No results found for the last 168 hours. **            I have reviewed all pertinent labs within the past 24 hours.    Estimated Creatinine Clearance: 52.5 mL/min (based on SCr of 1.4  mg/dL).    Diagnostic Results:  I have reviewed and interpreted all pertinent imaging results/findings within the past 24 hours.

## 2023-07-17 PROBLEM — I34.0 NONRHEUMATIC MITRAL VALVE REGURGITATION: Status: ACTIVE | Noted: 2023-01-01

## 2023-07-17 PROBLEM — I27.20 PULMONARY HYPERTENSION: Status: ACTIVE | Noted: 2023-01-01

## 2023-07-17 NOTE — ASSESSMENT & PLAN NOTE
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

## 2023-07-17 NOTE — PROGRESS NOTES
[facility] - Cardiology Stepdown  Heart Transplant  Progress Note    Patient Name: Jose Fonseca Jr.  MRN: [id number]  Admission Date: 7/14/2023  Hospital Length of Stay: 3 days  Attending Physician: Dalia Amezquita DO  Primary Care Provider: Vasyl Morris MD  Principal Problem:Acute on chronic heart failure    Subjective:     Interval History: NAEON. No complaints. CVP this morning was 6 from PICC. Repeat RHC this morning with RA 10, PA 58/29 (38), PCWP 28v48, CO 4.8, CI 2.21. Repeat ECHO pending. Will consulte Interventional Cardiology for Mitraclip eval. Continue IVP Lasix 80 mg BID, plan to transition to PO diuretics tomorrow.       Lines  - R.PICC Line (7/14--)    Scheduled Meds:   aspirin  81 mg Oral Daily    atorvastatin  80 mg Oral Daily    colchicine  0.6 mg Oral Daily    docusate sodium  50 mg Oral Daily    furosemide (LASIX) injection  80 mg Intravenous Q12H    metoprolol succinate  50 mg Oral Daily    sodium chloride 0.9%  10 mL Intravenous Q6H    tamsulosin  1 capsule Oral Daily     PRN Meds:acetaminophen, LIDOcaine HCL 20 mg/ml (2%), methocarbamoL, sodium chloride 0.9%, Flushing PICC Protocol **AND** sodium chloride 0.9% **AND** sodium chloride 0.9%    Review of patient's allergies indicates:  No Known Allergies  Objective:     Vital Signs (Most Recent):  Temp: 98 °F (36.7 °C) (07/17/23 1123)  Pulse: 69 (07/17/23 1123)  Resp: 20 (07/17/23 1123)  BP: (!) 145/63 (07/17/23 1123)  SpO2: 95 % (07/17/23 1123) Vital Signs (24h Range):  Temp:  [97.5 °F (36.4 °C)-99.8 °F (37.7 °C)] 98 °F (36.7 °C)  Pulse:  [64-76] 69  Resp:  [17-20] 20  SpO2:  [91 %-97 %] 95 %  BP: (101-152)/(56-71) 145/63     Patient Vitals for the past 72 hrs (Last 3 readings):   Weight   07/16/23 0755 93.5 kg (206 lb 2.1 oz)   07/15/23 1023 99 kg (218 lb 4.1 oz)   07/14/23 1324 100.2 kg (220 lb 14.4 oz)       Body mass index is 27.96 kg/m².      Intake/Output Summary (Last 24 hours) at 7/17/2023 1138  Last data filed  at 7/17/2023 0858  Gross per 24 hour   Intake 1249.44 ml   Output 2000 ml   Net -750.56 ml          Physical Exam  Constitutional:       Appearance: Normal appearance. He is obese.   Eyes:      General: No scleral icterus.     Conjunctiva/sclera: Conjunctivae normal.   Cardiovascular:      Rate and Rhythm: Normal rate and regular rhythm.   Pulmonary:      Effort: Pulmonary effort is normal. No respiratory distress.      Breath sounds: Normal breath sounds. No wheezing.   Abdominal:      General: There is no distension.      Palpations: Abdomen is soft.      Tenderness: There is no abdominal tenderness.   Musculoskeletal:      Right lower leg: No edema.      Left lower leg: No edema.   Skin:     General: Skin is warm.   Neurological:      General: No focal deficit present.      Mental Status: He is alert.   Psychiatric:         Mood and Affect: Mood normal.       Significant Labs:  CBC:  Recent Labs   Lab 07/14/23  0757 07/15/23  0518 07/15/23  0900 07/16/23  0516 07/16/23  0856   WBC 7.64 7.22  --   --  7.00   RBC 4.64 4.41*  --   --  4.68   HGB 13.8* 13.4*  --   --  14.3   HCT 44.5 41.6 43 44 43.8    191  --   --  216   MCV 96 94  --   --  94   MCH 29.7 30.4  --   --  30.6   MCHC 31.0* 32.2  --   --  32.6       BNP:  No results for input(s): BNP in the last 168 hours.    Invalid input(s): BNPTRIAGELBLO  CMP:  Recent Labs   Lab 07/14/23  0757 07/14/23  2107 07/16/23  0510 07/16/23  1754 07/17/23  0440   *   < > 112* 131* 108   CALCIUM 9.4   < > 9.9 9.7 9.7   ALBUMIN 3.3*  --   --   --   --    PROT 6.8  --   --   --   --       < > 143 146* 145   K 4.3   < > 3.8 3.8 3.8   CO2 28   < > 27 28 27      < > 102 102 105   BUN 45*   < > 43* 47* 48*   CREATININE 1.8*   < > 1.4 1.5* 1.3   ALKPHOS 69  --   --   --   --    ALT 19  --   --   --   --    AST 23  --   --   --   --    BILITOT 0.4  --   --   --   --     < > = values in this interval not displayed.        Coagulation:   No results for  input(s): PT, INR, APTT in the last 168 hours.  LDH:  No results for input(s): LDH in the last 72 hours.  Microbiology:  Microbiology Results (last 7 days)       ** No results found for the last 168 hours. **            I have reviewed all pertinent labs within the past 24 hours.    Estimated Creatinine Clearance: 56.5 mL/min (based on SCr of 1.3 mg/dL).    Diagnostic Results:  I have reviewed and interpreted all pertinent imaging results/findings within the past 24 hours.    Assessment and Plan:     No notes on file    * Acute on chronic heart failure  - Ischemic Cardiomyopathy (ICM)  - Last Echo (7/3): LV is normal in size and systolic function. LVEF 50%, Grade II LVDD, Biatrial enlargement. Mild TR. Moderate MR  - Home GDMT: Metoprolol XL 50 mg, daily  - Current Diuretics: Transitioned from Lasix 20 mg/hr to Lasix 80 IVP TID yesterday  -[facility] this mornign with RA 10, PA 58/28(38), PCWP 28v48, CO 4.8, CI 2.21  -Repeat ECHO pending. Will consult Interventional Cardiology for MitraClip eval  - Monitor electrolytes closely. Maintain Mg >2 and K >4  - HF Pathway: Sodium restriction <2 g, Fluid restriction < 1500 mL, Strict I/Os, Daily weights    KASSIE (acute kidney injury)  Resolved  - KASSIE on admission with Cr of 1.8-2.1, Current Cr 1.4  - Monitor renal function  - Avoid Nephrotoxic agents  - Monitor urine output     S/P CABG (coronary artery bypass graft)  - See CAD    BPH (benign prostatic hypertrophy)  - Continue with Flomax    CAD (coronary artery disease)  - CAD s/p PCI and CABG in 1996   - Continue with Aspirin and Atorvastatin 40 mg, daily  -Oct 2013 significant for patent pLAD stent and LIMA-OM and VG-PDA grafts and known LANCE-LAD occlusion  - 2019 angiogram    · Dist LAD lesion , 95% stenosed.  · Dist RCA lesion , 100% stenosed.  · Estimated blood loss: none  · Patent LIMA to to OMB and patent SVG to RCA.   - 2019 significant change in coronary anatomy compared to angiogram performed October 2013  - 2021:    The  Prox RCA lesion was 80% stenosed.The Mid RCA lesion was 80% stenosed.   The Prox Cx lesion was 70% stenosed.   Patent LIMA to OM and SVG to RCA.        Haley Jimenez PA-C  Heart Transplant  [facility] - Cardiology Stepdown

## 2023-07-17 NOTE — ASSESSMENT & PLAN NOTE
- RHC showed the following pressures PA 58/28 (38), PCWP 28v48, PVR 2.1 APPIAH   - Evaluation of MR as above

## 2023-07-17 NOTE — CONSULTS
[facility] - Cardiology Stepdown  Interventional Cardiology  Consult Note    Patient Name: Jose Fonseca Jr.  MRN: [id number]  Admission Date: 7/14/2023  Hospital Length of Stay: 3 days  Code Status: Full Code   Attending Provider: Dalia Amezquita DO  Consulting Provider: Filipe Polo MD  Primary Care Physician: Vasyl Morris MD  Principal Problem:Acute on chronic heart failure    Patient information was obtained from patient and ER records.     Inpatient consult to Interventional Cardiology  Consult performed by: Filipe Polo MD  Consult ordered by: Haley Jimenez PA-C      Subjective:     Chief Complaint:  Shortness of breath     HPI:  76yo M patient with history of CAD s/p PCI to LAD and RCA and CABG LIMA to OM, LANCE to LAD and SVG to RCA ~1996, HFmrEF (40-50%), pulmonary hypertension, HLD, KASSIE (baseline Cr 1.2), LM not on CPAP, who was admitted to [facility] on 07/14/23 with CC of decrease functional capacity and worsening SOB to minumal activity, associated with PND and orthopnea.  Patient admitted due to AHF, RHC showed elevated PA pressures (58/28 mean 38), likely secondary to left heart disease. Admitted to [facility]. HTS consider that PH component likely in the setting of long standing PH from Left heart disease. Interventional cardiology consulted to assess candidacy for mitraclip.    Home meds:  - Metoprolol XL 50mg po daily  - Tamsulosin 0.4  - Lasix 80 po daily  - ASA 81   - Atorva 40      Past Medical History:   Diagnosis Date    Coronary artery disease     S/P 3 vessel CABG; stents    Glaucoma     HTN (hypertension) 4/19/2013    Hyperlipidemia     Myocardial infarction     Obstructive sleep apnea     Squamous cell carcinoma        Past Surgical History:   Procedure Laterality Date    ADENOIDECTOMY      ANGIOGRAM, CORONARY, WITH LEFT HEART CATHETERIZATION N/A 09/15/2021    Procedure: ANGIOGRAM,CORONARY,WITH LEFT HEART CATHETERIZATION;  Surgeon: Ken Ibarra,  MD;  Location: [facility] CATH LAB;  Service: Cardiology;  Laterality: N/A;    CATARACT EXTRACTION      patient not sure which eye    CORONARY ANGIOGRAPHY N/A 05/22/2019    Procedure: ANGIOGRAM, CORONARY ARTERY;  Surgeon: Ken Ibarra MD;  Location: [facility] CATH LAB;  Service: Cardiology;  Laterality: N/A;    CORONARY ANGIOGRAPHY INCLUDING BYPASS GRAFTS WITH CATHETERIZATION OF LEFT HEART Right 04/22/2019    Procedure: ANGIOGRAM, CORONARY, INCLUDING BYPASS GRAFT, WITH LEFT HEART CATHETERIZATION;  Surgeon: Ken Ibarra MD;  Location: [facility] CATH LAB;  Service: Cardiology;  Laterality: Right;    CORONARY ANGIOPLASTY      CORONARY ARTERY BYPASS GRAFT      CORONARY BYPASS GRAFT ANGIOGRAPHY  09/15/2021    Procedure: Bypass graft study;  Surgeon: Ken Ibarar MD;  Location: [facility] CATH LAB;  Service: Cardiology;;    LEFT HEART CATHETERIZATION N/A 05/22/2019    Procedure: CATHETERIZATION, HEART, LEFT;  Surgeon: Ken Ibarra MD;  Location: [facility] CATH LAB;  Service: Cardiology;  Laterality: N/A;    LUMBAR DISCECTOMY  11/01/2022    LUMBAR LAMINECTOMY      ROTATOR CUFF REPAIR      SHOULDER SURGERY      TONSILLECTOMY Left 2015       Review of patient's allergies indicates:  No Known Allergies    PTA Medications   Medication Sig    atorvastatin (LIPITOR) 40 MG tablet TAKE 1 TABLET BY MOUTH EVERY DAY    colchicine (COLCRYS) 0.6 mg tablet Take 0.6 mg by mouth once daily.    furosemide (LASIX) 80 MG tablet Take 1 tablet (80 mg total) by mouth 2 (two) times daily.    metoprolol succinate (TOPROL-XL) 25 MG 24 hr tablet Take 1 tablet (25 mg total) by mouth once daily.    tamsulosin (FLOMAX) 0.4 mg Cap Take 1 capsule by mouth once daily.    allopurinol (ZYLOPRIM) 300 MG tablet Take 1 tablet (300 mg total) by mouth every evening.    aspirin (ECOTRIN) 81 MG EC tablet Take 1 tablet (81 mg total) by mouth once daily.    cyclobenzaprine (FLEXERIL) 10 MG tablet TAKE 1 TABLET BY MOUTH TWICE DAILY FOR MUSLE SPASMS    etodolac (LODINE) 400  MG tablet Take 400 mg by mouth as needed.    indomethacin (INDOCIN) 50 MG capsule Take 50 mg by mouth 2 (two) times daily with meals.    sildenafil (VIAGRA) 100 MG tablet Take 1 tablet (100 mg total) by mouth daily as needed for Erectile Dysfunction.    [DISCONTINUED] gabapentin (NEURONTIN) 100 MG capsule Take 100 mg by mouth 3 (three) times daily.    [DISCONTINUED] HYDROcodone-acetaminophen (NORCO) 7.5-325 mg per tablet Take 1 tablet by mouth every 4 to 6 hours as needed.    [DISCONTINUED] lisinopriL (PRINIVIL,ZESTRIL) 40 MG tablet Take 1 tablet (40 mg total) by mouth every evening.     Family History       Problem Relation (Age of Onset)    Diabetes Maternal Grandmother, Maternal Grandfather    Heart attack Father    Heart disease Father    Heart failure Father    Hypertension Father          Tobacco Use    Smoking status: Former     Packs/day: 1.00     Years: 31.00     Pack years: 31.00     Types: Cigarettes     Quit date: 1991     Years since quittin.2    Smokeless tobacco: Never   Substance and Sexual Activity    Alcohol use: Yes     Alcohol/week: 2.0 standard drinks     Types: 2 Glasses of wine per week     Comment: Socially    Drug use: No    Sexual activity: Not on file     Review of Systems   Constitutional: Negative.   HENT: Negative.     Cardiovascular:  Positive for dyspnea on exertion, orthopnea and paroxysmal nocturnal dyspnea. Negative for chest pain, claudication, cyanosis, irregular heartbeat, leg swelling, near-syncope, palpitations and syncope.   Respiratory: Negative.     Endocrine: Negative.    Musculoskeletal: Negative.    Gastrointestinal: Negative.    Genitourinary: Negative.    Neurological: Negative.    Objective:     Vital Signs (Most Recent):  Temp: 98 °F (36.7 °C) (23 1123)  Pulse: 69 (23 1123)  Resp: 20 (23 1123)  BP: (!) 145/63 (23 1123)  SpO2: 95 % (23) Vital Signs (24h Range):  Temp:  [97.5 °F (36.4 °C)-98.2 °F (36.8 °C)] 98 °F (36.7  °C)  Pulse:  [64-76] 69  Resp:  [17-20] 20  SpO2:  [91 %-97 %] 95 %  BP: (101-152)/(56-71) 145/63     Weight: 93.5 kg (206 lb 2.1 oz)  Body mass index is 27.96 kg/m².    SpO2: 95 %         Intake/Output Summary (Last 24 hours) at 7/17/2023 1331  Last data filed at 7/17/2023 0858  Gross per 24 hour   Intake 1249.44 ml   Output 2000 ml   Net -750.56 ml       Lines/Drains/Airways       Peripherally Inserted Central Catheter Line  Duration             PICC Double Lumen 07/14/23 1941 right brachial 2 days              Peripheral Intravenous Line  Duration                  Peripheral IV - Single Lumen 07/14/23 1324 20 G Anterior;Left;Proximal Forearm 3 days                     Physical Exam  Vitals and nursing note reviewed.   Constitutional:       General: He is not in acute distress.     Appearance: Normal appearance. He is obese. He is not ill-appearing or diaphoretic.   HENT:      Head: Normocephalic and atraumatic.   Eyes:      Pupils: Pupils are equal, round, and reactive to light.   Cardiovascular:      Rate and Rhythm: Normal rate and regular rhythm.      Pulses: Normal pulses.      Heart sounds: Murmur (Low grade holosystolic murmur) heard.   Pulmonary:      Effort: Pulmonary effort is normal.      Breath sounds: Normal breath sounds.   Abdominal:      General: Abdomen is flat. Bowel sounds are normal. There is no distension.      Palpations: Abdomen is soft. There is no mass.      Tenderness: There is no abdominal tenderness.      Comments: Central obesity   Musculoskeletal:         General: Normal range of motion.   Skin:     General: Skin is warm.      Capillary Refill: Capillary refill takes less than 2 seconds.   Neurological:      General: No focal deficit present.      Mental Status: He is alert and oriented to person, place, and time.          Significant Labs: CMP   Recent Labs   Lab 07/16/23  0510 07/16/23  1754 07/17/23  0440    146* 145   K 3.8 3.8 3.8    102 105   CO2 27 28 27   *  131* 108   BUN 43* 47* 48*   CREATININE 1.4 1.5* 1.3   CALCIUM 9.9 9.7 9.7   ANIONGAP 14 16 13   , CBC   Recent Labs   Lab 07/16/23  0856 07/17/23  1138   WBC 7.00 9.29   HGB 14.3 15.5   HCT 43.8 47.9    252   , INR No results for input(s): INR, PROTIME in the last 48 hours., Lipid Panel No results for input(s): CHOL, HDL, LDLCALC, TRIG, CHOLHDL in the last 48 hours., Troponin No results for input(s): TROPONINI in the last 48 hours., and All pertinent lab results from the last 24 hours have been reviewed.    Significant Imaging:     C 07/17/2023  RA 10  RV 57/10  PA 58/28 mean 38   PCWP 28v48  PA saturation 66%  Ao saturation 98%  CO/CI 4.8/2.21  PVR 2.1 APPIAH, consistent with post-capillary pulmonary venous hypertension  SVR 1267  /66 MAP 86  HR 68 SR with occasional PVCs  Hb 14.3  BSA 2.16     RHC 07/12/2023  - The estimated blood loss was none.  - The filling pressures on the right and left were severely elevated. Pulmonary hypertension was severe.  - RA 20  PA 90/40 (57)  PCWP 27  - CO 3.3 l/min  CI 1.5 l/min/m2.  - PVR 9 APPIAH.  - Large V wave on PCWP waveform may reflect severe MR. Correlate clinically.  - Post and pre capillary severe PH.  - Condition: no inotropes or pressors. No PAH medications.     EKG 07/14/2023  - Sinus rhythm with Premature supraventricular complexes and Fusion complexes   - Right bundle branch block   - Septal infarct ,age undetermined   - T wave abnormality, consider lateral ischemia   - Abnormal ECG   - When compared with ECG of 15-SEP-2021 08:50, Significant changes have occurred      TTE 07/03/2023  - The left ventricle is normal in size with low normal systolic function. The estimated ejection fraction is 50%.  - There is abnormal septal wall motion. There is systolic flattening of the interventricular septum consistent with right ventricle pressure overload.  - Moderate right ventricular enlargement with moderately to severely reduced right ventricular systolic  function.  - Grade II left ventricular diastolic dysfunction.  - Biatrial enlargement.  - Mild tricuspid regurgitation.  - Moderate mitral regurgitation.  - The estimated PA systolic pressure is 79 mmHg.  - Intermediate central venous pressure (8 mmHg).     [facility] 09/10/2021  - The Prox RCA lesion was 80% stenosed.  - The Mid RCA lesion was 80% stenosed.  - The Prox Cx lesion was 70% stenosed.  - The estimated blood loss was none.  - There was three vessel coronary artery disease.  - Patent LIMA to OM and SVG to RCA.  - No change in anatomy since prior angiogram.    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%), who was admitted due to symptoms of HF  - RHC was done which showed elevated PA pressure, wedge pressure and large V wave on wedge (PA 58/28 (38), PCWP 28v48, PVR 2.1 APPIAH)  - TTE reviewed in the ECHO lab and shows moderate MR  - Patient would benefit from additional evaluation to assess candidacy for adarsh-clip  - Evaluation can be done as outpatient    - Outpatient evaluation by interventional cardiology  - Outpatient [facility]  - Will assess candidacy for adarsh-clip    Pulmonary hypertension  - RHC showed the following pressures PA 58/28 (38), PCWP 28v48, PVR 2.1 APPIAH   - Evaluation of MR as above        VTE Risk Mitigation (From admission, onward)           Ordered     IP VTE LOW RISK PATIENT  Once         07/14/23 1495                    Thank you for your consult.     Filipe Polo MD  Interventional Cardiology   [facility] - Cardiology Stepdown

## 2023-07-17 NOTE — SUBJECTIVE & OBJECTIVE
Past Medical History:   Diagnosis Date    Coronary artery disease     S/P 3 vessel CABG; stents    Glaucoma     HTN (hypertension) 4/19/2013    Hyperlipidemia     Myocardial infarction     Obstructive sleep apnea     Squamous cell carcinoma        Past Surgical History:   Procedure Laterality Date    ADENOIDECTOMY      ANGIOGRAM, CORONARY, WITH LEFT HEART CATHETERIZATION N/A 09/15/2021    Procedure: ANGIOGRAM,CORONARY,WITH LEFT HEART CATHETERIZATION;  Surgeon: Ken Ibarra MD;  Location: [facility] CATH LAB;  Service: Cardiology;  Laterality: N/A;    CATARACT EXTRACTION      patient not sure which eye    CORONARY ANGIOGRAPHY N/A 05/22/2019    Procedure: ANGIOGRAM, CORONARY ARTERY;  Surgeon: Ken Ibarra MD;  Location: [facility] CATH LAB;  Service: Cardiology;  Laterality: N/A;    CORONARY ANGIOGRAPHY INCLUDING BYPASS GRAFTS WITH CATHETERIZATION OF LEFT HEART Right 04/22/2019    Procedure: ANGIOGRAM, CORONARY, INCLUDING BYPASS GRAFT, WITH LEFT HEART CATHETERIZATION;  Surgeon: Ken Ibarra MD;  Location: [facility] CATH LAB;  Service: Cardiology;  Laterality: Right;    CORONARY ANGIOPLASTY      CORONARY ARTERY BYPASS GRAFT      CORONARY BYPASS GRAFT ANGIOGRAPHY  09/15/2021    Procedure: Bypass graft study;  Surgeon: Ken Ibarra MD;  Location: [facility] CATH LAB;  Service: Cardiology;;    LEFT HEART CATHETERIZATION N/A 05/22/2019    Procedure: CATHETERIZATION, HEART, LEFT;  Surgeon: Ken Ibarra MD;  Location: [facility] CATH LAB;  Service: Cardiology;  Laterality: N/A;    LUMBAR DISCECTOMY  11/01/2022    LUMBAR LAMINECTOMY      ROTATOR CUFF REPAIR      SHOULDER SURGERY      TONSILLECTOMY Left 2015       Review of patient's allergies indicates:  No Known Allergies    PTA Medications   Medication Sig    atorvastatin (LIPITOR) 40 MG tablet TAKE 1 TABLET BY MOUTH EVERY DAY    colchicine (COLCRYS) 0.6 mg tablet Take 0.6 mg by mouth once daily.    furosemide (LASIX) 80 MG tablet Take 1 tablet (80 mg total) by mouth 2 (two)  times daily.    metoprolol succinate (TOPROL-XL) 25 MG 24 hr tablet Take 1 tablet (25 mg total) by mouth once daily.    tamsulosin (FLOMAX) 0.4 mg Cap Take 1 capsule by mouth once daily.    allopurinol (ZYLOPRIM) 300 MG tablet Take 1 tablet (300 mg total) by mouth every evening.    aspirin (ECOTRIN) 81 MG EC tablet Take 1 tablet (81 mg total) by mouth once daily.    cyclobenzaprine (FLEXERIL) 10 MG tablet TAKE 1 TABLET BY MOUTH TWICE DAILY FOR MUSLE SPASMS    etodolac (LODINE) 400 MG tablet Take 400 mg by mouth as needed.    indomethacin (INDOCIN) 50 MG capsule Take 50 mg by mouth 2 (two) times daily with meals.    sildenafil (VIAGRA) 100 MG tablet Take 1 tablet (100 mg total) by mouth daily as needed for Erectile Dysfunction.    [DISCONTINUED] gabapentin (NEURONTIN) 100 MG capsule Take 100 mg by mouth 3 (three) times daily.    [DISCONTINUED] HYDROcodone-acetaminophen (NORCO) 7.5-325 mg per tablet Take 1 tablet by mouth every 4 to 6 hours as needed.    [DISCONTINUED] lisinopriL (PRINIVIL,ZESTRIL) 40 MG tablet Take 1 tablet (40 mg total) by mouth every evening.     Family History       Problem Relation (Age of Onset)    Diabetes Maternal Grandmother, Maternal Grandfather    Heart attack Father    Heart disease Father    Heart failure Father    Hypertension Father          Tobacco Use    Smoking status: Former     Packs/day: 1.00     Years: 31.00     Pack years: 31.00     Types: Cigarettes     Quit date: 1991     Years since quittin.2    Smokeless tobacco: Never   Substance and Sexual Activity    Alcohol use: Yes     Alcohol/week: 2.0 standard drinks     Types: 2 Glasses of wine per week     Comment: Socially    Drug use: No    Sexual activity: Not on file     Review of Systems   Constitutional: Negative.   HENT: Negative.     Cardiovascular:  Positive for dyspnea on exertion, orthopnea and paroxysmal nocturnal dyspnea. Negative for chest pain, claudication, cyanosis, irregular heartbeat, leg swelling,  near-syncope, palpitations and syncope.   Respiratory: Negative.     Endocrine: Negative.    Musculoskeletal: Negative.    Gastrointestinal: Negative.    Genitourinary: Negative.    Neurological: Negative.    Objective:     Vital Signs (Most Recent):  Temp: 98 °F (36.7 °C) (07/17/23 1123)  Pulse: 69 (07/17/23 1123)  Resp: 20 (07/17/23 1123)  BP: (!) 145/63 (07/17/23 1123)  SpO2: 95 % (07/17/23 1123) Vital Signs (24h Range):  Temp:  [97.5 °F (36.4 °C)-98.2 °F (36.8 °C)] 98 °F (36.7 °C)  Pulse:  [64-76] 69  Resp:  [17-20] 20  SpO2:  [91 %-97 %] 95 %  BP: (101-152)/(56-71) 145/63     Weight: 93.5 kg (206 lb 2.1 oz)  Body mass index is 27.96 kg/m².    SpO2: 95 %         Intake/Output Summary (Last 24 hours) at 7/17/2023 1331  Last data filed at 7/17/2023 0858  Gross per 24 hour   Intake 1249.44 ml   Output 2000 ml   Net -750.56 ml       Lines/Drains/Airways       Peripherally Inserted Central Catheter Line  Duration             PICC Double Lumen 07/14/23 1941 right brachial 2 days              Peripheral Intravenous Line  Duration                  Peripheral IV - Single Lumen 07/14/23 1324 20 G Anterior;Left;Proximal Forearm 3 days                     Physical Exam  Vitals and nursing note reviewed.   Constitutional:       General: He is not in acute distress.     Appearance: Normal appearance. He is obese. He is not ill-appearing or diaphoretic.   HENT:      Head: Normocephalic and atraumatic.   Eyes:      Pupils: Pupils are equal, round, and reactive to light.   Cardiovascular:      Rate and Rhythm: Normal rate and regular rhythm.      Pulses: Normal pulses.      Heart sounds: Murmur (Low grade holosystolic murmur) heard.   Pulmonary:      Effort: Pulmonary effort is normal.      Breath sounds: Normal breath sounds.   Abdominal:      General: Abdomen is flat. Bowel sounds are normal. There is no distension.      Palpations: Abdomen is soft. There is no mass.      Tenderness: There is no abdominal tenderness.       Comments: Central obesity   Musculoskeletal:         General: Normal range of motion.   Skin:     General: Skin is warm.      Capillary Refill: Capillary refill takes less than 2 seconds.   Neurological:      General: No focal deficit present.      Mental Status: He is alert and oriented to person, place, and time.          Significant Labs: CMP   Recent Labs   Lab 07/16/23  0510 07/16/23  1754 07/17/23  0440    146* 145   K 3.8 3.8 3.8    102 105   CO2 27 28 27   * 131* 108   BUN 43* 47* 48*   CREATININE 1.4 1.5* 1.3   CALCIUM 9.9 9.7 9.7   ANIONGAP 14 16 13   , CBC   Recent Labs   Lab 07/16/23  0856 07/17/23  1138   WBC 7.00 9.29   HGB 14.3 15.5   HCT 43.8 47.9    252   , INR No results for input(s): INR, PROTIME in the last 48 hours., Lipid Panel No results for input(s): CHOL, HDL, LDLCALC, TRIG, CHOLHDL in the last 48 hours., Troponin No results for input(s): TROPONINI in the last 48 hours., and All pertinent lab results from the last 24 hours have been reviewed.    Significant Imaging:     RHC 07/17/2023  RA 10  RV 57/10  PA 58/28 mean 38   PCWP 28v48  PA saturation 66%  Ao saturation 98%  CO/CI 4.8/2.21  PVR 2.1 APPIAH, consistent with post-capillary pulmonary venous hypertension  SVR 1267  /66 MAP 86  HR 68 SR with occasional PVCs  Hb 14.3  BSA 2.16     RHC 07/12/2023  - The estimated blood loss was none.  - The filling pressures on the right and left were severely elevated. Pulmonary hypertension was severe.  - RA 20  PA 90/40 (57)  PCWP 27  - CO 3.3 l/min  CI 1.5 l/min/m2.  - PVR 9 APPIAH.  - Large V wave on PCWP waveform may reflect severe MR. Correlate clinically.  - Post and pre capillary severe PH.  - Condition: no inotropes or pressors. No PAH medications.     EKG 07/14/2023  - Sinus rhythm with Premature supraventricular complexes and Fusion complexes   - Right bundle branch block   - Septal infarct ,age undetermined   - T wave abnormality, consider lateral ischemia   -  Abnormal ECG   - When compared with ECG of 15-SEP-2021 08:50, Significant changes have occurred      TTE 07/03/2023  - The left ventricle is normal in size with low normal systolic function. The estimated ejection fraction is 50%.  - There is abnormal septal wall motion. There is systolic flattening of the interventricular septum consistent with right ventricle pressure overload.  - Moderate right ventricular enlargement with moderately to severely reduced right ventricular systolic function.  - Grade II left ventricular diastolic dysfunction.  - Biatrial enlargement.  - Mild tricuspid regurgitation.  - Moderate mitral regurgitation.  - The estimated PA systolic pressure is 79 mmHg.  - Intermediate central venous pressure (8 mmHg).     [facility] 09/10/2021  - The Prox RCA lesion was 80% stenosed.  - The Mid RCA lesion was 80% stenosed.  - The Prox Cx lesion was 70% stenosed.  - The estimated blood loss was none.  - There was three vessel coronary artery disease.  - Patent LIMA to OM and SVG to RCA.  - No change in anatomy since prior angiogram.

## 2023-07-17 NOTE — PROGRESS NOTES
Admit Note     Met with patient to assess needs. Patient is a 77 y.o.  male, admitted for pulmonary hypertension, KASSIE, LM, HLD and S/P CABG.    Patient admitted to [facility] on 7/14/2023 .  At this time, patient presents as alert and oriented x 4, good eye contact, calm, and communicative.  At this time, patients caregiver in attendance.     Household/Family Systems     Patient resides alone at [address]  [place] [address].      Support system includes adult children and son in law.  Patient does not have dependents that are need of being cared for.     Patients primary caregiver is self.    Pt's cell: [phone]    Emergency contact:   Taisha Miller (daughter, lives in [facility] and is a NP) [phone]  .    During admission, patient's caregiver plans to visit.      Confirmed patient and patients caregivers do have access to reliable transportation.    Cognitive Status/Learning     Patient reports reading ability as college and states patient does not have difficulty with reading, writing, seeing, comprehension, learning, and memory.  Pt reports he does have some difficulty with hearing.   Patient reports patient learns best by multiple ways.   Needed: No.   Highest education level: Post-College Graduate Degree    Vocation/Disability   .  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     Adherence     Patient reports a high level of adherence to patients health care regimen.  Pt reports he eats out six times a week, but does try to avoid added salt. Pt reports he is currently on a regular diet, but thinks he may be placed on a cardiac diet soon.   Adherence counseling and education provided. Patient verbalizes understanding.    Substance Use    Patient reports the following substance usage.    Tobacco: no current use.  Pt reports  he had is last cigarette in  out of [facility] ER when he was having a heart attack.  Pt reports he has smoked a cigarette here and there over the last 10 years.    Alcohol: pt reports he used to drink two glasses of wine per day, but currently is drinking two beers a night at least four nights a week.   Illicit Drugs/Non-prescribed Medications: none, patient denies any use.  Patient states clear understanding of the potential impact of substance use.  Substance abstinence/cessation counseling, education and resources provided and reviewed.     Services Utilizing/ADLS    Infusion Service: Prior to admission, patient utilizing? no  Home Health: Prior to admission, patient utilizing? no  DME: Prior to admission, no  Pulmonary/Cardiac Rehab: Prior to admission, no  Dialysis:  Prior to admission, no  Transplant Specialty Pharmacy:  Prior to admission, no.    Prior to admission, patient reports patient was independent with ADLS and was driving.  Patient reports patient is not able to care for self at this time due to compromised medical condition (as documented in medical record) and physical weakness..  Patient indicates a willingness to care for self once medically cleared to do so.    Insurance/Medications    Insured by   Payer/Plan Subscr  Sex Relation Sub. Ins. ID Effective Group Num   1. [employer] MANAGE* MALIHA BRIONES* 1945 Male Self [id number] 1[birth year] [id number]                                   [address] [address]      Primary Insurance (for UNOS reporting): Public Insurance - Medicare FFS (Fee For Service)  Secondary Insurance (for UNOS reporting): None    Patient reports patient is able to obtain and afford medications at this time and at time of discharge.    Living Will/Healthcare Power of     Patient states patient does not have a LW and/or HCPA.   provided education regarding LW and HCPA and the completion of forms.    Coping/Mental Health    Patient is coping adequately with the aid  of  family members.  Patient denies mental health difficulties.   Pt reports there have been times when he has become depressed, however he has the necessary tools to cope.   Worker provided encouragement.   Pt reports no needs at this time.     Discharge Planning    At time of discharge, patient plans to return to patient's home under the care of self. Pt's daughter is available to assist when needed.  Patients daughter will transport patient.  Per rounds today, expected discharge date has not been medically determined at this time. Patient and patients caregiver  verbalize understanding and are involved in treatment planning and discharge process.    Additional Concerns     providing ongoing psychosocial support, education, resources and d/c planning as needed.  SW remains available. Patient denies additional needs and/or concerns at this time. Patient verbalizes understanding and agreement with information reviewed, social work availability, and how to access available resources as needed.

## 2023-07-17 NOTE — BRIEF OP NOTE
Patient tolerated the procedure well. Please see complete procedure note for full details and results. Stable to return from cath lab to their room.  Procedure: Right heart catheterization   Procedure date: 07/17/2023  Discharge date: 07/17/2023  Estimated blood loss: less than 10 cc  Complications: none  Condition at discharge: stable  Discharge instructions: no heavy lifting greater than 5 lbs and no bearing down/coughing without holding pressure over incision site.

## 2023-07-17 NOTE — ASSESSMENT & PLAN NOTE
- Ischemic Cardiomyopathy (ICM)  - Last Echo (7/3): LV is normal in size and systolic function. LVEF 50%, Grade II LVDD, Biatrial enlargement. Mild TR. Moderate MR  - Home GDMT: Metoprolol XL 50 mg, daily  - Current Diuretics: Transitioned from Lasix 20 mg/hr to Lasix 80 IVP TID yesterday  -[facility] this mornign with RA 10, PA 58/28(38), PCWP 28v48, CO 4.8, CI 2.21  -Repeat ECHO pending. Will consult Interventional Cardiology for MitraClip eval  - Monitor electrolytes closely. Maintain Mg >2 and K >4  - HF Pathway: Sodium restriction <2 g, Fluid restriction < 1500 mL, Strict I/Os, Daily weights

## 2023-07-17 NOTE — INTERVAL H&P NOTE
The patient has been examined and the H&P has been reviewed:    I concur with the findings and no changes have occurred since H&P was written.    Procedure risks, benefits and alternative options discussed and understood by patient/family.          Active Hospital Problems    Diagnosis  POA    *Acute on chronic heart failure [I50.9]  Unknown    KASSIE (acute kidney injury) [N17.9]  Unknown    Chronic systolic heart failure [I50.22]  Yes    CKD (chronic kidney disease) stage 3, GFR 30-59 ml/min [N18.30]  Yes    S/P CABG (coronary artery bypass graft) [Z95.1]  Not Applicable     Chronic    BPH (benign prostatic hypertrophy) [N40.0]  Yes     Dx updated per 2019 IMO Load      CAD (coronary artery disease) [I25.10]  Yes               Resolved Hospital Problems   No resolved problems to display.

## 2023-07-17 NOTE — HPI
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  Patient admitted due to AHF, RHC showed elevated PA pressures (58/28 mean 38), likely secondary to left heart disease. Admitted to HTS. HTS consider that PH component likely in the setting of long standing PH from Left heart disease. Interventional cardiology consulted to assess candidacy for mitraclip.    Home meds:  - Metoprolol XL 50mg po daily  - Tamsulosin 0.4  - Lasix 80 po daily  - ASA 81   - Atorva 40

## 2023-07-17 NOTE — SUBJECTIVE & OBJECTIVE
Interval History: NAEON. No complaints. CVP this morning was 6 from PICC. Repeat RHC this morning with RA 10, PA 58/29 (38), PCWP 28v48, CO 4.8, CI 2.21. Repeat ECHO pending. Will consulte Interventional Cardiology for Mitraclip eval. Continue IVP Lasix 80 mg BID, plan to transition to PO diuretics tomorrow.       Lines  - R.PICC Line (7/14--)    Scheduled Meds:   aspirin  81 mg Oral Daily    atorvastatin  80 mg Oral Daily    colchicine  0.6 mg Oral Daily    docusate sodium  50 mg Oral Daily    furosemide (LASIX) injection  80 mg Intravenous Q12H    metoprolol succinate  50 mg Oral Daily    sodium chloride 0.9%  10 mL Intravenous Q6H    tamsulosin  1 capsule Oral Daily     PRN Meds:acetaminophen, LIDOcaine HCL 20 mg/ml (2%), methocarbamoL, sodium chloride 0.9%, Flushing PICC Protocol **AND** sodium chloride 0.9% **AND** sodium chloride 0.9%    Review of patient's allergies indicates:  No Known Allergies  Objective:     Vital Signs (Most Recent):  Temp: 98 °F (36.7 °C) (07/17/23 1123)  Pulse: 69 (07/17/23 1123)  Resp: 20 (07/17/23 1123)  BP: (!) 145/63 (07/17/23 1123)  SpO2: 95 % (07/17/23 1123) Vital Signs (24h Range):  Temp:  [97.5 °F (36.4 °C)-99.8 °F (37.7 °C)] 98 °F (36.7 °C)  Pulse:  [64-76] 69  Resp:  [17-20] 20  SpO2:  [91 %-97 %] 95 %  BP: (101-152)/(56-71) 145/63     Patient Vitals for the past 72 hrs (Last 3 readings):   Weight   07/16/23 0755 93.5 kg (206 lb 2.1 oz)   07/15/23 1023 99 kg (218 lb 4.1 oz)   07/14/23 1324 100.2 kg (220 lb 14.4 oz)       Body mass index is 27.96 kg/m².      Intake/Output Summary (Last 24 hours) at 7/17/2023 1138  Last data filed at 7/17/2023 0858  Gross per 24 hour   Intake 1249.44 ml   Output 2000 ml   Net -750.56 ml          Physical Exam  Constitutional:       Appearance: Normal appearance. He is obese.   Eyes:      General: No scleral icterus.     Conjunctiva/sclera: Conjunctivae normal.   Cardiovascular:      Rate and Rhythm: Normal rate and regular rhythm.   Pulmonary:       Effort: Pulmonary effort is normal. No respiratory distress.      Breath sounds: Normal breath sounds. No wheezing.   Abdominal:      General: There is no distension.      Palpations: Abdomen is soft.      Tenderness: There is no abdominal tenderness.   Musculoskeletal:      Right lower leg: No edema.      Left lower leg: No edema.   Skin:     General: Skin is warm.   Neurological:      General: No focal deficit present.      Mental Status: He is alert.   Psychiatric:         Mood and Affect: Mood normal.       Significant Labs:  CBC:  Recent Labs   Lab 07/14/23  0757 07/15/23  0518 07/15/23  0900 07/16/23  0516 07/16/23  0856   WBC 7.64 7.22  --   --  7.00   RBC 4.64 4.41*  --   --  4.68   HGB 13.8* 13.4*  --   --  14.3   HCT 44.5 41.6 43 44 43.8    191  --   --  216   MCV 96 94  --   --  94   MCH 29.7 30.4  --   --  30.6   MCHC 31.0* 32.2  --   --  32.6       BNP:  No results for input(s): BNP in the last 168 hours.    Invalid input(s): BNPTRIAGELBLO  CMP:  Recent Labs   Lab 07/14/23  0757 07/14/23  2107 07/16/23  0510 07/16/23  1754 07/17/23  0440   *   < > 112* 131* 108   CALCIUM 9.4   < > 9.9 9.7 9.7   ALBUMIN 3.3*  --   --   --   --    PROT 6.8  --   --   --   --       < > 143 146* 145   K 4.3   < > 3.8 3.8 3.8   CO2 28   < > 27 28 27      < > 102 102 105   BUN 45*   < > 43* 47* 48*   CREATININE 1.8*   < > 1.4 1.5* 1.3   ALKPHOS 69  --   --   --   --    ALT 19  --   --   --   --    AST 23  --   --   --   --    BILITOT 0.4  --   --   --   --     < > = values in this interval not displayed.        Coagulation:   No results for input(s): PT, INR, APTT in the last 168 hours.  LDH:  No results for input(s): LDH in the last 72 hours.  Microbiology:  Microbiology Results (last 7 days)       ** No results found for the last 168 hours. **            I have reviewed all pertinent labs within the past 24 hours.    Estimated Creatinine Clearance: 56.5 mL/min (based on SCr of 1.3  mg/dL).    Diagnostic Results:  I have reviewed and interpreted all pertinent imaging results/findings within the past 24 hours.

## 2023-07-18 PROBLEM — I50.43 ACUTE ON CHRONIC COMBINED SYSTOLIC AND DIASTOLIC HEART FAILURE: Status: ACTIVE | Noted: 2023-01-01

## 2023-07-18 NOTE — CARE UPDATE
Overnight gave 5 mg PO metolazone before scheduled IV lasix 80 push to augment UO  Still not net negative more than -1L unfortunately

## 2023-07-18 NOTE — NURSING
Plan of care discussed with patient.  Patient remains free of falls and trauma. Patient ambulating independently, fall precautions in place. Patient has no complaints of pain. Discussed medications and care. IVP Lasix 80mg and Spironolactone added to regimen. Patient has no questions at this time. Will continue to monitor.

## 2023-07-18 NOTE — SUBJECTIVE & OBJECTIVE
Interval History: No complaints. CVP 9 this AM. Net -ve 990 cc/24 hours with 80 mg IVP Lasix BID plus extra PO metolazone overnight. Will increase IVP lasix to 80 mg TID and reassess UOP this afternoon. Added spironolactone 25 mg QD as GDMT. Pt requesting to speak to Interventional Cardiology again today, specifically about possibility of undergoing MitraClip eval during current admission.       Lines  - R.PICC Line (7/14--)    Scheduled Meds:   aspirin  81 mg Oral Daily    atorvastatin  80 mg Oral Daily    colchicine  0.6 mg Oral Daily    docusate sodium  50 mg Oral Daily    furosemide (LASIX) injection  80 mg Intravenous TID    metoprolol succinate  50 mg Oral Daily    sodium chloride 0.9%  10 mL Intravenous Q6H    spironolactone  25 mg Oral Daily    tamsulosin  1 capsule Oral Daily     PRN Meds:acetaminophen, LIDOcaine HCL 20 mg/ml (2%), methocarbamoL, sodium chloride 0.9%, Flushing PICC Protocol **AND** sodium chloride 0.9% **AND** sodium chloride 0.9%    Review of patient's allergies indicates:  No Known Allergies  Objective:     Vital Signs (Most Recent):  Temp: 97 °F (36.1 °C) (07/18/23 1211)  Pulse: 65 (07/18/23 1211)  Resp: 20 (07/18/23 1211)  BP: 120/64 (07/18/23 1211)  SpO2: 99 % (07/18/23 1211) Vital Signs (24h Range):  Temp:  [96.6 °F (35.9 °C)-98.1 °F (36.7 °C)] 97 °F (36.1 °C)  Pulse:  [59-70] 65  Resp:  [16-20] 20  SpO2:  [92 %-99 %] 99 %  BP: (115-128)/(60-70) 120/64     Patient Vitals for the past 72 hrs (Last 3 readings):   Weight   07/18/23 0800 92 kg (202 lb 13.2 oz)   07/17/23 1516 93.4 kg (206 lb)   07/16/23 0755 93.5 kg (206 lb 2.1 oz)       Body mass index is 27.51 kg/m².      Intake/Output Summary (Last 24 hours) at 7/18/2023 1252  Last data filed at 7/18/2023 0934  Gross per 24 hour   Intake 600 ml   Output 2350 ml   Net -1750 ml          Physical Exam  Constitutional:       Appearance: Normal appearance. He is obese.   Eyes:      General: No scleral icterus.     Conjunctiva/sclera:  Conjunctivae normal.   Cardiovascular:      Rate and Rhythm: Normal rate and regular rhythm.   Pulmonary:      Effort: Pulmonary effort is normal. No respiratory distress.      Breath sounds: Normal breath sounds. No wheezing.   Abdominal:      General: There is no distension.      Palpations: Abdomen is soft.      Tenderness: There is no abdominal tenderness.   Musculoskeletal:      Right lower leg: No edema.      Left lower leg: No edema.   Skin:     General: Skin is warm.   Neurological:      General: No focal deficit present.      Mental Status: He is alert.   Psychiatric:         Mood and Affect: Mood normal.       Significant Labs:  CBC:  Recent Labs   Lab 07/16/23  0856 07/17/23  1138 07/18/23  0356   WBC 7.00 9.29 8.92   RBC 4.68 5.05 4.91   HGB 14.3 15.5 15.1   HCT 43.8 47.9 46.3    252 230   MCV 94 95 94   MCH 30.6 30.7 30.8   MCHC 32.6 32.4 32.6       BNP:  No results for input(s): BNP in the last 168 hours.    Invalid input(s): BNPTRIAGELBLO  CMP:  Recent Labs   Lab 07/14/23  0757 07/14/23  2107 07/16/23  1754 07/17/23  0440 07/18/23  0356   *   < > 131* 108 105   CALCIUM 9.4   < > 9.7 9.7 9.9   ALBUMIN 3.3*  --   --   --   --    PROT 6.8  --   --   --   --       < > 146* 145 143   K 4.3   < > 3.8 3.8 4.0   CO2 28   < > 28 27 26      < > 102 105 104   BUN 45*   < > 47* 48* 52*   CREATININE 1.8*   < > 1.5* 1.3 1.4   ALKPHOS 69  --   --   --   --    ALT 19  --   --   --   --    AST 23  --   --   --   --    BILITOT 0.4  --   --   --   --     < > = values in this interval not displayed.        Coagulation:   No results for input(s): PT, INR, APTT in the last 168 hours.  LDH:  No results for input(s): LDH in the last 72 hours.  Microbiology:  Microbiology Results (last 7 days)       ** No results found for the last 168 hours. **            I have reviewed all pertinent labs within the past 24 hours.    Estimated Creatinine Clearance: 48.5 mL/min (based on SCr of 1.4  mg/dL).    Diagnostic Results:  I have reviewed and interpreted all pertinent imaging results/findings within the past 24 hours.

## 2023-07-18 NOTE — ASSESSMENT & PLAN NOTE
- Ischemic Cardiomyopathy (ICM)  - Last Echo (7/3): LV is normal in size and systolic function. LVEF 50%, Grade II LVDD, Biatrial enlargement. Mild TR. Moderate MR  - Home GDMT: Metoprolol XL 50 mg daily, added spironolactone 25 mg QD  - Current Diuretics: Will increase IVP Lasix to 80 mg TID with CVP 9  -RHC 7/17/23 with RA 10, PA 58/28(38), PCWP 28v48, CO 4.8, CI 2.21  -Interventional Cardiology consulted, will plan to undergoing evaluation for MitraClip candidacy as outpatient.   - Monitor electrolytes closely. Maintain Mg >2 and K >4  - HF Pathway: Sodium restriction <2 g, Fluid restriction < 1500 mL, Strict I/Os, Daily weights

## 2023-07-18 NOTE — PROGRESS NOTES
[facility] - Cardiology Stepdown  Heart Transplant  Progress Note    Patient Name: Jose Fonseca Jr.  MRN: [id number]  Admission Date: 7/14/2023  Hospital Length of Stay: 4 days  Attending Physician: Dalia Amezquita DO  Primary Care Provider: Vasyl Morris MD  Principal Problem:Acute on chronic heart failure    Subjective:     Interval History: No complaints. CVP 9 this AM. Net -ve 990 cc/24 hours with 80 mg IVP Lasix BID plus extra PO metolazone overnight. Will increase IVP lasix to 80 mg TID and reassess UOP this afternoon. Added spironolactone 25 mg QD as GDMT. Pt requesting to speak to Interventional Cardiology again today, specifically about possibility of undergoing MitraClip eval during current admission.       Lines  - R.PICC Line (7/14--)    Scheduled Meds:   aspirin  81 mg Oral Daily    atorvastatin  80 mg Oral Daily    colchicine  0.6 mg Oral Daily    docusate sodium  50 mg Oral Daily    furosemide (LASIX) injection  80 mg Intravenous TID    metoprolol succinate  50 mg Oral Daily    sodium chloride 0.9%  10 mL Intravenous Q6H    spironolactone  25 mg Oral Daily    tamsulosin  1 capsule Oral Daily     PRN Meds:acetaminophen, LIDOcaine HCL 20 mg/ml (2%), methocarbamoL, sodium chloride 0.9%, Flushing PICC Protocol **AND** sodium chloride 0.9% **AND** sodium chloride 0.9%    Review of patient's allergies indicates:  No Known Allergies  Objective:     Vital Signs (Most Recent):  Temp: 97 °F (36.1 °C) (07/18/23 1211)  Pulse: 65 (07/18/23 1211)  Resp: 20 (07/18/23 1211)  BP: 120/64 (07/18/23 1211)  SpO2: 99 % (07/18/23 1211) Vital Signs (24h Range):  Temp:  [96.6 °F (35.9 °C)-98.1 °F (36.7 °C)] 97 °F (36.1 °C)  Pulse:  [59-70] 65  Resp:  [16-20] 20  SpO2:  [92 %-99 %] 99 %  BP: (115-128)/(60-70) 120/64     Patient Vitals for the past 72 hrs (Last 3 readings):   Weight   07/18/23 0800 92 kg (202 lb 13.2 oz)   07/17/23 1516 93.4 kg (206 lb)   07/16/23 0755 93.5 kg (206 lb 2.1 oz)        Body mass index is 27.51 kg/m².      Intake/Output Summary (Last 24 hours) at 7/18/2023 1252  Last data filed at 7/18/2023 0934  Gross per 24 hour   Intake 600 ml   Output 2350 ml   Net -1750 ml          Physical Exam  Constitutional:       Appearance: Normal appearance. He is obese.   Eyes:      General: No scleral icterus.     Conjunctiva/sclera: Conjunctivae normal.   Cardiovascular:      Rate and Rhythm: Normal rate and regular rhythm.   Pulmonary:      Effort: Pulmonary effort is normal. No respiratory distress.      Breath sounds: Normal breath sounds. No wheezing.   Abdominal:      General: There is no distension.      Palpations: Abdomen is soft.      Tenderness: There is no abdominal tenderness.   Musculoskeletal:      Right lower leg: No edema.      Left lower leg: No edema.   Skin:     General: Skin is warm.   Neurological:      General: No focal deficit present.      Mental Status: He is alert.   Psychiatric:         Mood and Affect: Mood normal.       Significant Labs:  CBC:  Recent Labs   Lab 07/16/23  0856 07/17/23  1138 07/18/23  0356   WBC 7.00 9.29 8.92   RBC 4.68 5.05 4.91   HGB 14.3 15.5 15.1   HCT 43.8 47.9 46.3    252 230   MCV 94 95 94   MCH 30.6 30.7 30.8   MCHC 32.6 32.4 32.6       BNP:  No results for input(s): BNP in the last 168 hours.    Invalid input(s): BNPTRIAGELBLO  CMP:  Recent Labs   Lab 07/14/23  0757 07/14/23  2107 07/16/23  1754 07/17/23  0440 07/18/23  0356   *   < > 131* 108 105   CALCIUM 9.4   < > 9.7 9.7 9.9   ALBUMIN 3.3*  --   --   --   --    PROT 6.8  --   --   --   --       < > 146* 145 143   K 4.3   < > 3.8 3.8 4.0   CO2 28   < > 28 27 26      < > 102 105 104   BUN 45*   < > 47* 48* 52*   CREATININE 1.8*   < > 1.5* 1.3 1.4   ALKPHOS 69  --   --   --   --    ALT 19  --   --   --   --    AST 23  --   --   --   --    BILITOT 0.4  --   --   --   --     < > = values in this interval not displayed.        Coagulation:   No results for input(s):  PT, INR, APTT in the last 168 hours.  LDH:  No results for input(s): LDH in the last 72 hours.  Microbiology:  Microbiology Results (last 7 days)       ** No results found for the last 168 hours. **            I have reviewed all pertinent labs within the past 24 hours.    Estimated Creatinine Clearance: 48.5 mL/min (based on SCr of 1.4 mg/dL).    Diagnostic Results:  I have reviewed and interpreted all pertinent imaging results/findings within the past 24 hours.    Assessment and Plan:     No notes on file    * Acute on chronic heart failure  - Ischemic Cardiomyopathy (ICM)  - Last Echo (7/3): LV is normal in size and systolic function. LVEF 50%, Grade II LVDD, Biatrial enlargement. Mild TR. Moderate MR  - Home GDMT: Metoprolol XL 50 mg daily, added spironolactone 25 mg QD  - Current Diuretics: Will increase IVP Lasix to 80 mg TID with CVP 9  -RHC 7/17/23 with RA 10, PA 58/28(38), PCWP 28v48, CO 4.8, CI 2.21  -Interventional Cardiology consulted, will plan to undergoing evaluation for MitraClip candidacy as outpatient.   - Monitor electrolytes closely. Maintain Mg >2 and K >4  - HF Pathway: Sodium restriction <2 g, Fluid restriction < 1500 mL, Strict I/Os, Daily weights    KASSIE (acute kidney injury)  Resolved  - KASSIE on admission with Cr of 1.8-2.1, Current Cr 1.4  - Monitor renal function  - Avoid Nephrotoxic agents  - Monitor urine output     S/P CABG (coronary artery bypass graft)  - See CAD    BPH (benign prostatic hypertrophy)  - Continue with Flomax    CAD (coronary artery disease)  - CAD s/p PCI and CABG in 1996   - Continue with Aspirin and Atorvastatin 40 mg, daily  -Oct 2013 significant for patent pLAD stent and LIMA-OM and VG-PDA grafts and known LANCE-LAD occlusion  - 2019 angiogram    · Dist LAD lesion , 95% stenosed.  · Dist RCA lesion , 100% stenosed.  · Estimated blood loss: none  · Patent LIMA to to OMB and patent SVG to RCA.   - 2019 significant change in coronary anatomy compared to angiogram  performed October 2013  - 2021:    The Prox RCA lesion was 80% stenosed.The Mid RCA lesion was 80% stenosed.   The Prox Cx lesion was 70% stenosed.   Patent LIMA to OM and SVG to RCA.        Haley Jimenez PA-C  Heart Transplant  [facility] - Cardiology Stepdown

## 2023-07-18 NOTE — CARE UPDATE
[facility] - Interventional Cardiology   Care Update Note     Patient Name: Jose Fonseca Jr.  MRN: [id number]  Admission Date: 7/14/2023  Hospital Length of Stay: 4 days  Attending Physician: Dalia Amezquita DO  Primary Care Provider: Vasyl Morris MD  Principal Problem:Acute on chronic heart failure    Patient was re-evaluated under request of the patient and the heart transplant team. Patient was seen by Dr. Ibarra in the past and wanted to be evaluated by him. He is feeling well today and has no acute complains. He denies chest pain, shortness of breath, orthopnea, PND or palpitations.      Discussed the care plan with the patient, the findings of a large v wave on the RHC in the setting of pulmonary hypertension, the TTE finding of moderate MR, and the indications for adarsh-clip based on . A plan was agreed upon and appropriate follow up was scheduled as follows:    - No indication for additional evaluation for adarsh-clip in the acute inpatient setting  - Will evaluate patient in his steady state with home medication regimen in a month  - CARSON as outpatient prior to outpatient evaluation  - Follow up as outpatient in the interventional cardiology clinic with Dr. Ibarra in a month    Filipe Polo, PGY4  Cardiovascular Disease  [facility]

## 2023-07-19 NOTE — PLAN OF CARE
Problem: Adult Inpatient Plan of Care  Goal: Plan of Care Review  Outcome: Ongoing, Not Progressing  Goal: Patient-Specific Goal (Individualized)  Outcome: Ongoing, Not Progressing  Goal: Absence of Hospital-Acquired Illness or Injury  Outcome: Ongoing, Not Progressing  Goal: Optimal Comfort and Wellbeing  Outcome: Ongoing, Not Progressing  Goal: Readiness for Transition of Care  Outcome: Ongoing, Not Progressing     Problem: Fall Injury Risk  Goal: Absence of Fall and Fall-Related Injury  Outcome: Ongoing, Not Progressing     Problem: Adjustment to Illness (Heart Failure)  Goal: Optimal Coping  Outcome: Ongoing, Not Progressing     Problem: Fluid Imbalance (Heart Failure)  Goal: Fluid Balance  Outcome: Ongoing, Not Progressing     Problem: Respiratory Compromise (Heart Failure)  Goal: Effective Oxygenation and Ventilation  Outcome: Ongoing, Not Progressing     Problem: Infection  Goal: Absence of Infection Signs and Symptoms  Outcome: Ongoing, Not Progressing     Problem: Fluid and Electrolyte Imbalance (Acute Kidney Injury/Impairment)  Goal: Fluid and Electrolyte Balance  Outcome: Ongoing, Not Progressing     Problem: Oral Intake Inadequate (Acute Kidney Injury/Impairment)  Goal: Optimal Nutrition Intake  Outcome: Ongoing, Not Progressing     Problem: Renal Function Impairment (Acute Kidney Injury/Impairment)  Goal: Effective Renal Function  Outcome: Ongoing, Not Progressing

## 2023-07-19 NOTE — PROGRESS NOTES
[facility] - Cardiology Stepdown  Transplant Heart  Progress Note    Patient Name: Jose Fonseca Jr.  MRN: [id number]  Admission Date: 7/14/2023  Hospital Length of Stay: 5 days  Code Status: Full Code   Attending Physician: Dalia Amezquita DO   Expected Discharge Date: 7/19/2023      Subjective / Interval   Cr increaed to 1.8  UO was 2.4 lts, 1.2 lts negative    Objective     Vitals:    07/19/23 0016 07/19/23 0437 07/19/23 0743 07/19/23 0759   BP: 116/63 118/63 131/74    BP Location: Left arm Left arm Left arm    Patient Position: Lying  Lying    Pulse: 62 65 74 63   Resp: 19 19 20    Temp: 97.6 °F (36.4 °C) 97.7 °F (36.5 °C) 96.9 °F (36.1 °C)    TempSrc: Oral Oral Oral    SpO2: 95% 96% (!) 91%    Weight:   91.1 kg (200 lb 13.4 oz)    Height:           I/O last 3 completed shifts:  In: 1562 [P.O.:1562]  Out: 5250 [Urine:5250]  No intake/output data recorded.    CVP:  [5 mmHg] 5 mmHg    Physical Examination: General appearance - alert, well appearing, and in no distress  Mental status - alert, oriented to person, place, and time  Neck - JVD absent  Chest - clear to auscultation, no wheezes, rales or rhonchi, symmetric air entry  Heart - normal rate, regular rhythm, normal S1, S2, no murmurs, rubs, clicks or gallops,  Abdomen - soft, nontender, nondistended, no masses or organomegaly  Neurological - alert, oriented, normal speech, no focal findings or movement disorder noted  Extremities - BLE edema none      Labs  BMP  Lab Results   Component Value Date     07/19/2023     07/19/2023    K 3.2 (L) 07/19/2023    K 3.3 (L) 07/19/2023    CL 98 07/19/2023    CL 98 07/19/2023    CO2 23 07/19/2023    CO2 25 07/19/2023    BUN 68 (H) 07/19/2023    BUN 66 (H) 07/19/2023    CREATININE 1.7 (H) 07/19/2023    CREATININE 1.8 (H) 07/19/2023    CALCIUM 10.1 07/19/2023    CALCIUM 10.0 07/19/2023    ANIONGAP 17 (H) 07/19/2023    ANIONGAP 15 07/19/2023    ESTGFRAFRICA >60.0 04/06/2022    ESTGFRAFRICA >60.0  04/06/2022    EGFRNONAA >60.0 04/06/2022    EGFRNONAA >60.0 04/06/2022       Lab Results   Component Value Date    WBC 8.48 07/19/2023    HGB 16.0 07/19/2023    HCT 48.1 07/19/2023    MCV 92 07/19/2023     07/19/2023         BNP  No results for input(s): BNP, BNPTRIAGEBLO in the last 168 hours.    Lab Results   Component Value Date    ALT 24 07/19/2023    AST 23 07/19/2023    ALKPHOS 79 07/19/2023    BILITOT 0.7 07/19/2023       Lab Results   Component Value Date    CHOL 164 07/03/2023    CHOL 117 (L) 04/06/2022    CHOL 101 (L) 07/15/2021     Lab Results   Component Value Date    HDL 46 07/03/2023    HDL 36 (L) 04/06/2022    HDL 39 (L) 07/15/2021     Lab Results   Component Value Date    LDLCALC 103.6 07/03/2023    LDLCALC 62.0 (L) 04/06/2022    LDLCALC 52.8 (L) 07/15/2021     Lab Results   Component Value Date    TRIG 72 07/03/2023    TRIG 95 04/06/2022    TRIG 46 07/15/2021     Lab Results   Component Value Date    CHOLHDL 28.0 07/03/2023    CHOLHDL 30.8 04/06/2022    CHOLHDL 38.6 07/15/2021       Cardiac studies  Cardiac studies  Results for orders placed during the hospital encounter of 07/14/23    Echo    Interpretation Summary  · Severe left atrial enlargement.  · The left ventricle is mildly enlarged with normal systolic function.  · The estimated ejection fraction is 60%.  · Grade II left ventricular diastolic dysfunction.  · Mild right ventricular enlargement with mildly reduced right ventricular systolic function.  · Moderate eccentrically directed mitral regurgitation. CARSON if indicated.  · Mild tricuspid regurgitation.  · Intermediate central venous pressure (8 mmHg).  · There is mild pulmonary hypertension. The estimated PA systolic pressure is 74 mmHg.      Results for orders placed or performed during the hospital encounter of 07/14/23   EKG 12-LEAD    Collection Time: 07/14/23  4:09 PM    Narrative    Test Reason : I50.9,    Vent. Rate : 070 BPM     Atrial Rate : 070 BPM     P-R Int : 184 ms           QRS Dur : 126 ms      QT Int : 556 ms       P-R-T Axes : 000 220 -71 degrees     QTc Int : 600 ms    Sinus rhythm with Premature supraventricular complexes and Fusion complexes  Right bundle branch block  Septal infarct ,age undetermined  T wave abnormality, consider lateral ischemia  Abnormal ECG  When compared with ECG of 15-SEP-2021 08:50,  Significant changes have occurred  Confirmed by FAIZAN CHEN MD ([id number]) on 7/15/2023 8:45:06 AM    Referred By: EMILIO AWAN           Confirmed By:FAIZAN CHEN MD             Assessment / Plan:    77-yo M with hx of CAD (s/p PCI to and CABG ~ 1996), 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 Admitted for diuresis. Volume status improving, will transition to oral diuretics tomorrow.    * Acute on chronic heart failure  - Ischemic Cardiomyopathy (ICM)  - Last Echo (7/3): LV is normal in size and systolic function. LVEF 50%, Grade II LVDD, Biatrial enlargement. Mild TR. Moderate MR  - BB:Metoprolol XL 50 mg daily,   - MRA: Spironolactone 25 mg QD  - Current Diuretics: Will transition to oral diuretic tomorrow   -C 7/17/23 with RA 10, PA 58/28(38), PCWP 28v48, CO 4.8, CI 2.21  -Interventional Cardiology consulted, will plan to undergoing evaluation for MitraClip candidacy as outpatient.   - Monitor electrolytes closely. Maintain Mg >2 and K >4  - HF Pathway: Sodium restriction <2 g, Fluid restriction < 1500 mL, Strict I/Os, Daily weights     KASSIE (acute kidney injury)  Resolved  - KASSIE on admission with Cr of 1.8-2.1, Current Cr 1.8  - Monitor renal function  - Avoid Nephrotoxic agents  - Monitor urine output   -Will hold diuretics today      S/P CABG (coronary artery bypass graft)  - See CAD     BPH (benign prostatic hypertrophy)  - Continue with Flomax     CAD (coronary artery disease)  - CAD s/p PCI and CABG in 1996   - Continue with Aspirin and Atorvastatin 40 mg, daily  -Oct 2013 significant for patent pLAD stent and LIMA-OM and  VG-PDA grafts and known LANCE-LAD occlusion  - 2019 angiogram    Dist LAD lesion , 95% stenosed.  Dist RCA lesion , 100% stenosed.  Estimated blood loss: none  Patent LIMA to to OMB and patent SVG to RCA.   - 2019 significant change in coronary anatomy compared to angiogram performed October 2013  - 2021:   The Prox RCA lesion was 80% stenosed.The Mid RCA lesion was 80% stenosed.  The Prox Cx lesion was 70% stenosed.  Patent LIMA to OM and SVG to RCA.         Rolf Falk MD  Cardiovascular diseases  Transplant Heart  [facility] - Cardiology Stepdown

## 2023-07-20 PROBLEM — N18.30 CKD (CHRONIC KIDNEY DISEASE) STAGE 3, GFR 30-59 ML/MIN: Status: RESOLVED | Noted: 2022-09-16 | Resolved: 2023-01-01

## 2023-07-20 PROBLEM — I27.20 PULMONARY HYPERTENSION: Status: RESOLVED | Noted: 2023-01-01 | Resolved: 2023-01-01

## 2023-07-20 NOTE — PROGRESS NOTES
[facility] - Cardiology Stepdown  Transplant Heart  Progress Note    Patient Name: Jose Fonseca Jr.  MRN: [id number]  Admission Date: 7/14/2023  Hospital Length of Stay: 6 days  Code Status: Full Code   Attending Physician: Ken Will MD   Expected Discharge Date: 7/20/2023      Subjective / Interval   Cr stable    Objective     Vitals:    07/19/23 2344 07/20/23 0319 07/20/23 0413 07/20/23 0725   BP: 110/61  123/62 115/62   BP Location:    Left arm   Patient Position: Lying  Lying Lying   Pulse: (!) 58 63 70 71   Resp: 17  19 18   Temp: 97.5 °F (36.4 °C)  97.8 °F (36.6 °C) 96.4 °F (35.8 °C)   TempSrc: Oral  Oral Oral   SpO2: (!) 93%  (!) 91% (!) 91%   Weight:    91.6 kg (201 lb 15.1 oz)   Height:           I/O last 3 completed shifts:  In: 1544 [P.O.:1544]  Out: 2100 [Urine:2100]  No intake/output data recorded.    CVP:  [4 mmHg] 4 mmHg    Physical Examination: General appearance - alert, well appearing, and in no distress  Mental status - alert, oriented to person, place, and time  Neck - JVD absent  Chest - clear to auscultation, no wheezes, rales or rhonchi, symmetric air entry  Heart - normal rate, regular rhythm, normal S1, S2, no murmurs, rubs, clicks or gallops,  Abdomen - soft, nontender, nondistended, no masses or organomegaly  Neurological - alert, oriented, normal speech, no focal findings or movement disorder noted  Extremities - BLE edema none      Labs  BMP  Lab Results   Component Value Date     07/20/2023    K 3.7 07/20/2023    CL 96 07/20/2023    CO2 28 07/20/2023    BUN 78 (H) 07/20/2023    CREATININE 1.9 (H) 07/20/2023    CALCIUM 9.8 07/20/2023    ANIONGAP 14 07/20/2023    ESTGFRAFRICA >60.0 04/06/2022    ESTGFRAFRICA >60.0 04/06/2022    EGFRNONAA >60.0 04/06/2022    EGFRNONAA >60.0 04/06/2022       Lab Results   Component Value Date    WBC 8.17 07/20/2023    HGB 15.7 07/20/2023    HCT 47.9 07/20/2023    MCV 92 07/20/2023     07/20/2023         BNP  No results for  input(s): BNP, BNPTRIAGEBLO in the last 168 hours.    Lab Results   Component Value Date    ALT 24 07/19/2023    AST 23 07/19/2023    ALKPHOS 79 07/19/2023    BILITOT 0.7 07/19/2023       Lab Results   Component Value Date    CHOL 164 07/03/2023    CHOL 117 (L) 04/06/2022    CHOL 101 (L) 07/15/2021     Lab Results   Component Value Date    HDL 46 07/03/2023    HDL 36 (L) 04/06/2022    HDL 39 (L) 07/15/2021     Lab Results   Component Value Date    LDLCALC 103.6 07/03/2023    LDLCALC 62.0 (L) 04/06/2022    LDLCALC 52.8 (L) 07/15/2021     Lab Results   Component Value Date    TRIG 72 07/03/2023    TRIG 95 04/06/2022    TRIG 46 07/15/2021     Lab Results   Component Value Date    CHOLHDL 28.0 07/03/2023    CHOLHDL 30.8 04/06/2022    CHOLHDL 38.6 07/15/2021       Cardiac studies  Cardiac studies  Results for orders placed during the hospital encounter of 07/14/23    Echo    Interpretation Summary  · Severe left atrial enlargement.  · The left ventricle is mildly enlarged with normal systolic function.  · The estimated ejection fraction is 60%.  · Grade II left ventricular diastolic dysfunction.  · Mild right ventricular enlargement with mildly reduced right ventricular systolic function.  · Moderate eccentrically directed mitral regurgitation. CARSON if indicated.  · Mild tricuspid regurgitation.  · Intermediate central venous pressure (8 mmHg).  · There is mild pulmonary hypertension. The estimated PA systolic pressure is 74 mmHg.      Results for orders placed or performed during the hospital encounter of 07/14/23   EKG 12-LEAD    Collection Time: 07/14/23  4:09 PM    Narrative    Test Reason : I50.9,    Vent. Rate : 070 BPM     Atrial Rate : 070 BPM     P-R Int : 184 ms          QRS Dur : 126 ms      QT Int : 556 ms       P-R-T Axes : 000 220 -71 degrees     QTc Int : 600 ms    Sinus rhythm with Premature supraventricular complexes and Fusion complexes  Right bundle branch block  Septal infarct ,age undetermined  T wave  abnormality, consider lateral ischemia  Abnormal ECG  When compared with ECG of 15-SEP-2021 08:50,  Significant changes have occurred  Confirmed by APRIL LORA, FAIZAN ([id number]) on 7/15/2023 8:45:06 AM    Referred By: EMILIO AWAN           Confirmed By:FAIZAN CHEN MD       CO: 4.25 / CI 1.96  CVP 4      Assessment / Plan:    77-yo M with hx of CAD (s/p PCI to and CABG ~ 1996), 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 Admitted for diuresis. Volume status improving, will transition to oral diuretics today.    * Acute on chronic heart failure  - Ischemic Cardiomyopathy (ICM)  - Last Echo (7/3): LV is normal in size and systolic function. LVEF 50%, Grade II LVDD, Biatrial enlargement. Mild TR. Moderate MR  - BB:Metoprolol XL 50 mg daily,   - MRA: Spironolactone 25 mg QD  - Current Diuretics: Torsemide 40mg po daily   -RHC with RA 10, PA 58/28(38), PCWP 28v48, CO 4.8, CI 2.21  -Interventional Cardiology consulted, will plan to undergoing evaluation for MitraClip candidacy as outpatient.   - Monitor electrolytes closely. Maintain Mg >2 and K >4  - HF Pathway: Sodium restriction <2 g, Fluid restriction < 1500 mL, Strict I/Os, Daily weights     KASSIE (acute kidney injury)  -Cr. Stable mahamed 1.8-1.9     S/P CABG (coronary artery bypass graft)  - See CAD     BPH (benign prostatic hypertrophy)  - Continue with Flomax     CAD (coronary artery disease)  - CAD s/p PCI and CABG in 1996   - Continue with Aspirin and Atorvastatin 40 mg, daily  -Oct 2013 significant for patent pLAD stent and LIMA-OM and VG-PDA grafts and known LANCE-LAD occlusion  - 2019 angiogram    Dist LAD lesion , 95% stenosed.  Dist RCA lesion , 100% stenosed.  Estimated blood loss: none  Patent LIMA to to OMB and patent SVG to RCA.   - 2019 significant change in coronary anatomy compared to angiogram performed October 2013  - 2021:   The Prox RCA lesion was 80% stenosed.The Mid RCA lesion was 80% stenosed.  The Prox Cx lesion was 70%  stenosed.  Patent LIMA to OM and SVG to RCA.       Dispo: Will discharge today    Rolf Falk MD  Cardiovascular diseases, PGY-VI  Transplant Heart  [facility] - Cardiology Stepdown

## 2023-07-20 NOTE — PROGRESS NOTES
Discharge Note:  Pt dc today per team. SW met with pt to discuss dc plans for today.  Pt voiced understanding and agreement. Pt expressed no needs at this time.  No dc needs indicated by team.  Pt reported that family will be coming to pick him up.  No further needs indicated.

## 2023-07-20 NOTE — PLAN OF CARE
Cr=1.9 this am inc from 1.7 yesterday. No edema. Ambulating without difficulty. Denies SOB. Plan to discharge home today.

## 2023-07-20 NOTE — NURSING
Discharge instructions given and reviewed. Reinforced importance of having labs drawn on 7/24. Informed pt could wait for Cr to result before taking Demadex on 7/24 in case dose needs to be decreased. Pt understood Cr=1.9 is elevated and is being monitored. Verbalized understanding.

## 2023-07-20 NOTE — DISCHARGE SUMMARY
[facility] - Cardiology Stepdown  Heart Transplant Service  Discharge Summary      Patient Name: Jose Fonseca Jr.  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 Admitted for IV diuretics after outpatient RHC showed elevated biv pressures.      Procedure(s) (LRB):  INSERTION, CATHETER, RIGHT HEART (Right)     Indwelling Lines/Drains at time of discharge:  Lines/Drains/Airways       Peripherally Inserted Central Catheter Line  Duration             PICC Double Lumen 07/14/23 1941 right brachial 5 days                    Hospital Course (synopsis of major diagnoses, care, treatment, and services provided during the course of the hospital stay): He was given IV diuretics, initially with a lasix drip and then TID. Had a repeat RHC with decreasing pressures but W was still 28 with v waves of 48. He was also evaluated by interventional cardiology to evaluate MR and a possible mitral clip. The plan is for him to continue GDMT in the outpatient and re-evaluate in clinic, ie CARSON to evaluate MR. He will also f/u in HF clinic.    Consults:   Consults (From admission, onward)          Status Ordering Provider     Inpatient consult to Interventional Cardiology  Once        Provider:  (Not yet assigned)    BETSEY Martell     Inpatient consult to PICC team ([facility])  Once        Provider:  (Not yet assigned)    LEWIS Ruiz            Significant Diagnostic Studies: Angiography: RHC: 7/17/2023  RA 10  RV 57/10  PA 58/28 mean 38   PCWP 28v48  PA saturation 66%  Ao saturation 98%  CO/CI 4.8/2.21  PVR 2.1 APPIAH, consistent with post-capillary pulmonary venous  hypertension  SVR 1267  /66 MAP 86  HR 68 SR with occasional PVCs  Hb 14.3  BSA 2.16      Pending Diagnostic Studies:       None            Final Active Diagnoses:    Diagnosis Date Noted POA    PRINCIPAL PROBLEM:  Acute decompensated heart failure [I50.9] 07/16/2023 Yes    Acute on chronic combined systolic and diastolic heart failure [I50.43] 07/18/2023 Unknown    Nonrheumatic mitral valve regurgitation [I34.0] 07/17/2023 Yes    KASSIE (acute kidney injury) [N17.9] 07/16/2023 Unknown    Chronic systolic heart failure [I50.22] 03/27/2023 Yes    S/P CABG (coronary artery bypass graft) [Z95.1] 02/18/2015 Not Applicable     Chronic    BPH (benign prostatic hypertrophy) [N40.0] 05/13/2014 Yes    CAD (coronary artery disease) [I25.10] 04/19/2013 Yes      Problems Resolved During this Admission:    Diagnosis Date Noted Date Resolved POA    Pulmonary hypertension [I27.20] 07/17/2023 07/20/2023 Unknown    CKD (chronic kidney disease) stage 3, GFR 30-59 ml/min [N18.30] 09/16/2022 07/20/2023 Yes       Discharged Condition: fair    Follow Up:   Follow-up Information       Ken Will MD Follow up in 2 week(s).    Specialties: Cardiology, Transplant  Contact information:  [address] [place] [address] [id number] [phone]                           Patient Instructions:      Basic metabolic panel   Standing Status: Future Standing Exp. Date: 09/17/24     NT-Pro Natriuretic Peptide   Standing Status: Future Standing Exp. Date: 09/17/24     Diet Cardiac     Activity as tolerated     Medications:  Reconciled Home Medications:      Medication List        START taking these medications      spironolactone 25 MG tablet  Commonly known as: ALDACTONE  Take 1 tablet (25 mg total) by mouth once daily.  Start taking on: July 21, 2023     torsemide 40 mg Tab  Take 40 mg by mouth once daily.            CHANGE how you take these medications      atorvastatin 80 MG tablet  Commonly known as: LIPITOR  Take 1 tablet (80 mg  total) by mouth once daily.  Start taking on: July 21, 2023  What changed:   medication strength  how much to take  when to take this     metoprolol succinate 50 MG 24 hr tablet  Commonly known as: TOPROL-XL  Take 1 tablet (50 mg total) by mouth once daily.  Start taking on: July 21, 2023  What changed:   medication strength  how much to take            CONTINUE taking these medications      allopurinoL 300 MG tablet  Commonly known as: ZYLOPRIM  Take 1 tablet (300 mg total) by mouth every evening.     aspirin 81 MG EC tablet  Commonly known as: ECOTRIN  Take 1 tablet (81 mg total) by mouth once daily.     colchicine 0.6 mg tablet  Commonly known as: COLCRYS  Take 0.6 mg by mouth once daily.     sildenafiL 100 MG tablet  Commonly known as: VIAGRA  Take 1 tablet (100 mg total) by mouth daily as needed for Erectile Dysfunction.     tamsulosin 0.4 mg Cap  Commonly known as: FLOMAX  Take 1 capsule by mouth once daily.            STOP taking these medications      cyclobenzaprine 10 MG tablet  Commonly known as: FLEXERIL     etodolac 400 MG tablet  Commonly known as: LODINE     furosemide 80 MG tablet  Commonly known as: LASIX     indomethacin 50 MG capsule  Commonly known as: INDOCIN              Time spent on the discharge of patient: 35 minutes    Rolf Falk MD  Cardiology Fellow  [facility] - Cardiology Stepdown

## 2023-07-22 NOTE — TELEPHONE ENCOUNTER
Spoke to pt who states he was supposed to have lab work Monday but it has not been scheduled. Nurse informed pt lab orders are in the system he can walk in or schedule if he wishes. Pt states he will have them scheduled as long as they are there.

## 2023-07-26 NOTE — TELEPHONE ENCOUNTER
7/26/2023    Received and reviewed labs 7/24/2023 BMP; watching BUN/CR following hospital discharge.    NA/K:  133/4.1  BUN/CR:  1126/1.7      Received written orders from MD Sonam Will:    MD FRANCIA Uriarte [facility] Heart Failure Clinical  Hi team:   Could you please have this patient to have repeated nt-proBNP and BMP on Monday am.   Thank you      Spoke with patient through patient portal, see patient message thread 7/24/2023.  Patient reports no SOB or wheezing.  Patient reports still feeling lethargic and tiring easily, however he is resting well.  Patient reports that weight is within 1 lb of discharge weight.Patient confirmed he is taking Torsemide 20 mg 2 tabs daily, Aldactone 25 mg daily, and Toprol XL 50 mg daily.      Education completed with patient through patient portal, see portal thread 7/25/23.    BMP, and NT Pro scheduled at location of patient choice for 7/31/23, Remind me entered.    Above sent to MD Sonam Will for awareness.

## 2023-08-16 PROBLEM — I50.9 ACUTE DECOMPENSATED HEART FAILURE: Status: RESOLVED | Noted: 2023-01-01 | Resolved: 2023-01-01

## 2023-08-16 PROBLEM — N17.9 AKI (ACUTE KIDNEY INJURY): Status: RESOLVED | Noted: 2023-01-01 | Resolved: 2023-01-01

## 2023-08-16 NOTE — PROGRESS NOTES
[facility] Follow up.      Attending Physician: Ken Will MD.  The patient's last visit with me was on 3/27/2023.         HPI.  76 yo man with PMH /o  MI in 1996 s/p PCI of LAD and CABG.  HFrEF, LVEF improved to 55% most recently (May 2021).     Comes for regular follow up.  He reports 2 months ago he started noticing weight gain, increasing dyspnea. He knew how to recognize fluid was building up and increased his lasix to 80 mg BID. Since then he had some improvement in symptoms. He also reported fatigue that he attributes in part to B-blockers he is using. His BNP is the highest in long time 1059.      Today August 16, 2023 he comes after recent hospitalization for acute on chronic HF decompensation. This was after undergoing RHC that found severe hypervolemia.  He also had evidence of severe MR on echo and RHC, that remained severe after diuresis to normal right side filling pressure.     Today he comes after vacation in [place] area. He has gained 15 pounds since discharge. He has edema and dyspnea.    Review of Systems   Constitutional:  Positive for fatigue and unexpected weight change. Negative for chills, diaphoresis and fever.   HENT:  Negative for nasal congestion, rhinorrhea and sore throat.    Eyes:  Negative for visual disturbance.   Respiratory:  Positive for shortness of breath.    Cardiovascular:  Positive for leg swelling. Negative for chest pain.   Gastrointestinal:  Negative for abdominal pain, diarrhea, nausea and vomiting.   Genitourinary:  Negative for difficulty urinating, dysuria and hematuria.   Integumentary:  Negative for rash.   Neurological:  Negative for seizures, syncope and light-headedness.   Psychiatric/Behavioral:  Negative for agitation and hallucinations.         Past Medical History:   Diagnosis Date    Coronary artery disease     S/P 3  vessel CABG; stents    Glaucoma     HTN (hypertension) 4/19/2013    Hyperlipidemia     Myocardial infarction     Obstructive sleep apnea     Squamous cell carcinoma         Past Surgical History:   Procedure Laterality Date    ADENOIDECTOMY      ANGIOGRAM, CORONARY, WITH LEFT HEART CATHETERIZATION N/A 09/15/2021    Procedure: ANGIOGRAM,CORONARY,WITH LEFT HEART CATHETERIZATION;  Surgeon: Ken Ibarra MD;  Location: [facility] CATH LAB;  Service: Cardiology;  Laterality: N/A;    CATARACT EXTRACTION      patient not sure which eye    CORONARY ANGIOGRAPHY N/A 05/22/2019    Procedure: ANGIOGRAM, CORONARY ARTERY;  Surgeon: Ken Ibarra MD;  Location: [facility] CATH LAB;  Service: Cardiology;  Laterality: N/A;    CORONARY ANGIOGRAPHY INCLUDING BYPASS GRAFTS WITH CATHETERIZATION OF LEFT HEART Right 04/22/2019    Procedure: ANGIOGRAM, CORONARY, INCLUDING BYPASS GRAFT, WITH LEFT HEART CATHETERIZATION;  Surgeon: Ken Ibarra MD;  Location: [facility] CATH LAB;  Service: Cardiology;  Laterality: Right;    CORONARY ANGIOPLASTY      CORONARY ARTERY BYPASS GRAFT      CORONARY BYPASS GRAFT ANGIOGRAPHY  09/15/2021    Procedure: Bypass graft study;  Surgeon: Ken Ibarra MD;  Location: [facility] CATH LAB;  Service: Cardiology;;    LEFT HEART CATHETERIZATION N/A 05/22/2019    Procedure: CATHETERIZATION, HEART, LEFT;  Surgeon: Ken Ibarra MD;  Location: [facility] CATH LAB;  Service: Cardiology;  Laterality: N/A;    LUMBAR DISCECTOMY  11/01/2022    LUMBAR LAMINECTOMY      RIGHT HEART CATHETERIZATION Right 7/14/2023    Procedure: INSERTION, CATHETER, RIGHT HEART;  Surgeon: Ken Will MD;  Location: [facility] CATH LAB;  Service: Cardiology;  Laterality: Right;    RIGHT HEART CATHETERIZATION Right 7/17/2023    Procedure: INSERTION, CATHETER, RIGHT HEART;  Surgeon: Dalia Amezquita DO;  Location: [facility] CATH LAB;  Service: Cardiology;  Laterality: Right;    ROTATOR CUFF REPAIR      SHOULDER SURGERY      TONSILLECTOMY Left 2015     "    Family History   Problem Relation Age of Onset    Heart disease Father     Hypertension Father     Heart failure Father     Heart attack Father     Diabetes Maternal Grandmother     Diabetes Maternal Grandfather     Melanoma Neg Hx         Review of patient's allergies indicates:  No Known Allergies     Current Outpatient Medications   Medication Instructions    allopurinoL (ZYLOPRIM) 300 mg, Oral, Nightly    aspirin (ECOTRIN) 81 mg, Oral, Daily    atorvastatin (LIPITOR) 80 mg, Oral, Daily    colchicine (gout) (COLCRYS) 0.6 mg, Oral, Daily    metoprolol succinate (TOPROL-XL) 50 mg, Oral, Daily    sildenafiL (VIAGRA) 100 mg, Oral, Daily PRN    spironolactone (ALDACTONE) 25 mg, Oral, Daily    tamsulosin (FLOMAX) 0.4 mg Cap 1 capsule, Oral, Daily    torsemide (DEMADEX) 40 mg, Oral, Daily        Vitals:    08/16/23 1412   BP: (!) 122/59   Pulse: 61        Wt Readings from Last 3 Encounters:   08/16/23 97.8 kg (215 lb 9.8 oz)   08/16/23 91.2 kg (201 lb)   07/20/23 91.6 kg (201 lb 15.1 oz)     Temp Readings from Last 3 Encounters:   07/20/23 97.7 °F (36.5 °C) (Oral)   07/14/23 97.7 °F (36.5 °C) (Temporal)   05/18/23 98.1 °F (36.7 °C)     BP Readings from Last 3 Encounters:   08/16/23 (!) 122/59   08/16/23 120/70   07/20/23 123/63     Pulse Readings from Last 3 Encounters:   08/16/23 61   08/16/23 75   07/20/23 66        Body mass index is 28.45 kg/m². Estimated body surface area is 2.24 meters squared as calculated from the following:    Height as of this encounter: 6' 1" (1.854 m).    Weight as of this encounter: 97.8 kg (215 lb 9.8 oz).     Physical Exam  Constitutional:       Appearance: He is well-developed.   HENT:      Head: Normocephalic and atraumatic.      Right Ear: External ear normal.      Left Ear: External ear normal.   Eyes:      Conjunctiva/sclera: Conjunctivae normal.      Pupils: Pupils are equal, round, and reactive to light.   Neck:      Vascular: Hepatojugular reflux and JVD present.      " Comments: JVP 16 cmH20  Cardiovascular:      Rate and Rhythm: Normal rate and regular rhythm.      Pulses: Intact distal pulses.      Heart sounds: S1 normal and S2 normal. Murmur heard.      High-pitched blowing holosystolic murmur is present with a grade of 3/6 at the apex.      No friction rub. Gallop present. S3 sounds present.   Pulmonary:      Effort: Pulmonary effort is normal.      Breath sounds: Normal breath sounds.   Abdominal:      General: Bowel sounds are normal. There is no distension.      Palpations: Abdomen is soft.      Tenderness: There is no abdominal tenderness. There is no guarding or rebound.   Musculoskeletal:      Cervical back: Normal range of motion and neck supple.      Right lower leg: 3+ Edema present.      Left lower leg: 3+ Edema present.   Neurological:      Mental Status: He is alert and oriented to person, place, and time.          Lab Results   Component Value Date     (H) 07/03/2023     08/16/2023    K 4.7 08/16/2023    MG 2.5 07/20/2023     08/16/2023    CO2 19 (L) 08/16/2023    BUN 83 (H) 08/16/2023    CREATININE 2.0 (H) 08/16/2023     08/16/2023    HGBA1C 5.5 07/15/2021    AST 23 07/19/2023    ALT 24 07/19/2023    ALBUMIN 3.8 07/19/2023    ALBUMIN 4.0 06/21/2017    PROT 7.6 07/19/2023    BILITOT 0.7 07/19/2023    WBC 8.17 07/20/2023    HGB 15.7 07/20/2023    HCT 47.9 07/20/2023    HCT 44 07/16/2023     07/20/2023    INR 1.0 04/22/2019    TSH 1.856 04/06/2022    CHOL 164 07/03/2023    HDL 46 07/03/2023    LDLCALC 103.6 07/03/2023    TRIG 72 07/03/2023           Results for orders placed during the hospital encounter of 08/16/23    Echo Saline Bubble? No    Interpretation Summary    Left Ventricle: The left ventricle is moderately dilated. Normal wall thickness. regional wall motion abnormalities present. Septal flattening in diastole and systole consistent with right ventricular volume and pressure overload. There is low normal systolic  function with a visually estimated ejection fraction of 50 - 55%. There is diastolic dysfunction.    Right Ventricle: Moderate right ventricular enlargement. Systolic function is severely reduced.    Left Atrium: Left atrium is severely dilated.    Mitral Valve: There is severe regurgitation with a posterolateral eccentriccally directed jet. MR is now severe by PISA calculations with an ERO of 0.5 cm2 and RVol of 76 ml. There is systolic flow reversal in the pulmonary veins.    Tricuspid Valve: There is mild regurgitation.    Pulmonary Artery: The estimated pulmonary artery systolic pressure is 73 mmHg.    IVC/SVC: Elevated venous pressure at 15 mmHg.        Results for orders placed during the hospital encounter of 07/14/23    Cardiac catheterization    Conclusion    The estimated blood loss was <50 mL.    Summary  RA 10  RV 57/10  PA 58/28 mean 38  PCWP 28v48  PA saturation 66%  Ao saturation 98%  CO/CI 4.8/2.21  PVR 2.1 APPIAH, consistent with post-capillary pulmonary venous hypertension  SVR 1267  /66 MAP 86  HR 68 SR with occasional PVCs  Hb 14.3  BSA 2.16         Assessment and Plan:  Benign prostatic hyperplasia, unspecified whether lower urinary tract symptoms present  -     Ambulatory referral/consult to Urology; Future; Expected date: 08/23/2023    Chronic systolic congestive heart failure  -     Basic Metabolic Panel; Future; Expected date: 08/21/2023  -     NT-Pro Natriuretic Peptide; Future; Expected date: 08/21/2023  -     CBC Auto Differential; Future; Expected date: 10/23/2023  -     Comprehensive Metabolic Panel; Future; Expected date: 10/23/2023  -     NT-Pro Natriuretic Peptide; Future; Expected date: 10/23/2023  -     Cancel: Ambulatory referral/consult to Urology; Future; Expected date: 08/23/2023    Acute on chronic combined systolic and diastolic heart failure    Coronary artery disease involving native coronary artery of native heart without angina pectoris    Chronic systolic heart  failure    Dyslipidemia    Primary hypertension    S/P CABG (coronary artery bypass graft)    Other orders  -     torsemide (DEMADEX) 20 MG Tab; Take 2 tablets (40 mg total) by mouth 2 (two) times a day.  Dispense: 120 tablet; Refill: 4  -     potassium chloride SA (K-DUR,KLOR-CON) 20 MEQ tablet; Take 2 tablets (40 mEq total) by mouth once daily.  Dispense: 30 tablet; Refill: 50          Acute on chronic systolic HF, LVEF 50-55%  Etiology: ICM CAD. Now valvular heart disease.  Hemodynamic status: warm, normotensive, hypervolemic (15 pounds over dry weight).   Plan:  -increase torsemide from 40 mg daily to 40 mg twice a day.  -Start potassium 40 meq daily.   -patient was asked to d/c his over the counter potassium/Magnesium.  -Follow up blood test on Monday am.  -patient was asked to call us Monday afternoon to report weight, symptoms and discuss plan.  -Follow up in 2 months with labs.    2. Severe MR.  IC was consulted while patient was hospitalized. Patient has f/u with them for evaluation for possible mitral clip.     3. CAD s/p CABG.  Last [facility] in 2021 with unchanged anatomy and no PCI was done. On bblocker, ACEI, lipitor, asa.     3.  Dyslipidemia.  Last LDL chol 62. Lipitor.

## 2023-08-16 NOTE — NURSING NOTE
Patient identified by 2 identifiers. Allergies reviewed, procedure explained and pt verbalized understanding.  22 g IV placed to LAC, flushed w/ 10cc NS pre & post contrast administration.  3cc Optison administered by sonographer, echo images obtained.  Pt tolerated procedure well.  IV D/C'ed per Scott presandy dsg applied.  Pt D/C'ed to home.

## 2023-08-16 NOTE — PATIENT INSTRUCTIONS
You have too much extra fluid on you.  Please adhere to a low sodium diet (no more than 1.5 grams of sodium in 24h).  3.   Follow fluid restriction of  2. no more than 1.5 liters in 24 hours..   4.  Increase torsemide from 40 mg daily to 40 mg twice a day.  5. Start potassium 40 meq daily. Please stop your over the counter potassium/Magnesium.  6. Follow up blood test on Monday am.  7. Call us Monday afternoon to report weight, symptoms and discuss plan.  8. Follow up in 2 months with labs.

## 2023-08-21 PROBLEM — Z87.39 HX OF CALCIUM PYROPHOSPHATE DEPOSITION DISEASE (CPPD): Status: RESOLVED | Noted: 2021-08-16 | Resolved: 2023-01-01

## 2023-08-21 PROBLEM — R15.1 FECAL SMEARING: Status: RESOLVED | Noted: 2018-06-27 | Resolved: 2023-01-01

## 2023-08-21 PROBLEM — R15.0 INCOMPLETE DEFECATION: Status: RESOLVED | Noted: 2018-06-27 | Resolved: 2023-01-01

## 2023-08-21 NOTE — PROGRESS NOTES
CHIEF COMPLAINT:    Mr. Fonseca is a 77 y.o. male presenting for a consultation at the request of Dr. Will. Patient presents with LUTS.    PRESENTING ILLNESS:    Jose Fonseca Jr. is a 77 y.o. male who c/o LUTS. Nocturia 3-4x. +urgency, weak stream, post void dribbling.  Is on flomax.  His LUTS worsened after starting demadex.    He has ED. Took Viagra with no improvement. He's not bothered by this.    REVIEW OF SYSTEMS:    Jose Fonseca Jr. denies headache, blurred vision, fever, nausea, vomiting, chills, abdominal pain, chest pain, sore throat, bleeding per rectum, cough, SOB, recent loss of consciousness, recent mental status changes, seizures, dizziness, or upper or lower extremity weakness.    GRISELDA  1. 1  2. 1  3. 1  4. 1  5. 0      PATIENT HISTORY:    Past Medical History:   Diagnosis Date    Coronary artery disease     S/P 3 vessel CABG; stents    Glaucoma     HTN (hypertension) 4/19/2013    Hx of calcium pyrophosphate deposition disease (CPPD) 8/16/2021    XR CPPD knees 2021    Hyperlipidemia     Myocardial infarction     Obstructive sleep apnea     Post PTCA 2/18/2015    S/P CABG (coronary artery bypass graft) 2/18/2015    S/P L arthroscopy of shoulder (3/18/15) 4/1/2015    Squamous cell carcinoma        Past Surgical History:   Procedure Laterality Date    ADENOIDECTOMY      ANGIOGRAM, CORONARY, WITH LEFT HEART CATHETERIZATION N/A 09/15/2021    Procedure: ANGIOGRAM,CORONARY,WITH LEFT HEART CATHETERIZATION;  Surgeon: Ken Ibarra MD;  Location: [facility] CATH LAB;  Service: Cardiology;  Laterality: N/A;    CATARACT EXTRACTION      patient not sure which eye    CORONARY ANGIOGRAPHY N/A 05/22/2019    Procedure: ANGIOGRAM, CORONARY ARTERY;  Surgeon: Ken Ibarra MD;  Location: [facility] CATH LAB;  Service: Cardiology;  Laterality: N/A;    CORONARY ANGIOGRAPHY INCLUDING BYPASS GRAFTS WITH CATHETERIZATION OF LEFT HEART Right 04/22/2019    Procedure: ANGIOGRAM, CORONARY, INCLUDING  BYPASS GRAFT, WITH LEFT HEART CATHETERIZATION;  Surgeon: Ken Ibarra MD;  Location: [facility] CATH LAB;  Service: Cardiology;  Laterality: Right;    CORONARY ANGIOPLASTY      CORONARY ARTERY BYPASS GRAFT      CORONARY BYPASS GRAFT ANGIOGRAPHY  09/15/2021    Procedure: Bypass graft study;  Surgeon: Ken Ibarra MD;  Location: [facility] CATH LAB;  Service: Cardiology;;    LEFT HEART CATHETERIZATION N/A 2019    Procedure: CATHETERIZATION, HEART, LEFT;  Surgeon: Ken Ibarra MD;  Location: [facility] CATH LAB;  Service: Cardiology;  Laterality: N/A;    LUMBAR DISCECTOMY  2022    LUMBAR LAMINECTOMY      RIGHT HEART CATHETERIZATION Right 2023    Procedure: INSERTION, CATHETER, RIGHT HEART;  Surgeon: Ken Will MD;  Location: [facility] CATH LAB;  Service: Cardiology;  Laterality: Right;    RIGHT HEART CATHETERIZATION Right 2023    Procedure: INSERTION, CATHETER, RIGHT HEART;  Surgeon: Dalia Amezquita DO;  Location: [facility] CATH LAB;  Service: Cardiology;  Laterality: Right;    ROTATOR CUFF REPAIR      SHOULDER SURGERY      TONSILLECTOMY Left        Family History   Problem Relation Age of Onset    Heart disease Father     Hypertension Father     Heart failure Father     Heart attack Father     Diabetes Maternal Grandmother     Diabetes Maternal Grandfather     Melanoma Neg Hx        Social History     Socioeconomic History    Marital status:    Tobacco Use    Smoking status: Former     Current packs/day: 0.00     Average packs/day: 1 pack/day for 31.0 years (31.0 ttl pk-yrs)     Types: Cigarettes     Start date: 1960     Quit date: 1991     Years since quittin.3    Smokeless tobacco: Never   Substance and Sexual Activity    Alcohol use: Yes     Alcohol/week: 2.0 standard drinks of alcohol     Types: 2 Glasses of wine per week     Comment: Socially    Drug use: No       Allergies:  Patient has no known allergies.    Medications:    Current Outpatient Medications:      aspirin (ECOTRIN) 81 MG EC tablet, Take 1 tablet (81 mg total) by mouth once daily., Disp: 30 tablet, Rfl: 11    atorvastatin (LIPITOR) 80 MG tablet, Take 1 tablet (80 mg total) by mouth once daily., Disp: 90 tablet, Rfl: 3    colchicine (COLCRYS) 0.6 mg tablet, Take 0.6 mg by mouth once daily., Disp: , Rfl:     metoprolol succinate (TOPROL-XL) 50 MG 24 hr tablet, Take 1 tablet (50 mg total) by mouth once daily., Disp: 30 tablet, Rfl: 11    potassium chloride SA (K-DUR,KLOR-CON) 20 MEQ tablet, Take 2 tablets (40 mEq total) by mouth once daily., Disp: 30 tablet, Rfl: 50    spironolactone (ALDACTONE) 25 MG tablet, Take 1 tablet (25 mg total) by mouth once daily., Disp: 30 tablet, Rfl: 11    tamsulosin (FLOMAX) 0.4 mg Cap, Take 1 capsule by mouth once daily., Disp: , Rfl:     torsemide (DEMADEX) 20 MG Tab, Take 2 tablets (40 mg total) by mouth 2 (two) times a day., Disp: 120 tablet, Rfl: 4    allopurinol (ZYLOPRIM) 300 MG tablet, Take 1 tablet (300 mg total) by mouth every evening., Disp: 30 tablet, Rfl: 6    sildenafil (VIAGRA) 100 MG tablet, Take 1 tablet (100 mg total) by mouth daily as needed for Erectile Dysfunction., Disp: 6 tablet, Rfl: 12    PHYSICAL EXAMINATION:    The patient generally appears in good health, is appropriately interactive, and is in no apparent distress.     Eyes: anicteric sclerae, moist conjunctivae; no lid-lag; PERRLA     HENT: Atraumatic; oropharynx clear with moist mucous membranes and no mucosal ulcerations;normal hard and soft palate.  No evidence of lymphadenopathy.    Neck: Trachea midline.  No thyromegaly.    Skin: No lesions.    Mental: Cooperative with normal affect.  Is oriented to time, place, and person.    Neuro: Grossly intact.    Chest: Normal inspiratory effort.   No accessory muscles.  No audible wheezes.  Respirations symmetric on inspiration and expiration.    Heart: Regular rhythm.      Abdomen:  Soft, non-tender. No masses or organomegaly. Bladder is not palpable. No  evidence of flank discomfort. No evidence of inguinal hernia.    Genitourinary: The penis is circumcised with no evidence of plaques or induration. The urethral meatus is normal. The testes, epididymides, and cord structures are normal in size and contour bilaterally. The scrotum is normal in size and contour.    Normal anal sphincter tone. No rectal mass.    The prostate is 40 g. Normal landmarks. Lateral sulci. Median furrow intact.  No nodularity or induration. Seminal vesicles are normal.    Extremities: No clubbing, cyanosis, or edema      LABS:      Lab Results   Component Value Date    PSA 2.5 04/06/2022    PSA 3.0 01/07/2021    PSA 1.5 12/19/2018    PSADIAG 1.7 12/21/2015    PSADIAG 2.0 11/03/2015    PSATOTAL 1.0 09/27/2004    PSAFREE 0.31 09/27/2004    PSAFREEPCT 31.00 09/27/2004       IMPRESSION:    Encounter Diagnoses   Name Primary?    BPH with urinary obstruction          PLAN:    1. Discussed options for his LUTS.  He's on demadex BID.  Discussed that taking it before bed will worsen nocturia.  Should discuss with the prescribing physician.  Overall, he's pleased with how he voids.  2. Continue flomax.  Refilled by PCP.  3. RTC prn.    Copy to:

## 2023-08-22 NOTE — TELEPHONE ENCOUNTER
8/22/23 - Received below message from patient through patient portal:    good morning,  i saw you on the 16th and after my visit and before taking a second dose that afternoon, I weighed 212lbs. the following days,  my weight was 202.4, 199.0, 200.1, and this am 199.4.(same scale, same dress).     i am weak and tired.      I had blood drawn this am.     The am dosage is not very productive but the pm is, multiple visits thru the night.      Any suggestions as to dosage and frequency.     Harley ortega    I responded to patient via portal and asked for his BP reading's and received the below response:    Morning, 120/130 to 60/80.  basicly the same as my svetlana in the hospital and recent office visits.   Regards,    I forwarded message to LARRY Del Cid M.D., for is review and orders.  Dr. Del Cid responded directly to patient with the below orders.:      It seems you may have lost the extra fluid you had on you.  I suggest the following:  -decrease torsemide from 40 mg BID to 40 mg daily.   -decrease potassium from 40 meq daily to 20 meq daily  Follow up blood test Tursday am and next week.  You will monitor your weight and take extra dose of torsemide if weight goes up more than 3 pounds in few days or 5 pounds or above in 1 week..  Thank you    Orders entered for BMP and message sent to schedulers to contact patient to schedule lab appt this Thursday and again next Thursday, along with remind me's.

## 2023-08-24 NOTE — TELEPHONE ENCOUNTER
8/24/2023    Received and reviewed BMP; decrease in torsemide and potassium, see LARRY Noble NN 8/211/2023.    NA+/K+:  135/5.1    BUN/CR:  77/2.1    Spoke with patient via phone.  Patient reports no SOB or edema in BLE or abd.  Patient reports weight at 199 lbs (weight range of 199- 201 lbs).  Patient states that he feels less fatigue and tiredness since making medication changes; patient confirms Torsemide 40 mg po daily and potassium 40 meq PO daily.  Confirmed with patient repeat lab draw on 8/31/2023.    Discussed and reviewed the above with MD Sonam Will.  Received VORB LARRY Will/ROMARIO Miller BSN RN: Please confirm potassium dose is 40 meq and not 20 meq as previously suggested?  Instructed patient again to continue with decreased torsemide dose from 40 mg BID to 40 mg daily and decrease potassium from 40 meq daily to 20 meq daily.  Instructed patient  to monitor his weight daily and take an extra dose of torsemide if weight goes up more than 3 pounds in few days or 5 pounds or above in 1 week.    Sent a message to patient through the patient portal per his request with communication details from MD Sonam Will.  See Message thread on 8/24/2023.   Instructed patient again to continue with decreased torsemide dose from 40 mg BID to 40 mg daily and decrease potassium from 40 meq daily to 20 meq daily.  Instructed patient  to monitor his weight daily and take an extra dose of torsemide if weight goes up more than 3 pounds in few days or 5 pounds or above in 1 week.    8/25/2023 1500    Spoke with patient by phone. Confirmed with patient potassium dose was decreased to potassium 20 meq PO daily.  Patient confirms having received message sent through patient portal with all instructions for medication changes and plan of care were included.

## 2023-08-31 NOTE — TELEPHONE ENCOUNTER
5:00 pm:  F/U on todays nurse lab phone reminder  See NN by FE Cheng 8/24/23  Lab resutls today in epic and stable  K 5.0  from 4.8  Cr:  1.7  from 2.1    Will send message to pt as per documentation , this seems pt preferred method of communication    Will confirm in this message w/ pt dose of Torsemide and K as in previous NN  And wts/assessment.     5:10 pm  Sent the message below to pt via the portal:      Good evening     Wanted to let you know your lab results have resulted and are stable    Your kidney function has improved  Your potassium level is a the upper range of normal @ 5.1    Wanted to confirm with you:  Are you taking Torsemide 40 mg once daily? And have you had to take any additional doses:  Are you taking Potassium 20 meq once daily ?      Also :  What have your daily wts been the past week?  Any signs of fluid: Any swelling to legs/ankles or feet?  Any Abdominal distention  Any heavy breathing with activity?    At this time I would encourage you to follow a low potassium diet:  Refrain from bananas and citrus fruits and juices      Will await your response to questions    Sincerely  Gail

## 2023-09-01 NOTE — TELEPHONE ENCOUNTER
1000 am: See [employer] messaging w/ report of pts labs -with improvement of renal function w/ lower torsemide and wt has fluctuated 200-203 ( up and down)   K up a little     Routing all to Dr. Masters for his review and response.     1030 am: Here is Dr. Vargas response to [employer] that I copied and forwarded to pt at this time  I will update medication list    I think he will need to take extra dose of torsemide evry now and then to keep the progress made. It seems that if he is maintained in once a day torsemide, he will gain the fluid weight.     I suggest that if his weight goes up to 205 he takes one extra dose of torsemide 40 mg and potassium 20 meq. I do not think he will need to do this every day. But I suggest he defends a weight of 205 or less on his scale.   I hope I explained myself.   Thx.

## 2023-09-01 NOTE — TELEPHONE ENCOUNTER
"1040 am:  After further review of pts response to my questions;  Noted pt answered "no" to the question of : Are you takng Potassium 20 meq once daily     Just called and spoke w/ pt   Pt told me he is taking one of the 20 meq potassium tablets and has been doing so for a week now.   "

## 2023-09-12 PROBLEM — I70.0 ATHEROSCLEROSIS OF AORTA: Status: ACTIVE | Noted: 2023-01-01

## 2023-09-12 NOTE — ASSESSMENT & PLAN NOTE
Patient has dyspnea on minimal exertion   Has severe MR  Etiology, posterior leaflet is tethered   Had coronary angiogram in 2021 to evaluate ischemic etiology of the MR, LIMA-OM was patent and SVG-RCA patent with patent LAD stent   Will refer him for surgical eval to evaluate candidacy for mitral repair or replacement   If he is not surgical candidate then will proceed with Mitral Clip   Antiplatelet would be Aspirin alone

## 2023-09-12 NOTE — ASSESSMENT & PLAN NOTE
Continue high intensity statin and antihypertensive medications.  We will continue to monitor.  Follow-up with primary care cardiology for risk factor management.

## 2023-09-12 NOTE — PROGRESS NOTES
[facility] - Vasyl Morris MD referred by Ken Felipe MD  Subjective:   Patient ID:  Jose Fonseca Jr. is a 77 y.o. male who presents for evaluation of Mitral Clip     HPI: 76 yo Male. Hx of coronary artery disease with LIMA-OM, SVG to dRCA and PCI to LAD. Hx of CKD III-IV. Patient presented for evaluation of MR, he has severe MR, eccentric jet. Patient has dyspnea with minimal exertion. Denies chest pain, but dyspnea is limiting his activities. He is monitoring his weight and fluid intake very carefully     Jose Fonseca Jr. is a 77 y.o. male referred by Dr Irwin for evaluation of severe MR (NYHA Class III sx).    Mitral Valve Disease Etiology: Other/Indeterminate    The patient has undergone the following MitraClip work-up:   TTE (Date 09/12/2023 ): Severe mitral insufficiency, EOA 0.5 cm2, EF= 50-55%.   LHC (Date 09/2021):  Patent LIMA to OM and SVG to RCA.    CT Surgery risk assessment: pending   PFTs: FEV1 not indicated  Comorbidities: coronary artery disease, hx of CABG, CKD   Labs: Hgb 15.7, Cr 1.7, BNP 2266     History:     Past Medical History:   Diagnosis Date    Coronary artery disease     S/P 3 vessel CABG; stents    Glaucoma     HTN (hypertension) 4/19/2013    Hx of calcium pyrophosphate deposition disease (CPPD) 8/16/2021    XR CPPD knees 2021    Hyperlipidemia     Myocardial infarction     Obstructive sleep apnea     Post PTCA 2/18/2015    S/P CABG (coronary artery bypass graft) 2/18/2015    S/P L arthroscopy of shoulder (3/18/15) 4/1/2015    Squamous cell carcinoma      Past Surgical History:   Procedure Laterality Date    ADENOIDECTOMY      ANGIOGRAM, CORONARY, WITH LEFT HEART CATHETERIZATION N/A 09/15/2021    Procedure: ANGIOGRAM,CORONARY,WITH LEFT HEART CATHETERIZATION;  Surgeon: Ken Ibarra MD;  Location: [facility] CATH LAB;  Service: Cardiology;  Laterality: N/A;    CATARACT EXTRACTION      patient not sure which eye    CORONARY ANGIOGRAPHY N/A  2019    Procedure: ANGIOGRAM, CORONARY ARTERY;  Surgeon: Ken Ibarra MD;  Location: [facility] CATH LAB;  Service: Cardiology;  Laterality: N/A;    CORONARY ANGIOGRAPHY INCLUDING BYPASS GRAFTS WITH CATHETERIZATION OF LEFT HEART Right 2019    Procedure: ANGIOGRAM, CORONARY, INCLUDING BYPASS GRAFT, WITH LEFT HEART CATHETERIZATION;  Surgeon: Ken Ibarra MD;  Location: [facility] CATH LAB;  Service: Cardiology;  Laterality: Right;    CORONARY ANGIOPLASTY      CORONARY ARTERY BYPASS GRAFT      CORONARY BYPASS GRAFT ANGIOGRAPHY  09/15/2021    Procedure: Bypass graft study;  Surgeon: Ken Ibarra MD;  Location: [facility] CATH LAB;  Service: Cardiology;;    LEFT HEART CATHETERIZATION N/A 2019    Procedure: CATHETERIZATION, HEART, LEFT;  Surgeon: Ken Ibarra MD;  Location: [facility] CATH LAB;  Service: Cardiology;  Laterality: N/A;    LUMBAR DISCECTOMY  2022    LUMBAR LAMINECTOMY      RIGHT HEART CATHETERIZATION Right 2023    Procedure: INSERTION, CATHETER, RIGHT HEART;  Surgeon: Ken Will MD;  Location: [facility] CATH LAB;  Service: Cardiology;  Laterality: Right;    RIGHT HEART CATHETERIZATION Right 2023    Procedure: INSERTION, CATHETER, RIGHT HEART;  Surgeon: Dalia Amezquita DO;  Location: [facility] CATH LAB;  Service: Cardiology;  Laterality: Right;    ROTATOR CUFF REPAIR      SHOULDER SURGERY      TONSILLECTOMY Left      Social History     Tobacco Use    Smoking status: Former     Current packs/day: 0.00     Average packs/day: 1 pack/day for 31.0 years (31.0 ttl pk-yrs)     Types: Cigarettes     Start date: 1960     Quit date: 1991     Years since quittin.4    Smokeless tobacco: Never   Substance Use Topics    Alcohol use: Yes     Alcohol/week: 2.0 standard drinks of alcohol     Types: 2 Glasses of wine per week     Comment: rare     Family History   Problem Relation Age of Onset    Heart disease Father     Hypertension Father     Heart failure Father      "Heart attack Father     Diabetes Maternal Grandmother     Diabetes Maternal Grandfather     Melanoma Neg Hx        Meds:   Review of patient's allergies indicates:  Not on File    Current Outpatient Medications:     allopurinol (ZYLOPRIM) 300 MG tablet, Take 1 tablet (300 mg total) by mouth every evening., Disp: 30 tablet, Rfl: 6    aspirin (ECOTRIN) 81 MG EC tablet, Take 1 tablet (81 mg total) by mouth once daily., Disp: 30 tablet, Rfl: 11    atorvastatin (LIPITOR) 80 MG tablet, Take 1 tablet (80 mg total) by mouth once daily., Disp: 90 tablet, Rfl: 3    metoprolol succinate (TOPROL-XL) 50 MG 24 hr tablet, Take 1 tablet (50 mg total) by mouth once daily., Disp: 30 tablet, Rfl: 11    potassium chloride SA (K-DUR,KLOR-CON) 20 MEQ tablet, Take 1 tablet (20 mEq total) by mouth once daily. Take one additional 20 meq potassium with the extra dose of torsemide (no more than once weekly), Disp: 45 tablet, Rfl: 5    spironolactone (ALDACTONE) 25 MG tablet, Take 1 tablet (25 mg total) by mouth once daily., Disp: 30 tablet, Rfl: 11    tamsulosin (FLOMAX) 0.4 mg Cap, Take 1 capsule by mouth once daily., Disp: , Rfl:     torsemide (DEMADEX) 20 MG Tab, Take 2 tablets (40 mg total) by mouth once daily. Take additional 40  mg once weekly for wt > 205, Disp: 45 tablet, Rfl: 4    colchicine (COLCRYS) 0.6 mg tablet, Take 0.6 mg by mouth once daily., Disp: , Rfl:     sildenafil (VIAGRA) 100 MG tablet, Take 1 tablet (100 mg total) by mouth daily as needed for Erectile Dysfunction. (Patient not taking: Reported on 9/12/2023), Disp: 6 tablet, Rfl: 12      ROS 14 systems were reviewed, negative except above     Objective:   /65 (BP Location: Right arm, Patient Position: Sitting, BP Method: Large (Automatic))   Pulse 74   Ht 6' 1" (1.854 m)   Wt 94.4 kg (208 lb 1.8 oz)   SpO2 96%   BMI 27.46 kg/m²   Physical Exam  Gen awake and alert  Neck supple  Heart II/VI holosystolic murmur best heard at the apex  Lungs poor air entry " bilaterally   Abdomen soft non tender  Ext no edema   Labs:     Lab Results   Component Value Date     08/31/2023    K 5.0 08/31/2023     08/31/2023    CO2 21 (L) 08/31/2023    BUN 65 (H) 08/31/2023    CREATININE 1.7 (H) 08/31/2023    GLUCOSE 118 (H) 11/29/2022    ANIONGAP 9 08/31/2023     Lab Results   Component Value Date    HGBA1C 5.5 07/15/2021     Lab Results   Component Value Date     (H) 07/03/2023    BNP 1,059 (H) 03/24/2023     (H) 01/10/2023       Lab Results   Component Value Date    WBC 8.17 07/20/2023    HGB 15.7 07/20/2023    HCT 47.9 07/20/2023    HCT 44 07/16/2023     07/20/2023    GRAN 4.5 07/20/2023    GRAN 54.7 07/20/2023     Lab Results   Component Value Date    CHOL 164 07/03/2023    HDL 46 07/03/2023    LDLCALC 103.6 07/03/2023    TRIG 72 07/03/2023       Lab Results   Component Value Date     08/31/2023    K 5.0 08/31/2023     08/31/2023    CO2 21 (L) 08/31/2023    BUN 65 (H) 08/31/2023    CREATININE 1.7 (H) 08/31/2023    GLUCOSE 118 (H) 11/29/2022    ANIONGAP 9 08/31/2023     Lab Results   Component Value Date    HGBA1C 5.5 07/15/2021     Lab Results   Component Value Date     (H) 07/03/2023    BNP 1,059 (H) 03/24/2023     (H) 01/10/2023    Lab Results   Component Value Date    WBC 8.17 07/20/2023    HGB 15.7 07/20/2023    HCT 47.9 07/20/2023    HCT 44 07/16/2023     07/20/2023    GRAN 4.5 07/20/2023    GRAN 54.7 07/20/2023     Lab Results   Component Value Date    CHOL 164 07/03/2023    HDL 46 07/03/2023    LDLCALC 103.6 07/03/2023    TRIG 72 07/03/2023                Cardiovascular Imaging:       Echo:   EF   Date Value Ref Range Status   07/17/2023 60 % Final   07/03/2023 50 % Final   05/05/2021 55 % Final         Assessment & Plan:   Atherosclerosis of aorta  Continue high intensity statin and antihypertensive medications.  We will continue to monitor.  Follow-up with primary care cardiology for risk factor  management.    Severe mitral regurgitation  Patient has dyspnea on minimal exertion   Has severe MR  Etiology, posterior leaflet is tethered   Had coronary angiogram in 2021 to evaluate ischemic etiology of the MR, LIMA-OM was patent and SVG-RCA patent with patent LAD stent   Will refer him for surgical eval to evaluate candidacy for mitral repair or replacement   If he is not surgical candidate then will proceed with Mitral Clip   Antiplatelet would be Aspirin alone           Signed:  Kiki Keane MD  Interventional fellow     I have seen the patient, reviewed the Fellow's history and physical, assessment and plan. I have personally interviewed and examined the patient and agree with the findings.   77-year-old male with CAD and functional mitral regurgitation who is on optimal medical therapy per HTS and continues to have severe symptomatic.  It is amendable to MitraClip and will proceed.  LARRY Ibarra MD

## 2023-09-26 PROBLEM — N18.9 CKD (CHRONIC KIDNEY DISEASE): Status: ACTIVE | Noted: 2021-07-16

## 2023-09-26 NOTE — PROGRESS NOTES
Subjective:      Patient ID: Jose Fonseca Jr. is a 77 y.o. male.    Chief Complaint: No chief complaint on file.      HPI:  Jose Fonseca Jr. is a 77 y.o. male who presents for surgical evaluation of mitral regurgitation. Hx of coronary artery disease with LIMA-OM, SVG to dRCA and PCI to LAD in the 90s with Dr. Carlos. Hx of CKD III-IV. Patient presented for evaluation of MR, he has severe MR, eccentric jet. Patient has dyspnea with minimal exertion. Denies chest pain, but dyspnea is limiting his activities. Reports cleaning up some food he spilled caused SOB. He is monitoring his weight and fluid intake very carefully. Was admitted for heart failure exacerbation in the past couple months. Reports medication is currently keeping the fluid off.      Mitral Valve Disease Etiology: Other/Indeterminate     The patient has undergone the following MitraClip work-up:   TTE (Date 09/12/2023 ): Severe mitral insufficiency, EOA 0.5 cm2, EF= 50-55%.   LHC (Date 09/2021):  Patent LIMA to OM and SVG to RCA.  CT Surgery risk assessment: pending   PFTs: FEV1 not indicated  Comorbidities: coronary artery disease, hx of CABG, CKD   Labs: Hgb 15.7, Cr 1.7, BNP 2266       Family and social history reviewed    Current Outpatient Medications   Medication Instructions    allopurinoL (ZYLOPRIM) 300 mg, Oral, Nightly    aspirin (ECOTRIN) 81 mg, Oral, Daily    atorvastatin (LIPITOR) 80 mg, Oral, Daily    colchicine (gout) (COLCRYS) 0.6 mg, Oral, Daily    gabapentin (NEURONTIN) 300 MG capsule No dose, route, or frequency recorded.    meloxicam (MOBIC) 15 mg, Oral    metoprolol succinate (TOPROL-XL) 50 mg, Oral, Daily    potassium chloride SA (K-DUR,KLOR-CON) 20 MEQ tablet 20 mEq, Oral, Daily, Take one additional 20 meq potassium with the extra dose of torsemide (no more than once weekly)    sildenafiL (VIAGRA) 100 mg, Oral, Daily PRN    spironolactone (ALDACTONE) 25 mg, Oral, Daily    tamsulosin (FLOMAX) 0.4 mg Cap 1  capsule, Oral, Daily    torsemide (DEMADEX) 40 mg, Oral, Daily, Take additional 40  mg once weekly for wt > 205         Review of patient's allergies indicates:  Not on File  Past Medical History:   Diagnosis Date    Coronary artery disease     S/P 3 vessel CABG; stents    Glaucoma     HTN (hypertension) 4/19/2013    Hx of calcium pyrophosphate deposition disease (CPPD) 8/16/2021    XR CPPD knees 2021    Hyperlipidemia     Myocardial infarction     Obstructive sleep apnea     Post PTCA 2/18/2015    S/P CABG (coronary artery bypass graft) 2/18/2015    S/P L arthroscopy of shoulder (3/18/15) 4/1/2015    Squamous cell carcinoma      Past Surgical History:   Procedure Laterality Date    ADENOIDECTOMY      ANGIOGRAM, CORONARY, WITH LEFT HEART CATHETERIZATION N/A 09/15/2021    Procedure: ANGIOGRAM,CORONARY,WITH LEFT HEART CATHETERIZATION;  Surgeon: Ken Ibarra MD;  Location: [facility] CATH LAB;  Service: Cardiology;  Laterality: N/A;    CATARACT EXTRACTION      patient not sure which eye    CORONARY ANGIOGRAPHY N/A 05/22/2019    Procedure: ANGIOGRAM, CORONARY ARTERY;  Surgeon: Ken Ibarra MD;  Location: [facility] CATH LAB;  Service: Cardiology;  Laterality: N/A;    CORONARY ANGIOGRAPHY INCLUDING BYPASS GRAFTS WITH CATHETERIZATION OF LEFT HEART Right 04/22/2019    Procedure: ANGIOGRAM, CORONARY, INCLUDING BYPASS GRAFT, WITH LEFT HEART CATHETERIZATION;  Surgeon: Ken Ibarra MD;  Location: [facility] CATH LAB;  Service: Cardiology;  Laterality: Right;    CORONARY ANGIOPLASTY      CORONARY ARTERY BYPASS GRAFT      CORONARY BYPASS GRAFT ANGIOGRAPHY  09/15/2021    Procedure: Bypass graft study;  Surgeon: Ken Ibarra MD;  Location: [facility] CATH LAB;  Service: Cardiology;;    LEFT HEART CATHETERIZATION N/A 05/22/2019    Procedure: CATHETERIZATION, HEART, LEFT;  Surgeon: Ken Ibarra MD;  Location: [facility] CATH LAB;  Service: Cardiology;  Laterality: N/A;    LUMBAR DISCECTOMY  11/01/2022    LUMBAR LAMINECTOMY      RIGHT  HEART CATHETERIZATION Right 2023    Procedure: INSERTION, CATHETER, RIGHT HEART;  Surgeon: Ken Will MD;  Location: [facility] CATH LAB;  Service: Cardiology;  Laterality: Right;    RIGHT HEART CATHETERIZATION Right 2023    Procedure: INSERTION, CATHETER, RIGHT HEART;  Surgeon: Dalia Amezquita DO;  Location: [facility] CATH LAB;  Service: Cardiology;  Laterality: Right;    ROTATOR CUFF REPAIR      SHOULDER SURGERY      TONSILLECTOMY Left      Family History       Problem Relation (Age of Onset)    Diabetes Maternal Grandmother, Maternal Grandfather    Heart attack Father    Heart disease Father    Heart failure Father    Hypertension Father          Social History     Socioeconomic History    Marital status:    Tobacco Use    Smoking status: Former     Current packs/day: 0.00     Average packs/day: 1 pack/day for 31.0 years (31.0 ttl pk-yrs)     Types: Cigarettes     Start date: 1960     Quit date: 1991     Years since quittin.4    Smokeless tobacco: Never   Substance and Sexual Activity    Alcohol use: Yes     Alcohol/week: 2.0 standard drinks of alcohol     Types: 2 Glasses of wine per week     Comment: rare    Drug use: No       Current medications Reviewed    Review of Systems   Constitutional:  Positive for activity change.   HENT:  Negative for nosebleeds.    Eyes:  Negative for visual disturbance.   Respiratory:  Positive for shortness of breath.    Cardiovascular:  Negative for chest pain and leg swelling.   Gastrointestinal:  Negative for nausea.   Musculoskeletal:  Negative for gait problem.   Skin:  Negative for color change.   Neurological:  Positive for weakness.   Hematological:  Does not bruise/bleed easily.   Psychiatric/Behavioral:  Negative for sleep disturbance.      Objective:   Physical Exam  Vitals reviewed.   Constitutional:       General: He is not in acute distress.     Appearance: He is well-developed. He is not diaphoretic.   HENT:      Head:  Normocephalic and atraumatic.   Eyes:      Pupils: Pupils are equal, round, and reactive to light.   Neck:      Vascular: No JVD.   Cardiovascular:      Rate and Rhythm: Normal rate.   Pulmonary:      Effort: Pulmonary effort is normal. No respiratory distress.   Abdominal:      General: There is no distension.   Musculoskeletal:         General: Normal range of motion.      Cervical back: Normal range of motion.   Skin:     Coloration: Skin is not pale.   Neurological:      Mental Status: He is alert. Mental status is at baseline.   Psychiatric:         Speech: Speech normal.         Behavior: Behavior normal.         Thought Content: Thought content normal.         Judgment: Judgment normal.         Diagnostic Results: reviewed   TTE 8/2023    Left Ventricle: The left ventricle is moderately dilated. Normal wall thickness. regional wall motion abnormalities present. Septal flattening in diastole and systole consistent with right ventricular volume and pressure overload. There is low normal systolic function with a visually estimated ejection fraction of 50 - 55%. There is diastolic dysfunction.    Right Ventricle: Moderate right ventricular enlargement. Systolic function is severely reduced.    Left Atrium: Left atrium is severely dilated.    Mitral Valve: There is severe regurgitation with a posterolateral eccentriccally directed jet. MR is now severe by PISA calculations with an ERO of 0.5 cm2 and RVol of 76 ml. There is systolic flow reversal in the pulmonary veins.    Tricuspid Valve: There is mild regurgitation.    Pulmonary Artery: The estimated pulmonary artery systolic pressure is 73 mmHg.    IVC/SVC: Elevated venous pressure at 15 mmHg.  LV 6.6    Assessment:   MR  S/P CABG   Plan:     CTS Attending Note:    I have personally taken the history and examined this patient and agree with the JUAN's note as stated above.    Very pleasant 77-year-old gentleman with previous coronary artery bypass grafting in the  1990s.   He now has severe mitral regurgitation.  He is symptomatic for this.  Unfortunately, I believe he is inoperable due to pulmonary hypertension and severe reduction of right ventricular systolic function.  Additionally, he is at increased risk for renal failure.  Agree that transcatheter clip is the best option for him.

## 2023-09-29 NOTE — PROGRESS NOTES
You have been scheduled for your Mitraclip procedure on Wednesday, November 1, 2023.  Please report to the Cardiology Waiting Area on the Third floor of the hospital and check in at 10 AM. You will then be taken to the SSCU (Short Stay Cardiac Unit) and prepared for your procedure. Please be aware that this is not the time of your procedure but the time that you are to arrive.     Preperations for your procedure:  Shower with Dial soap the night before and the morning of your procedure.  No solid foods 8 hours prior to your procedure.  You may have clear liquids until the time of your arrival in SSCU which should be 2 hours prior to your procedure.  You are encouraged to drink at least 8 ounces prior to admission.  Patients with gastric Emptying issues should be fasting for 6- 8 hours prior to the procedure.  Clear liquids include water, black coffee, clear juices, and performance drinks - no pulp or milk.    Heart failure or dialysis patients will be limited to 8 ounces (1 cup) of clear liquids up until 2 hours of the procedure.  You may take your regular morning medications         with as much water as necessary.   4.  If you are on Pradaxa, Eliquis, or coumadin you can hold 3 days prior to the procedure.    Bring your cane and/or walker with you to the hospital.  Bring CPAP/BiPAP, or oxygen if you are on oxygen at home.  Call the office for any signs of infection ( fever, cough, pneumonia, urinary tract, etc.) We cannot implant a device in an infected patient.    The entire procedure may take up to three hours. After the procedure, you will return to your room on the third floor where you will be monitored closely for the next several hours.  Your length of stay in the hospital will be determined by your physician. You may be discharged the same day if your physician is satisfied with your progress.  YOu must have someone to drive you home.    Follow up After Your Procedure  It is required that you return to  "[facility] for follow up in 1month and in 1 year with an echo, lab work, and a visit with your physician. You can follow up with your regular Cardiologist regarding any other heart issues.     If you should have any questions, concerns, or need to change the date of your procedure, please call "[facility] @ [phone]            Special Instructions:    Continue your current medications.    THE ABOVE INSTRUCTIONS WERE GIVEN TO THE PATIENT VERBALLY AND THEY VERBALIZED UNDERSTANDING.  THEY DO NOT REQUIRE ANY SPECIAL NEEDS AND DO NOT HAVE ANY LEARNING BARRIERS.          Directions for Reporting to Cardiology Waiting Area in the Hospital  If you park in the Parking Garage:  Take elevators to the1st floor of the parking garage.  Continue past the gift shop, coffee shop, and piano.  Take a right and go to the gold elevators. (Elevator B)  Take the elevator to the 3rd floor.  Follow the arrow on the sign on the wall that says Cath Lab Registration/EP Lab Registration.  Follow the long hallway all the way around until you come to a big open area.  This is the registration area.  Check in at Reception Desk.    OR    If family is dropping you off:  Have them drop you off at the front of the Hospital under the green overhang.  Enter through the doors and take a right.  Take the E elevators to the 3rd floor Cardiology Waiting Area.  Check in at the Reception Desk in the waiting room.           "

## 2023-10-30 NOTE — PATIENT INSTRUCTIONS
You have too much extra fluid on you.  Please adhere to a low sodium diet (no more than 1.5 grams of sodium in 24h).  3.   Follow fluid restriction of  1. no more than 2 liters in 24 hours..   4.   Please increase torsemide from 40 mg daily to 40 mg twice a day.  5. Start potassium chloride 20 meq daily.  6. Follow blood test  Thursday am.  7. Call us Thursday pm to report weight, symptoms, and to discuss results/plan.  8. F/u in 2 months with labs.

## 2023-10-30 NOTE — PROGRESS NOTES
[facility] Follow up.      Attending Physician: Ken Will MD.  The patient's last visit with me was on 8/16/2023.         HPI.  76 yo man with PMH /o  MI in 1996 s/p PCI of LAD and CABG.  HFrEF, LVEF improved to 55% most recently (May 2021).     Comes for regular follow up.  He reports 2 months ago he started noticing weight gain, increasing dyspnea. He knew how to recognize fluid was building up and increased his lasix to 80 mg BID. Since then he had some improvement in symptoms. He also reported fatigue that he attributes in part to B-blockers he is using. His BNP is the highest in long time 1059.        Today August 16, 2023 he comes after recent hospitalization for acute on chronic HF decompensation. This was after undergoing RHC that found severe hypervolemia.  He also had evidence of severe MR on echo and RHC, that remained severe after diuresis to normal right side filling pressure.      Today he comes after vacation in [place] area. He has gained 15 pounds since discharge. He has edema and dyspnea.    Today October 30, 2023, he comes with several complains. He feels poorly. Weight gain, worsening dyspnea, decreased appetite. He can only walk short distances before getting O[place]. He does not add salt to the food but eats out or uber things VERY frequently. He recognizes that as a problem.    Review of Systems   Constitutional:  Positive for activity change, appetite change, fatigue and unexpected weight change. Negative for chills, diaphoresis and fever.   HENT:  Negative for nasal congestion, rhinorrhea and sore throat.    Eyes:  Negative for visual disturbance.   Respiratory:  Positive for cough, choking and shortness of breath.    Cardiovascular:  Positive for leg swelling. Negative for chest pain.   Gastrointestinal:  Negative for abdominal distention, abdominal pain,  diarrhea, nausea and vomiting.   Genitourinary:  Negative for difficulty urinating, dysuria and hematuria.   Integumentary:  Negative for rash.   Neurological:  Negative for seizures, syncope and light-headedness.   Psychiatric/Behavioral:  Negative for agitation and hallucinations.         Past Medical History:   Diagnosis Date    Coronary artery disease     S/P 3 vessel CABG; stents    Glaucoma     HTN (hypertension) 4/19/2013    Hx of calcium pyrophosphate deposition disease (CPPD) 8/16/2021    XR CPPD knees 2021    Hyperlipidemia     Myocardial infarction     Obstructive sleep apnea     Post PTCA 2/18/2015    S/P CABG (coronary artery bypass graft) 2/18/2015    S/P L arthroscopy of shoulder (3/18/15) 4/1/2015    Squamous cell carcinoma         Past Surgical History:   Procedure Laterality Date    ADENOIDECTOMY      ANGIOGRAM, CORONARY, WITH LEFT HEART CATHETERIZATION N/A 09/15/2021    Procedure: ANGIOGRAM,CORONARY,WITH LEFT HEART CATHETERIZATION;  Surgeon: Ken Ibarra MD;  Location: [facility] CATH LAB;  Service: Cardiology;  Laterality: N/A;    CATARACT EXTRACTION      patient not sure which eye    CORONARY ANGIOGRAPHY N/A 05/22/2019    Procedure: ANGIOGRAM, CORONARY ARTERY;  Surgeon: Ken Ibarra MD;  Location: [facility] CATH LAB;  Service: Cardiology;  Laterality: N/A;    CORONARY ANGIOGRAPHY INCLUDING BYPASS GRAFTS WITH CATHETERIZATION OF LEFT HEART Right 04/22/2019    Procedure: ANGIOGRAM, CORONARY, INCLUDING BYPASS GRAFT, WITH LEFT HEART CATHETERIZATION;  Surgeon: Ken Ibarra MD;  Location: [facility] CATH LAB;  Service: Cardiology;  Laterality: Right;    CORONARY ANGIOPLASTY      CORONARY ARTERY BYPASS GRAFT      CORONARY BYPASS GRAFT ANGIOGRAPHY  09/15/2021    Procedure: Bypass graft study;  Surgeon: Ken Ibarra MD;  Location: [facility] CATH LAB;  Service: Cardiology;;    LEFT HEART CATHETERIZATION N/A 05/22/2019    Procedure: CATHETERIZATION, HEART, LEFT;  Surgeon: Ken Ibarra MD;  Location: [facility]  CATH LAB;  Service: Cardiology;  Laterality: N/A;    LUMBAR DISCECTOMY  11/01/2022    LUMBAR LAMINECTOMY      RIGHT HEART CATHETERIZATION Right 7/14/2023    Procedure: INSERTION, CATHETER, RIGHT HEART;  Surgeon: Ken Will MD;  Location: [facility] CATH LAB;  Service: Cardiology;  Laterality: Right;    RIGHT HEART CATHETERIZATION Right 7/17/2023    Procedure: INSERTION, CATHETER, RIGHT HEART;  Surgeon: Dalia Amezquita DO;  Location: [facility] CATH LAB;  Service: Cardiology;  Laterality: Right;    ROTATOR CUFF REPAIR      SHOULDER SURGERY      TONSILLECTOMY Left 2015        Family History   Problem Relation Age of Onset    Heart disease Father     Hypertension Father     Heart failure Father     Heart attack Father     Diabetes Maternal Grandmother     Diabetes Maternal Grandfather     Melanoma Neg Hx         Review of patient's allergies indicates:  No Known Allergies     Current Outpatient Medications   Medication Instructions    allopurinoL (ZYLOPRIM) 300 mg, Oral, Nightly    aspirin (ECOTRIN) 81 mg, Oral, Daily    atorvastatin (LIPITOR) 80 mg, Oral, Daily    colchicine (gout) (COLCRYS) 0.6 mg, Oral, Daily    gabapentin (NEURONTIN) 300 MG capsule No dose, route, or frequency recorded.    meloxicam (MOBIC) 15 mg, Oral    metoprolol succinate (TOPROL-XL) 50 mg, Oral, Daily    potassium chloride SA (K-DUR,KLOR-CON) 20 MEQ tablet 20 mEq, Oral, Daily, Take one additional 20 meq potassium with the extra dose of torsemide (no more than once weekly)    sildenafiL (VIAGRA) 100 mg, Oral, Daily PRN    spironolactone (ALDACTONE) 25 mg, Oral, Daily    tamsulosin (FLOMAX) 0.4 mg Cap 1 capsule, Oral, Daily    torsemide (DEMADEX) 40 mg, Oral, Daily, Take additional 40  mg once weekly for wt > 205        There were no vitals filed for this visit.     Wt Readings from Last 3 Encounters:   10/30/23 95 kg (209 lb 7 oz)   09/26/23 94.6 kg (208 lb 7.1 oz)   09/12/23 94.4 kg (208 lb 1.8 oz)     Temp Readings from Last 3  Encounters:   07/20/23 97.7 °F (36.5 °C) (Oral)   07/14/23 97.7 °F (36.5 °C) (Temporal)   05/18/23 98.1 °F (36.7 °C)     BP Readings from Last 3 Encounters:   09/26/23 137/65   09/12/23 130/65   08/21/23 (!) 101/54     Pulse Readings from Last 3 Encounters:   09/26/23 70   09/12/23 74   08/21/23 (!) 58        There is no height or weight on file to calculate BMI. Estimated body surface area is 2.19 meters squared as calculated from the following:    Height as of 9/26/23: 6' (1.829 m).    Weight as of 9/26/23: 94.6 kg (208 lb 7.1 oz).     Physical Exam  Constitutional:       Appearance: He is well-developed.   HENT:      Head: Normocephalic and atraumatic.      Right Ear: External ear normal.      Left Ear: External ear normal.   Eyes:      Conjunctiva/sclera: Conjunctivae normal.      Pupils: Pupils are equal, round, and reactive to light.   Neck:      Vascular: No JVD.   Cardiovascular:      Rate and Rhythm: Normal rate and regular rhythm.      Pulses: Intact distal pulses.      Heart sounds: S1 normal and S2 normal. No murmur heard.     No friction rub. No gallop.   Pulmonary:      Effort: Pulmonary effort is normal.      Breath sounds: Normal breath sounds.   Abdominal:      General: Bowel sounds are normal. There is no distension.      Palpations: Abdomen is soft.      Tenderness: There is no abdominal tenderness. There is no guarding or rebound.   Musculoskeletal:      Cervical back: Normal range of motion and neck supple.   Neurological:      Mental Status: He is alert and oriented to person, place, and time.          Lab Results   Component Value Date     (H) 07/03/2023     10/26/2023    K 4.5 10/26/2023    MG 2.5 07/20/2023     10/26/2023    CO2 16 (L) 10/26/2023     (H) 10/26/2023    CREATININE 2.4 (H) 10/26/2023     (H) 10/26/2023    HGBA1C 5.5 07/15/2021    AST 11 10/26/2023    ALT 15 10/26/2023    ALBUMIN 3.6 10/26/2023    ALBUMIN 4.0 06/21/2017    PROT 7.3 10/26/2023     BILITOT 0.6 10/26/2023    WBC 9.23 10/26/2023    HGB 12.3 (L) 10/26/2023    HCT 37.4 (L) 10/26/2023    HCT 44 07/16/2023     10/26/2023    INR 1.1 10/26/2023    TSH 1.856 04/06/2022    CHOL 164 07/03/2023    HDL 46 07/03/2023    LDLCALC 103.6 07/03/2023    TRIG 72 07/03/2023       Results for orders placed during the hospital encounter of 08/16/23    Echo Saline Bubble? No    Interpretation Summary    Left Ventricle: The left ventricle is moderately dilated. Normal wall thickness. regional wall motion abnormalities present. Septal flattening in diastole and systole consistent with right ventricular volume and pressure overload. There is low normal systolic function with a visually estimated ejection fraction of 50 - 55%. There is diastolic dysfunction.    Right Ventricle: Moderate right ventricular enlargement. Systolic function is severely reduced.    Left Atrium: Left atrium is severely dilated.    Mitral Valve: There is severe regurgitation with a posterolateral eccentriccally directed jet. MR is now severe by PISA calculations with an ERO of 0.5 cm2 and RVol of 76 ml. There is systolic flow reversal in the pulmonary veins.    Tricuspid Valve: There is mild regurgitation.    Pulmonary Artery: The estimated pulmonary artery systolic pressure is 73 mmHg.    IVC/SVC: Elevated venous pressure at 15 mmHg.        Results for orders placed during the hospital encounter of 07/14/23    Cardiac catheterization    Conclusion    The estimated blood loss was <50 mL.    Summary  RA 10  RV 57/10  PA 58/28 mean 38  PCWP 28v48  PA saturation 66%  Ao saturation 98%  CO/CI 4.8/2.21  PVR 2.1 APPIAH, consistent with post-capillary pulmonary venous hypertension  SVR 1267  /66 MAP 86  HR 68 SR with occasional PVCs  Hb 14.3  BSA 2.16         Assessment and Plan:  Coronary artery disease involving native coronary artery of native heart without angina pectoris  -     Basic Metabolic Panel; Future; Expected date: 11/02/2023  -      NT-Pro Natriuretic Peptide; Future; Expected date: 11/02/2023  -     CBC Auto Differential; Future; Expected date: 11/30/2023  -     BNP; Future; Expected date: 11/30/2023  -     Comprehensive Metabolic Panel; Future; Expected date: 11/30/2023    Chronic systolic heart failure  -     potassium chloride SA (K-DUR,KLOR-CON) 20 MEQ tablet; Take 1 tablet (20 mEq total) by mouth once daily. Take one additional 20 meq potassium with the extra dose of torsemide (no more than once weekly)  Dispense: 45 tablet; Refill: 5  -     torsemide (DEMADEX) 20 MG Tab; Take 2 tablets (40 mg total) by mouth 2 (two) times a day. Take additional 40  mg once weekly for wt > 205  Dispense: 45 tablet; Refill: 4    Dyslipidemia    Severe mitral regurgitation    Acute on chronic combined systolic and diastolic heart failure            Acute on chronic systolic HF, LVEF 50-55%  Etiology: ICM CAD. Now valvular heart disease.  Hemodynamic status: warm, normotensive, hypervolemic (15 pounds over dry weight).   Plan:  -increase torsemide from 40 mg daily to 40 mg twice a day.  -Start potassium 20 meq daily.   -Follow up blood test on Thursday am.  -patient was asked to call us Thursday afternoon to report weight, symptoms and discuss plan.  -Follow up in 2 months with labs.     2. Severe MR.  Plan for mitral clip on Wednesday per IC.     3. CAD s/p CABG.  Last [facility] in 2021 with unchanged anatomy and no PCI was done. On bblocker, ACEI, lipitor, asa.     3.  Dyslipidemia.  Last LDL chol 62. Lipitor.

## 2023-10-31 NOTE — ANESTHESIA PREPROCEDURE EVALUATION
[facility]  Anesthesia Pre-Operative Evaluation         Patient Name: Jose Fonseca Jr.  YOB: 1945  MRN: [id number]    SUBJECTIVE:     Pre-operative evaluation for Procedure(s) (LRB):  Mitral clip (N/A)  ECHOCARDIOGRAM, TRANSESOPHAGEAL (N/A)     10/31/2023    Jose Fonseca Jr. is a 78 y.o. male w/ a significant PMHx of coronary artery disease LIMA-OM, s/p CABG SVG to dRCA and PCI to LAD, CKD III, and severe MR. Patient has dyspnea on minimal exertion.    Patient now presents for the above procedure(s).      LDA: None documented.    Prev airway:     11/28/22; 0806 (created via procedure documentation); Video laryngoscopy; Oral Standard; 7.5 mm; Cuffed; Auscultation, Capnometry, Symmetrical chest wall movement; Pink tape; Other (Comment); 1       Drips: None documented.    Echo 8/16/23  Left Ventricle: The left ventricle is moderately dilated. Normal wall thickness. regional wall motion abnormalities present. Septal flattening in diastole and systole consistent with right ventricular volume and pressure overload. There is low normal systolic function with a visually estimated ejection fraction of 50 - 55%. There is diastolic dysfunction.    Right Ventricle: Moderate right ventricular enlargement. Systolic function is severely reduced.    Left Atrium: Left atrium is severely dilated.    Mitral Valve: There is severe regurgitation with a posterolateral eccentriccally directed jet. MR is now severe by PISA calculations with an ERO of 0.5 cm2 and RVol of 76 ml. There is systolic flow reversal in the pulmonary veins.    Tricuspid Valve: There is mild regurgitation.    Pulmonary Artery: The estimated pulmonary artery systolic pressure is 73 mmHg.    IVC/SVC: Elevated venous pressure at 15 mmHg.    Patient Active Problem List   Diagnosis    CAD (coronary artery disease)    HTN (hypertension)    Dyslipidemia    ED (erectile dysfunction) of organic origin    BPH  with urinary obstruction    Cataracts, bilateral    Bronchitis    Thrombosed external hemorrhoid    Obstructive sleep apnea    CKD (chronic kidney disease)    Chronic systolic heart failure    Severe mitral regurgitation    Acute on chronic combined systolic and diastolic heart failure    Atherosclerosis of aorta       Review of patient's allergies indicates:  No Known Allergies    Current Outpatient Medications:  No current facility-administered medications for this encounter.    Current Outpatient Medications:     allopurinol (ZYLOPRIM) 300 MG tablet, Take 1 tablet (300 mg total) by mouth every evening., Disp: 30 tablet, Rfl: 6    aspirin (ECOTRIN) 81 MG EC tablet, Take 1 tablet (81 mg total) by mouth once daily., Disp: 30 tablet, Rfl: 11    atorvastatin (LIPITOR) 80 MG tablet, Take 1 tablet (80 mg total) by mouth once daily., Disp: 90 tablet, Rfl: 3    colchicine (COLCRYS) 0.6 mg tablet, Take 0.6 mg by mouth once daily., Disp: , Rfl:     gabapentin (NEURONTIN) 300 MG capsule, , Disp: , Rfl:     meloxicam (MOBIC) 15 MG tablet, Take 15 mg by mouth., Disp: , Rfl:     metoprolol succinate (TOPROL-XL) 50 MG 24 hr tablet, Take 1 tablet (50 mg total) by mouth once daily., Disp: 30 tablet, Rfl: 11    potassium chloride SA (K-DUR,KLOR-CON) 20 MEQ tablet, Take 1 tablet (20 mEq total) by mouth once daily. Take one additional 20 meq potassium with the extra dose of torsemide (no more than once weekly), Disp: 45 tablet, Rfl: 5    sildenafil (VIAGRA) 100 MG tablet, Take 1 tablet (100 mg total) by mouth daily as needed for Erectile Dysfunction. (Patient not taking: Reported on 9/12/2023), Disp: 6 tablet, Rfl: 12    spironolactone (ALDACTONE) 25 MG tablet, Take 1 tablet (25 mg total) by mouth once daily., Disp: 30 tablet, Rfl: 11    tamsulosin (FLOMAX) 0.4 mg Cap, Take 1 capsule by mouth once daily., Disp: , Rfl:     torsemide (DEMADEX) 20 MG Tab, Take 2 tablets (40 mg total) by mouth 2 (two) times a day.  Take additional 40  mg once weekly for wt > 205, Disp: 45 tablet, Rfl: 4    Past Surgical History:   Procedure Laterality Date    ADENOIDECTOMY      ANGIOGRAM, CORONARY, WITH LEFT HEART CATHETERIZATION N/A 09/15/2021    Procedure: ANGIOGRAM,CORONARY,WITH LEFT HEART CATHETERIZATION;  Surgeon: Ken Ibarra MD;  Location: [facility] CATH LAB;  Service: Cardiology;  Laterality: N/A;    CATARACT EXTRACTION      patient not sure which eye    CORONARY ANGIOGRAPHY N/A 05/22/2019    Procedure: ANGIOGRAM, CORONARY ARTERY;  Surgeon: Ken Ibarra MD;  Location: [facility] CATH LAB;  Service: Cardiology;  Laterality: N/A;    CORONARY ANGIOGRAPHY INCLUDING BYPASS GRAFTS WITH CATHETERIZATION OF LEFT HEART Right 04/22/2019    Procedure: ANGIOGRAM, CORONARY, INCLUDING BYPASS GRAFT, WITH LEFT HEART CATHETERIZATION;  Surgeon: Ken Ibarra MD;  Location: [facility] CATH LAB;  Service: Cardiology;  Laterality: Right;    CORONARY ANGIOPLASTY      CORONARY ARTERY BYPASS GRAFT      CORONARY BYPASS GRAFT ANGIOGRAPHY  09/15/2021    Procedure: Bypass graft study;  Surgeon: Ken Ibarra MD;  Location: [facility] CATH LAB;  Service: Cardiology;;    LEFT HEART CATHETERIZATION N/A 05/22/2019    Procedure: CATHETERIZATION, HEART, LEFT;  Surgeon: Ken Ibarra MD;  Location: [facility] CATH LAB;  Service: Cardiology;  Laterality: N/A;    LUMBAR DISCECTOMY  11/01/2022    LUMBAR LAMINECTOMY      RIGHT HEART CATHETERIZATION Right 7/14/2023    Procedure: INSERTION, CATHETER, RIGHT HEART;  Surgeon: Ken Will MD;  Location: [facility] CATH LAB;  Service: Cardiology;  Laterality: Right;    RIGHT HEART CATHETERIZATION Right 7/17/2023    Procedure: INSERTION, CATHETER, RIGHT HEART;  Surgeon: Dalia Amezquita DO;  Location: [facility] CATH LAB;  Service: Cardiology;  Laterality: Right;    ROTATOR CUFF REPAIR      SHOULDER SURGERY      TONSILLECTOMY Left 2015       Social History     Socioeconomic History    Marital status:    Tobacco  Use    Smoking status: Former     Current packs/day: 0.00     Average packs/day: 1 pack/day for 31.0 years (31.0 ttl pk-yrs)     Types: Cigarettes     Start date: 1960     Quit date: 1991     Years since quittin.5    Smokeless tobacco: Never   Substance and Sexual Activity    Alcohol use: Yes     Alcohol/week: 2.0 standard drinks of alcohol     Types: 2 Glasses of wine per week     Comment: rare    Drug use: No       OBJECTIVE:     Vital Signs Range (Last 24H):  Pulse:  [52]   BP: (104)/(47)   SpO2:  [99 %]       Significant Labs:  Lab Results   Component Value Date    WBC 9.23 10/26/2023    HGB 12.3 (L) 10/26/2023    HCT 37.4 (L) 10/26/2023     10/26/2023    CHOL 164 2023    TRIG 72 2023    HDL 46 2023    ALT 15 10/26/2023    AST 11 10/26/2023     10/26/2023    K 4.5 10/26/2023     10/26/2023    CREATININE 2.4 (H) 10/26/2023     (H) 10/26/2023    CO2 16 (L) 10/26/2023    TSH 1.856 2022    PSA 2.5 2022    INR 1.1 10/26/2023    GLUF 114 (H) 10/31/2013    HGBA1C 5.5 07/15/2021       Diagnostic Studies: No relevant studies.    EKG:   Results for orders placed or performed during the hospital encounter of 23   EKG 12-LEAD    Collection Time: 23  4:09 PM    Narrative    Test Reason : I50.9,    Vent. Rate : 070 BPM     Atrial Rate : 070 BPM     P-R Int : 184 ms          QRS Dur : 126 ms      QT Int : 556 ms       P-R-T Axes : 000 220 -71 degrees     QTc Int : 600 ms    Sinus rhythm with Premature supraventricular complexes and Fusion complexes  Right bundle branch block  Septal infarct ,age undetermined  T wave abnormality, consider lateral ischemia  Abnormal ECG  When compared with ECG of 15-SEP-2021 08:50,  Significant changes have occurred  Confirmed by FAIZAN CHEN MD ([id number]) on 7/15/2023 8:45:06 AM    Referred By: EMILIO AWAN           Confirmed By:FAIZAN CHEN MD       2D ECHO:  TTE:  Results for orders placed or  performed during the hospital encounter of 08/16/23   Echo Saline Bubble? No   Result Value Ref Range    BSA 2.15 m2    LVOT stroke volume 91.64 cm3    LVIDd 6.62 (A) 3.5 - 6.0 cm    LV Systolic Volume 112.29 mL    LV Systolic Volume Index 52.7 mL/m2    LVIDs 4.89 (A) 2.1 - 4.0 cm    LV Diastolic Volume 225.52 mL    LV Diastolic Volume Index 105.88 mL/m2    IVS 0.76 0.6 - 1.1 cm    LVOT diameter 2.38 cm    LVOT area 4.4 cm2    FS 26 (A) 28 - 44 %    Left Ventricle Relative Wall Thickness 0.30 cm    Posterior Wall 1.00 0.6 - 1.1 cm    LV mass 248.84 g    LV Mass Index 117 g/m2    MV Peak E Checo 0.73 m/s    TDI LATERAL 0.07 m/s    TDI SEPTAL 0.05 m/s    E/E' ratio 12.17 m/s    TR Max Checo 3.82 m/s    LV SEPTAL E/E' RATIO 14.60 m/s    LA Volume Index 69.1 mL/m2    LV LATERAL E/E' RATIO 10.43 m/s    LA volume 147.24 cm3    LVOT peak checo 0.94 m/s    LA volume (mod) 127.08 cm3    LA Volume Index (Mod) 59.7 mL/m2    LA size 5.57 cm    Left Atrium Major Axis 6.23 cm    Left Atrium Minor Axis 6.26 cm    LA WIDTH 4.98 cm    RVDD 4.71 cm    TAPSE 1.19 cm    RA Major Axis 5.30 cm    RA Width 4.35 cm    AV mean gradient 2 mmHg    AV peak gradient 5 mmHg    Ao peak checo 1.15 m/s    Ao VTI 19.87 cm    LVOT peak VTI 20.61 cm    AV valve area 4.61 cm²    AV Velocity Ratio 0.82     AV index (prosthetic) 1.04     BRISA by Velocity Ratio 3.63 cm²    Triscuspid Valve Regurgitation Peak Gradient 58 mmHg    Sinus 3.71 cm    STJ 2.82 cm    Ascending aorta 3.54 cm    Mean e' 0.06 m/s    ZLVIDS 1.31     ZLVIDD -0.25     TV resting pulmonary artery pressure 73 mmHg    RV TB RVSP 19 mmHg    Est. RA pres 15 mmHg    Narrative      Left Ventricle: The left ventricle is moderately dilated. Normal wall   thickness. regional wall motion abnormalities present. Septal flattening   in diastole and systole consistent with right ventricular volume and   pressure overload. There is low normal systolic function with a visually   estimated ejection fraction of  50 - 55%. There is diastolic dysfunction.    Right Ventricle: Moderate right ventricular enlargement. Systolic   function is severely reduced.    Left Atrium: Left atrium is severely dilated.    Mitral Valve: There is severe regurgitation with a posterolateral   eccentriccally directed jet. MR is now severe by PISA calculations with an   ERO of 0.5 cm2 and RVol of 76 ml. There is systolic flow reversal in the   pulmonary veins.    Tricuspid Valve: There is mild regurgitation.    Pulmonary Artery: The estimated pulmonary artery systolic pressure is   73 mmHg.    IVC/SVC: Elevated venous pressure at 15 mmHg.         CARSON:  No results found for this or any previous visit.    ASSESSMENT/PLAN:           Pre-op Assessment    I have reviewed the Patient Summary Reports.     I have reviewed the Nursing Notes. I have reviewed the NPO Status.   I have reviewed the Medications.     Review of Systems  Anesthesia Hx:  No problems with previous Anesthesia   History of prior surgery of interest to airway management or planning:          Denies Family Hx of Anesthesia complications.    Denies Personal Hx of Anesthesia complications.                    Social:  Non-Smoker       Hematology/Oncology:  Hematology Normal   Oncology Normal                                   Cardiovascular:     Hypertension  Past MI (1991) CAD      Angina     hyperlipidemia                             Pulmonary:   Sleep Apnea    Renal/:   Chronic Renal Disease BPH    Musculoskeletal:   Arthritis     Neurological:  Neurology Normal                                      Endocrine:  Endocrine Normal                Physical Exam  General: Well nourished    Airway:  Mallampati: II   Mouth Opening: Normal  TM Distance: Normal  Tongue: Normal  Neck ROM: Normal ROM    Dental:  Intact    Chest/Lungs:  Clear to auscultation, Normal Respiratory Rate    Heart:  Rate: Normal  Rhythm: Regular Rhythm  Sounds: Normal        Anesthesia Plan  Type of Anesthesia, risks  & benefits discussed:    Anesthesia Type: Gen ETT, Gen Natural Airway  Intra-op Monitoring Plan: Standard ASA Monitors and Art Line  Post Op Pain Control Plan: multimodal analgesia and IV/PO Opioids PRN  Induction:  IV  Airway Plan: Direct, Post-Induction  Informed Consent: Informed consent signed with the Patient and all parties understand the risks and agree with anesthesia plan.  All questions answered.   ASA Score: 4  Day of Surgery Review of History & Physical: H&P Update referred to the surgeon/provider.    Ready For Surgery From Anesthesia Perspective.     .

## 2023-11-01 PROBLEM — I95.9 HYPOTENSION: Status: ACTIVE | Noted: 2023-01-01

## 2023-11-01 NOTE — ASSESSMENT & PLAN NOTE
Recent EF 50-55% with regional wall motion abnormalities present. Septal flattening in diastole and systole consistent with right ventricular volume and pressure overload. Patient recently increased his home diuretics due to feeling more SOB but did not notice improvement.  - strict I/Os  - holding diuresis   - IVC collapsible on bedside echo  - s/p 500cc NS

## 2023-11-01 NOTE — NURSING TRANSFER
Nursing Transfer Note      11/1/2023   5:03 PM    Reason patient is being transferred: Upgrade to ICU care    PACU nurse giving handoff: FE Ross    Nurse receiving handoff: Miri    [facility]    Transfer via stretcher    Transfer with continuous BP, O2 & cardiac monitoring    Transported by RN & PCT    Telemetry: Bedside monitor    Transfer Vital Signs @ 1645  Temperature: 97.5  Blood Pressure: 90/40  Heart Rate: 50  O2 Sat: 98% on RA  Respirations:22-23    Medicines/Equipment sent with patient: Norepinephrine infusing @ 0.08mcg/kg/min per pump    Any special needs or follow-up needed: See orders    Patient belongings transferred with patient: No    Chart send with patient: Yes    Notified: Daughter, Taisha    Patient reassessed prior to transfer at: 11/1/23 @ [id number]

## 2023-11-01 NOTE — TRANSFER OF CARE
Anesthesia Transfer of Care Note    Patient: Jose Fonseca Jr.    Procedure(s) Performed: Procedure(s) (LRB):  Mitral clip (N/A)  ECHOCARDIOGRAM, TRANSESOPHAGEAL (N/A)    Patient location: PACU    Anesthesia Type: general    Transport from OR: Transported from OR on 6-10 L/min O2 by face mask with adequate spontaneous ventilation    Post pain: adequate analgesia    Post assessment: no apparent anesthetic complications    Post vital signs: stable    Level of consciousness: awake, alert and oriented    Nausea/Vomiting: no nausea/vomiting    Complications: none    Transfer of care protocol was followed      Last vitals: Visit Vitals  BP (!) 89/49 (BP Location: Right arm, Patient Position: Lying)   Pulse (!) 54   Temp 36.6 °C (97.9 °F) (Temporal)   Resp 18   Ht 6' (1.829 m)   Wt 92.1 kg (203 lb)   SpO2 100%   BMI 27.53 kg/m²

## 2023-11-01 NOTE — H&P
[facility] - Cardiac Intensive Care  Cardiology  History and Physical     Patient Name: Jose Fonseca Jr.  MRN: [id number]  Admission Date: 11/1/2023  Code Status: Full Code   Attending Provider: Emerson Anderson MD   Primary Care Physician: Vasyl Morris MD  Principal Problem:Hypotension    Patient information was obtained from patient and ER records.     Subjective:     Chief Complaint:  hypotension     HPI:  Jose Fonseca Jr. 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 For the past 2 weeks, he has had increased SOB and hypotension to SBP 80s-90s. He increased his home lasix once he noticed feeling more SOB, but that did not improve his symptoms. After the procedure, patient hypotensive to MAP 56 and shivering. Recent pro-BNP 3100, K 6.1 today.    The patient has undergone the following MitraClip work-up:   TTE (Date 09/12/2023 ): Severe mitral insufficiency, EOA 0.5 cm2, EF= 50-55%.   LHC (Date 09/2021):  Patent LIMA to OM and SVG to RCA.  CT Surgery risk assessment: pending   PFTs: FEV1 not indicated  Comorbidities: coronary artery disease, hx of CABG, CKD   Labs: Hgb 15.7, Cr 1.7, BNP 2266     Past Medical History:   Diagnosis Date    Coronary artery disease     S/P 3 vessel CABG; stents    Glaucoma     HTN (hypertension) 4/19/2013    Hx of calcium pyrophosphate deposition disease (CPPD) 8/16/2021    XR CPPD knees 2021    Hyperlipidemia     Myocardial infarction     Obstructive sleep apnea     Post PTCA 2/18/2015    S/P CABG (coronary artery bypass graft) 2/18/2015    S/P L arthroscopy of shoulder (3/18/15) 4/1/2015    Squamous cell carcinoma        Past Surgical History:   Procedure Laterality Date    ADENOIDECTOMY      ANGIOGRAM, CORONARY, WITH LEFT HEART CATHETERIZATION N/A 09/15/2021    Procedure: ANGIOGRAM,CORONARY,WITH LEFT HEART CATHETERIZATION;  Surgeon: Ken Ibarra MD;   Location: [facility] CATH LAB;  Service: Cardiology;  Laterality: N/A;    CATARACT EXTRACTION      patient not sure which eye    CORONARY ANGIOGRAPHY N/A 05/22/2019    Procedure: ANGIOGRAM, CORONARY ARTERY;  Surgeon: Ken Ibarra MD;  Location: [facility] CATH LAB;  Service: Cardiology;  Laterality: N/A;    CORONARY ANGIOGRAPHY INCLUDING BYPASS GRAFTS WITH CATHETERIZATION OF LEFT HEART Right 04/22/2019    Procedure: ANGIOGRAM, CORONARY, INCLUDING BYPASS GRAFT, WITH LEFT HEART CATHETERIZATION;  Surgeon: Ken Ibarra MD;  Location: [facility] CATH LAB;  Service: Cardiology;  Laterality: Right;    CORONARY ANGIOPLASTY      CORONARY ARTERY BYPASS GRAFT      CORONARY BYPASS GRAFT ANGIOGRAPHY  09/15/2021    Procedure: Bypass graft study;  Surgeon: Ken Ibarra MD;  Location: [facility] CATH LAB;  Service: Cardiology;;    LEFT HEART CATHETERIZATION N/A 05/22/2019    Procedure: CATHETERIZATION, HEART, LEFT;  Surgeon: Ken Ibarra MD;  Location: [facility] CATH LAB;  Service: Cardiology;  Laterality: N/A;    LUMBAR DISCECTOMY  11/01/2022    LUMBAR LAMINECTOMY      RIGHT HEART CATHETERIZATION Right 7/14/2023    Procedure: INSERTION, CATHETER, RIGHT HEART;  Surgeon: Ken Will MD;  Location: [facility] CATH LAB;  Service: Cardiology;  Laterality: Right;    RIGHT HEART CATHETERIZATION Right 7/17/2023    Procedure: INSERTION, CATHETER, RIGHT HEART;  Surgeon: Dalia Amezquita DO;  Location: [facility] CATH [facility];  Service: Cardiology;  Laterality: Right;    ROTATOR CUFF REPAIR      SHOULDER SURGERY      TONSILLECTOMY Left 2015       Review of patient's allergies indicates:  No Known Allergies    No current facility-administered medications on file prior to encounter.     Current Outpatient Medications on File Prior to Encounter   Medication Sig    allopurinol (ZYLOPRIM) 300 MG tablet Take 1 tablet (300 mg total) by mouth every evening.    aspirin (ECOTRIN) 81 MG EC tablet Take 1 tablet (81 mg total) by mouth once daily.    atorvastatin  (LIPITOR) 80 MG tablet Take 1 tablet (80 mg total) by mouth once daily.    colchicine (COLCRYS) 0.6 mg tablet Take 0.6 mg by mouth once daily.    metoprolol succinate (TOPROL-XL) 50 MG 24 hr tablet Take 1 tablet (50 mg total) by mouth once daily.    spironolactone (ALDACTONE) 25 MG tablet Take 1 tablet (25 mg total) by mouth once daily.    tamsulosin (FLOMAX) 0.4 mg Cap Take 1 capsule by mouth once daily.    sildenafil (VIAGRA) 100 MG tablet Take 1 tablet (100 mg total) by mouth daily as needed for Erectile Dysfunction. (Patient not taking: Reported on 2023)     Family History       Problem Relation (Age of Onset)    Diabetes Maternal Grandmother, Maternal Grandfather    Heart attack Father    Heart disease Father    Heart failure Father    Hypertension Father          Tobacco Use    Smoking status: Former     Current packs/day: 0.00     Average packs/day: 1 pack/day for 31.0 years (31.0 ttl pk-yrs)     Types: Cigarettes     Start date: 1960     Quit date: 1991     Years since quittin.5    Smokeless tobacco: Never   Substance and Sexual Activity    Alcohol use: Yes     Alcohol/week: 2.0 standard drinks of alcohol     Types: 2 Glasses of wine per week     Comment: rare    Drug use: No    Sexual activity: Not on file     Review of Systems   Constitutional: Positive for chills. Negative for fever.   HENT:  Positive for sore throat.    Cardiovascular:  Negative for chest pain and leg swelling.   Respiratory:  Negative for cough and shortness of breath.    Hematologic/Lymphatic: Negative for bleeding problem.   Musculoskeletal:  Negative for falls.   Gastrointestinal:  Negative for bloating and abdominal pain.   Neurological:  Negative for light-headedness.     Objective:     Vital Signs (Most Recent):  Temp: 97.2 °F (36.2 °C) (23 1530)  Pulse: (!) 50 (23)  Resp: (!) 21 (23)  BP: (!) 92/46 (23)  SpO2: 97 % (23) Vital Signs (24h Range):  Temp:  [97.2 °F  (36.2 °C)-97.9 °F (36.6 °C)] 97.2 °F (36.2 °C)  Pulse:  [46-57] 50  Resp:  [18-22] 21  SpO2:  [97 %-100 %] 97 %  BP: ()/(42-52) 92/46  Arterial Line BP: (78-90)/(40-52) 86/52     Weight: 92.1 kg (203 lb)  Body mass index is 27.53 kg/m².    SpO2: 97 %       No intake or output data in the 24 hours ending 11/01/23 1653    Lines/Drains/Airways       Arterial Line  Duration             Arterial Line 11/01/23 1402 Right Radial <1 day              Peripheral Intravenous Line  Duration                  Peripheral IV - Single Lumen 11/01/23 1246 20 G Left;Posterior Forearm <1 day         Peripheral IV - Single Lumen 11/01/23 1343 18 G Right Forearm <1 day                     Physical Exam  Vitals and nursing note reviewed.   Constitutional:       General: He is not in acute distress.  HENT:      Head: Normocephalic and atraumatic.      Right Ear: External ear normal.      Left Ear: External ear normal.      Nose: No rhinorrhea.   Eyes:      General: No scleral icterus.  Neck:      Comments: JVD to ear  Cardiovascular:      Rate and Rhythm: Regular rhythm. Bradycardia present.   Pulmonary:      Effort: Pulmonary effort is normal. No respiratory distress.   Abdominal:      General: Abdomen is flat.   Musculoskeletal:      Cervical back: Normal range of motion.      Right lower leg: No edema.      Left lower leg: No edema.   Neurological:      Mental Status: He is alert. Mental status is at baseline.          Significant Labs: CMP   Recent Labs   Lab 11/01/23  1158 11/01/23  1542    139   K 6.1* 5.2*   * 115*   CO2 13* 11*   GLU 97 95   *  --    CREATININE 2.8*  --    CALCIUM 9.4 8.4*   ANIONGAP 12 13    and CBC   Recent Labs   Lab 11/01/23  1623   WBC 10.52   HGB 9.7*   HCT 28.9*          Significant Imaging:   Results for orders placed or performed during the hospital encounter of 11/01/23   EKG 12-lead    Collection Time: 11/01/23 11:22 AM    Narrative    Test Reason :  Detail Level: Zone I34.0,Z98.890,Z95.818,    Vent. Rate : 053 BPM     Atrial Rate : 053 BPM     P-R Int : 178 ms          QRS Dur : 150 ms      QT Int : 468 ms       P-R-T Axes : 047 262 024 degrees     QTc Int : 439 ms    Sinus bradycardia and artifact  Right bundle branch block , plus right ventricular hypertrophy  Abnormal ECG  When compared with ECG of 14-JUL-2023 16:09,  Fusion complexes are no longer Present  Premature supraventricular complexes are no longer Present  Questionable change in QRS duration    Confirmed by Wilfred GUERRERO MD ([id number]) on 11/1/2023 2:21:16 PM    Referred By: EMILIO GARCIA           Confirmed By:Wilfred GUERRERO MD      Echo Saline Bubble? No    Result Date: 8/16/2023    Left Ventricle: The left ventricle is moderately dilated. Normal wall   thickness. regional wall motion abnormalities present. Septal flattening   in diastole and systole consistent with right ventricular volume and   pressure overload. There is low normal systolic function with a visually   estimated ejection fraction of 50 - 55%. There is diastolic dysfunction.    Right Ventricle: Moderate right ventricular enlargement. Systolic   function is severely reduced.    Left Atrium: Left atrium is severely dilated.    Mitral Valve: There is severe regurgitation with a posterolateral   eccentriccally directed jet. MR is now severe by PISA calculations with an   ERO of 0.5 cm2 and RVol of 76 ml. There is systolic flow reversal in the   pulmonary veins.    Tricuspid Valve: There is mild regurgitation.    Pulmonary Artery: The estimated pulmonary artery systolic pressure is   73 mmHg.    IVC/SVC: Elevated venous pressure at 15 mmHg.      Assessment and Plan:     * Hypotension  Patient has been hypotensive at home for the past 2 weeks from his baseline. MAPs down to 56 after successful mitral clip procedure. No evidence of pericardial effusion on bedside echo, no hematoma at groin, no sick symptoms. Concern for constrictive process like amyloidosis.  -  Detail Level: Generalized admission to CCU  - s/p neobump, 500cc NS  - starting levophed  - f/u bcx   - f/u BNP  - calculate hemodynamics once central line obtained    Acute on chronic combined systolic and diastolic heart failure  Recent EF 50-55% with regional wall motion abnormalities present. Septal flattening in diastole and systole consistent with right ventricular volume and pressure overload. Patient recently increased his home diuretics due to feeling more SOB but did not notice improvement.  - strict I/Os  - holding diuresis   - IVC collapsible on bedside echo  - s/p 500cc NS    Severe mitral regurgitation  Severe mitral regurg s/p mitral clip with significant improvement in regurgitation.      Pulmonary hypertension  Likely group 2, pulmonary artery systolic pressure is 73 mmHg on recent echo.  - f/u repeat formal echo    KASSIE on CKD  Baseline Cr ~1.5. Cr 2.8 on admission. Concern for 2/2 hypotension given 2 weeks of BP 80-90s/60 at home. K 6.1 on admission, shifted. Repeat improved.  - received 500ml NS  - strict I/Os  - monitor CMP  - tele    Chronic systolic heart failure  Recent EF 50-55% with regional wall motion abnormalities present. Septal flattening in diastole and systole consistent with right ventricular volume and pressure overload. Patient recently increased his home diuretics due to feeling more SOB but did not notice improvement.  - strict I/Os  - holding diuresis   - IVC collapsible on bedside echo  - s/p 500cc NS    S/P CABG (coronary artery bypass graft)  Last angiogram in Oct 2013 significant for patent pLAD stent and LIMA-OM and VG-PDA grafts and known LANCE-LAD occlusion  2019 angiogram    Dist LAD lesion , 95% stenosed.  Dist RCA lesion , 100% stenosed.  Estimated blood loss: none  Patent LIMA to to OMB and patent SVG to RCA.     HTN (hypertension)  Holding all antihypertensives in setting of hypotension    CAD (coronary artery disease)  Continue home statin and aspirin.        VTE Risk Mitigation (From  admission, onward)           Ordered     heparin (porcine) injection 5,000 Units  Every 8 hours         11/01/23 1530     IP VTE HIGH RISK PATIENT  Once         11/01/23 1530     Place sequential compression device  Until discontinued         11/01/23 1530                    Kasandra Casanova MD  Cardiology   [facility] - Cardiac Int[facility]

## 2023-11-01 NOTE — ASSESSMENT & PLAN NOTE
Patient has been hypotensive at home for the past 2 weeks from his baseline. MAPs down to 56 after successful mitral clip procedure. No evidence of pericardial effusion on bedside echo, no hematoma at groin, no sick symptoms. Concern for constrictive process like amyloidosis.  - admission to CCU  - s/p neobump, 500cc NS  - starting levophed  - f/u bcx   - f/u BNP  - calculate hemodynamics once central line obtained

## 2023-11-01 NOTE — BRIEF OP NOTE
: Ken Ibarra MD  Date of procedure: 11/01/2023    Post-operative diagnosis: Mitral Regurgitation    Procedure performed: MitraClip    Description of procedure: The patient was brought to the cath lab in the fasting state. Prepped and draped in sterile fashion. Safety timeout was performed. Sedation administered by anesthesia staff. Ultrasound guided venous access of the right femoral vein was performed. 16 Fr short sheath placed in right femoral vein. Heparin bolus given. CARSON and fluoroscopic guided single transseptal puncture performed. Mitraclip deployed using CARSON and fluoroscopic guidance. Improvement in regurgitation jet, SBP, and V wave after deployment. CARSON confirmed position. Sheaths removed. One silk figure of 8 sutures placed at right femoral access site for hemostasis.     EBL: <10 mL    Specimens removed: None  Complications: no immediate    Plan  -bedrest for 6 hours  -will admit to CCU stepdown  -Cr elevated from baseline 2.1-2.3 with K 6.1 and HCO3 13, shifted with insulin by anesthesia during the case  -Recommend diuresis, treatment of hyperkalemia    The attending physician was present for entire duration of the procedure.    Michele Roberts MD  Cardiovascular Disease PGY-V  [facility]

## 2023-11-01 NOTE — ASSESSMENT & PLAN NOTE
Baseline Cr ~1.5. Cr 2.8 on admission. Concern for 2/2 hypotension given 2 weeks of BP 80-90s/60 at home. K 6.1 on admission, shifted. Repeat improved.  - received 500ml NS  - strict I/Os  - monitor CMP  - tele

## 2023-11-01 NOTE — ANESTHESIA PROCEDURE NOTES
Arterial    Diagnosis: Heart Failure    Patient location during procedure: done in OR    Staffing  Authorizing Provider: Marlon Anne MD  Performing Provider: Mary Wells MD    Staffing  Performed by: Mary Wells MD  Authorized by: Marlon Anne MD    Anesthesiologist was present at the time of the procedure.    Preanesthetic Checklist  Completed: patient identified, IV checked, site marked, risks and benefits discussed, surgical consent, monitors and equipment checked, pre-op evaluation, timeout performed and anesthesia consent givenArterial  Skin Prep: chlorhexidine gluconate  Local Infiltration: lidocaine  Orientation: right  Location: radial    Catheter Size: 22 G  Catheter placement by Ultrasound guidance. Heme positive aspiration all ports.   Vessel Caliber: small, patent, compressibility normal  Needle advanced into vessel with real time Ultrasound guidance.Insertion Attempts: 1  Assessment  Dressing: secured with tape and tegaderm  Patient: Tolerated well

## 2023-11-01 NOTE — ASSESSMENT & PLAN NOTE
Baseline Cr ~1.5. Cr 2.8 on admission. Concern for 2/2 hypotension given 2 weeks of BP 80-90s/60 at home.  - received 500ml NS  - strict I/Os  - monitor CMP

## 2023-11-01 NOTE — NURSING
Nurses Note -- 4 Eyes      11/1/2023   5:45 PM      Skin assessed during: Admit      [x] No Altered Skin Integrity Present    []Prevention Measures Documented      [] Yes- Altered Skin Integrity Present or Discovered   [] LDA Added if Not in Epic (Describe Wound)   [] New Altered Skin Integrity was Present on Admit and Documented in LDA   [] Wound Image Taken    Wound Care Consulted? No    Attending Nurse:  Miri Beckett RN/Staff Member:   Phoebe Bueno

## 2023-11-01 NOTE — SUBJECTIVE & OBJECTIVE
Past Medical History:   Diagnosis Date    Coronary artery disease     S/P 3 vessel CABG; stents    Glaucoma     HTN (hypertension) 4/19/2013    Hx of calcium pyrophosphate deposition disease (CPPD) 8/16/2021    XR CPPD knees 2021    Hyperlipidemia     Myocardial infarction     Obstructive sleep apnea     Post PTCA 2/18/2015    S/P CABG (coronary artery bypass graft) 2/18/2015    S/P L arthroscopy of shoulder (3/18/15) 4/1/2015    Squamous cell carcinoma        Past Surgical History:   Procedure Laterality Date    ADENOIDECTOMY      ANGIOGRAM, CORONARY, WITH LEFT HEART CATHETERIZATION N/A 09/15/2021    Procedure: ANGIOGRAM,CORONARY,WITH LEFT HEART CATHETERIZATION;  Surgeon: Ken Ibarra MD;  Location: [facility] CATH LAB;  Service: Cardiology;  Laterality: N/A;    CATARACT EXTRACTION      patient not sure which eye    CORONARY ANGIOGRAPHY N/A 05/22/2019    Procedure: ANGIOGRAM, CORONARY ARTERY;  Surgeon: Ken Ibarra MD;  Location: [facility] CATH LAB;  Service: Cardiology;  Laterality: N/A;    CORONARY ANGIOGRAPHY INCLUDING BYPASS GRAFTS WITH CATHETERIZATION OF LEFT HEART Right 04/22/2019    Procedure: ANGIOGRAM, CORONARY, INCLUDING BYPASS GRAFT, WITH LEFT HEART CATHETERIZATION;  Surgeon: Ken Ibarra MD;  Location: [facility] CATH LAB;  Service: Cardiology;  Laterality: Right;    CORONARY ANGIOPLASTY      CORONARY ARTERY BYPASS GRAFT      CORONARY BYPASS GRAFT ANGIOGRAPHY  09/15/2021    Procedure: Bypass graft study;  Surgeon: Ken Ibarra MD;  Location: [facility] CATH LAB;  Service: Cardiology;;    LEFT HEART CATHETERIZATION N/A 05/22/2019    Procedure: CATHETERIZATION, HEART, LEFT;  Surgeon: Ken Ibarra MD;  Location: [facility] CATH LAB;  Service: Cardiology;  Laterality: N/A;    LUMBAR DISCECTOMY  11/01/2022    LUMBAR LAMINECTOMY      RIGHT HEART CATHETERIZATION Right 7/14/2023    Procedure: INSERTION, CATHETER, RIGHT HEART;  Surgeon: Ken Will MD;  Location: [facility] CATH LAB;  Service: Cardiology;   Laterality: Right;    RIGHT HEART CATHETERIZATION Right 2023    Procedure: INSERTION, CATHETER, RIGHT HEART;  Surgeon: Dalia Amezquita DO;  Location: [facility] CATH LAB;  Service: Cardiology;  Laterality: Right;    ROTATOR CUFF REPAIR      SHOULDER SURGERY      TONSILLECTOMY Left        Review of patient's allergies indicates:  No Known Allergies    No current facility-administered medications on file prior to encounter.     Current Outpatient Medications on File Prior to Encounter   Medication Sig    allopurinol (ZYLOPRIM) 300 MG tablet Take 1 tablet (300 mg total) by mouth every evening.    aspirin (ECOTRIN) 81 MG EC tablet Take 1 tablet (81 mg total) by mouth once daily.    atorvastatin (LIPITOR) 80 MG tablet Take 1 tablet (80 mg total) by mouth once daily.    colchicine (COLCRYS) 0.6 mg tablet Take 0.6 mg by mouth once daily.    metoprolol succinate (TOPROL-XL) 50 MG 24 hr tablet Take 1 tablet (50 mg total) by mouth once daily.    spironolactone (ALDACTONE) 25 MG tablet Take 1 tablet (25 mg total) by mouth once daily.    tamsulosin (FLOMAX) 0.4 mg Cap Take 1 capsule by mouth once daily.    sildenafil (VIAGRA) 100 MG tablet Take 1 tablet (100 mg total) by mouth daily as needed for Erectile Dysfunction. (Patient not taking: Reported on 2023)     Family History       Problem Relation (Age of Onset)    Diabetes Maternal Grandmother, Maternal Grandfather    Heart attack Father    Heart disease Father    Heart failure Father    Hypertension Father          Tobacco Use    Smoking status: Former     Current packs/day: 0.00     Average packs/day: 1 pack/day for 31.0 years (31.0 ttl pk-yrs)     Types: Cigarettes     Start date: 1960     Quit date: 1991     Years since quittin.5    Smokeless tobacco: Never   Substance and Sexual Activity    Alcohol use: Yes     Alcohol/week: 2.0 standard drinks of alcohol     Types: 2 Glasses of wine per week     Comment: rare    Drug use: No    Sexual  activity: Not on file     Review of Systems   Constitutional: Positive for chills. Negative for fever.   HENT:  Positive for sore throat.    Cardiovascular:  Negative for chest pain and leg swelling.   Respiratory:  Negative for cough and shortness of breath.    Hematologic/Lymphatic: Negative for bleeding problem.   Musculoskeletal:  Negative for falls.   Gastrointestinal:  Negative for bloating and abdominal pain.   Neurological:  Negative for light-headedness.     Objective:     Vital Signs (Most Recent):  Temp: 97.2 °F (36.2 °C) (11/01/23 1530)  Pulse: (!) 50 (11/01/23 1625)  Resp: (!) 21 (11/01/23 1625)  BP: (!) 92/46 (11/01/23 1625)  SpO2: 97 % (11/01/23 1625) Vital Signs (24h Range):  Temp:  [97.2 °F (36.2 °C)-97.9 °F (36.6 °C)] 97.2 °F (36.2 °C)  Pulse:  [46-57] 50  Resp:  [18-22] 21  SpO2:  [97 %-100 %] 97 %  BP: ()/(42-52) 92/46  Arterial Line BP: (78-90)/(40-52) 86/52     Weight: 92.1 kg (203 lb)  Body mass index is 27.53 kg/m².    SpO2: 97 %       No intake or output data in the 24 hours ending 11/01/23 1653    Lines/Drains/Airways       Arterial Line  Duration             Arterial Line 11/01/23 1402 Right Radial <1 day              Peripheral Intravenous Line  Duration                  Peripheral IV - Single Lumen 11/01/23 1246 20 G Left;Posterior Forearm <1 day         Peripheral IV - Single Lumen 11/01/23 1343 18 G Right Forearm <1 day                     Physical Exam  Vitals and nursing note reviewed.   Constitutional:       General: He is not in acute distress.  HENT:      Head: Normocephalic and atraumatic.      Right Ear: External ear normal.      Left Ear: External ear normal.      Nose: No rhinorrhea.   Eyes:      General: No scleral icterus.  Neck:      Comments: JVD to ear  Cardiovascular:      Rate and Rhythm: Regular rhythm. Bradycardia present.   Pulmonary:      Effort: Pulmonary effort is normal. No respiratory distress.   Abdominal:      General: Abdomen is flat.   Musculoskeletal:       Cervical back: Normal range of motion.      Right lower leg: No edema.      Left lower leg: No edema.   Neurological:      Mental Status: He is alert. Mental status is at baseline.          Significant Labs: CMP   Recent Labs   Lab 11/01/23  1158 11/01/23  1542    139   K 6.1* 5.2*   * 115*   CO2 13* 11*   GLU 97 95   *  --    CREATININE 2.8*  --    CALCIUM 9.4 8.4*   ANIONGAP 12 13    and CBC   Recent Labs   Lab 11/01/23  1623   WBC 10.52   HGB 9.7*   HCT 28.9*          Significant Imaging:   Results for orders placed or performed during the hospital encounter of 11/01/23   EKG 12-lead    Collection Time: 11/01/23 11:22 AM    Narrative    Test Reason : I34.0,Z98.890,Z95.818,    Vent. Rate : 053 BPM     Atrial Rate : 053 BPM     P-R Int : 178 ms          QRS Dur : 150 ms      QT Int : 468 ms       P-R-T Axes : 047 262 024 degrees     QTc Int : 439 ms    Sinus bradycardia and artifact  Right bundle branch block , plus right ventricular hypertrophy  Abnormal ECG  When compared with ECG of 14-JUL-2023 16:09,  Fusion complexes are no longer Present  Premature supraventricular complexes are no longer Present  Questionable change in QRS duration    Confirmed by Wilfred GUERRERO MD ([id number]) on 11/1/2023 2:21:16 PM    Referred By: EMILIO GARCIA           Confirmed By:Wilfred GUERRERO MD      Echo Saline Bubble? No    Result Date: 8/16/2023    Left Ventricle: The left ventricle is moderately dilated. Normal wall   thickness. regional wall motion abnormalities present. Septal flattening   in diastole and systole consistent with right ventricular volume and   pressure overload. There is low normal systolic function with a visually   estimated ejection fraction of 50 - 55%. There is diastolic dysfunction.    Right Ventricle: Moderate right ventricular enlargement. Systolic   function is severely reduced.    Left Atrium: Left atrium is severely dilated.    Mitral Valve: There is severe regurgitation with a  posterolateral   eccentriccally directed jet. MR is now severe by PISA calculations with an   ERO of 0.5 cm2 and RVol of 76 ml. There is systolic flow reversal in the   pulmonary veins.    Tricuspid Valve: There is mild regurgitation.    Pulmonary Artery: The estimated pulmonary artery systolic pressure is   73 mmHg.    IVC/SVC: Elevated venous pressure at 15 mmHg.

## 2023-11-01 NOTE — ASSESSMENT & PLAN NOTE
Last angiogram in Oct 2013 significant for patent pLAD stent and LIMA-OM and VG-PDA grafts and known LANCE-LAD occlusion  2019 angiogram    Dist LAD lesion , 95% stenosed.  Dist RCA lesion , 100% stenosed.  Estimated blood loss: none  Patent LIMA to to OMB and patent SVG to RCA.

## 2023-11-01 NOTE — CONSULTS
[facility], [facility]  CARSON Consult      Jose Fonseca Jr.  YOB: 1945  Medical Record Number:  [id number]  Attending Physician:  Ken Ibarra MD   Date of Admission: (Not on file)       Hospital Day:  0  Current Principal Problem:  Severe mitral regurgitation      History       HPI  Mr. Fonseca is a 76 yo M with hx of severe MR(NYHA Class III sx).  , coronary artery disease s/p CABG w/ LIMA-OM, SVG to dRCA and PCI to LAD, and CKD III-IV. Patient has dyspnea with minimal exertion. Today denies any chest pain, SOB, palpitations or any complaints.      Mitral Valve Disease Etiology: Other/Indeterminate     The patient has undergone the following MitraClip work-up:   TTE (Date 09/12/2023 ): Rv w/ systolic function severely reduced , Severe mitral insufficiency with a posterolateral eccentriccally directed jet, EOA 0.5 cm2, EF= 50-55%., PAP 73mmHg  LHC (Date 09/2021):  Patent LIMA to OM and SVG to RCA.      Anticoagulant/antiplatelets: ASA     Dysphagia or odynophagia:  no  Liver Disease, esophageal disease, or known varices:  no  Upper GI Bleeding: no  Snoring:  no  Sleep Apnea:  no  Prior neck surgery or radiation:  no  History of anesthetic difficulties:  no  Family history of anesthetic difficulties:  no  Last oral intake: last pm   Able to move neck in all directions: yes          Medications - Outpatient  Prior to Admission medications    Medication Sig Start Date End Date Taking? Authorizing Provider   allopurinol (ZYLOPRIM) 300 MG tablet Take 1 tablet (300 mg total) by mouth every evening. 8/19/19   Radha Olivas MD   aspirin (ECOTRIN) 81 MG EC tablet Take 1 tablet (81 mg total) by mouth once daily. 6/29/17   Ken Ibarra MD   atorvastatin (LIPITOR) 80 MG tablet Take 1 tablet (80 mg total) by mouth once daily. 7/21/23 7/20/24  Rolf Medellin MD   colchicine (COLCRYS) 0.6 mg tablet Take 0.6 mg by mouth once daily. 4/21/21   Provider, Historical    gabapentin (NEURONTIN) 300 MG capsule  7/23/23   Provider, Historical   meloxicam (MOBIC) 15 MG tablet Take 15 mg by mouth. 8/28/23   Provider, Historical   metoprolol succinate (TOPROL-XL) 50 MG 24 hr tablet Take 1 tablet (50 mg total) by mouth once daily. 7/21/23 7/20/24  Rolf Medellin MD   potassium chloride SA (K-DUR,KLOR-CON) 20 MEQ tablet Take 1 tablet (20 mEq total) by mouth once daily. Take one additional 20 meq potassium with the extra dose of torsemide (no more than once weekly) 10/30/23   Ken Felipe MD   sildenafil (VIAGRA) 100 MG tablet Take 1 tablet (100 mg total) by mouth daily as needed for Erectile Dysfunction.  Patient not taking: Reported on 9/12/2023 2/6/19 9/12/23  John Jose MD   spironolactone (ALDACTONE) 25 MG tablet Take 1 tablet (25 mg total) by mouth once daily. 7/21/23 7/20/24  Rolf Medellin MD   tamsulosin (FLOMAX) 0.4 mg Cap Take 1 capsule by mouth once daily. 4/24/21   Provider, Historical   torsemide (DEMADEX) 20 MG Tab Take 2 tablets (40 mg total) by mouth 2 (two) times a day. Take additional 40  mg once weekly for wt > 205 10/30/23   Ken Felipe MD         Medications - Current  Scheduled Meds:  Continuous Infusions:  PRN Meds:.      Allergies  Review of patient's allergies indicates:  No Known Allergies      Past Medical History  Past Medical History:   Diagnosis Date    Coronary artery disease     S/P 3 vessel CABG; stents    Glaucoma     HTN (hypertension) 4/19/2013    Hx of calcium pyrophosphate deposition disease (CPPD) 8/16/2021    XR CPPD knees 2021    Hyperlipidemia     Myocardial infarction     Obstructive sleep apnea     Post PTCA 2/18/2015    S/P CABG (coronary artery bypass graft) 2/18/2015    S/P L arthroscopy of shoulder (3/18/15) 4/1/2015    Squamous cell carcinoma          Past Surgical History  Past Surgical History:   Procedure Laterality Date    ADENOIDECTOMY      ANGIOGRAM, CORONARY, WITH LEFT HEART CATHETERIZATION N/A  09/15/2021    Procedure: ANGIOGRAM,CORONARY,WITH LEFT HEART CATHETERIZATION;  Surgeon: Ken Ibarra MD;  Location: [facility] CATH LAB;  Service: Cardiology;  Laterality: N/A;    CATARACT EXTRACTION      patient not sure which eye    CORONARY ANGIOGRAPHY N/A 05/22/2019    Procedure: ANGIOGRAM, CORONARY ARTERY;  Surgeon: Ken Ibarra MD;  Location: [facility] CATH LAB;  Service: Cardiology;  Laterality: N/A;    CORONARY ANGIOGRAPHY INCLUDING BYPASS GRAFTS WITH CATHETERIZATION OF LEFT HEART Right 04/22/2019    Procedure: ANGIOGRAM, CORONARY, INCLUDING BYPASS GRAFT, WITH LEFT HEART CATHETERIZATION;  Surgeon: Ken Ibarra MD;  Location: [facility] CATH LAB;  Service: Cardiology;  Laterality: Right;    CORONARY ANGIOPLASTY      CORONARY ARTERY BYPASS GRAFT      CORONARY BYPASS GRAFT ANGIOGRAPHY  09/15/2021    Procedure: Bypass graft study;  Surgeon: Ken Ibarra MD;  Location: [facility] CATH LAB;  Service: Cardiology;;    LEFT HEART CATHETERIZATION N/A 05/22/2019    Procedure: CATHETERIZATION, HEART, LEFT;  Surgeon: Ken Ibarra MD;  Location: [facility] CATH LAB;  Service: Cardiology;  Laterality: N/A;    LUMBAR DISCECTOMY  11/01/2022    LUMBAR LAMINECTOMY      RIGHT HEART CATHETERIZATION Right 7/14/2023    Procedure: INSERTION, CATHETER, RIGHT HEART;  Surgeon: Ken Will MD;  Location: [facility] CATH LAB;  Service: Cardiology;  Laterality: Right;    RIGHT HEART CATHETERIZATION Right 7/17/2023    Procedure: INSERTION, CATHETER, RIGHT HEART;  Surgeon: Dalia Amezquita DO;  Location: [facility] CATH LAB;  Service: Cardiology;  Laterality: Right;    ROTATOR CUFF REPAIR      SHOULDER SURGERY      TONSILLECTOMY Left 2015         Social History  Social History     Socioeconomic History    Marital status:    Tobacco Use    Smoking status: Former     Current packs/day: 0.00     Average packs/day: 1 pack/day for 31.0 years (31.0 ttl pk-yrs)     Types: Cigarettes     Start date: 4/24/1960     Quit date: 4/24/1991      "Years since quittin.5    Smokeless tobacco: Never   Substance and Sexual Activity    Alcohol use: Yes     Alcohol/week: 2.0 standard drinks of alcohol     Types: 2 Glasses of wine per week     Comment: rare    Drug use: No         ROS  10 point ROS performed and negative except as stated in HPI     Physical Examination         Vital Signs             24 Hour VS Range    BP: ()/()   Arterial Line BP: ()/()   No intake or output data in the 24 hours ending 23 0955      Physical Exam:   Constitutional: no acute distress  HEENT: NCAT, EOMI, no scleral icterus  Cardiovascular: Regular rate and rhythm  Pulmonary: Normal respiratory effort   Abdomen: nontender, non-distended   Neuro: alert and oriented, no focal deficits  Extremities: warm, no edema   MSK: no deformities  Integument: intact, no rashes       Data       Recent Labs   Lab 10/26/23  1327   WBC 9.23   HGB 12.3*   HCT 37.4*           Recent Labs   Lab 10/26/23  1327   INR 1.1        Recent Labs   Lab 10/26/23  1327      K 4.5      CO2 16*   *   CREATININE 2.4*   ANIONGAP 11   CALCIUM 9.5        Recent Labs   Lab 10/26/23  1327   PROT 7.3   ALBUMIN 3.6   BILITOT 0.6   ALKPHOS 77   AST 11   ALT 15        No results for input(s): "TROPONINI" in the last 168 hours.     BNP (pg/mL)   Date Value   2023 950 (H)   2023 1,059 (H)   01/10/2023 368 (H)   2022 365 (H)   2022 336 (H)           Assessment & Plan     Severe Mitral Regurgitation  Chronic HFpEF  mvCAD s/p CABG       We will proceed with CARSON in preparation for MitraClip placement.   -No absolute contraindications of esophageal stricture, tumor, perforation, laceration,or diverticulum and/or active GI bleed  -The risks, benefits & alternatives of the procedure were explained to the patient.   -The risks of transesophageal echo include but are not limited to:  Dental trauma, esophageal trauma/perforation, bleeding, laryngospasm/brochospasm, aspiration, " sore throat/hoarseness, & dislodgement of the endotracheal tube/nasogastric tube (where applicable).    -The risks of moderate sedation include hypotension, respiratory depression, arrhythmias, bronchospasm, & death.    -Informed consent was obtained. The patient is agreeable to proceed with the procedure and all questions and concerns addressed       Case discussed with an attending in echocardiography lab. Further recommendations per attending addendum.    Joleen Ahumada MD  [facility]   Cardiovascular Disease PGY-V

## 2023-11-01 NOTE — EICU
Intervention Initiated From:  Bedside    Tahira intervened regarding:  Time-Out    Nurse Notified:  Yes    Doctor Notified:  Yes    Comments: Called remotely into patients room for a trialysis line time out.  A Rij line was placed and tolerated well.

## 2023-11-01 NOTE — H&P
[facility] Short Stay [facility]iac [facility]  Interventional Cardiology  H&P    Patient Name: Jose Fonseca Jr.  MRN: [id number]  Admission Date: (Not on file)  Code Status: Prior   Attending Provider: Ken Ibarra MD   Primary Care Physician: Vasyl Morris MD  Principal Problem:Severe mitral regurgitation    Subjective:     Chief Complaint: Severe Mitral Regurgitation, MitraClip    HPI: 76 yo Male. Hx of coronary artery disease with LIMA-OM, SVG to dRCA and PCI to LAD. Hx of CKD III-IV. Patient presented for evaluation of MR, he has severe MR, eccentric jet. Patient has dyspnea with minimal exertion. Denies chest pain, but dyspnea is limiting his activities. He is monitoring his weight and fluid intake very carefully      Jose Fonseca Jr. is a 77 y.o. male referred for evaluation of severe MR (NYHA Class III sx).     Mitral Valve Disease Etiology: Other/Indeterminate     The patient has undergone the following MitraClip work-up:   TTE (Date 09/12/2023 ): Severe mitral insufficiency, EOA 0.5 cm2, EF= 50-55%.   LHC (Date 09/2021):  Patent LIMA to OM and SVG to RCA.  CT Surgery risk assessment: pending   PFTs: FEV1 not indicated  Comorbidities: coronary artery disease, hx of CABG, CKD   Labs: Hgb 15.7, Cr 1.7, BNP 2266     Past Medical History:   Diagnosis Date    Coronary artery disease     S/P 3 vessel CABG; stents    Glaucoma     HTN (hypertension) 4/19/2013    Hx of calcium pyrophosphate deposition disease (CPPD) 8/16/2021    XR CPPD knees 2021    Hyperlipidemia     Myocardial infarction     Obstructive sleep apnea     Post PTCA 2/18/2015    S/P CABG (coronary artery bypass graft) 2/18/2015    S/P L arthroscopy of shoulder (3/18/15) 4/1/2015    Squamous cell carcinoma      Past Surgical History:   Procedure Laterality Date    ADENOIDECTOMY      ANGIOGRAM, CORONARY, WITH LEFT HEART CATHETERIZATION N/A 09/15/2021    Procedure: ANGIOGRAM,CORONARY,WITH LEFT HEART CATHETERIZATION;  Surgeon: Ken  HONEY Ibarra MD;  Location: [facility] CATH LAB;  Service: Cardiology;  Laterality: N/A;    CATARACT EXTRACTION      patient not sure which eye    CORONARY ANGIOGRAPHY N/A 05/22/2019    Procedure: ANGIOGRAM, CORONARY ARTERY;  Surgeon: Ken Ibarra MD;  Location: [facility] CATH LAB;  Service: Cardiology;  Laterality: N/A;    CORONARY ANGIOGRAPHY INCLUDING BYPASS GRAFTS WITH CATHETERIZATION OF LEFT HEART Right 04/22/2019    Procedure: ANGIOGRAM, CORONARY, INCLUDING BYPASS GRAFT, WITH LEFT HEART CATHETERIZATION;  Surgeon: Ken Ibarra MD;  Location: [facility] CATH LAB;  Service: Cardiology;  Laterality: Right;    CORONARY ANGIOPLASTY      CORONARY ARTERY BYPASS GRAFT      CORONARY BYPASS GRAFT ANGIOGRAPHY  09/15/2021    Procedure: Bypass graft study;  Surgeon: Ken Ibarra MD;  Location: [facility] CATH LAB;  Service: Cardiology;;    LEFT HEART CATHETERIZATION N/A 05/22/2019    Procedure: CATHETERIZATION, HEART, LEFT;  Surgeon: Ken Ibarra MD;  Location: [facility] CATH LAB;  Service: Cardiology;  Laterality: N/A;    LUMBAR DISCECTOMY  11/01/2022    LUMBAR LAMINECTOMY      RIGHT HEART CATHETERIZATION Right 7/14/2023    Procedure: INSERTION, CATHETER, RIGHT HEART;  Surgeon: Ken Will MD;  Location: [facility] CATH LAB;  Service: Cardiology;  Laterality: Right;    RIGHT HEART CATHETERIZATION Right 7/17/2023    Procedure: INSERTION, CATHETER, RIGHT HEART;  Surgeon: Dalia Amezquita DO;  Location: [facility] CATH LAB;  Service: Cardiology;  Laterality: Right;    ROTATOR CUFF REPAIR      SHOULDER SURGERY      TONSILLECTOMY Left 2015     Review of patient's allergies indicates:  No Known Allergies    No medications prior to admission.     Family History       Problem Relation (Age of Onset)    Diabetes Maternal Grandmother, Maternal Grandfather    Heart attack Father    Heart disease Father    Heart failure Father    Hypertension Father          Tobacco Use    Smoking status: Former     Current packs/day: 0.00     Average  packs/day: 1 pack/day for 31.0 years (31.0 ttl pk-yrs)     Types: Cigarettes     Start date: 1960     Quit date: 1991     Years since quittin.5    Smokeless tobacco: Never   Substance and Sexual Activity    Alcohol use: Yes     Alcohol/week: 2.0 standard drinks of alcohol     Types: 2 Glasses of wine per week     Comment: rare    Drug use: No    Sexual activity: Not on file     Review of Systems   Constitutional: Negative for chills, diaphoresis and night sweats.   Cardiovascular:  Negative for chest pain, dyspnea on exertion, irregular heartbeat, leg swelling, near-syncope and palpitations.   Respiratory:  Positive for shortness of breath. Negative for cough and wheezing.    Skin:  Negative for color change and dry skin.   Musculoskeletal:  Negative for joint pain and joint swelling.   Gastrointestinal:  Negative for abdominal pain, diarrhea, nausea and vomiting.   Genitourinary:  Negative for dysuria.   Neurological:  Negative for dizziness, headaches, light-headedness and weakness.   All other systems reviewed and are negative.    Objective:     Vital Signs (Most Recent):    Vital Signs (24h Range):  BP: ()/()   Arterial Line BP: ()/()      There is no height or weight on file to calculate BMI.  No intake or output data in the 24 hours ending 10/31/23 2151    Physical Exam  Vitals reviewed.   Constitutional:       Appearance: He is well-developed.   HENT:      Head: Normocephalic and atraumatic.   Eyes:      General: No scleral icterus.     Conjunctiva/sclera: Conjunctivae normal.   Neck:      Vascular: No JVD.   Cardiovascular:      Rate and Rhythm: Normal rate and regular rhythm.      Pulses: Intact distal pulses.      Heart sounds: Normal heart sounds. No murmur heard.     No friction rub. No gallop.   Pulmonary:      Effort: Pulmonary effort is normal.      Breath sounds: Normal breath sounds. No wheezing or rales.   Abdominal:      General: Bowel sounds are normal. There is no distension.       "Palpations: Abdomen is soft.      Tenderness: There is no abdominal tenderness.   Musculoskeletal:         General: Normal range of motion.      Cervical back: Normal range of motion and neck supple.      Right lower leg: No edema.      Left lower leg: No edema.   Skin:     General: Skin is warm and dry.      Capillary Refill: Capillary refill takes less than 2 seconds.      Findings: No erythema or rash.   Neurological:      General: No focal deficit present.      Mental Status: He is alert and oriented to person, place, and time.   Psychiatric:         Behavior: Behavior normal.         Thought Content: Thought content normal.         Judgment: Judgment normal.     Significant Labs: CMP No results for input(s): "NA", "K", "CL", "CO2", "GLU", "BUN", "CREATININE", "CALCIUM", "PROT", "ALBUMIN", "BILITOT", "ALKPHOS", "AST", "ALT", "ANIONGAP", "ESTGFRAFRICA", "EGFRNONAA" in the last 48 hours., CBC No results for input(s): "WBC", "HGB", "HCT", "PLT" in the last 48 hours., and INR No results for input(s): "INR", "PROTIME" in the last 48 hours.    Significant Imaging: Reviewed    Assessment and Plan:     Active Diagnoses:    Diagnosis Date Noted POA    PRINCIPAL PROBLEM:  Severe mitral regurgitation [I34.0] 07/17/2023 Yes    Chronic systolic heart failure [I50.22] 03/27/2023 Yes    CKD (chronic kidney disease) [N18.9] 07/16/2021 Yes    Obstructive sleep apnea [G47.33]  Yes    S/P CABG (coronary artery bypass graft) [Z95.1] 02/18/2015 Not Applicable     Chronic    HTN (hypertension) [I10] 04/19/2013 Yes      Problems Resolved During this Admission:     78-year-old male with CAD and functional mitral regurgitation who is on optimal medical therapy per HTS and continues to have severe symptomatic MR.  It is amendable to MitraClip and will proceed.    Severe mitral regurgitation  Patient has dyspnea on minimal exertion   Has severe MR  Etiology, posterior leaflet is tethered   Had coronary angiogram in 2021 to evaluate " ischemic etiology of the MR, LIMA-OM was patent and SVG-RCA patent with patent LAD stent   Assessed for surgical mitral repair or replacement but he is not surgical candidate, and will proceed with Mitral Clip   Diuretic dose doubled by [facility] staff Monday  Pending BMP/BNP today  Antiplatelet would be Aspirin alone      The risks, benefits & alternatives of the procedure were explained to the patient.   The risks of MitralClip include but are not limited to: Bleeding, infection, heart rhythm abnormalities, allergic reactions, kidney injury requiring dilaysis, stroke and death.   Informed consent was obtained & the patient is agreeable to proceed with the procedure.    Case discussed with Dr. Ibarra.    Michele Roberts MD  Interventional Cardiology   [facility] - Short Stay Cardiac Unit

## 2023-11-01 NOTE — HPI
Jose Fonseca Jr. 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 For the past 2 weeks, he has had increased SOB and hypotension to SBP 80s-90s. He increased his home lasix once he noticed feeling more SOB, but that did not improve his symptoms. After the procedure, patient hypotensive to MAP 56 and shivering. Recent pro-BNP 3100, K 6.1 today.    The patient has undergone the following MitraClip work-up:   TTE (Date 09/12/2023 ): Severe mitral insufficiency, EOA 0.5 cm2, EF= 50-55%.   LHC (Date 09/2021):  Patent LIMA to OM and SVG to RCA.  CT Surgery risk assessment: pending   PFTs: FEV1 not indicated  Comorbidities: coronary artery disease, hx of CABG, CKD   Labs: Hgb 15.7, Cr 1.7, BNP 2266

## 2023-11-01 NOTE — ASSESSMENT & PLAN NOTE
Likely group 2, pulmonary artery systolic pressure is 73 mmHg on recent echo.  - f/u repeat formal echo

## 2023-11-01 NOTE — ANESTHESIA PROCEDURE NOTES
Intubation    Date/Time: 11/1/2023 1:45 PM    Performed by: Mary Wells MD  Authorized by: Marlon Anne MD    Intubation:     Induction:  Intravenous    Intubated:  Postinduction    Mask Ventilation:  Easy mask    Attempts:  1    Attempted By:  Resident anesthesiologist    Method of Intubation:  Video laryngoscopy    Blade:  [place] 4    Laryngeal View Grade: Grade I - full view of cords      Difficult Airway Encountered?: No      Complications:  None    Airway Device:  Oral endotracheal tube    Airway Device Size:  7.5    Style/Cuff Inflation:  Cuffed    Inflation Amount (mL):  8    Tube secured:  23    Secured at:  The lips    Placement Verified By:  Capnometry    Complicating Factors:  None    Findings Post-Intubation:  BS equal bilateral and atraumatic/condition of teeth unchanged

## 2023-11-02 NOTE — PROGRESS NOTES
[facility] Intensive Care  Cardiology  Progress Note    Patient Name: Jose Fonseca Jr.  MRN: [id number]  Admission Date: 11/1/2023  Hospital Length of Stay: 1 days  Code Status: Full Code   Attending Physician: Renaldo Melchor MD   Primary Care Physician: Vasyl Morris MD  Expected Discharge Date: 11/4/2023  Principal Problem:Hypotension    Subjective:     Hospital Course:   Patient admitted to CCU for hypotension and KASSIE. Hemodynamics not consistent with cardiogenic shock. Patient likely volume down and has received fluids. Patient initiated on levophed and had improvement in Cr.     Interval History: NAEON. Patient feeling well this morning without SOB or lightheadedness. Weaning down levophed after receiving fluids.      Objective:     Vital Signs (Most Recent):  Temp: 98 °F (36.7 °C) (11/02/23 1218)  Pulse: (!) 58 (11/02/23 1218)  Resp: (!) 25 (11/02/23 1218)  BP: (!) 119/55 (11/02/23 1218)  SpO2: 99 % (11/02/23 1218) Vital Signs (24h Range):  Temp:  [97.2 °F (36.2 °C)-98.3 °F (36.8 °C)] 98 °F (36.7 °C)  Pulse:  [46-90] 58  Resp:  [13-57] 25  SpO2:  [86 %-100 %] 99 %  BP: ()/(36-70) 119/55  Arterial Line BP: ()/(27-67) 101/44     Weight: 96.1 kg (211 lb 13.8 oz)  Body mass index is 28.73 kg/m².     SpO2: 99 %         Intake/Output Summary (Last 24 hours) at 11/2/2023 1245  Last data filed at 11/2/2023 1205  Gross per 24 hour   Intake 1809.53 ml   Output 2625 ml   Net -815.47 ml       Lines/Drains/Airways       Central Venous Catheter Line  Duration             Trialysis (Dialysis) Catheter 11/01/23 1748 right internal jugular <1 day              Arterial Line  Duration             Arterial Line 11/01/23 1402 Right Radial <1 day              Peripheral Intravenous Line  Duration                  Peripheral IV - Single Lumen 11/01/23 1246 20 G Left;Posterior Forearm <1 day         Peripheral IV - Single Lumen 11/01/23 1343 18 G Right Forearm <1 day                        Physical Exam  Vitals and nursing note reviewed.   Constitutional:       General: He is not in acute distress.  HENT:      Head: Normocephalic and atraumatic.      Right Ear: External ear normal.      Left Ear: External ear normal.      Nose: No rhinorrhea.   Eyes:      General: No scleral icterus.  Cardiovascular:      Rate and Rhythm: Regular rhythm. Bradycardia present.   Pulmonary:      Effort: Pulmonary effort is normal. No respiratory distress.   Abdominal:      General: Abdomen is flat.   Musculoskeletal:      Cervical back: Normal range of motion.      Right lower leg: No edema.      Left lower leg: No edema.   Neurological:      Mental Status: He is alert. Mental status is at baseline.            Significant Labs: CMP   Recent Labs   Lab 11/01/23  1158 11/01/23  1542 11/02/23  0336    139 139   K 6.1* 5.2* 4.9   * 115* 113*   CO2 13* 11* 12*   GLU 97 95 130*   * 135* 133*   CREATININE 2.8* 2.6* 2.5*   CALCIUM 9.4 8.4* 8.5*   PROT  --   --  6.4   ALBUMIN  --   --  3.2*   BILITOT  --   --  0.3   ALKPHOS  --   --  75   AST  --   --  12   ALT  --   --  11   ANIONGAP 12 13 14    and CBC   Recent Labs   Lab 11/01/23  1623 11/01/23  1629 11/02/23  0336   WBC 10.52  --  7.59   HGB 9.7*  --  10.2*   HCT 28.9*   < > 31.4*     --  200    < > = values in this interval not displayed.     Lab Results   Component Value Date/Time    CORTISOL 19.80 11/02/2023 09:40 AM    CORTISOL 16.50 11/02/2023 08:51 AM    CORTISOL 9.80 11/02/2023 07:40 AM        Significant Imaging:   none  Assessment and Plan:       * Hypotension  Patient has been hypotensive at home for the past 2 weeks from his baseline. MAPs down to 56 after successful mitral clip procedure. No evidence of pericardial effusion on bedside echo, no hematoma at groin, no sick symptoms. Hemodynamics not consistent with cardiogenic shock. Concern for volume depletion or constrictive process like amyloidosis. Received fluids, correcting  metabolic acidosis with bicarb.  - weaning levophed  - Bcx NGTD  - ACTH stimulation test with normal adrenal function  - TSH wnl    Acute on chronic combined systolic and diastolic heart failure  Recent EF 50-55% with regional wall motion abnormalities present. Septal flattening in diastole and systole consistent with right ventricular volume and pressure overload. Patient recently increased his home diuretics due to feeling more SOB but did not notice improvement.  - strict I/Os  - holding diuresis   - IVC collapsible on bedside echo  - s/p 1250cc fluid    Severe mitral regurgitation  Severe mitral regurg s/p mitral clip with significant improvement in regurgitation.      Pulmonary hypertension  Likely group 2, pulmonary artery systolic pressure is 73 mmHg on recent echo.  - f/u repeat formal echo    KASSIE on CKD  Baseline Cr ~1.5. Cr 2.8 on admission. Concern for 2/2 hypotension given 2 weeks of BP 80-90s/60 at home. K 6.1 on admission, shifted. Repeat improved.  - received 750ml NS and 500cc LR  - strict I/Os  - monitor CMP  - tele    Chronic systolic heart failure  Recent EF 50-55% with regional wall motion abnormalities present. Septal flattening in diastole and systole consistent with right ventricular volume and pressure overload. Patient recently increased his home diuretics due to feeling more SOB but did not notice improvement.  - strict I/Os  - holding diuresis   - IVC collapsible on bedside echo  - s/p 500cc NS    S/P CABG (coronary artery bypass graft)  Last angiogram in Oct 2013 significant for patent pLAD stent and LIMA-OM and VG-PDA grafts and known LANCE-LAD occlusion  2019 angiogram    Dist LAD lesion , 95% stenosed.  Dist RCA lesion , 100% stenosed.  Estimated blood loss: none  Patent LIMA to to OMB and patent SVG to RCA.     HTN (hypertension)  Holding all antihypertensives in setting of hypotension    CAD (coronary artery disease)  Continue home statin and aspirin.        VTE Risk Mitigation (From  admission, onward)           Ordered     heparin (porcine) injection 5,000 Units  Every 8 hours         11/01/23 1530     IP VTE HIGH RISK PATIENT  Once         11/01/23 1530     Place sequential compression device  Until discontinued         11/01/23 1530                    Kasandra Casanova MD  Cardiology  [facility] - Card[facility]

## 2023-11-02 NOTE — ASSESSMENT & PLAN NOTE
Baseline Cr ~1.5. Cr 2.8 on admission. Concern for 2/2 hypotension given 2 weeks of BP 80-90s/60 at home. K 6.1 on admission, shifted. Repeat improved.  - received 750ml NS and 500cc LR  - strict I/Os  - monitor CMP  - tele

## 2023-11-02 NOTE — HOSPITAL COURSE
Patient Jose Fonseca Jr. was admitted to CCU from 11/1/2023 to 11/4/2023 for the management of hypotension and KASSIE following outpatient mitral clip placement. Hemodynamics not consistent with cardiogenic shock. Patient appeared volume depleted with improvement in KASSIE following IVFs. Briefly required Levophed for pressor support, able to wean off within 24 hrs with MAPs stable >65. Denied any further LH/DZ following volume repletion. Maintained adequate UoP throughout admission. Brief episode of NSVT on 11/03, asymptomatic and started on half home-dose BB (Toprol-XL 25 mg). No further episodes of VT during admission. Cr improved to baseline and BP remained stable prior to discharge. At time of discharge, pt was clinically and hemodynamically stable and reported improvement of presenting symptoms.     Brief Discharge Plan:  - Stop taking Spironolactone (Aldactone) 25 mg until hospital follow up appointment  - Start Metoprolol succinate (Toprol-XL) at half your home dose (take 25 mg daily); new prescription for 25 mg dosing provided  - Weigh yourself first thing every morning after using the restroom to determine your dry weight; use the same scale every morning and adjust your diuretic dose as follows:  Take Torsemide 40 mg as needed for weight gain of 3 lbs in one day or 5 lbs in one week   - F/u with PCP as scheduled in 1-2 weeks  - F/u with Cardiology as scheduled  - Medications provided at bedside prior to discharge   - Patient instructed to return to ED if worsening symptoms  - Discharge instructions given in person, patient verbalized understanding

## 2023-11-02 NOTE — PLAN OF CARE
[facility] - Cardiac Intensive Care  Initial Discharge Assessment       Primary Care Provider: Vasyl Morris MD    Admission Diagnosis: Severe mitral regurgitation [I34.0]    Admission Date: 11/1/2023  Expected Discharge Date: 11/4/2023    Transition of Care Barriers: None    Payor: [employer] MEDICARE / Plan: HU[employer]NA MEDICARE HMO / Product Type: Capitation /     Extended Emergency Contact Information  Primary Emergency Contact: Taisha Miller   [place]  Mobile Phone: [phone]  Relation: Daughter    Discharge Plan A: Home  Discharge Plan B: Home      [id number] [place], [place] - [address]  [address]  [facility] [address]  Phone: [phone] Fax: [phone]      Initial Assessment (most recent)       Adult Discharge Assessment - 11/02/23 1224          Discharge Assessment    Assessment Type Discharge Planning Assessment     Confirmed/corrected address, phone number and insurance Yes     Confirmed Demographics Correct on Facesheet     Source of Information patient     Communicated CHANELL with patient/caregiver Date not available/Unable to determine     Reason For Admission Hypotension     People in Home alone     Facility Arrived From: home     Do you expect to return to your current living situation? Yes     Do you have help at home or someone to help you manage your care at home? No     Prior to hospitilization cognitive status: Alert/Oriented     Current cognitive status: Alert/Oriented     Walking or Climbing Stairs --   none    Dressing/Bathing --   none    Equipment Currently Used at Home none     Readmission within 30 days? No     Patient currently being followed by outpatient case management? No     Do you currently have service(s) that help you manage your care at home? No     Do you take prescription medications? Yes     Do you have prescription coverage? Yes     Coverage [employer] Care     Do you have any  problems affording any of your prescribed medications? No     Is the patient taking medications as prescribed? yes     Who is going to help you get home at discharge? daughter     How do you get to doctors appointments? car, drives self;family or friend will provide     Are you on dialysis? No     Do you take coumadin? No     DME Needed Upon Discharge  none     Discharge Plan discussed with: Patient     Transition of Care Barriers None     Discharge Plan A Home     Discharge Plan B Home        Physical Activity    On average, how many days per week do you engage in moderate to strenuous exercise (like a brisk walk)? 0 days     On average, how many minutes do you engage in exercise at this level? 0 min        Financial Resource Strain    How hard is it for you to pay for the very basics like food, housing, medical care, and heating? Not hard at all        Housing Stability    In the last 12 months, was there a time when you were not able to pay the mortgage or rent on time? No     In the last 12 months, how many places have you lived? 1     In the last 12 months, was there a time when you did not have a steady place to sleep or slept in a shelter (including now)? No        Transportation Needs    In the past 12 months, has lack of transportation kept you from medical appointments or from getting medications? No     In the past 12 months, has lack of transportation kept you from meetings, work, or from getting things needed for daily living? No        Food Insecurity    Within the past 12 months, you worried that your food would run out before you got the money to buy more. Never true     Within the past 12 months, the food you bought just didn't last and you didn't have money to get more. Never true        Stress    Do you feel stress - tense, restless, nervous, or anxious, or unable to sleep at night because your mind is troubled all the time - these days? Only a little        Social Connections    In a typical  week, how many times do you talk on the phone with family, friends, or neighbors? More than three times a week     How often do you get together with friends or relatives? More than three times a week     How often do you attend Advent or Jewish services? Never     Do you belong to any clubs or organizations such as Advent groups, unions, fraternal or athletic groups, or school groups? Yes     How often do you attend meetings of the clubs or organizations you belong to? More than 4 times per year     Are you , , , , never , or living with a partner?         Alcohol Use    Q1: How often do you have a drink containing alcohol? 2-4 times a month     Q2: How many drinks containing alcohol do you have on a typical day when you are drinking? 1 or 2     Q3: How often do you have six or more drinks on one occasion? Never                        ROMAN met with the patient at his bedside and he verified his demographics. Patient stated his primary physician is no longer there and would needed a new PCP . He used to see Dr Morris . He reports he lives alone in an apartment with working elevators. He has a good support system with his daughter and son in law. Stated , my daughter will bring me home. Patient stated he has no DME and is not on coumadin and not on dialysis. He uses [facility] on [address] and is interested in [facility].     Donnie Polanco RN, CM   [phone]

## 2023-11-02 NOTE — ASSESSMENT & PLAN NOTE
Recent EF 50-55% with regional wall motion abnormalities present. Septal flattening in diastole and systole consistent with right ventricular volume and pressure overload. Patient recently increased his home diuretics due to feeling more SOB but did not notice improvement.  - strict I/Os  - holding diuresis   - IVC collapsible on bedside echo  - s/p 1250cc fluid

## 2023-11-02 NOTE — PLAN OF CARE
"Cardiac ICU Care Plan  [facility] - Cardiac Int[facility] Care    POC reviewed with Jose Fonseca Jr. Patient verbalized understanding. Questions answered  and concerns addressed. Pt progressing toward goals.  See below and flowsheets for full assessment and VS info.       Neuro:  [place] Coma Scale  Best Eye Response: 4-->(E4) spontaneous  Best Motor Response: 6-->(M6) obeys commands  Best Verbal Response: 5-->(V5) oriented  [place] Coma Scale Score: 15  Pupil PERRLA: yes  24 hr Temp:  [97.2 °F (36.2 °C)-98.3 °F (36.8 °C)]      CV:  Rhythm: normal sinus rhythm, sinus bradycardia   DVT prophylaxis: VTE Required Core Measure: Pharmacological prophylaxis initiated/maintained    CVP (mean): 5 mmHg (11/02/23 0100)  SVO2 (%): 59 % (11/01/23 2156)    Pulses  Right Radial Pulse: 2+ (normal)  Left Radial Pulse: 2+ (normal)  Right Dorsalis Pedis Pulse: 2+ (normal)  Left Dorsalis Pedis Pulse: 2+ (normal)  Right Posterior Tibial Pulse: 2+ (normal)  Left Posterior Tibial Pulse: 2+ (normal)    Resp:  Room air    GI/:  GI prophylaxis: yes  Diet/Nutrition Received: low saturated fat/low cholesterol, no added salt, restrict fluids  Last Bowel Movement: 11/01/23  Voiding Characteristics: voids spontaneously without difficulty   Intake/Output Summary (Last 24 hours) at 11/2/2023 0612  Last data filed at 11/2/2023 0600  Gross per 24 hour   Intake 1248.86 ml   Output 1250 ml   Net -1.14 ml       Labs/Accuchecks:  Recent Labs   Lab 10/26/23  1327 11/01/23  1623 11/02/23  0336   WBC 9.23 10.52 7.59   RBC 3.98* 3.19* 3.39*   HGB 12.3* 9.7* 10.2*   HCT 37.4* 28.9* 31.4*    171 200      Recent Labs   Lab 10/26/23  1327   INR 1.1      Recent Labs     11/02/23  0336      K 4.9   CO2 12*   *   *   CREATININE 2.5*   ALKPHOS 75   ALT 11   AST 12   BILITOT 0.3     No results for input(s): "CPK", "CPKMB", "MB", "TROPONINI" in the last 168 hours.   Recent Labs     11/01/23  7868   PH 7.275*   PCO2 36.4   PO2 35* "   HCO3 16.9*   POCSATURATED 59   BE -10*     Electrolytes: N/A - electrolytes WDL  Accuchecks: none    Gtts/LDAs:   sodium chloride 0.9% Stopped (11/01/23 1313)    NORepinephrine bitartrate-D5W 0.08 mcg/kg/min (11/02/23 0600)     Lines/Drains/Airways       Central Venous Catheter Line  Duration             Trialysis (Dialysis) Catheter 11/01/23 1748 right internal jugular <1 day              Arterial Line  Duration             Arterial Line 11/01/23 1402 Right Radial <1 day              Peripheral Intravenous Line  Duration                  Peripheral IV - Single Lumen 11/01/23 1246 20 G Left;Posterior Forearm <1 day         Peripheral IV - Single Lumen 11/01/23 1343 18 G Right Forearm <1 day                  Skin/Wounds  Bathing/Skin Care: bath, complete;linen changed;electrode patches/site rotation;dressed/undressed (11/01/23 1915)  Wounds: No  Wound care consulted: No

## 2023-11-02 NOTE — ASSESSMENT & PLAN NOTE
Patient has been hypotensive at home for the past 2 weeks from his baseline. MAPs down to 56 after successful mitral clip procedure. No evidence of pericardial effusion on bedside echo, no hematoma at groin, no sick symptoms. Hemodynamics not consistent with cardiogenic shock. Concern for volume depletion or constrictive process like amyloidosis. Received fluids, correcting metabolic acidosis with bicarb.  - weaning levophed  - Bcx NGTD  - ACTH stimulation test with normal adrenal function  - TSH wnl

## 2023-11-02 NOTE — CARE UPDATE
Hemodynamics     10 PM    CVP 5   SVO2 59   CO 6.0   CI 2.9         Continuous Infusions:   sodium chloride 0.9% Stopped (11/01/23 1530)    NORepinephrine bitartrate-D5W 0.02 mcg/kg/min (11/01/23 1640)       Intake/Output Summary (Last 24 hours) at 11/1/2023 2223  Last data filed at 11/1/2023 1640  Gross per 24 hour   Intake 120 ml   Output --   Net 120 ml         Recommendation:   -- Continue current management       Case discussed with on call attending.    Sadia Gan MD  Cardiology Fellow, PGY4         Griseofulvin Pregnancy And Lactation Text: This medication is Pregnancy Category X and is known to cause serious birth defects. It is unknown if this medication is excreted in breast milk but breast feeding should be avoided.

## 2023-11-02 NOTE — PLAN OF CARE
CICU DAILY GOALS       A: Awake    RASS: Goal - RASS Goal: 0-->alert and calm  Actual - RASS ([place]-Sedation Scale): alert and calm   Restraint necessity:    B: Breath   SBT: Not intubated   C: Coordinate A & B, analgesics/sedatives   Pain: managed    SAT: Not intubated  D: Delirium   CAM-ICU: Overall CAM-ICU: Negative  E: Early(intubated/ Progressive (non-intubated) Mobility   MOVE Screen: Pass   Activity: Activity Management: Ambulated to bathroom - L4  FAS: Feeding/Nutrition   Diet order: Diet/Nutrition Received: 2 gram sodium, low saturated fat/low cholesterol, Specialty Diet/Nutrition Received: renal diet Fluid restriction: Fluid Requirement: 1500 cc FR   Nutritional Supplement Intake: Quantity 0, Type:  n/a  T: Thrombus   DVT prophylaxis: VTE Required Core Measure: Pharmacological prophylaxis initiated/maintained  H: HOB Elevation   Head of Bed (HOB) Positioning: HOB elevated  U: Ulcer Prophylaxis   GI: no  G: Glucose control   managed    S: Skin   Bundle compliance: yes   Bathing/Skin Care: bath, complete, linen changed, electrode patches/site rotation, dressed/undressed Date: 11/1  B: Bowel Function   no issues   I: Indwelling Catheters   [place] necessity:     CVC necessity: No   IPAD offered: Not appropriate  D: De-escalation Antibx   No    Patient remains on levo. Weaned down from 0.08 to 0.02 to target MAP goal of 65. No other issues today.     Family/Goals of care/Code Status   Code Status: Full Code     No acute events throughout day, VS and assessment per flow sheet, patient progressing towards goals as tolerated, plan of care reviewed with Jose Fonseca Jr. and family, all concerns addressed, will continue to monitor.

## 2023-11-02 NOTE — SUBJECTIVE & OBJECTIVE
Interval History: NAEON. Patient feeling well this morning without SOB or lightheadedness. Weaning down levophed after receiving fluids.      Objective:     Vital Signs (Most Recent):  Temp: 98 °F (36.7 °C) (11/02/23 1218)  Pulse: (!) 58 (11/02/23 1218)  Resp: (!) 25 (11/02/23 1218)  BP: (!) 119/55 (11/02/23 1218)  SpO2: 99 % (11/02/23 1218) Vital Signs (24h Range):  Temp:  [97.2 °F (36.2 °C)-98.3 °F (36.8 °C)] 98 °F (36.7 °C)  Pulse:  [46-90] 58  Resp:  [13-57] 25  SpO2:  [86 %-100 %] 99 %  BP: ()/(36-70) 119/55  Arterial Line BP: ()/(27-67) 101/44     Weight: 96.1 kg (211 lb 13.8 oz)  Body mass index is 28.73 kg/m².     SpO2: 99 %         Intake/Output Summary (Last 24 hours) at 11/2/2023 1245  Last data filed at 11/2/2023 1205  Gross per 24 hour   Intake 1809.53 ml   Output 2625 ml   Net -815.47 ml       Lines/Drains/Airways       Central Venous Catheter Line  Duration             Trialysis (Dialysis) Catheter 11/01/23 1748 right internal jugular <1 day              Arterial Line  Duration             Arterial Line 11/01/23 1402 Right Radial <1 day              Peripheral Intravenous Line  Duration                  Peripheral IV - Single Lumen 11/01/23 1246 20 G Left;Posterior Forearm <1 day         Peripheral IV - Single Lumen 11/01/23 1343 18 G Right Forearm <1 day                       Physical Exam  Vitals and nursing note reviewed.   Constitutional:       General: He is not in acute distress.  HENT:      Head: Normocephalic and atraumatic.      Right Ear: External ear normal.      Left Ear: External ear normal.      Nose: No rhinorrhea.   Eyes:      General: No scleral icterus.  Cardiovascular:      Rate and Rhythm: Regular rhythm. Bradycardia present.   Pulmonary:      Effort: Pulmonary effort is normal. No respiratory distress.   Abdominal:      General: Abdomen is flat.   Musculoskeletal:      Cervical back: Normal range of motion.      Right lower leg: No edema.      Left lower leg: No edema.    Neurological:      Mental Status: He is alert. Mental status is at baseline.            Significant Labs: CMP   Recent Labs   Lab 11/01/23  1158 11/01/23  1542 11/02/23  0336    139 139   K 6.1* 5.2* 4.9   * 115* 113*   CO2 13* 11* 12*   GLU 97 95 130*   * 135* 133*   CREATININE 2.8* 2.6* 2.5*   CALCIUM 9.4 8.4* 8.5*   PROT  --   --  6.4   ALBUMIN  --   --  3.2*   BILITOT  --   --  0.3   ALKPHOS  --   --  75   AST  --   --  12   ALT  --   --  11   ANIONGAP 12 13 14    and CBC   Recent Labs   Lab 11/01/23  1623 11/01/23  1629 11/02/23  0336   WBC 10.52  --  7.59   HGB 9.7*  --  10.2*   HCT 28.9*   < > 31.4*     --  200    < > = values in this interval not displayed.     Lab Results   Component Value Date/Time    CORTISOL 19.80 11/02/2023 09:40 AM    CORTISOL 16.50 11/02/2023 08:51 AM    CORTISOL 9.80 11/02/2023 07:40 AM        Significant Imaging:   none

## 2023-11-03 PROBLEM — Z98.890 S/P MITRAL VALVE CLIP IMPLANTATION: Status: ACTIVE | Noted: 2023-01-01

## 2023-11-03 PROBLEM — I50.22 CHRONIC SYSTOLIC HEART FAILURE: Status: RESOLVED | Noted: 2023-01-01 | Resolved: 2023-01-01

## 2023-11-03 PROBLEM — Z95.818 S/P MITRAL VALVE CLIP IMPLANTATION: Status: ACTIVE | Noted: 2023-01-01

## 2023-11-03 PROBLEM — I47.29 NONSUSTAINED VENTRICULAR TACHYCARDIA: Status: ACTIVE | Noted: 2023-01-01

## 2023-11-03 NOTE — PLAN OF CARE
Problem: Occupational Therapy  Goal: Occupational Therapy Goal  Description: Goals to be met by: 12/3/23 (1 month)     Patient will increase functional independence with ADLs by performing:    UE Dressing with [place].  LE Dressing with [place].  Grooming while standing at sink with [place].  Toileting from toilet with [place] for hygiene and clothing management.   Rolling to Bilateral with [place].   Supine to sit with [place].  Step transfer with [place]  Toilet transfer to toilet with [place].    Evaluated pt and established OT POC. Continue OT as tolerated.  Digna Fernandez OT  11/3/2023    Outcome: Ongoing, Progressing

## 2023-11-03 NOTE — ASSESSMENT & PLAN NOTE
Recent EF 50-55% with regional wall motion abnormalities present. Septal flattening in diastole and systole consistent with right ventricular volume and pressure overload. Patient recently increased his home diuretics due to feeling more SOB but did not notice improvement. Now s/p 1250 cc IVFs with improvement, no longer requiring pressor support    strict I/Os  holding diuresis   IVC collapsible on bedside echo  Will resume GDMT as follows at time of discharge:  Toprol-XL 25 mg, weight-based Torsemide 10 mg; hold aldactone

## 2023-11-03 NOTE — NURSING
Levo at 0.02 for the beginning of the shift, over time, increased to 0.1 to maintain maps above 65.   MD Gan notified of increasing pressure requirements, cuff pressure reads 135/84 (104), arterial line reads 144/47(72). Orders to titrate levo down based off of cuff pressure to maintain a map of 65

## 2023-11-03 NOTE — ASSESSMENT & PLAN NOTE
Baseline Cr ~1.5. Cr 2.8 on admission. Concern for 2/2 hypotension given 2 weeks of BP 80-90s/60 at home. Received total 750 mL NS and 500 cc LR. K 6.1 on admission, shifted with repeat wnl and stable.    Renal fxn at baseline, continue to monitor CMP  Continue bicarb 650 mg BID   Strict I/Os

## 2023-11-03 NOTE — PLAN OF CARE
CICU DAILY GOALS     Family/Goals of care/Code Status   Code Status: Full Code    24H Vital Sign Range  Temp:  [97.1 °F (36.2 °C)-98.5 °F (36.9 °C)]   Pulse:  []   Resp:  [12-27]   BP: ()/(54-85)   SpO2:  [91 %-99 %]   Arterial Line BP: ()/(35-55)      Shift Events   No acute events throughout shift. Pt got OOB and ambulated to the toilet and around the room without any distress or unsteadiness. He c/o pain to his right shoulder this morning and requested Tylenol. Tylenol was given as ordered and her later verbalized effective pain relief. He said that he didn't sleep well last night, and was able to tap a nap this afternoon. VSS, afebrile. No c/o pain, nausea or SOB this shift. Pt has transfer orders, awaiting bed assignment. No distress noted.       AWAKE RASS: Goal - RASS Goal: 0-->alert and calm  Actual - RASS ([place] Agitation-Sedation Scale): alert and calm    Restraint necessity: Not necessary   BREATHE SBT: Not intubated    Coordinate A & B, analgesics/sedatives Pain: managed   SAT: Not intubated   Delirium CAM-ICU: Overall CAM-ICU: Negative   Early(intubated/ Progressive (non-intubated) Mobility MOVE Screen (INTUBATED ONLY): Pass    Activity: Activity Management: Ambulated to bathroom - L4, Ambulated in room - L4   Feeding/Nutrition Diet order: Diet/Nutrition Received: 2 gram sodium, low saturated fat/low cholesterol, Specialty Diet/Nutrition Received: renal diet   Thrombus DVT prophylaxis: VTE Required Core Measure: Pharmacological prophylaxis initiated/maintained   HOB Elevation Head of Bed (HOB) Positioning: HOB at 60-90 degrees   Ulcer Prophylaxis GI: yes   Glucose control managed     Skin Skin assessed during: Daily Assessment    Sacrum intact? Yes  Heels intact? Yes  Surgical wound? No    [] No Altered Skin Integrity Present    []Prevention Measures Documented    [] Altered Skin Integrity Present or Discovered   [] LDA present /added in EPIC   [] Wound Image Taken     Wound Care  Consulted? No    Attending Nurse:  Tatianna Beckett RN/Staff Member:  Mary MIRAMONTES & Bertha   Bowel Function no issues    Indwelling Catheter Necessity      Trialysis (Dialysis) Catheter 11/01/23 5823 right internal jugular-Line Necessity Review: CRRT/HD, Frequent Blood Draws, Hemodynamic instability  No, will be removing before he transfers to the floor.    De-escalation Antibiotics No       VS and assessment per flow sheet, patient progressing towards goals as tolerated, plan of care reviewed with  patient and his family , all concerns addressed, will continue to monitor.   Problem: Adult Inpatient Plan of Care  Goal: Plan of Care Review  Outcome: Ongoing, Not Progressing  Goal: Patient-Specific Goal (Individualized)  Outcome: Ongoing, Not Progressing  Goal: Absence of Hospital-Acquired Illness or Injury  Outcome: Ongoing, Not Progressing  Goal: Optimal Comfort and Wellbeing  Outcome: Ongoing, Not Progressing  Goal: Readiness for Transition of Care  Outcome: Ongoing, Not Progressing     Problem: Fluid and Electrolyte Imbalance (Acute Kidney Injury/Impairment)  Goal: Fluid and Electrolyte Balance  Outcome: Ongoing, Not Progressing     Problem: Oral Intake Inadequate (Acute Kidney Injury/Impairment)  Goal: Optimal Nutrition Intake  Outcome: Ongoing, Not Progressing     Problem: Renal Function Impairment (Acute Kidney Injury/Impairment)  Goal: Effective Renal Function  Outcome: Ongoing, Not Progressing     Problem: Infection  Goal: Absence of Infection Signs and Symptoms  Outcome: Ongoing, Not Progressing     Problem: Fall Injury Risk  Goal: Absence of Fall and Fall-Related Injury  Outcome: Ongoing, Not Progressing

## 2023-11-03 NOTE — ASSESSMENT & PLAN NOTE
Likely group 2/3, pulmonary artery systolic pressure is 73 mmHg on recent echo.    f/u repeat formal echo

## 2023-11-03 NOTE — PLAN OF CARE
"Cardiac ICU Care Plan    CVP 4, 11, 10   SvO2 60   Pt re educated on 1500 cc fluid restriction.   16 beat run of VT     POC reviewed with Jose Fonseca Jr. and family. Questions and concerns addressed. Pt progressing toward goals. Will continue to monitor. See below and flowsheets for full assessment and VS info.       Neuro:  [place] Coma Scale  Best Eye Response: 4-->(E4) spontaneous  Best Motor Response: 6-->(M6) obeys commands  Best Verbal Response: 5-->(V5) oriented  [place] Coma Scale Score: 15  Assessment Qualifiers: patient not sedated/intubated  Pupil PERRLA: yes    24 hr Temp:  [97.7 °F (36.5 °C)-98.5 °F (36.9 °C)]      CV:  Rhythm: normal sinus rhythm   DVT prophylaxis: VTE Required Core Measure: Pharmacological prophylaxis initiated/maintained    CVP (mean): 10 mmHg (11/03/23 0301)       SVO2 (%): 59 % (11/01/23 2156)               Pulses  Right Radial Pulse: 2+ (normal)  Left Radial Pulse: 2+ (normal)  Right Dorsalis Pedis Pulse: 2+ (normal)  Left Dorsalis Pedis Pulse: 2+ (normal)  Right Posterior Tibial Pulse: 2+ (normal)  Left Posterior Tibial Pulse: 2+ (normal)    Resp:  Flow (L/min): 2       GI/:  GI prophylaxis: no  Diet/Nutrition Received: 2 gram sodium, low saturated fat/low cholesterol  Last Bowel Movement: 11/01/23      Intake/Output Summary (Last 24 hours) at 11/3/2023 0512  Last data filed at 11/3/2023 0401  Gross per 24 hour   Intake 1507.43 ml   Output 3025 ml   Net -1517.57 ml        Nutritional Supplement Intake: Quantity 0 , Type:  None ordered     Labs/Accuchecks:  Recent Labs   Lab 11/01/23  1623 11/01/23  1629 11/02/23  0336 11/03/23  0436   WBC 10.52  --  7.59 9.00   RBC 3.19*  --  3.39* 3.33*   HGB 9.7*  --  10.2* 10.1*   HCT 28.9* 26* 31.4* 31.5*     --  200 204    No results for input(s): "PT", "INR", "APTT" in the last 168 hours.   Recent Labs     11/03/23  0436      K 4.4   CO2 16*   *   BUN 93*   CREATININE 1.4   ALKPHOS 75   ALT 9*   AST 15 " "  BILITOT 0.6     No results for input(s): "CPK", "CPKMB", "MB", "TROPONINI" in the last 168 hours.   Recent Labs     11/01/23  2156 11/02/23  0949 11/03/23  0228   PH 7.275* 7.280* 7.320*   PCO2 36.4 36.9 35.0   PO2 35* 34* 33*   HCO3 16.9* 17.3* 18.0*   POCSATURATED 59 59 60   BE -10* -9* -8*       Electrolytes: No replacement orders  Accuchecks: ACHS    Gtts/LDAs:   NORepinephrine bitartrate-D5W 0.09 mcg/kg/min (11/03/23 0401)       Lines/Drains/Airways       Central Venous Catheter Line  Duration             Trialysis (Dialysis) Catheter 11/01/23 1748 right internal jugular 1 day              Arterial Line  Duration             Arterial Line 11/01/23 1402 Right Radial 1 day              Peripheral Intravenous Line  Duration                  Peripheral IV - Single Lumen 11/01/23 1246 20 G Left;Posterior Forearm 1 day                    Skin/Wounds  Bathing/Skin Care: patient refused (11/02/23 2301)  Wounds: No  Wound care consulted: No     Problem: Adult Inpatient Plan of Care  Goal: Plan of Care Review  Outcome: Ongoing, Progressing     "

## 2023-11-03 NOTE — ASSESSMENT & PLAN NOTE
12-beat run of VT noted on tele at 0524 on 11/03, pt asymptomatic  On Toprol-XL 50 mg at home    Toprol-XL 25 mg

## 2023-11-03 NOTE — ANESTHESIA POSTPROCEDURE EVALUATION
Anesthesia Post Evaluation    Patient: Jose Fonseca Jr.    Procedure(s) Performed: Procedure(s) (LRB):  Mitral clip (N/A)  ECHOCARDIOGRAM, TRANSESOPHAGEAL (N/A)    Final Anesthesia Type: general      Patient location during evaluation: ICU  Patient participation: Yes- Able to Participate  Level of consciousness: awake and alert  Post-procedure vital signs: reviewed and stable  Pain management: adequate  Airway patency: patent    PONV status at discharge: No PONV  Anesthetic complications: no      Cardiovascular status: blood pressure returned to baseline and hypotensive  Respiratory status: unassisted and nasal cannula  Hydration status: euvolemic  Follow-up not needed.  Comments: Pt admitted to ICU service for treatment of metabolic acidosis, hypotension, and hyperkalemia present on admission.           Vitals Value Taken Time   /73 11/02/23 1653   Temp 36.8 °C (98.3 °F) 11/02/23 1505   Pulse 67 11/02/23 1903   Resp 18 11/02/23 1903   SpO2 99 % 11/02/23 1903   Vitals shown include unvalidated device data.      No case tracking events are documented in the log.      Pain/Nisha Score: Nisha Score: 10 (11/1/2023  3:45 PM)

## 2023-11-03 NOTE — PLAN OF CARE
Problem: Physical Therapy  Goal: Physical Therapy Goal  Description: Goals to be met by: 2023     Patient will increase functional independence with mobility by performin. Supine to sit with [place]  2. Sit to stand transfer with [place]  3. Gait  x 600 feet with Modified [place] using No Assistive Device.     Outcome: Ongoing, Progressing     Pt evaluated and appropriate goals established.

## 2023-11-03 NOTE — PT/OT/SLP EVAL
Physical Therapy Co-Evaluation and Treatment     Patient Name:  Jose Fonseca Jr.   MRN:  [id number]    *co-treatment with OT  2/2 pt with potential impaired ability to tolerate 2 evaluations 2/2 medical status   Recommendations:     Discharge Recommendations: No Therapy Indicated   Discharge Equipment Recommendations: none   Barriers to discharge: None    Assessment:     Jose Fonseca Jr. is a 78 y.o. male admitted with a medical diagnosis of Hypotension.  He presents with the following impairments/functional limitations: weakness, impaired endurance, impaired cardiopulmonary response to activity. Pt tolerated activity with stand by assistance-supervision for all out of bed mobility. Pt able to ambulate and perform ADLs with supervision from nursing staff. Arterial line with variable readings, appeared to maintain MAP >65 throughout mobility, denied dizziness or SOB. Pt would continue to benefit from acute skilled therapy intervention to address deficits and progress toward prior level of function.       Rehab Prognosis: Good; patient would benefit from acute skilled PT services to address these deficits and reach maximum level of function.    Recent Surgery: Procedure(s) (LRB):  Mitral clip (N/A)  ECHOCARDIOGRAM, TRANSESOPHAGEAL (N/A) 2 Days Post-Op    Plan:     During this hospitalization, patient to be seen 2 x/week to address the identified rehab impairments via gait training, therapeutic activities, therapeutic exercises, neuromuscular re-education and progress toward the following goals:    Plan of Care Expires:  12/03/23    Subjective     Chief Complaint: multiple lines and wires   Patient/Family Comments/goals: to get better   Pain/Comfort:  Pain Rating 1: 0/10  Pain Rating Post-Intervention 1: 0/10    Patients cultural, spiritual, Denominational conflicts given the current situation: no    Living Environment:  [facility] with [facility].   Prior to admission,  patients level of function was independent with mobility and ADLs, works.  Equipment used at home: none.  DME owned (not currently used): none.  Upon discharge, patient will have assistance from unknown.    Objective:     Communicated with RN prior to session.  Patient found HOB elevated with arterial line, blood pressure cuff, pulse ox (continuous), telemetry, central line, peripheral IV  upon PT entry to room.    General Precautions: Standard, fall  Orthopedic Precautions:N/A   Braces: N/A  Respiratory Status: Room air    Exams:  Cognitive Exam:  Patient is AAOx4, followed all commands, communicates clearly and fluently  Gross Motor Coordination:  WFL  RLE ROM: WFL  RLE Strength: WFL  LLE ROM: WFL  LLE Strength: WFL    Functional Mobility:  Bed Mobility:     Supine to Sit: supervision  Transfers:     Sit to Stand: 1x from EOB, 1x from toilet with supervision with no AD  Gait: Pt ambulated 2x 8 ft, then 120 ft with no AD and stand by assistance. Pt demo'd small step size, decreased foot clearance, narrow JERRY, no LOB, no dizziness, mild increase in WOB that resolved with seated rest. Cuing provided for forward gaze and upright posture. BP MAP 70 while walking.       AM-PAC 6 CLICK MOBILITY  Total Score:23       Treatment & Education:  Pt stood at the sink and performed self care with OT, supervision for static and dynamic standing balance. Pt then ambulated to toilet, performed toileting with supervision. RN presented to adjust arterial line 2/2 poor wave form. BP MAP >65 throughout session  Pt educated on role of PT/POC. Pt verbalized understanding.   Pt encouraged to only perform OOB mobility with assistance from nursing/therapy. Pt agreeable.   Pt encouraged to ambulate daily with assistance/supervision from nursing/therapy. Pt agreeable.      Patient left up in chair with all lines intact, call button in reach, and RN notified.    GOALS:   Multidisciplinary Problems       Physical Therapy Goals           Problem: Physical Therapy    Goal Priority Disciplines Outcome Goal Variances Interventions   Physical Therapy Goal     PT, PT/OT Ongoing, Progressing     Description: Goals to be met by: 2023     Patient will increase functional independence with mobility by performin. Supine to sit with [place]  2. Sit to stand transfer with [place]  3. Gait  x 600 feet with Modified [place] using No Assistive Device.                          History:     Past Medical History:   Diagnosis Date    Chronic systolic heart failure 2023    Coronary artery disease     S/P 3 vessel CABG; stents    Glaucoma     HTN (hypertension) 2013    HTN (hypertension) 2013    Hx of calcium pyrophosphate deposition disease (CPPD) 2021    XR CPPD knees     Hyperlipidemia     Myocardial infarction     Obstructive sleep apnea     Post PTCA 2015    S/P CABG (coronary artery bypass graft) 2015    S/P L arthroscopy of shoulder (3/18/15) 2015    Squamous cell carcinoma        Past Surgical History:   Procedure Laterality Date    ADENOIDECTOMY      ANGIOGRAM, CORONARY, WITH LEFT HEART CATHETERIZATION N/A 09/15/2021    Procedure: ANGIOGRAM,CORONARY,WITH LEFT HEART CATHETERIZATION;  Surgeon: Ken Ibarra MD;  Location: [facility] CATH LAB;  Service: Cardiology;  Laterality: N/A;    CATARACT EXTRACTION      patient not sure which eye    CORONARY ANGIOGRAPHY N/A 2019    Procedure: ANGIOGRAM, CORONARY ARTERY;  Surgeon: Ken Ibarra MD;  Location: [facility] CATH LAB;  Service: Cardiology;  Laterality: N/A;    CORONARY ANGIOGRAPHY INCLUDING BYPASS GRAFTS WITH CATHETERIZATION OF LEFT HEART Right 2019    Procedure: ANGIOGRAM, CORONARY, INCLUDING BYPASS GRAFT, WITH LEFT HEART CATHETERIZATION;  Surgeon: Ken Ibarra MD;  Location: [facility] CATH LAB;  Service: Cardiology;  Laterality: Right;    CORONARY ANGIOPLASTY      CORONARY ARTERY BYPASS GRAFT      CORONARY BYPASS GRAFT ANGIOGRAPHY   09/15/2021    Procedure: Bypass graft study;  Surgeon: Ken Ibarra MD;  Location: [facility] CATH LAB;  Service: Cardiology;;    LEFT HEART CATHETERIZATION N/A 05/22/2019    Procedure: CATHETERIZATION, HEART, LEFT;  Surgeon: Ken Ibarra MD;  Location: [facility] CATH LAB;  Service: Cardiology;  Laterality: N/A;    LUMBAR DISCECTOMY  11/01/2022    LUMBAR LAMINECTOMY      MITRAL CLIP N/A 11/1/2023    Procedure: Mitral clip;  Surgeon: Ken Ibarra MD;  Location: [facility] CATH LAB;  Service: Cardiology;  Laterality: N/A;    RIGHT HEART CATHETERIZATION Right 7/14/2023    Procedure: INSERTION, CATHETER, RIGHT HEART;  Surgeon: Ken Will MD;  Location: [facility] CATH LAB;  Service: Cardiology;  Laterality: Right;    RIGHT HEART CATHETERIZATION Right 7/17/2023    Procedure: INSERTION, CATHETER, RIGHT HEART;  Surgeon: Dalia Amezquita DO;  Location: [facility] CATH LAB;  Service: Cardiology;  Laterality: Right;    ROTATOR CUFF REPAIR      SHOULDER SURGERY      TONSILLECTOMY Left 2015    TRANSESOPHAGEAL ECHOCARDIOGRAPHY N/A 11/1/2023    Procedure: ECHOCARDIOGRAM, TRANSESOPHAGEAL;  Surgeon: Jared Jackson MD;  Location: [facility] CATH LAB;  Service: Cardiology;  Laterality: N/A;       Time Tracking:     PT Received On: 11/03/23  PT Start Time: 0945     PT Stop Time: 1019  PT Total Time (min): 34 min     Billable Minutes: Evaluation 8 mins , Gait Training 16 mins, and Therapeutic Activity 10 mins       11/03/2023

## 2023-11-03 NOTE — PT/OT/SLP EVAL
"Occupational Therapy   Co-Evaluation and Co-Treatment  Co-treatment with PT for maximal pt participation, safety, and activity tolerance       Name: Jose Fonseca Jr.  MRN: [id number]  Admitting Diagnosis: Hypotension  Recent Surgery: Procedure(s) (LRB):  Mitral clip (N/A)  ECHOCARDIOGRAM, TRANSESOPHAGEAL (N/A) 2 Days Post-Op    Recommendations:     Discharge Recommendations: No Therapy Indicated  Discharge Equipment Recommendations:  none  Barriers to discharge:  None    Assessment:     Jose Fonseca Jr. is a 78 y.o. male with a medical diagnosis of Hypotension.  He presents with impaired ADL and mobility performance deficits. Pt found upright and agreeable for therapy today. Session focused on standing ADLs at sink, toileting, and functional mobility in hallway. Pt tolerated session well with limitations noted by generalized hospitalized weakness and impaired endurance. PTA, pt was (I) and living alone, still working and driving. Pt currently is an excellent candidate for continued therapy. Pt is highly motivated to return to OF and currently is not at their baseline. Pt currently would benefit best from no therapy indicated at discharge.   Performance deficits affecting function: weakness, impaired self care skills, impaired endurance, impaired functional mobility, gait instability, impaired balance, impaired cardiopulmonary response to activity.      Rehab Prognosis: Good; patient would benefit from acute skilled OT services to address these deficits and reach maximum level of function.       Plan:     Patient to be seen 2 x/week to address the above listed problems via self-care/home management, therapeutic activities, therapeutic exercises  Plan of Care Expires:    Plan of Care Reviewed with: patient    Subjective     Chief Complaint: feeling fatigued after walking   Patient/Family Comments/goals: "Thank you guys for coming"    Occupational Profile:  Living Environment: Pt lives alone in a " two story condo with elevator access for second level. Pt has two steps to enter condo.  Previous level of function: I with ADLs and mobility  Roles and Routines: Pt drives; works as a . Enjoys his grandchildren, children, going to dinner, and football  Equipment Used at Home: none (owns a SPC and raised toilet)  Assistance upon Discharge: family    Pain/Comfort:  Pain Rating 1: 0/10  Pain Rating Post-Intervention 1: 0/10  Pain Rating Post-Intervention 2: 0/10    Patients cultural, spiritual, Advent conflicts given the current situation: no    Objective:     Communicated with: RN prior to session.  Patient found HOB elevated with arterial line, peripheral IV, telemetry, blood pressure cuff, pulse ox (continuous) upon OT entry to room.    General Precautions: Standard, fall  Orthopedic Precautions: N/A  Braces: N/A  Respiratory Status: Room air    Occupational Performance:    Bed Mobility:    Patient completed Rolling/Turning to Left with  stand by assistance  Patient completed Scooting/Bridging with stand by assistance  Patient completed Supine to Sit with stand by assistance    Functional Mobility/Transfers:  Patient completed Sit <> Stand Transfer with stand by assistance  with  no assistive device   Patient completed Bed <> Chair Transfer using Step Transfer technique with contact guard assistance with no assistive device  Patient completed Toilet Transfer Step Transfer technique with contact guard assistance with  no AD  Functional Mobility: Pt stood from bed and mobilized in room for ~5 minutes of standing ADLs at sink. From sink, pt requesting to complete toileting. Needed cues for pacing of task. Pt then mobilized functional distance in room and hallway to simulate household/community distances. Pt was SBA-CGA throughout session with A-line measured for safety multiple times.    Activities of Daily Living:  Grooming: stand by assistance washing face and brushing teeth standing at sink. Pt also  combed hair at EOB   Lower Body Dressing: minimum assistance donning pants at EOB; pt needed A threading at bedside  Toileting: stand by assistance for pt completing self pericare    Cognitive/Visual Perceptual:  Cognitive/Psychosocial Skills:     -       Oriented to: Person, Place, Time, and Situation   -       Follows Commands/attention:Follows multistep  commands  -       Communication: clear/fluent  -       Memory: No Deficits noted  -       Safety awareness/insight to disability: intact   -       Mood/Affect/Coping skills/emotional control: Appropriate to situation    Physical Exam:  Balance:    -       demo good sitting and standing balance   Upper Extremity Range of Motion:     -       Right Upper Extremity: WFL  -       Left Upper Extremity: WFL  Upper Extremity Strength:    -       Right Upper Extremity: WFL  -       Left Upper Extremity: WFL   Strength:    -       Right Upper Extremity: WFL  -       Left Upper Extremity: WFL    AMPAC 6 Click ADL:  AMPAC Total Score: 22    Treatment & Education:  Pt educated on role of occupational therapy, POC, and safety during ADLs and functional mobility. Pt and OT discussed importance of safe, continued mobility to optimize daily living skills. Pt verbalized understanding.     White board updated during session. Pt given instruction to call for medical staff/nurse for assistance.       Patient left up in chair with all lines intact, call button in reach, and RN notified    GOALS:   Multidisciplinary Problems       Occupational Therapy Goals          Problem: Occupational Therapy    Goal Priority Disciplines Outcome Interventions   Occupational Therapy Goal     OT, PT/OT Ongoing, Progressing    Description: Goals to be met by: 12/3/23 (1 month)     Patient will increase functional independence with ADLs by performing:    UE Dressing with [place].  LE Dressing with [place].  Grooming while standing at sink with [place].  Toileting from toilet with  [place] for hygiene and clothing management.   Rolling to Bilateral with [place].   Supine to sit with [place].  Step transfer with [place]  Toilet transfer to toilet with [place].                         History:     Past Medical History:   Diagnosis Date    Coronary artery disease     S/P 3 vessel CABG; stents    Glaucoma     HTN (hypertension) 4/19/2013    Hx of calcium pyrophosphate deposition disease (CPPD) 8/16/2021    XR CPPD knees 2021    Hyperlipidemia     Myocardial infarction     Obstructive sleep apnea     Post PTCA 2/18/2015    S/P CABG (coronary artery bypass graft) 2/18/2015    S/P L arthroscopy of shoulder (3/18/15) 4/1/2015    Squamous cell carcinoma          Past Surgical History:   Procedure Laterality Date    ADENOIDECTOMY      ANGIOGRAM, CORONARY, WITH LEFT HEART CATHETERIZATION N/A 09/15/2021    Procedure: ANGIOGRAM,CORONARY,WITH LEFT HEART CATHETERIZATION;  Surgeon: Ken Ibarra MD;  Location: [facility] CATH LAB;  Service: Cardiology;  Laterality: N/A;    CATARACT EXTRACTION      patient not sure which eye    CORONARY ANGIOGRAPHY N/A 05/22/2019    Procedure: ANGIOGRAM, CORONARY ARTERY;  Surgeon: Ken Ibarra MD;  Location: [facility] CATH LAB;  Service: Cardiology;  Laterality: N/A;    CORONARY ANGIOGRAPHY INCLUDING BYPASS GRAFTS WITH CATHETERIZATION OF LEFT HEART Right 04/22/2019    Procedure: ANGIOGRAM, CORONARY, INCLUDING BYPASS GRAFT, WITH LEFT HEART CATHETERIZATION;  Surgeon: Ken Ibarra MD;  Location: [facility] CATH LAB;  Service: Cardiology;  Laterality: Right;    CORONARY ANGIOPLASTY      CORONARY ARTERY BYPASS GRAFT      CORONARY BYPASS GRAFT ANGIOGRAPHY  09/15/2021    Procedure: Bypass graft study;  Surgeon: Ken Ibarra MD;  Location: [facility] CATH LAB;  Service: Cardiology;;    LEFT HEART CATHETERIZATION N/A 05/22/2019    Procedure: CATHETERIZATION, HEART, LEFT;  Surgeon: Ken Ibarra MD;  Location: [facility] CATH LAB;  Service: Cardiology;  Laterality:  N/A;    LUMBAR DISCECTOMY  11/01/2022    LUMBAR LAMINECTOMY      MITRAL CLIP N/A 11/1/2023    Procedure: Mitral clip;  Surgeon: Ken Ibarra MD;  Location: [facility] CATH LAB;  Service: Cardiology;  Laterality: N/A;    RIGHT HEART CATHETERIZATION Right 7/14/2023    Procedure: INSERTION, CATHETER, RIGHT HEART;  Surgeon: Ken Will MD;  Location: [facility] CATH LAB;  Service: Cardiology;  Laterality: Right;    RIGHT HEART CATHETERIZATION Right 7/17/2023    Procedure: INSERTION, CATHETER, RIGHT HEART;  Surgeon: Dalia Amezquita DO;  Location: [facility] CATH LAB;  Service: Cardiology;  Laterality: Right;    ROTATOR CUFF REPAIR      SHOULDER SURGERY      TONSILLECTOMY Left 2015    TRANSESOPHAGEAL ECHOCARDIOGRAPHY N/A 11/1/2023    Procedure: ECHOCARDIOGRAM, TRANSESOPHAGEAL;  Surgeon: Jared Jackson MD;  Location: [facility] CATH LAB;  Service: Cardiology;  Laterality: N/A;       Time Tracking:     OT Date of Treatment: 11/03/23  OT Start Time: 0945  OT Stop Time: 1019  OT Total Time (min): 34 min    Billable Minutes:Evaluation 10 min  Self Care/Home Management 24 min    11/3/2023

## 2023-11-03 NOTE — SUBJECTIVE & OBJECTIVE
Interval History: Off levo since 0551, MAPs >70. Episode of NSVT (12-beat run) on tele overnight, asymptomatic. CVP 9. Cr improved to 1.4. H/H stable. Feels well today, denies any LH/DZ or SOB. Anticipate DC tomorrow.     Objective:     Vital Signs (Most Recent):  Temp: (P) 98.1 °F (36.7 °C) (11/03/23 1100)  Pulse: 76 (11/03/23 1011)  Resp: 20 (11/03/23 0944)  BP: 101/63 (11/03/23 1011)  SpO2: (!) 94 % (11/03/23 0944) Vital Signs (24h Range):  Temp:  [97.1 °F (36.2 °C)-98.5 °F (36.9 °C)] (P) 98.1 °F (36.7 °C)  Pulse:  [] 76  Resp:  [12-28] 20  SpO2:  [91 %-100 %] 94 %  BP: ()/(54-85) 101/63  Arterial Line BP: ()/(35-55) 96/35     Weight: 96.1 kg (211 lb 13.8 oz)  Body mass index is 28.73 kg/m².     SpO2: (!) 94 %         Intake/Output Summary (Last 24 hours) at 11/3/2023 1407  Last data filed at 11/3/2023 1000  Gross per 24 hour   Intake 1072 ml   Output 2526 ml   Net -1454 ml       Lines/Drains/Airways       Central Venous Catheter Line  Duration             Trialysis (Dialysis) Catheter 11/01/23 1748 right internal jugular 1 day              Arterial Line  Duration             Arterial Line 11/01/23 1402 Right Radial 2 days              Peripheral Intravenous Line  Duration                  Peripheral IV - Single Lumen 11/01/23 1246 20 G Left;Posterior Forearm 2 days                       Physical Exam  Constitutional:       General: He is not in acute distress.     Appearance: Normal appearance.   HENT:      Nose: Nose normal.      Mouth/Throat:      Mouth: Mucous membranes are moist.      Pharynx: Oropharynx is clear.   Neck:      Vascular: No carotid bruit.   Cardiovascular:      Rate and Rhythm: Normal rate and regular rhythm.      Pulses: Normal pulses.      Heart sounds: Normal heart sounds. No murmur heard.     No friction rub. No gallop.      Comments: RIJ Trialysis line in place  Pulmonary:      Effort: Pulmonary effort is normal. No respiratory distress.      Breath sounds: No wheezing  or rales.   Chest:      Chest wall: No tenderness.   Abdominal:      General: Bowel sounds are normal.      Palpations: Abdomen is soft.      Tenderness: There is no abdominal tenderness.   Musculoskeletal:         General: Normal range of motion.      Cervical back: Normal range of motion.      Right lower leg: No edema.      Left lower leg: No edema.   Skin:     General: Skin is warm and dry.   Neurological:      Mental Status: He is alert and oriented to person, place, and time.          Significant Labs: ABG:   Recent Labs   Lab 11/01/23  2156 11/02/23  0949 11/03/23  0228   PH 7.275* 7.280* 7.320*   PCO2 36.4 36.9 35.0   HCO3 16.9* 17.3* 18.0*   POCSATURATED 59 59 60   BE -10* -9* -8*   , CMP   Recent Labs   Lab 11/02/23  2048 11/03/23  0436 11/03/23  1044    142 144   K 4.2 4.4 4.7   * 118* 118*   CO2 16* 16* 20*   * 126* 127*   * 93* 77*   CREATININE 1.6* 1.4 1.3   CALCIUM 8.8 9.0 9.2   PROT 6.1 6.2 6.5   ALBUMIN 3.0* 3.1* 3.3*   BILITOT 0.4 0.6 0.5   ALKPHOS 73 75 79   AST 12 15 15   ALT 8* 9* 11   ANIONGAP 13 8 6*   , CBC   Recent Labs   Lab 11/01/23  1623 11/01/23  1629 11/02/23  0336 11/03/23  0436   WBC 10.52  --  7.59 9.00   HGB 9.7*  --  10.2* 10.1*   HCT 28.9*   < > 31.4* 31.5*     --  200 204    < > = values in this interval not displayed.     All pertinent lab and imaging results from the last 24 hours have been reviewed.    Significant Imaging: All pertinent imaging results reviewed

## 2023-11-03 NOTE — ASSESSMENT & PLAN NOTE
Severe mitral regurg s/p mitral clip implantation on 11/01 with significant improvement in regurgitation.

## 2023-11-03 NOTE — ASSESSMENT & PLAN NOTE
Patient has been hypotensive at home for the past 2 weeks from his baseline. MAPs down to 56 after successful mitral clip procedure. No evidence of pericardial effusion on bedside echo, no hematoma at groin, no sick symptoms. Hemodynamics not consistent with cardiogenic shock. Concern for volume depletion or constrictive process like amyloidosis. Received fluids, correcting metabolic acidosis with bicarb. Cortisol and TSH wnl. Off Levophed since 11/03 at 0551.    Continue to monitor off pressor support  Maintain MAP >65  Anticipate DC tomorrow

## 2023-11-03 NOTE — PROGRESS NOTES
[facility] - Cardiac Intensive Care  Cardiology  Progress Note    Patient Name: Jose Fonseca Jr.  MRN: [id number]  Admission Date: 11/1/2023  Hospital Length of Stay: 2 days  Code Status: Full Code   Attending Physician: Renaldo Melchor MD   Primary Care Physician: Vasyl Morris MD  Expected Discharge Date: 11/4/2023  Principal Problem:Hypotension    Subjective:     Hospital Course:   Patient admitted to CCU for hypotension and KASSIE. Hemodynamics not consistent with cardiogenic shock. Patient likely volume down and has received fluids. Patient initiated on levophed and had improvement in Cr. Levophed stopped 11/03 with MAPs stable >65. Brief episode of NSVT on 11/03, asymptomatic and started on half home-dose BB (Toprol-XL 25 mg).     Interval History: Off levo since 0551, MAPs >70. Episode of NSVT (12-beat run) on tele overnight, asymptomatic. CVP 9. Cr improved to 1.4. H/H stable. Feels well today, denies any LH/DZ or SOB. Anticipate DC tomorrow.     Objective:     Vital Signs (Most Recent):  Temp: (P) 98.1 °F (36.7 °C) (11/03/23 1100)  Pulse: 76 (11/03/23 1011)  Resp: 20 (11/03/23 0944)  BP: 101/63 (11/03/23 1011)  SpO2: (!) 94 % (11/03/23 0944) Vital Signs (24h Range):  Temp:  [97.1 °F (36.2 °C)-98.5 °F (36.9 °C)] (P) 98.1 °F (36.7 °C)  Pulse:  [] 76  Resp:  [12-28] 20  SpO2:  [91 %-100 %] 94 %  BP: ()/(54-85) 101/63  Arterial Line BP: ()/(35-55) 96/35     Weight: 96.1 kg (211 lb 13.8 oz)  Body mass index is 28.73 kg/m².     SpO2: (!) 94 %         Intake/Output Summary (Last 24 hours) at 11/3/2023 1407  Last data filed at 11/3/2023 1000  Gross per 24 hour   Intake 1072 ml   Output 2526 ml   Net -1454 ml       Lines/Drains/Airways       Central Venous Catheter Line  Duration             Trialysis (Dialysis) Catheter 11/01/23 1748 right internal jugular 1 day              Arterial Line  Duration             Arterial Line 11/01/23 1402 Right Radial 2 days              Peripheral  Intravenous Line  Duration                  Peripheral IV - Single Lumen 11/01/23 1246 20 G Left;Posterior Forearm 2 days                       Physical Exam  Constitutional:       General: He is not in acute distress.     Appearance: Normal appearance.   HENT:      Nose: Nose normal.      Mouth/Throat:      Mouth: Mucous membranes are moist.      Pharynx: Oropharynx is clear.   Neck:      Vascular: No carotid bruit.   Cardiovascular:      Rate and Rhythm: Normal rate and regular rhythm.      Pulses: Normal pulses.      Heart sounds: Normal heart sounds. No murmur heard.     No friction rub. No gallop.      Comments: RIJ Trialysis line in place  Pulmonary:      Effort: Pulmonary effort is normal. No respiratory distress.      Breath sounds: No wheezing or rales.   Chest:      Chest wall: No tenderness.   Abdominal:      General: Bowel sounds are normal.      Palpations: Abdomen is soft.      Tenderness: There is no abdominal tenderness.   Musculoskeletal:         General: Normal range of motion.      Cervical back: Normal range of motion.      Right lower leg: No edema.      Left lower leg: No edema.   Skin:     General: Skin is warm and dry.   Neurological:      Mental Status: He is alert and oriented to person, place, and time.          Significant Labs: ABG:   Recent Labs   Lab 11/01/23  2156 11/02/23  0949 11/03/23  0228   PH 7.275* 7.280* 7.320*   PCO2 36.4 36.9 35.0   HCO3 16.9* 17.3* 18.0*   POCSATURATED 59 59 60   BE -10* -9* -8*   , CMP   Recent Labs   Lab 11/02/23  2048 11/03/23  0436 11/03/23  1044    142 144   K 4.2 4.4 4.7   * 118* 118*   CO2 16* 16* 20*   * 126* 127*   * 93* 77*   CREATININE 1.6* 1.4 1.3   CALCIUM 8.8 9.0 9.2   PROT 6.1 6.2 6.5   ALBUMIN 3.0* 3.1* 3.3*   BILITOT 0.4 0.6 0.5   ALKPHOS 73 75 79   AST 12 15 15   ALT 8* 9* 11   ANIONGAP 13 8 6*   , CBC   Recent Labs   Lab 11/01/23  1623 11/01/23  1629 11/02/23  0336 11/03/23  0436   WBC 10.52  --  7.59 9.00   HGB  9.7*  --  10.2* 10.1*   HCT 28.9*   < > 31.4* 31.5*     --  200 204    < > = values in this interval not displayed.     All pertinent lab and imaging results from the last 24 hours have been reviewed.    Significant Imaging: All pertinent imaging results reviewed  Assessment and Plan:     * Hypotension  Patient has been hypotensive at home for the past 2 weeks from his baseline. MAPs down to 56 after successful mitral clip procedure. No evidence of pericardial effusion on bedside echo, no hematoma at groin, no sick symptoms. Hemodynamics not consistent with cardiogenic shock. Concern for volume depletion or constrictive process like amyloidosis. Received fluids, correcting metabolic acidosis with bicarb. Cortisol and TSH wnl. Off Levophed since 11/03 at 0551.    Continue to monitor off pressor support  Maintain MAP >65  Anticipate DC tomorrow     Nonsustained ventricular tachycardia  12-beat run of VT noted on tele at 0524 on 11/03, pt asymptomatic  On Toprol-XL 50 mg at home    Toprol-XL 25 mg     S/P mitral valve clip implantation  See severe mitral regurgitation    Severe mitral regurgitation  Severe mitral regurg s/p mitral clip implantation on 11/01 with significant improvement in regurgitation.      KASSIE on CKD  Baseline Cr ~1.5. Cr 2.8 on admission. Concern for 2/2 hypotension given 2 weeks of BP 80-90s/60 at home. Received total 750 mL NS and 500 cc LR. K 6.1 on admission, shifted with repeat wnl and stable.    Renal fxn at baseline, continue to monitor CMP  Continue bicarb 650 mg BID   Strict I/Os     Pulmonary hypertension  Likely group 2/3, pulmonary artery systolic pressure is 73 mmHg on recent echo.    Acute on chronic combined systolic and diastolic heart failure  Recent EF 50-55% with regional wall motion abnormalities present. Septal flattening in diastole and systole consistent with right ventricular volume and pressure overload. Patient recently increased his home diuretics due to feeling  more SOB but did not notice improvement. Now s/p 1250 cc IVFs with improvement, no longer requiring pressor support    strict I/Os  holding diuresis   IVC collapsible on bedside echo  Will resume GDMT as follows at time of discharge:  Toprol-XL 25 mg, weight-based Torsemide 10 mg; hold aldactone       CAD (coronary artery disease)  Continue home statin and aspirin.    S/P CABG (coronary artery bypass graft)  Last angiogram in Oct 2013 significant for patent pLAD stent and LIMA-OM and VG-PDA grafts and known LANCE-LAD occlusion  2019 angiogram    Dist LAD lesion , 95% stenosed.  Dist RCA lesion , 100% stenosed.  Estimated blood loss: none  Patent LIMA to to OMB and patent SVG to RCA.         VTE Risk Mitigation (From admission, onward)           Ordered     heparin (porcine) injection 5,000 Units  Every 8 hours         11/01/23 1530     IP VTE HIGH RISK PATIENT  Once         11/01/23 1530     Place sequential compression device  Until discontinued         11/01/23 1530                    Ese Navarrete MD  Cardiology  [facility] - Cardiac Intensive Care

## 2023-11-04 NOTE — PLAN OF CARE
Problem: Adult Inpatient Plan of Care  Goal: Plan of Care Review  Outcome: Ongoing, Not Progressing  Goal: Patient-Specific Goal (Individualized)  Outcome: Ongoing, Not Progressing  Goal: Absence of Hospital-Acquired Illness or Injury  Outcome: Ongoing, Not Progressing  Goal: Optimal Comfort and Wellbeing  Outcome: Ongoing, Not Progressing  Goal: Readiness for Transition of Care  Outcome: Ongoing, Not Progressing     Problem: Fluid and Electrolyte Imbalance (Acute Kidney Injury/Impairment)  Goal: Fluid and Electrolyte Balance  Outcome: Ongoing, Not Progressing     Problem: Oral Intake Inadequate (Acute Kidney Injury/Impairment)  Goal: Optimal Nutrition Intake  Outcome: Ongoing, Not Progressing     Problem: Renal Function Impairment (Acute Kidney Injury/Impairment)  Goal: Effective Renal Function  Outcome: Ongoing, Not Progressing     Problem: Infection  Goal: Absence of Infection Signs and Symptoms  Outcome: Ongoing, Not Progressing     Problem: Fall Injury Risk  Goal: Absence of Fall and Fall-Related Injury  Outcome: Ongoing, Not Progressing

## 2023-11-04 NOTE — DISCHARGE INSTRUCTIONS
Thank you for allowing us to participate in your care.     Medication changes:  - Stop taking Spironolactone (Aldactone) 25 mg until hospital follow up appointment  - Start Metoprolol succinate (Toprol-XL) at half your home dose (take 25 mg daily); new prescription for 25 mg dosing provided  - Weigh yourself first thing every morning after using the restroom to determine your dry weight; use the same scale every morning and adjust your diuretic dose as follows:  Take Torsemide 40 mg as needed for weight gain of 3 lbs in one day or 5 lbs in one week

## 2023-11-04 NOTE — NURSING TRANSFER
Nursing Transfer Note      11/3/2023   8:57 PM    Nurse giving handoff: Mary Mckoy RN    Nurse receiving handoff:FE Casiano     Reason patient is being transferred: Stepdown from ICU     Transfer To:      Transfer via wheelchair    Transfer with cardiac monitoring    Transported by Mary Mckoy RN     Transfer Vital Signs:   Blood Pressure: 122/58 (83)  Heart Rate:68  O2:99  Temperature:97.9  Respirations:23      Order for Tele Monitor? Yes    4eyes on Skin: yes    Medicines sent: None     Any special needs or follow-up needed: NA     Patient belongings transferred with patient: Yes    Chart send with patient: Yes    Notified: Pt in contact with family members     Upon arrival to floor: cardiac monitor applied, patient oriented to room, call bell in reach, and bed in lowest position

## 2023-11-04 NOTE — PLAN OF CARE
[facility] - Cardiology Stepdown  Discharge Final Note    Primary Care Provider: Vasyl Morris MD    Expected Discharge Date: 11/4/2023    Final Discharge Note (most recent)       Final Note - 11/04/23 1157          Final Note    Assessment Type Final Discharge Note (P)      Anticipated Discharge Disposition Home or Self Care (P)      Hospital Resources/Appts/Education Provided Provided patient/caregiver with written discharge plan information;Provided education on problems/symptoms using teachback;Appointments scheduled and added to AVS (P)         Post-Acute Status    Post-Acute Authorization Other (P)      Coverage [employer] (P)      Other Status No Post-Acute Service Needs (P)      Discharge Delays None known at this time (P)                      Important Message from Medicare  Important Message from Medicare regarding Discharge Appeal Rights: Given to patient/caregiver, Explained to patient/caregiver, Signed/date by patient/caregiver     Date IMM was signed: 11/04/23  Time IMM was signed: 1039    SW noting pt's discharge. Pt. discharging home with Self-Care. After review of pt's medical record, no discharge needs identified. Family providing transportation home upon discharge.     Masha Ibanez LMSW

## 2023-11-04 NOTE — DISCHARGE SUMMARY
[facility] - [facility] Stepdown  Cardiology  Discharge Summary      Patient Name: Jose Fonseca Jr.  MRN: [id number]  Admission Date: 11/1/2023  Hospital Length of Stay: 3 days  Discharge Date and Time: No discharge date for patient encounter.  Attending Physician: Renaldo Melchor MD    Discharging Provider: Ese Navarrete MD  Primary Care Physician: Vasyl Morris MD    HPI:   Jose Fonseca Jr. 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 For the past 2 weeks, he has had increased SOB and hypotension to SBP 80s-90s. He increased his home lasix once he noticed feeling more SOB, but that did not improve his symptoms. After the procedure, patient hypotensive to MAP 56 and shivering. Recent pro-BNP 3100, K 6.1 today.    The patient has undergone the following MitraClip work-up:   · TTE (Date 09/12/2023 ): Severe mitral insufficiency, EOA 0.5 cm2, EF= 50-55%.   · LHC (Date 09/2021):  Patent LIMA to OM and SVG to RCA.  · CT Surgery risk assessment: pending   · PFTs: FEV1 not indicated  · Comorbidities: coronary artery disease, hx of CABG, CKD   · Labs: Hgb 15.7, Cr 1.7, BNP 2266       Procedure(s) (LRB):  Mitral clip (N/A)  ECHOCARDIOGRAM, TRANSESOPHAGEAL (N/A)     Physical Exam  Constitutional:       General: He is not in acute distress.     Appearance: Normal appearance. He is not ill-appearing.   HENT:      Nose: Nose normal. No congestion or rhinorrhea.      Mouth/Throat:      Mouth: Mucous membranes are moist.      Pharynx: Oropharynx is clear.   Neck:      Vascular: No carotid bruit.   Cardiovascular:      Rate and Rhythm: Normal rate and regular rhythm.      Pulses: Normal pulses.      Heart sounds: Normal heart sounds. No murmur heard.     No friction rub. No gallop.   Pulmonary:      Effort: Pulmonary effort is normal. No respiratory distress.      Breath sounds: Normal breath  sounds. No stridor. No wheezing, rhonchi or rales.   Chest:      Chest wall: No tenderness.   Abdominal:      General: Bowel sounds are normal.      Palpations: Abdomen is soft.      Tenderness: There is no abdominal tenderness. There is no guarding or rebound.   Musculoskeletal:         General: No swelling. Normal range of motion.      Cervical back: Normal range of motion and neck supple. No rigidity or tenderness.      Right lower leg: No edema.      Left lower leg: No edema.   Skin:     General: Skin is warm and dry.      Coloration: Skin is not pale.      Findings: No bruising or erythema.   Neurological:      General: No focal deficit present.      Mental Status: He is alert and oriented to person, place, and time.      Motor: No weakness.         Indwelling Lines/Drains at time of discharge:  Lines/Drains/Airways     None                 Hospital Course:  Patient Jose Fonseca Jr. was admitted to CCU from 11/1/2023 to 11/4/2023 for the management of hypotension and KASSIE following outpatient mitral clip placement. Hemodynamics not consistent with cardiogenic shock. Patient appeared volume depleted with improvement in KASSIE following IVFs. Briefly required Levophed for pressor support, able to wean off within 24 hrs with MAPs stable >65. Denied any further LH/DZ following volume repletion. Maintained adequate UoP throughout admission. Brief episode of NSVT on 11/03, asymptomatic and started on half home-dose BB (Toprol-XL 25 mg). No further episodes of VT during admission. Cr improved to baseline and BP remained stable prior to discharge. At time of discharge, pt was clinically and hemodynamically stable and reported improvement of presenting symptoms.     Brief Discharge Plan:  - Stop taking Spironolactone (Aldactone) 25 mg until hospital follow up appointment  - Start Metoprolol succinate (Toprol-XL) at half your home dose (take 25 mg daily); new prescription for 25 mg dosing provided  - Weigh  yourself first thing every morning after using the restroom to determine your dry weight; use the same scale every morning and adjust your diuretic dose as follows:   Take Torsemide 40 mg as needed for weight gain of 3 lbs in one day or 5 lbs in one week   - F/u with PCP as scheduled in 1-2 weeks  - F/u with Cardiology as scheduled  - Medications provided at bedside prior to discharge   - Patient instructed to return to ED if worsening symptoms  - Discharge instructions given in person, patient verbalized understanding      Goals of Care Treatment Preferences:  Code Status: Full Code      Consults:     Significant Diagnostic Studies: Labs: All labs within the past 24 hours have been reviewed    Pending Diagnostic Studies:     Procedure Component Value Units Date/Time    Comprehensive metabolic panel [[id number]]     Order Status: Sent Lab Status: No result     Specimen: Blood     NT-PRO BNP, [place] [[id number]] Collected: 11/01/23 1542    Order Status: Sent Lab Status: In process Updated: 11/01/23 1542    Specimen: Blood           Final Active Diagnoses:    Diagnosis Date Noted POA    PRINCIPAL PROBLEM:  Hypotension [I95.9] 11/01/2023 Yes    Nonsustained ventricular tachycardia [I47.29] 11/03/2023 No    S/P mitral valve clip implantation [Z98.890, Z95.818] 11/03/2023 Not Applicable    Severe mitral regurgitation [I34.0] 07/17/2023 Yes    KASSIE on CKD [N17.9] 07/16/2023 Yes    Pulmonary hypertension [I27.20] 07/17/2023 Yes    Acute on chronic combined systolic and diastolic heart failure [I50.43] 07/18/2023 Yes    CAD (coronary artery disease) [I25.10] 04/19/2013 Yes    S/P CABG (coronary artery bypass graft) [Z95.1] 02/18/2015 Not Applicable     Chronic      Problems Resolved During this Admission:     Cardiac/Vascular  S/P CABG (coronary artery bypass graft)  Last angiogram in Oct 2013 significant for patent pLAD stent and LIMA-OM and VG-PDA grafts and known LANCE-LAD occlusion  2019 angiogram    · Dist LAD  lesion , 95% stenosed.  · Dist RCA lesion , 100% stenosed.  · Estimated blood loss: none  · Patent LIMA to to OMB and patent SVG to RCA.     CAD (coronary artery disease)  Continue home statin and aspirin.    Acute on chronic combined systolic and diastolic heart failure  Recent EF 50-55% with regional wall motion abnormalities present. Septal flattening in diastole and systole consistent with right ventricular volume and pressure overload. Patient recently increased his home diuretics due to feeling more SOB but did not notice improvement. Now s/p 1250 cc IVFs with improvement, no longer requiring pressor support     strict I/Os   holding diuresis    IVC collapsible on bedside echo   Will resume GDMT as follows at time of discharge:   Toprol-XL 25 mg, weight-based Torsemide 10 mg; hold aldactone       Pulmonary hypertension  Likely group 2/3, pulmonary artery systolic pressure is 73 mmHg on recent echo.        Discharged Condition: stable    Disposition: Home or Self Care    Follow Up:    Patient Instructions:      Ambulatory referral/consult to Cardiology   Standing Status: Future   Referral Priority: Routine Referral Type: Consultation   Referral Reason: Specialty Services Required   Requested Specialty: Cardiology   Number of Visits Requested: 1     Ambulatory referral/consult to Internal Medicine   Standing Status: Future   Referral Priority: Routine Referral Type: Consultation   Referral Reason: Specialty Services Required   Requested Specialty: Internal Medicine   Number of Visits Requested: 1     Medications:  Reconciled Home Medications:      Medication List      CHANGE how you take these medications    metoprolol succinate 25 MG 24 hr tablet  Commonly known as: TOPROL-XL  Take 1 tablet (25 mg total) by mouth once daily.  What changed:   · medication strength  · how much to take     torsemide 20 MG Tab  Commonly known as: DEMADEX  Take 2 tablets (40 mg total) by mouth as needed (Take Torsemide 40 mg as  needed for weight gain of 3 lbs in one day or 5 lbs in one week). Take Torsemide 40 mg as needed for weight gain of 3 lbs in one day or 5 lbs in one week  What changed:   · when to take this  · reasons to take this  · additional instructions        STOP taking these medications    colchicine (gout) 0.6 mg tablet  Commonly known as: COLCRYS     INDOMETHACIN ORAL     meloxicam 15 MG tablet  Commonly known as: MOBIC     potassium chloride SA 20 MEQ tablet  Commonly known as: K-DUR,KLOR-CON     sildenafiL 100 MG tablet  Commonly known as: VIAGRA     spironolactone 25 MG tablet  Commonly known as: ALDACTONE        ASK your doctor about these medications    allopurinoL 300 MG tablet  Commonly known as: ZYLOPRIM  Take 1 tablet (300 mg total) by mouth every evening.     aspirin 81 MG EC tablet  Commonly known as: ECOTRIN  Take 1 tablet (81 mg total) by mouth once daily.     atorvastatin 80 MG tablet  Commonly known as: LIPITOR  Take 1 tablet (80 mg total) by mouth once daily.     tamsulosin 0.4 mg Cap  Commonly known as: FLOMAX  Take 1 capsule by mouth once daily.            Time spent on the discharge of patient: 30 minutes    Ese Navarrete MD  Cardiology  [facility] - Cardiology Stepdown

## 2023-11-07 NOTE — PROGRESS NOTES
C3 nurse attempted to contact Jose Fonseca Jr.  for a TCC post hospital discharge follow up call. No answer. The patient does not have a scheduled HOSFU appointment, and the pt does not have an [facility] PCP. Message sent via [facility] portal for Post Discharge Attempt.

## 2023-11-07 NOTE — PROGRESS NOTES
C3 nurse spoke with Jose Fonseca Jr.  for a TCC post hospital discharge follow up call. The patient reports does not have a scheduled HOSFU appointment. C3 nurse was unable to schedule [facility]. Patient advised to contact their PCP to schedule a HOSPFU within 5-7 days.

## 2023-11-07 NOTE — PHYSICIAN QUERY
PT Name: Jose Fonseca Jr.  MR #: [id number]     DOCUMENTATION CLARIFICATION     CDS/: HEIDE Canales, RN, CCDS               Contact information: [email or web address]  This form is a permanent document in the medical record.     Query Date: November 7, 2023    By submitting this query, we are merely seeking further clarification of documentation.  Please utilize your independent clinical judgment when addressing the question(s) below.    The Medical Record contains the following   Indicators Supporting Clinical Findings Location in Medical Record   X Heart Failure documented Chronic systolic heart failure     Acute on chronic combined systolic and diastolic heart failure    Chronic HFpEF    CHF- elevated BNP secondary to severe MR, treated with MitraClip   H & P: Dr. Ibarra 11/1    Cards H & P: Dr. Anderson 11/1    Cards: Dr. Jackson 11/1    Cards PN: Dr. Melchor 11/2   X    Lab: 11/1   X EF/Echo TTE (Date 09/12/2023 ): Rv w/ systolic function severely reduced , Severe mitral insufficiency with a posterolateral eccentriccally directed jet, EOA 0.5 cm2, EF= 50-55%., PAP 73mmHg    Recent EF 50-55% with regional wall motion abnormalities present. Septal flattening in diastole and systole consistent with right ventricular volume and pressure overload   Cards: Dr. Jackson 11/1            Radiology findings     X Subjective/Objective Respiratory Conditions dyspnea with minimal exertion H & P: Dr. Ibarra 11/1    Recent/Current MI      Heart Transplant, LVAD     X Edema, JVD Right lower leg: No edema.    Left lower leg: No edema    JVD to ear   H & P: Dr. Ibarra 11/1      Cards H & P: Dr. Anderson 11/1    Ascites     X Diuretics/Meds recently increased his home diuretics due to feeling more SOB but did not notice improvement    holding diuresis    Cards H & P: Dr. Anderson 11/1    Other Treatment      Other       Heart failure is a clinical diagnosis which includes symptomatic fluid retention,  elevated intracardiac pressures, and/or the inability of the heart to deliver adequate blood flow.    Heart Failure with reduced Ejection Fraction (HFrEF) or Systolic Heart Failure (loses ability to contract normally, EF is <40%)    Heart Failure with preserved Ejection Fraction (HFpEF) or Diastolic Heart Failure (stiff ventricles, does not relax properly, EF is >50%)     Heart Failure with Combined Systolic and Diastolic Failure (stiff ventricles, does not relax properly and EF is <50%)    Mid-range or mildly reduced ejection fraction (HFmrEF) is classified as systolic heart failure.  Congestive heart failure with a recovered EF is classified as Diastolic Heart Failure.  Common clues to acute exacerbation:  Rapidly progressive symptoms (w/in 2 weeks of presentation), using IV diuretics, using supplemental O2, pulmonary edema on Xray, new or worsening pleural effusion, +JVD or other signs of volume overload, MI w/in 4 weeks, and/or BNP >500  The clinical guidelines noted are only system guidelines, and do not replace the providers clinical judgment.    Provider, please clarify conflicting type/acuity CHF diagnosis associated with the above clinical findings.    [   ]  Chronic Systolic Heart Failure (HFrEF or HFmrEF) - preexisting and stable   [   ]  Chronic Diastolic Heart Failure (HFpEF) - preexisting and stable   X  Acute on Chronic Combined Systolic and Diastolic Heart Failure - worsening of CHF signs/symptoms in preexisting CHF   [   ]  Other (please specify): ___________________________________       Please document in your progress notes daily for the duration of treatment until resolved and include in your discharge summary.    References:  American Heart Association editorial staff. (2017, May). Ejection Fraction Heart Failure Measurement. American Heart Association.  https://www.heart.org/en/health-topics/heart-failure/diagnosing-heart-failure/ejection-fraction-heart-failure-measurement#:~:text=Ejection%20fraction%20(EF)%20is%20a,pushed%20out%20with%20each%20heartbeat  ANDERSON Ramirez (2020, December 15). Heart failure with preserved ejection fraction: Clinical manifestations and diagnosis. [place]. https://www.Chomp.Fitfully/contents/heart-failure-with-preserved-ejection-fraction-clinical-manifestations-and-diagnosis.  ICD-10-CM/PCS Coding Clinic Third Quarter ICD-10, Effective with discharges: September 8, 2020 [facility] § Heart failure with mid-range or mildly reduced ejection fraction (2020).  ICD-10-CM/PCS Coding Clinic Third Quarter ICD-10, Effective with discharges: September 8, 2020 [facility] § Heart failure with recovered ejection fraction (2020).  Form No. 52131

## 2023-11-21 PROBLEM — D64.9 NORMOCYTIC ANEMIA: Status: ACTIVE | Noted: 2023-01-01

## 2023-11-21 PROBLEM — N17.9 AKI (ACUTE KIDNEY INJURY): Status: RESOLVED | Noted: 2023-01-01 | Resolved: 2023-01-01

## 2023-11-21 PROBLEM — E55.9 VITAMIN D DEFICIENCY: Status: ACTIVE | Noted: 2023-01-01

## 2023-11-21 PROBLEM — Z00.00 ENCOUNTER FOR MEDICAL EXAMINATION TO ESTABLISH CARE: Status: ACTIVE | Noted: 2023-01-01

## 2023-11-21 PROBLEM — I50.42 CHRONIC COMBINED SYSTOLIC (CONGESTIVE) AND DIASTOLIC (CONGESTIVE) HEART FAILURE: Status: ACTIVE | Noted: 2023-01-01

## 2023-11-21 PROBLEM — Z00.00 HEALTHCARE MAINTENANCE: Status: ACTIVE | Noted: 2023-01-01

## 2023-11-21 NOTE — ASSESSMENT & PLAN NOTE
Patient is identified as having Combined Systolic and Diastolic heart failure that is Chronic. CHF is currently controlled.     Latest ECHO ECHO 11/1/23    CARSON done to assist with Mitraclip implantation.    Left Ventricle: The left ventricle is moderately dilated. There is normal systolic function with a visually estimated ejection fraction of 55 - 60%.    Right Ventricle: Right ventricular enlargement. Systolic function is mildly reduced.    Mitral Valve: Severe regurgitation s/p mitraclip with reduction to mild-moderate regurgitation with pulmonary vein systolic reversal transitioning to forward flow, increase in SBP >10 mmHg and significant reduction in color flow doppler signal. The mean pressure gradient across the mitral valve is 2 mmHg at a heart rate of 65 bpm s/p MitraClip. Iatrogenic ASD is present.    Continue Beta Blocker and monitor clinical status closely.   Continue torsemide PRN.  Referral to heart failure clinic.  CHF pathway.  Monitor strict Is&Os and daily weights.    Will discuss with cardiology about resuming MRAs and initiating SGLT2i.

## 2023-11-21 NOTE — ASSESSMENT & PLAN NOTE
Lab Results   Component Value Date    WBC 9.45 11/21/2023    HGB 10.0 (L) 11/21/2023    HCT 31.9 (L) 11/21/2023    MCV 98 11/21/2023     11/21/2023     Chronic  Noted since July of 2023  Possibly multifactorial, 2/2: HF, valvular disease, iron deficiency, CKD.  Very symptomatic at this time (increased SOB)  Will start treatment with oral iron and will refer to hematology for evaluation.

## 2023-11-21 NOTE — ASSESSMENT & PLAN NOTE
Not on CPAP  Improved?  Will reassess in future visits to determine need for re-evaluation by sleep medicine.

## 2023-11-21 NOTE — ASSESSMENT & PLAN NOTE
Patient requests to transfer healthcare services to our practice.  We are pleased to continue provided primary care services as requested.

## 2023-11-21 NOTE — PROGRESS NOTES
Subjective:       Patient ID: Jose Fonseca Jr. is a 78 y.o. male.    Chief Complaint: Annual Exam (SOB TIRED, MITRAL VALVE, JOINT PAIN)    HPI    Jose Fonseca Jr. is a 78 y.o. male , English speaking, with a history of:  CAD with LIMA-OM, SVG to dRCA and PCI to LAD  H/o CKD III-IV (Baseline 1.5-1.8)  Severe Mitral Regurgitation S/P mitral clip 11/4/23  H/o Sleep apnea, not on CPAP  Pulmonary hypertension  Aortic atherosclerosis  Hypotension  BPH with urinary obstruction  Vitamin D deficiency  Normocytic anemia      [Local Patient]  Originally from [facility]  Lives in: [facility] [id number]       Patient comes to the clinic a general medical health examination and to establish care.  This is the first time I am seeing Mr. Fonseca.    Patient was recently discharged from the hospital after having mitral valve clips done on 11/04/2023 trying to correct his severe mitral regurgitation.      Patient has had a history of coronary artery disease for over 20 years.  He follows with Cardiology.      His main complaint is increased fatigue and shortness of breath.  He says he is regularly very active person but although he has the wheel he feels he has no energy and his shortness for breath is very limiting.  He gets short of breath even with speaking full sentences or walking a few steps.    This has gotten worst in the past few months.      During his previous hospitalization patient had KASSIE on CKD and hypotension which was also treated with Levophed for a few hours with full resolution.  He was discharged home after the procedure and his Aldactone is being held until his next cardiology appointment.      He is checking his weight daily and he notices variations of want to 3 lb daily.  Whenever he surpasses 3 lb of weight gain he uses his torsemide as indicated by Cardiology.      He did not have a PCP visit in the last 10 years.      During the past year patient was admitted to the hospital  twice.    ECHO 11/1/23    CARSON done to assist with Mitraclip implantation.    Left Ventricle: The left ventricle is moderately dilated. There is normal systolic function with a visually estimated ejection fraction of 55 - 60%.    Right Ventricle: Right ventricular enlargement. Systolic function is mildly reduced.    Mitral Valve: Severe regurgitation s/p mitraclip with reduction to mild-moderate regurgitation with pulmonary vein systolic reversal transitioning to forward flow, increase in SBP >10 mmHg and significant reduction in color flow doppler signal. The mean pressure gradient across the mitral valve is 2 mmHg at a heart rate of 65 bpm s/p MitraClip. Iatrogenic ASD is present.    Latest laboratories from outside lab in [facility] 11/20/23 shoed    TSH 2.07  Hb 9.8 Htc 31 MCV 94  Vt B12 542  Psa 2.62  Cr. 1.2 GFR 61  A1c 5.9  Vit D 14    Patient denies CV symptoms, CP, SOB, palpitations.  Patient denies constitutional symptoms, fever, changes in the urine or stool.    PMH:  Past Medical History:   Diagnosis Date    Chronic systolic heart failure 03/27/2023    Coronary artery disease     S/P 3 vessel CABG; stents    Glaucoma     HTN (hypertension) 04/19/2013    Hx of calcium pyrophosphate deposition disease (CPPD) 08/16/2021    XR CPPD knees 2021    Hyperlipidemia     Myocardial infarction     Obstructive sleep apnea     Post PTCA 02/18/2015    S/P CABG (coronary artery bypass graft) 02/18/2015    S/P L arthroscopy of shoulder (3/18/15) 04/01/2015    Squamous cell carcinoma        PSH:  Past Surgical History:   Procedure Laterality Date    ADENOIDECTOMY      ANGIOGRAM, CORONARY, WITH LEFT HEART CATHETERIZATION N/A 09/15/2021    Procedure: ANGIOGRAM,CORONARY,WITH LEFT HEART CATHETERIZATION;  Surgeon: Ken Ibarra MD;  [facility] [facility];  Service: Cardiology;  Laterality: N/A;    CATARACT EXTRACTION      patient not sure which eye    CORONARY ANGIOGRAPHY N/A 05/22/2019    Procedure: ANGIOGRAM,  CORONARY ARTERY;  Surgeon: Ken Ibarra MD;  Location: [facility] CATH LAB;  Service: Cardiology;  Laterality: N/A;    CORONARY ANGIOGRAPHY INCLUDING BYPASS GRAFTS WITH CATHETERIZATION OF LEFT HEART Right 04/22/2019    Procedure: ANGIOGRAM, CORONARY, INCLUDING BYPASS GRAFT, WITH LEFT HEART CATHETERIZATION;  Surgeon: Ken Ibarra MD;  Location: [facility] CATH LAB;  Service: Cardiology;  Laterality: Right;    CORONARY ANGIOPLASTY      CORONARY ARTERY BYPASS GRAFT      CORONARY BYPASS GRAFT ANGIOGRAPHY  09/15/2021    Procedure: Bypass graft study;  Surgeon: Ken Ibarra MD;  Location: [facility] CATH LAB;  Service: Cardiology;;    LEFT HEART CATHETERIZATION N/A 05/22/2019    Procedure: CATHETERIZATION, HEART, LEFT;  Surgeon: Ken Ibarra MD;  Location: [facility] CATH LAB;  Service: Cardiology;  Laterality: N/A;    LUMBAR DISCECTOMY  11/01/2022    LUMBAR LAMINECTOMY      MITRAL CLIP N/A 11/1/2023    Procedure: Mitral clip;  Surgeon: Ken Ibarra MD;  Location: [facility] CATH LAB;  Service: Cardiology;  Laterality: N/A;    RIGHT HEART CATHETERIZATION Right 7/14/2023    Procedure: INSERTION, CATHETER, RIGHT HEART;  Surgeon: Ken Will MD;  Location: [facility] CATH LAB;  Service: Cardiology;  Laterality: Right;    RIGHT HEART CATHETERIZATION Right 7/17/2023    Procedure: INSERTION, CATHETER, RIGHT HEART;  Surgeon: Dalia Amezquita DO;  Location: [facility] CATH LAB;  Service: Cardiology;  Laterality: Right;    ROTATOR CUFF REPAIR      SHOULDER SURGERY      TONSILLECTOMY Left 2015    TRANSESOPHAGEAL ECHOCARDIOGRAPHY N/A 11/1/2023    Procedure: ECHOCARDIOGRAM, TRANSESOPHAGEAL;  Surgeon: Jared Jackson MD;  Location: [facility] CATH LAB;  Service: Cardiology;  Laterality: N/A;       FH:  Family History   Problem Relation Age of Onset    Heart disease Father     Hypertension Father     Heart failure Father     Heart attack Father     Diabetes Maternal Grandmother     Diabetes Maternal Grandfather     Melanoma Neg Hx   "      Allergies: Negative  Review of patient's allergies indicates:  No Known Allergies    Meds:    Current Outpatient Medications:     allopurinol (ZYLOPRIM) 300 MG tablet, Take 1 tablet (300 mg total) by mouth every evening., Disp: 30 tablet, Rfl: 6    aspirin (ECOTRIN) 81 MG EC tablet, Take 1 tablet (81 mg total) by mouth once daily., Disp: 30 tablet, Rfl: 11    atorvastatin (LIPITOR) 80 MG tablet, Take 1 tablet (80 mg total) by mouth once daily., Disp: 90 tablet, Rfl: 3    metoprolol succinate (TOPROL-XL) 25 MG 24 hr tablet, Take 1 tablet (25 mg total) by mouth once daily., Disp: 90 tablet, Rfl: 3    tamsulosin (FLOMAX) 0.4 mg Cap, Take 1 capsule by mouth once daily., Disp: , Rfl:     torsemide (DEMADEX) 20 MG Tab, Take 2 tablets (40 mg total) by mouth as needed (Take Torsemide 40 mg as needed for weight gain of 3 lbs in one day or 5 lbs in one week). Take Torsemide 40 mg as needed for weight gain of 3 lbs in one day or 5 lbs in one week, Disp: 360 tablet, Rfl: 3    cholecalciferol, vitamin D3, 125 mcg (5,000 unit) capsule, Take 1 capsule (5,000 Units total) by mouth once daily., Disp: 90 capsule, Rfl: 3    dapagliflozin propanediol (FARXIGA) 5 mg Tab tablet, Take 1 tablet (5 mg total) by mouth once daily., Disp: 90 tablet, Rfl: 3    ferrous sulfate (FEOSOL) 325 mg (65 mg iron) Tab tablet, Take 1 tablet (325 mg total) by mouth daily with breakfast., Disp: 90 tablet, Rfl: 3    Social:    Practices law / litigation   He has two grown children and 7 grand children  Lives by himself    Exercise: Sedentary  "Doesn't play golf"    Diet: Fluid restriction    Tobacco: Former. 31PPY  Tobacco Use: Medium Risk (11/21/2023)    Patient History     Smoking Tobacco Use: Former     Smokeless Tobacco Use: Never     Passive Exposure: Not on file        Alcohol: Denies    Recreational drug use: Denies    Recent travel: Denies      Most recent laboratories reviewed:    Recent Labs   Lab 11/03/23  0436 11/04/23  0350 " 11/21/23  1005   WBC 9.00 9.94 9.45   Hemoglobin 10.1 L 9.8 L 10.0 L   Hematocrit 31.5 L 32.2 L 31.9 L   MCV 95 98 98   Platelets 204 169 246       Recent Labs   Lab 08/25/21  1246 09/15/21  0858 04/06/22  0840 09/16/22  1546 11/04/23  0350 11/04/23  1026 11/21/23  1005   Glucose 97 96 118 H  118 H   < > 102  100 145 H 96   Sodium 139 142 143  143   < > 144  143 140 140   Potassium 4.9 4.1 4.6  4.6   < > 4.7  4.6 4.5 4.3   BUN 28 H 29 H 39 H  39 H   < > 51 H  57 H 51 H 49 H   Creatinine 1.1 1.1 1.1  1.1   < > 0.9  1.1 1.1 1.3   eGFR if non African American >60.0 >60.0 >60.0  >60.0  --   --   --   --    Total Bilirubin  --   --  0.8   < > 0.8  0.8 0.7 0.6   AST  --   --  22   < > 14  14 13 19   ALT  --   --  36   < > 9 L  10 8 L 18    < > = values in this interval not displayed.       Recent Labs   Lab 06/18/21  1430 07/15/21  0955 11/21/23  1005   Hemoglobin A1C 5.9 H 5.5 5.4       Recent Labs   Lab 04/06/22  0840 07/03/23  0833 11/21/23  1005   Cholesterol 117 L 164 87 L   Triglycerides 95 72 50   HDL 36 L 46 32 L   LDL Cholesterol 62.0 L 103.6 45.0 L   Non-HDL Cholesterol 81 118 55       Recent Labs   Lab 01/07/21  0830 07/15/21  0955 04/06/22  0840 11/02/23  0336 11/21/23  1005   TSH  --  2.667 1.856 1.983  --    PSA, Screen 3.0  --  2.5  --  2.9       Recent Labs   Lab 11/21/23  1005   Hepatitis C Ab Non-reactive         ECG 11/1/23:  Sinus bradycardia and artifact   Right bundle branch block , plus right ventricular hypertrophy     Healthcare Maintenance:  Colon cancer screening: Last Colonoscopy completed on 11/20/2014   Vaccinations: Pneumonia 2018, Zoster 2018, Tetanus 2014  COVID vaccination: completed x 4  Depression screening: PHQ2 score = 0    Annual visit approx. Month: November ROS  11-point review of systems done. Negative except for detailed in the HPI.        Objective:      BP (!) 109/55   Pulse 88   Ht 6' (1.829 m)   Wt 97.1 kg (214 lb 1.1 oz)   SpO2 98%   BMI 29.03 kg/m²       Physical Exam   General appearance: Good general aspect, NAD, conversant. Significant SOB, speaking in short sentences.  Eyes and HEENT: Normal sclerae, moist mucous membranes, no thyromegaly or lymphadenopathy  Respiratory: No work of breathing, clear to auscultation bilaterally. No rales, rhonchi, wheezing, or rubs.  Cardiovascular: PMI not displaced. RRR. Normal S1, S2. No murmurs, rubs or gallops.  Abdomen and : Soft, non-tender, nondistended, BS, no hepatosplenomegaly, no masses.  Extremities: no edemas, no extremity lymphadenopathy, no clubbing, no cyanosis.  Nervous System: Alert and oriented x 3, good concentration, no deficits.  Skin: Normal temperature, turgor and texture; no rash, ulcers or subcutaneous nodules  Psych: Appropriate affect, alert and oriented to person, place and time          Assessment:       1. Encounter for medical examination to establish care  Assessment & Plan:  Patient requests to transfer healthcare services to our practice.  We are pleased to continue provided primary care services as requested.        2. Chronic combined systolic (congestive) and diastolic (congestive) heart failure  Assessment & Plan:  Patient is identified as having Combined Systolic and Diastolic heart failure that is Chronic. CHF is currently controlled.     Latest ECHO ECHO 11/1/23    CARSON done to assist with Mitraclip implantation.    Left Ventricle: The left ventricle is moderately dilated. There is normal systolic function with a visually estimated ejection fraction of 55 - 60%.    Right Ventricle: Right ventricular enlargement. Systolic function is mildly reduced.    Mitral Valve: Severe regurgitation s/p mitraclip with reduction to mild-moderate regurgitation with pulmonary vein systolic reversal transitioning to forward flow, increase in SBP >10 mmHg and significant reduction in color flow doppler signal. The mean pressure gradient across the mitral valve is 2 mmHg at a heart rate of 65 bpm s/p  MitraClip. Iatrogenic ASD is present.    Continue Beta Blocker and monitor clinical status closely.   Continue torsemide PRN.  Referral to heart failure clinic.  CHF pathway.  Monitor strict Is&Os and daily weights.    Will discuss with cardiology about resuming MRAs and initiating SGLT2i.    Orders:  -     Ambulatory referral/consult to General Congestive Heart Failure Clinic; Future; Expected date: 11/28/2023  -     dapagliflozin propanediol (FARXIGA) 5 mg Tab tablet; Take 1 tablet (5 mg total) by mouth once daily.  Dispense: 90 tablet; Refill: 3    3. Severe mitral regurgitation  Overview:  S/P mitral clip (11/2023)    Assessment & Plan:  Continue follow up with cardiology.    Orders:  -     Ambulatory referral/consult to Internal Medicine    4. S/P mitral valve clip implantation  Assessment & Plan:  Continue f/u with cardiology    Orders:  -     Ambulatory referral/consult to Internal Medicine    5. Stage 3a chronic kidney disease  -     Ambulatory referral/consult to Internal Medicine    6. Normocytic anemia  Assessment & Plan:  Lab Results   Component Value Date    WBC 9.45 11/21/2023    HGB 10.0 (L) 11/21/2023    HCT 31.9 (L) 11/21/2023    MCV 98 11/21/2023     11/21/2023     Chronic  Noted since July of 2023  Possibly multifactorial, 2/2: HF, valvular disease, iron deficiency, CKD.  Very symptomatic at this time (increased SOB)  Will start treatment with oral iron and will refer to hematology for evaluation.      Orders:  -     Ferritin; Future; Expected date: 11/21/2023  -     Iron and TIBC; Future; Expected date: 11/21/2023  -     Vitamin B12; Future; Expected date: 11/21/2023  -     Folate; Future; Expected date: 11/21/2023  -     LACTATE DEHYDROGENASE; Future; Expected date: 11/21/2023  -     Reticulocytes; Future; Expected date: 11/21/2023  -     HAPTOGLOBIN; Future; Expected date: 11/21/2023  -     Ambulatory referral/consult to Hematology / Oncology; Future; Expected date: 11/28/2023  -      ferrous sulfate (FEOSOL) 325 mg (65 mg iron) Tab tablet; Take 1 tablet (325 mg total) by mouth daily with breakfast.  Dispense: 90 tablet; Refill: 3    7. Vitamin D deficiency  Assessment & Plan:  Cholecalciferol ordered 5000 U daily    Orders:  -     cholecalciferol, vitamin D3, 125 mcg (5,000 unit) capsule; Take 1 capsule (5,000 Units total) by mouth once daily.  Dispense: 90 capsule; Refill: 3    8. Other vitamin B12 deficiency anemias  -     Vitamin B12; Future; Expected date: 11/21/2023  -     Folate; Future; Expected date: 11/21/2023    9. Healthcare maintenance    10. Obstructive sleep apnea  Assessment & Plan:  Not on CPAP  Improved?  Will reassess in future visits to determine need for re-evaluation by sleep medicine.         Plan:       Anemia panel ordered  Referral to [facility]  Started patient on Farxiga, ferrous sulfate and Vitamin D  Referral to heart failure clinic.      Will assume as PCP    Education provided  Lifestyle recommendations given  AVS generated, explained, and sent to the patient through the patient portal.  RTC in : 3 months for a general F/U of HF, anemia.          BONNIE GAONA MD, MPH  Internal Medicine  [facility]  [facility]

## 2023-12-05 NOTE — PROGRESS NOTES
You have been scheduled for your Mitraclip procedure on Wednesday, December 13, 2023.  Please report to the Cardiology Waiting Area on the Third floor of the hospital and check in at 10 AM. You will then be taken to the SSCU (Short Stay Cardiac Unit) and prepared for your procedure. Please be aware that this is not the time of your procedure but the time that you are to arrive.     Preperations for your procedure:  Shower with Dial soap the night before and the morning of your procedure.  No solid foods 8 hours prior to your procedure.  You may have clear liquids until the time of your arrival in SSCU which should be 2 hours prior to your procedure.  You are encouraged to drink at least 8 ounces prior to admission.  Patients with gastric Emptying issues should be fasting for 6- 8 hours prior to the procedure.  Clear liquids include water, black coffee, clear juices, and performance drinks - no pulp or milk.    Heart failure or dialysis patients will be limited to 8 ounces (1 cup) of clear liquids up until 2 hours of the procedure.  You may take your regular morning medications         with as much water as necessary.   4.  If you are on Pradaxa, Eliquis, or coumadin you can hold 3 days prior to the procedure.    Bring your cane and/or walker with you to the hospital.  Bring CPAP/BiPAP, or oxygen if you are on oxygen at home.  Call the office for any signs of infection ( fever, cough, pneumonia, urinary tract, etc.) We cannot implant a device in an infected patient.    The entire procedure may take up to three hours. After the procedure, you will return to your room on the third floor where you will be monitored closely for the next several hours.  Your length of stay in the hospital will be determined by your physician. You may be discharged the same day if your physician is satisfied with your progress.  YOu must have someone to drive you home.    Follow up After Your Procedure  It is required that you return to  "[facility] for follow up in 1month and in 1 year with an echo, lab work, and a visit with your physician. You can follow up with your regular Cardiologist regarding any other heart issues.     If you should have any questions, concerns, or need to change the date of your procedure, please call "[facility] @ [phone]            Special Instructions:    Continue your current medications.    We will admit you overnight the night before for heart failure services (diuresis, etc).    Plan for a overnight stay after your procedure.    THE ABOVE INSTRUCTIONS WERE GIVEN TO THE PATIENT VERBALLY AND THEY VERBALIZED UNDERSTANDING.  THEY DO NOT REQUIRE ANY SPECIAL NEEDS AND DO NOT HAVE ANY LEARNING BARRIERS.          Directions for Reporting to Cardiology Waiting Area in the Hospital  If you park in the Parking Garage:  Take elevators to the1st floor of the parking garage.  Continue past the gift shop, coffee shop, and piano.  Take a right and go to the gold elevators. (Elevator B)  Take the elevator to the 3rd floor.  Follow the arrow on the sign on the wall that says Cath Lab Registration/EP Lab Registration.  Follow the long hallway all the way around until you come to a big open area.  This is the registration area.  Check in at Reception Desk.    OR    If family is dropping you off:  Have them drop you off at the front of the Hospital under the green overhang.  Enter through the doors and take a right.  Take the E elevators to the 3rd floor Cardiology Waiting Area.  Check in at the Reception Desk in the waiting room.            "

## 2023-12-05 NOTE — PROGRESS NOTES
Interventional Cardiology Clinic Note  Reason for Visit: Jocelyne Clip follow up     HPI:     Jose Fonseca Jr. 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 Admitted to CCU from 11/1/2023 to 11/4/2023 for the management of hypotension and KASSIE following outpatient mitral clip placement. Patient appeared volume depleted with improvement in KASSIE following IVFs.     The mitral clip was placed on the A2P2. Regurgitation was reduced from 4+ to 1+. The post-procedure gradient was 1.5 mmHG, pulmonary vein flow was normal and left atrial pressure was 30 mmHG systolic, 15 mmHG mean.     Since the procedure pt has been stable. ECHO performed yesterday noted below showing moderate to severe MR. Since clip he reports his breathing has improved and he feels stronger, although he still feels lethargic which he attributes to his anemia. Before the procedure pt could walk 10 ft, but now pt believes he can walk further. Pt denies PND, orthopnea, palpitations. Pt with weight gain over the last several days, thus he took torsemide for 2 straight days.     TTE 12/4/23     S/p Jocelyne clip. Jocelyne Clip is not attached tp the anterior leaflet there is over riding anterior MV leaflet causing posteriorly directed jet.    Left Ventricle: The left ventricle is normal in size. Normal wall thickness. Normal wall motion. There is normal systolic function with a visually estimated ejection fraction of 55 - 60%. Grade III diastolic dysfunction.    The following segments are akinetic: basal inferior and basal inferolateral.    Right Ventricle: Moderate right ventricular enlargement. Wall thickness is normal. Right ventricle wall motion  is normal. Systolic function is moderately reduced. Right ventricular global longitudinal strain is - 10.0 %.    Left Atrium: Left atrium is moderately dilated.    Right Atrium: Right atrium is mildly  dilated.    Aortic Valve: The aortic valve is a trileaflet valve. There is mild aortic valve sclerosis. There is moderate annular calcification present.    Mitral Valve: There is mild mitral annular calcification present. There is moderate to severe regurgitation with an eccentric jet and a wall hugging jet.    Tricuspid Valve: There is mild regurgitation.    Aorta: Aortic root is normal in size measuring 3.46 cm. Ascending aorta is normal measuring 2.89 cm.    Pulmonary Artery: The estimated pulmonary artery systolic pressure is 67 mmHg.    IVC/SVC: Intermediate venous pressure at 8 mmHg.            ROS:    Constitution: Negative for fever, chills, weight loss or gain.   HENT: Negative for sore throat, rhinorrhea, or headache.  Eyes: Negative for blurred or double vision.   Cardiovascular: See above  Pulmonary: Negative for SOB   Gastrointestinal: Negative for abdominal pain, nausea, vomiting, or diarrhea.   : Negative for dysuria.   Neurological: Negative for focal weakness or sensory changes.  PMH:     Past Medical History:   Diagnosis Date    Chronic systolic heart failure 03/27/2023    Coronary artery disease     S/P 3 vessel CABG; stents    Glaucoma     HTN (hypertension) 04/19/2013    Hx of calcium pyrophosphate deposition disease (CPPD) 08/16/2021    XR CPPD knees 2021    Hyperlipidemia     Myocardial infarction     Obstructive sleep apnea     Post PTCA 02/18/2015    S/P CABG (coronary artery bypass graft) 02/18/2015    S/P L arthroscopy of shoulder (3/18/15) 04/01/2015    Squamous cell carcinoma      Past Surgical History:   Procedure Laterality Date    ADENOIDECTOMY      ANGIOGRAM, CORONARY, WITH LEFT HEART CATHETERIZATION N/A 09/15/2021    Procedure: ANGIOGRAM,CORONARY,WITH LEFT HEART CATHETERIZATION;  Surgeon: Ken Ibarra MD;  Location: [facility] CATH LAB;  Service: Cardiology;  Laterality: N/A;    CATARACT EXTRACTION      patient not sure which eye    CORONARY ANGIOGRAPHY N/A 05/22/2019    Procedure:  ANGIOGRAM, CORONARY ARTERY;  Surgeon: Ken Ibarra MD;  Location: [facility] CATH LAB;  Service: Cardiology;  Laterality: N/A;    CORONARY ANGIOGRAPHY INCLUDING BYPASS GRAFTS WITH CATHETERIZATION OF LEFT HEART Right 04/22/2019    Procedure: ANGIOGRAM, CORONARY, INCLUDING BYPASS GRAFT, WITH LEFT HEART CATHETERIZATION;  Surgeon: Ken Ibarra MD;  Location: [facility] CATH LAB;  Service: Cardiology;  Laterality: Right;    CORONARY ANGIOPLASTY      CORONARY ARTERY BYPASS GRAFT      CORONARY BYPASS GRAFT ANGIOGRAPHY  09/15/2021    Procedure: Bypass graft study;  Surgeon: Ken Ibarra MD;  Location: [facility] CATH LAB;  Service: Cardiology;;    LEFT HEART CATHETERIZATION N/A 05/22/2019    Procedure: CATHETERIZATION, HEART, LEFT;  Surgeon: Ken Ibarra MD;  Location: [facility] CATH LAB;  Service: Cardiology;  Laterality: N/A;    LUMBAR DISCECTOMY  11/01/2022    LUMBAR LAMINECTOMY      MITRAL CLIP N/A 11/1/2023    Procedure: Mitral clip;  Surgeon: Ken Ibarra MD;  Location: [facility] CATH LAB;  Service: Cardiology;  Laterality: N/A;    RIGHT HEART CATHETERIZATION Right 7/14/2023    Procedure: INSERTION, CATHETER, RIGHT HEART;  Surgeon: Ken Will MD;  Location: [facility] CATH LAB;  Service: Cardiology;  Laterality: Right;    RIGHT HEART CATHETERIZATION Right 7/17/2023    Procedure: INSERTION, CATHETER, RIGHT HEART;  Surgeon: Dalia Amezquita DO;  Location: [facility] CATH LAB;  Service: Cardiology;  Laterality: Right;    ROTATOR CUFF REPAIR      SHOULDER SURGERY      TONSILLECTOMY Left 2015    TRANSESOPHAGEAL ECHOCARDIOGRAPHY N/A 11/1/2023    Procedure: ECHOCARDIOGRAM, TRANSESOPHAGEAL;  Surgeon: Jared Jackson MD;  Location: [facility] CATH LAB;  Service: Cardiology;  Laterality: N/A;     Allergies:   Review of patient's allergies indicates:  No Known Allergies  Medications:     Current Outpatient Medications on File Prior to Visit   Medication Sig Dispense Refill    allopurinol (ZYLOPRIM) 300 MG tablet Take 1 tablet (300  mg total) by mouth every evening. 30 tablet 6    aspirin (ECOTRIN) 81 MG EC tablet Take 1 tablet (81 mg total) by mouth once daily. 30 tablet 11    atorvastatin (LIPITOR) 80 MG tablet Take 1 tablet (80 mg total) by mouth once daily. 90 tablet 3    cholecalciferol, vitamin D3, 125 mcg (5,000 unit) capsule Take 1 capsule (5,000 Units total) by mouth once daily. 90 capsule 3    dapagliflozin propanediol (FARXIGA) 5 mg Tab tablet Take 1 tablet (5 mg total) by mouth once daily. 90 tablet 3    ferrous sulfate (FEOSOL) 325 mg (65 mg iron) Tab tablet Take 1 tablet (325 mg total) by mouth daily with breakfast. 90 tablet 3    metoprolol succinate (TOPROL-XL) 25 MG 24 hr tablet Take 1 tablet (25 mg total) by mouth once daily. 90 tablet 3    tamsulosin (FLOMAX) 0.4 mg Cap Take 1 capsule by mouth once daily.      torsemide (DEMADEX) 20 MG Tab Take 2 tablets (40 mg total) by mouth as needed (Take Torsemide 40 mg as needed for weight gain of 3 lbs in one day or 5 lbs in one week). Take Torsemide 40 mg as needed for weight gain of 3 lbs in one day or 5 lbs in one week 360 tablet 3     No current facility-administered medications on file prior to visit.     Social History:     Social History     Tobacco Use    Smoking status: Former     Current packs/day: 0.00     Average packs/day: 1 pack/day for 31.0 years (31.0 ttl pk-yrs)     Types: Cigarettes     Start date: 1960     Quit date: 1991     Years since quittin.6    Smokeless tobacco: Never   Substance Use Topics    Alcohol use: Yes     Alcohol/week: 2.0 standard drinks of alcohol     Types: 2 Glasses of wine per week     Comment: rare     Family History:     Family History   Problem Relation Age of Onset    Heart disease Father     Hypertension Father     Heart failure Father     Heart attack Father     Diabetes Maternal Grandmother     Diabetes Maternal Grandfather     Melanoma Neg Hx      Physical Exam:   There were no vitals taken for this visit.   Wt Readings  from Last 4 Encounters:   12/04/23 97.1 kg (214 lb)   11/21/23 97.1 kg (214 lb 1.1 oz)   11/02/23 96.1 kg (211 lb 13.8 oz)   11/01/23 92.1 kg (203 lb)         Constitutional: No distress, obese, conversant  HEENT: Sclera anicteric, PERRLA, EOMI  Neck: No JVD, no masses, good movement  CV: RRR, S1 and S2 normal, no additional heart sounds or murmurs. Pulses 2+ and equal bilaterally in radial arteries, Darren's normal on right. Distal pulses are 2+ and equal in the femoral, DP and PT areas bilaterally  Pulm: Clear to auscultation bilaterally with symmetrical expansion. Chest wall palpated for reproduction of pain symptoms, and no pain was able to be produced on palpation or resistance exercises  GI: Abdomen soft, non-tender, good bowel sounds  Extremities: Both extremities intact and grossly normal, skin is warm, no edema noted  Skin: No ecchymosis, erythema, or ulcers  Psych: AOx3, appropriate affect  Neuro: CNII-XII intact, no focal deficits      Labs:     Lab Results   Component Value Date     12/04/2023    K 3.9 12/04/2023     12/04/2023    CO2 18 (L) 12/04/2023    BUN 56 (H) 12/04/2023    CREATININE 1.6 (H) 12/04/2023    GLUCOSE 118 (H) 11/29/2022    ANIONGAP 12 12/04/2023     Lab Results   Component Value Date    HGBA1C 5.4 11/21/2023     Lab Results   Component Value Date     (H) 11/01/2023     (H) 07/03/2023    BNP 1,059 (H) 03/24/2023    Lab Results   Component Value Date    WBC 9.15 12/04/2023    HGB 10.5 (L) 12/04/2023    HCT 32.8 (L) 12/04/2023    HCT 26 (L) 11/01/2023     12/04/2023    GRAN 7.8 (H) 12/04/2023    GRAN 85.4 (H) 12/04/2023     Lab Results   Component Value Date    CHOL 87 (L) 11/21/2023    HDL 32 (L) 11/21/2023    LDLCALC 45.0 (L) 11/21/2023    TRIG 50 11/21/2023          Imaging:         EF   Date Value Ref Range Status   07/17/2023 60 % Final   07/03/2023 50 % Final   05/05/2021 55 % Final       EKG: Sinus shaylee, RBBB  TTE: Noted above   Coronary  Angiography:  9/15/21   The Prox RCA lesion was 80% stenosed.  The Mid RCA lesion was 80% stenosed.  The Prox Cx lesion was 70% stenosed.  The estimated blood loss was none.  There was three vessel coronary artery disease.  Patent LIMA to OM and SVG to RCA.  No change in anatomy since prior angiogram.  Plan:     1. S/P mitral valve clip implantation  2. Severe mitral regurgitation  -The mitral clip was placed on the A2P2. Regurgitation was reduced from 4+ to 1+. The post-procedure gradient was 1.5 mmHG, pulmonary vein flow was normal and left atrial pressure was 30 mmHG systolic, 15 mmHG mean.   -Since the procedure pt has been stable. ECHO performed 12/4/23 shows moderate to severe MR and the Jocelyne Clip is not attached tp the anterior leaflet there is over riding anterior MV leaflet causing posteriorly directed jet.   -Pt will need re-do Jocelyne Clip   -The risks, benefits & alternatives of the procedure were explained to the patient.   -The risks of MitralClip include but are not limited to: Bleeding, infection, heart rhythm abnormalities, allergic reactions, kidney injury requiring dilaysis, stroke and death.   -Informed consent was obtained & the patient is agreeable to proceed with the procedure.    [facility] (Date 09/2021):  Patent LIMA to OM and SVG to RCA.  PFTs: FEV1 not indicated  Comorbidities: coronary artery disease, hx of CABG, CKD   Labs: Hgb 1 Cr 1.6     3. Coronary artery disease involving native coronary artery of native heart without angina pectoris  Continue ASA, BB and statin     4. Hyperlipidemia, unspecified hyperlipidemia type  Continue statin   LDL at goal. Was 45 on last check on 11/21/23     5. Hypertension, unspecified type  /64 in clinic   Continue current regimen     6. Chronic diastolic heart failure  Continue SGLT-2 inhibitor, Torsemide as needed     Keep a home BP diary and bring to next visit  Discussed mediterranean diet  Discussed exercise therapy based on 150-min per week AHA  recommendations for moderate intensity exercise/75 min for high-intensity exercise    Signed:  Jacques Lorenzo MD  Cardiology Fellow  Pager - [phone]  [facility]  12/5/2023 8:47 AM    I have seen the patient, reviewed the Fellow's history and physical, assessment and plan. I have personally interviewed and examined the patient and agree with the findings.  78-year-old male with ischemic cardiomyopathy who is status post multiple coronary stenting procedures as well as bypass surgery.  He was deemed inoperable for severe mitral regurgitation which was clipped with MitraClip proximally 1 month ago.  He returns today for follow-up echo which demonstrates SL D of the anterior leaflet.  There is no mitral stenosis the clip is secured to the posterior leaflet and I have recommended to proceed forward with repeat MitraClip and have reviewed both transthoracic echo today in transesophageal echo 1 month ago during the procedure which demonstrates a good tissue bridge and a properly placed clip.  Patient understands and wishes to proceed.    LARRY Ibarra MD

## 2024-04-03 DIAGNOSIS — I50.22 CHRONIC SYSTOLIC CONGESTIVE HEART FAILURE: ICD-10-CM

## 2024-04-03 RX ORDER — LISINOPRIL 40 MG/1
40 TABLET ORAL NIGHTLY
Qty: 90 TABLET | Refills: 3 | OUTPATIENT
Start: 2024-04-03

## (undated) DEVICE — SHEATH INTRODUCER 7FR 11CM

## (undated) DEVICE — TRAY CATH LAB OMC

## (undated) DEVICE — SET MICROPUNCTURE 5FR 501NT

## (undated) DEVICE — TRANSDUCER ADULT DISP

## (undated) DEVICE — CATH IMA INFINITI 4FRX100CM

## (undated) DEVICE — SPIKE CONTRAST CONTROLLER

## (undated) DEVICE — OMNIPAQUE 350MG 150ML VIAL

## (undated) DEVICE — OMNIPAQUE 350 200ML

## (undated) DEVICE — PROTECTION STATION PLUS

## (undated) DEVICE — CATH MPA2 INFINITI 4FR 100CM

## (undated) DEVICE — CATH ANG PIGTAIL 4FR INFINITY

## (undated) DEVICE — SHEATH INTRODUCER 6FR 11CM

## (undated) DEVICE — NDL BROCKENBROUGH CRV 71CM

## (undated) DEVICE — CATH JL5 4FR INFINITY

## (undated) DEVICE — SHEATH INTRODUCER 4FR 11CM

## (undated) DEVICE — GUIDEWIRE EMERALD 150CM PTFE

## (undated) DEVICE — KIT CUSTOM MANIFOLD

## (undated) DEVICE — COVER TABLE 44X90 STERILE

## (undated) DEVICE — FLUID DELIVERY SET WITH FILTER

## (undated) DEVICE — CATH INFINITI JUDKINS JR4

## (undated) DEVICE — SET BASIN 48X48IN 6000ML RING

## (undated) DEVICE — GUIDEWIRE AMPLATZ .035X260

## (undated) DEVICE — GUIDEWIRE TORAY INOUE

## (undated) DEVICE — SEE MEDLINE ITEM 156894

## (undated) DEVICE — KIT MICROINTRO 4F .018X40X7CM

## (undated) DEVICE — STOPCOCK 3-WAY

## (undated) DEVICE — SHEATH INTRODUCER 8FR 11CM

## (undated) DEVICE — CATH SWAN GANZ STND 7FR

## (undated) DEVICE — PAD DEFIB CADENCE ADULT R2

## (undated) DEVICE — INTRO FAST-CATH SL1 8.5FR 63CM

## (undated) DEVICE — TUBING HPCIL ROT M/F ADPT 10IN

## (undated) DEVICE — CATH INFINITI 4F JL4 .042X100

## (undated) DEVICE — CATH SUPER TORQUE MP 4FRX80CM

## (undated) DEVICE — SPIKE SHORT LG BORE 1-WAY 2IN

## (undated) DEVICE — KIT PROBE COVER WITH GEL